# Patient Record
Sex: FEMALE | Race: WHITE | NOT HISPANIC OR LATINO | Employment: OTHER | URBAN - METROPOLITAN AREA
[De-identification: names, ages, dates, MRNs, and addresses within clinical notes are randomized per-mention and may not be internally consistent; named-entity substitution may affect disease eponyms.]

---

## 2017-01-18 ENCOUNTER — HISTORICAL (OUTPATIENT)
Dept: RADIOLOGY | Facility: HOSPITAL | Age: 67
End: 2017-01-18

## 2017-03-01 ENCOUNTER — HISTORICAL (OUTPATIENT)
Dept: LAB | Facility: HOSPITAL | Age: 67
End: 2017-03-01

## 2017-06-19 ENCOUNTER — HISTORICAL (OUTPATIENT)
Dept: SURGERY | Facility: HOSPITAL | Age: 67
End: 2017-06-19

## 2017-06-22 ENCOUNTER — HISTORICAL (OUTPATIENT)
Dept: ANESTHESIOLOGY | Facility: HOSPITAL | Age: 67
End: 2017-06-22

## 2017-12-01 ENCOUNTER — HISTORICAL (OUTPATIENT)
Dept: LAB | Facility: HOSPITAL | Age: 67
End: 2017-12-01

## 2018-01-26 ENCOUNTER — HISTORICAL (OUTPATIENT)
Dept: RADIOLOGY | Facility: HOSPITAL | Age: 68
End: 2018-01-26

## 2018-04-17 ENCOUNTER — HISTORICAL (OUTPATIENT)
Dept: LAB | Facility: HOSPITAL | Age: 68
End: 2018-04-17

## 2018-06-08 ENCOUNTER — HISTORICAL (OUTPATIENT)
Dept: CARDIOLOGY | Facility: HOSPITAL | Age: 68
End: 2018-06-08

## 2019-04-26 ENCOUNTER — HISTORICAL (OUTPATIENT)
Dept: RADIOLOGY | Facility: HOSPITAL | Age: 69
End: 2019-04-26

## 2019-12-10 ENCOUNTER — HISTORICAL (OUTPATIENT)
Dept: RADIOLOGY | Facility: HOSPITAL | Age: 69
End: 2019-12-10

## 2020-02-03 ENCOUNTER — HISTORICAL (OUTPATIENT)
Dept: SURGERY | Facility: HOSPITAL | Age: 70
End: 2020-02-03

## 2020-05-05 ENCOUNTER — HISTORICAL (OUTPATIENT)
Dept: ADMINISTRATIVE | Facility: HOSPITAL | Age: 70
End: 2020-05-05

## 2020-06-07 ENCOUNTER — HISTORICAL (OUTPATIENT)
Dept: ADMINISTRATIVE | Facility: HOSPITAL | Age: 70
End: 2020-06-07

## 2020-06-13 LAB
FINAL CULTURE: NORMAL
FINAL CULTURE: NORMAL

## 2020-10-07 ENCOUNTER — HISTORICAL (OUTPATIENT)
Dept: RADIOLOGY | Facility: HOSPITAL | Age: 70
End: 2020-10-07

## 2021-05-25 ENCOUNTER — HISTORICAL (OUTPATIENT)
Dept: RADIOLOGY | Facility: HOSPITAL | Age: 71
End: 2021-05-25

## 2021-06-08 ENCOUNTER — HISTORICAL (OUTPATIENT)
Dept: RADIOLOGY | Facility: HOSPITAL | Age: 71
End: 2021-06-08

## 2021-06-15 ENCOUNTER — HISTORICAL (OUTPATIENT)
Dept: ANESTHESIOLOGY | Facility: HOSPITAL | Age: 71
End: 2021-06-15

## 2021-10-04 ENCOUNTER — HISTORICAL (OUTPATIENT)
Dept: RADIOLOGY | Facility: HOSPITAL | Age: 71
End: 2021-10-04

## 2021-10-12 ENCOUNTER — HISTORICAL (OUTPATIENT)
Dept: RADIOLOGY | Facility: HOSPITAL | Age: 71
End: 2021-10-12

## 2022-01-04 ENCOUNTER — HISTORICAL (OUTPATIENT)
Dept: ADMINISTRATIVE | Facility: HOSPITAL | Age: 72
End: 2022-01-04

## 2022-01-04 LAB
ABS NEUT (OLG): 5.9 X10(3)/MCL (ref 1.5–6.9)
ALBUMIN SERPL-MCNC: 2.9 GM/DL (ref 3.4–4.8)
ALBUMIN/GLOB SERPL: 0.8 RATIO (ref 1.1–2)
ALP SERPL-CCNC: 136 UNIT/L (ref 40–150)
ALT SERPL-CCNC: 14 UNIT/L (ref 0–55)
AST SERPL-CCNC: 17 UNIT/L (ref 5–34)
BASOPHILS # BLD AUTO: 0 X10(3)/MCL (ref 0–0.1)
BASOPHILS NFR BLD AUTO: 0 % (ref 0–1)
BILIRUB SERPL-MCNC: 1.3 MG/DL
BILIRUBIN DIRECT+TOT PNL SERPL-MCNC: 0.5 MG/DL (ref 0–0.5)
BILIRUBIN DIRECT+TOT PNL SERPL-MCNC: 0.8 MG/DL (ref 0–0.8)
BUN SERPL-MCNC: 18 MG/DL (ref 9.8–20.1)
CALCIUM SERPL-MCNC: 9.5 MG/DL (ref 8.7–10.5)
CHLORIDE SERPL-SCNC: 114 MMOL/L (ref 98–107)
CO2 SERPL-SCNC: 24 MMOL/L (ref 23–31)
CREAT SERPL-MCNC: 2.97 MG/DL (ref 0.55–1.02)
EOSINOPHIL # BLD AUTO: 0.3 X10(3)/MCL (ref 0–0.6)
EOSINOPHIL NFR BLD AUTO: 4 % (ref 0–5)
ERYTHROCYTE [DISTWIDTH] IN BLOOD BY AUTOMATED COUNT: 15.1 % (ref 11.5–17)
GLOBULIN SER-MCNC: 3.8 GM/DL (ref 2.4–3.5)
GLUCOSE SERPL-MCNC: 88 MG/DL (ref 82–115)
HCT VFR BLD AUTO: 35 % (ref 36–48)
HGB BLD-MCNC: 10.9 GM/DL (ref 12–16)
IMM GRANULOCYTES # BLD AUTO: 0.06 10*3/UL (ref 0–0.02)
IMM GRANULOCYTES NFR BLD AUTO: 0.8 % (ref 0–0.43)
LYMPHOCYTES # BLD AUTO: 0.8 X10(3)/MCL (ref 0.5–4.1)
LYMPHOCYTES NFR BLD AUTO: 11 % (ref 15–40)
MCH RBC QN AUTO: 29 PG (ref 27–34)
MCHC RBC AUTO-ENTMCNC: 31 GM/DL (ref 31–36)
MCV RBC AUTO: 93 FL (ref 80–99)
MONOCYTES # BLD AUTO: 0.3 X10(3)/MCL (ref 0–1.1)
MONOCYTES NFR BLD AUTO: 4 % (ref 4–12)
NEUTROPHILS # BLD AUTO: 5.9 X10(3)/MCL (ref 1.5–6.9)
NEUTROPHILS NFR BLD AUTO: 80 % (ref 43–75)
PLATELET # BLD AUTO: 190 X10(3)/MCL (ref 140–400)
PMV BLD AUTO: 10.7 FL (ref 6.8–10)
POTASSIUM SERPL-SCNC: 4.2 MMOL/L (ref 3.5–5.1)
PROT SERPL-MCNC: 6.7 GM/DL (ref 5.8–7.6)
RBC # BLD AUTO: 3.77 X10(6)/MCL (ref 4.2–5.4)
SODIUM SERPL-SCNC: 145 MMOL/L (ref 136–145)
WBC # SPEC AUTO: 7.4 X10(3)/MCL (ref 4.5–11.5)

## 2022-01-07 ENCOUNTER — HISTORICAL (OUTPATIENT)
Dept: ADMINISTRATIVE | Facility: HOSPITAL | Age: 72
End: 2022-01-07

## 2022-04-04 ENCOUNTER — HISTORICAL (OUTPATIENT)
Dept: ADMINISTRATIVE | Facility: HOSPITAL | Age: 72
End: 2022-04-04

## 2022-04-04 ENCOUNTER — HISTORICAL (OUTPATIENT)
Dept: LAB | Facility: HOSPITAL | Age: 72
End: 2022-04-04

## 2022-04-04 LAB
ABS NEUT (OLG): 4.7 (ref 1.5–6.9)
BASOPHILS # BLD AUTO: 0 10*3/UL (ref 0–0.1)
BASOPHILS NFR BLD AUTO: 0 % (ref 0–1)
BUN SERPL-MCNC: 19 MG/DL (ref 9.8–20.1)
CALCIUM SERPL-MCNC: 9.8 MG/DL (ref 8.7–10.5)
CHLORIDE SERPL-SCNC: 113 MMOL/L (ref 98–107)
CO2 SERPL-SCNC: 16 MMOL/L (ref 23–31)
CREAT SERPL-MCNC: 2.48 MG/DL (ref 0.55–1.02)
CREAT/UREA NIT SERPL: 8
EOSINOPHIL # BLD AUTO: 0.3 10*3/UL (ref 0–0.6)
EOSINOPHIL NFR BLD AUTO: 4 % (ref 0–5)
ERYTHROCYTE [DISTWIDTH] IN BLOOD BY AUTOMATED COUNT: 13.8 % (ref 11.5–17)
GLUCOSE SERPL-MCNC: 93 MG/DL (ref 82–115)
HCT VFR BLD AUTO: 34.8 % (ref 36–48)
HEMOLYSIS INTERF INDEX SERPL-ACNC: >10
HGB BLD-MCNC: 11.2 G/DL (ref 12–16)
ICTERIC INTERF INDEX SERPL-ACNC: 0
IMM GRANULOCYTES # BLD AUTO: 0.02 10*3/UL (ref 0–0.02)
IMM GRANULOCYTES NFR BLD AUTO: 0.3 % (ref 0–0.43)
LIPEMIC INTERF INDEX SERPL-ACNC: >10
LYMPHOCYTES # BLD AUTO: 1.6 10*3/UL (ref 0.5–4.1)
LYMPHOCYTES NFR BLD AUTO: 22 % (ref 15–40)
MANUAL DIFF? (OHS): NO
MCH RBC QN AUTO: 31 PG (ref 27–34)
MCHC RBC AUTO-ENTMCNC: 32 G/DL (ref 31–36)
MCV RBC AUTO: 96 FL (ref 80–99)
MONOCYTES # BLD AUTO: 0.4 10*3/UL (ref 0–1.1)
MONOCYTES NFR BLD AUTO: 6 % (ref 4–12)
NEUTROPHILS # BLD AUTO: 4.7 10*3/UL (ref 1.5–6.9)
NEUTROPHILS NFR BLD AUTO: 67 % (ref 43–75)
PLATELET # BLD AUTO: 148 10*3/UL (ref 140–400)
PMV BLD AUTO: 11.8 FL (ref 6.8–10)
POTASSIUM SERPL-SCNC: 4.6 MMOL/L (ref 3.5–5.1)
RBC # BLD AUTO: 3.64 10*6/UL (ref 4.2–5.4)
SODIUM SERPL-SCNC: 142 MMOL/L (ref 136–145)
WBC # SPEC AUTO: 7.1 10*3/UL (ref 4.5–11.5)

## 2022-04-30 NOTE — OP NOTE
PREOPERATIVE DIAGNOSES:    1. History of colon cancer.    2. History of XRT to the pelvis due to cervical cancer remotely.    PROCEDURES PERFORMED:  A rectal stump proctoscopy as well as endoscopic visualization of the terminal ileum.    POSTOPERATIVE DIAGNOSES:    1. Normal terminal ileum.  2. 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    INDICATIONS:    A pleasant 66-year-old white female patient who, in 1996, had colon cancer which required total colectomy.  She had recurrence in 1999.  At that point, the patient had a revision of her colostomy and had an ileostomy as well.  She also in that time period developed cervical cancer and ended up having some radiation to the pelvis as well.     She had some surveillance by Dr. Watson, the surgeon, but has not had these in quite some time.  The patient presented to my office for local followup.  She denies any alarm symptoms.     The patient was consented for the procedure prior to.  The risks and benefits of the procedure were explained to her in detail.  She was willing to undergo those risks.    PROCEDURE IN DETAIL:  The patient was brought down to the endoscopy room suite, laid in a left lateral decubitus position.  I performed a rectal exam at first.  She had essentially a tight anus, too tight for the colonoscope.  At that point, the Olympus gastroscope was then lubricated after the digital exam was performed.  The digital exam was largely unrevealing.  No masses.  Fixed rectal tone.     The Olympus gastroscope was then lubricated and inserted into the rectal vault.  She had about 5-10 cm of rectal vault still present.  It was somewhat erythematous and difficult to tell whether this was trauma or underlying proctitis.  None the less, because that, I biopsied these areas.  I did not see any masses or polyps, but just a closed end of the rectal stump.  These biopsies were sent off in a jar.  The scope was then pulled out gently, and  then we proceeded with evaluation of the terminal ileum.  The Olympus gastroscope was then lubricated once more and then inserted into the ileostomy site.  I was able to get about 15-20 cm into the terminal ileum.  This was very healthy-appearing tissue.  No abnormalities.  No polyps or any masses.  The ostomy itself looked very clean and healthy.     No biopsies taken.     In summary, a 66-year-old white female with a history of colon cancer, status post endoscopic evaluation of the rectum and terminal ileum without any signs of recurrence.     We will follow up on those biopsies of the rectum and see if these have any bearing or significance.  This is likely due to chronic old changes to the rectum due to XRT.  I do not suspect malignancy, but will discuss this with her at a later date.     Repeat endoscopy will be based off my biopsies and further communication and delineation of her previous endoscopies.  It will likely be in the next 5 years.        ANDREW/NHUNG   DD: 06/22/2017 0954   DT: 06/22/2017 1019  Job # [id number]    cc: ANGY Arredondo III, M.D. Stephen R Cannon, M.D.

## 2022-05-02 NOTE — HISTORICAL [facility] CERNER
This is a historical note converted from [employer]. Formatting and pictures may have been removed.  Please reference [employer] for original formatting and attached multimedia. Reason for Consultation  Alex?  History of Present Illness  68 y/o  female ,with h/o radiation cystitis,chronic UT is , vesicular-vaginal and vesicular-enteral fistula,admitted with severe Alex non oliguric renal failure.  Review of Systems  Pt states feels bad has no n/v but has had increased ileostomy output. No heme present. Remaining ros are negative.  Physical Exam  Vitals & Measurements  T:?36.6? ?C (Oral)? TMIN:?36.5? ?C (Oral)? TMAX:?36.6? ?C (Oral)? HR:?67(Peripheral)? RR:?15? BP:?126/75? SpO2:?100%?  Gen Nad skin in tact no lesions ?Lymph ?no lymphadenopathy.HEENT perrla mouth clear EOMI, NECK no jvd supple trachea in midline. Lungs. Cta. No wheezes or rales no tachypneic. Heart s1s2 no rubs or gallops no pmi ? Abdomen ntnd +bs ostomies noted no infection no guarding ? Back ?no cva. Tenderness ? Extremities +2/4 pulses no c/c/e Neuro C N II - XII intact?  Assessment/Plan  Anemia?D64.9  GERD - Gastro-esophageal reflux disease?K21.9  Hypothyroidism?E03.9  Sepsis due to urinary tract infection?A41.9  Urinary tract infection?N39.0  Alex associated with Uti, improving with abx and fluids, avoid nephrotoxins.  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   Problem List/Past Medical History  Ongoing  Anemia  Colon cancer  GERD - Gastro-esophageal reflux disease  Hydronephrosis  Hypothyroidism  Radiation cystitis  Sepsis due to urinary tract infection  Urinary tract infection  vesicular enteric fistula  vesicularvaginal fistula  Historical  Uterine cancer  Procedure/Surgical History  Cystoscopy (None) (06/26/2019)  Fluoroscopy of Kidneys, Ureters and Bladder using Low Osmolar Contrast (06/26/2019)  Suprapubic Catheter Placement (06/26/2019)  Insertion of Infusion Device into Superior Vena Cava, Percutaneous Approach  (06/24/2019)  Change Drainage Device in Bladder, External Approach (05/04/2019)  Change of cystostomy tube; simple (05/04/2019)  Insertion of Infusion Device into Superior Vena Cava, Percutaneous Approach (07/23/2018)  Ultrasonography of Superior Vena Cava, Guidance (07/23/2018)  Change Drainage Device in Kidney, External Approach (06/08/2018)  Convert nephrostomy catheter to nephroureteral catheter, percutaneous, including diagnostic nephrostogram and/or ureterogram when performed, imaging guidance (eg, ultrasound and/or fluoroscopy) and all associated radiological supervision and interpretatio (06/08/2018)  Change Drainage Device in Kidney, External Approach (06/06/2018)  Exchange nephrostomy catheter, percutaneous, including diagnostic nephrostogram and/or ureterogram when performed, imaging guidance (eg, ultrasound and/or fluoroscopy) and all associated radiological supervision and interpretation (06/06/2018)  Insertion of Infusion Device into Superior Vena Cava, Percutaneous Approach (06/04/2018)  Ultrasonography of Superior Vena Cava, Guidance (06/04/2018)  Drainage of Left Kidney with Drainage Device, Percutaneous Approach (05/30/2018)  Drainage of Right Kidney with Drainage Device, Percutaneous Approach (05/30/2018)  Fluoroscopy of Bilateral Kidneys (05/30/2018)  Cystoscopy w/ Ureteral Stent (Bilateral) (05/29/2018)  Inspection of Bladder, Via Natural or Artificial Opening Endoscopic (05/29/2018)  Biopsy Gastrointestinal (06/22/2017)  Colonoscopy (06/22/2017)  Excision of Rectum, Via Natural or Artificial Opening Endoscopic (06/22/2017)  Inspection of Lower Intestinal Tract, Via Natural or Artificial Opening Endoscopic (06/22/2017)  Proctosigmoidoscopy, rigid; with biopsy, single or multiple (06/22/2017)  Small intestinal endoscopy, enteroscopy beyond second portion of duodenum, including ileum; diagnostic, with or without collection of specimen(s) by brushing or washing (separate procedure)  (2017)  Drainage of Bladder with Drainage Device, Via Natural or Artificial Opening (11/15/2016)  fistula packing (2015)  Cataract Extraction Phacoemulsification (Right) (2015)  Extracapsular cataract removal with insertion of intraocular lens prosthesis (1 stage procedure), manual or mechanical technique (eg, irrigation and aspiration or phacoemulsification) (2015)  Insertion of intraocular lens prosthesis at time of cataract extraction, one-stage (2015)  Phacoemulsification and aspiration of cataract (2015)  Cystectomy (.) (2013)  Cystoscopy w/ Ureteral Stent (Bilateral) (2013)  Exploration Laparotomy (2013)  Incisional hernia repair (2013)  Insertion of indwelling urinary catheter (2013)  Other lysis of peritoneal adhesions (2013)  Other suprapubic cystostomy (2013)  Repair of fistula involving bladder and intestine (2013)  Retrograde pyelogram (2013)  Revision of stoma of small intestine (2013)  Small Bowel Resection (2013)  Ureteral catheterization (2013)  Venous catheterization, not elsewhere classified (2013)  Appendectomy   section  Cholecystectomy  colon resection  Hernia repair  Hysterectomy  Ileostomy  mediport  Partial thyroidectomy  Urostomy - stoma   Medications  Inpatient  acetaminophen 325 mg oral tablet, 650 mg= 2 tab(s), Oral, q4hr, PRN  acetaminophen 325 mg oral tablet, 650 mg= 2 tab(s), Oral, q4hr, PRN  acetaminophen-hydrocodone 325 mg-10 mg oral tablet, 1 tab(s), Oral, q6hr, PRN  citalopram 20 mg oral tablet, 20 mg= 1 tab(s), Oral, Daily  citric acid-sodium citrate, 30 mL, Oral, TID  Dulcolax Laxative 10 mg RECTAL suppository, 10 mg= 1 supp, ND (rectal), q24hr, PRN  enoxaparin, 30 mg= 0.3 mL, Subcutaneous, Daily  fluconazole 100 mg oral tablet, 100 mg= 1 tab(s), Oral, Daily  IVF D5 ? Normal Saline Infusion 1,000 mL, 1000 mL, IV  levothyroxine 25 mcg (0.025 mg) oral tablet, 25  mcg= 1 tab(s), Oral, Daily  nystatin 100,000 units/g topical cream, 1 jonelle, TOP, QID  pantoprazole, 40 mg= 1 EA, IV Slow, Daily  piperacillin-tazobactam  Seroquel 25 mg oral tablet, 25 mg= 1 tab(s), Oral, At Bedtime  sodium bicarbonate 650mg tablet, 650 mg= 1 tab(s), Oral, QID  Sodium Chloride 0.9% Normal Saline Flush, 10 mL, IV Push, q12hr  Sodium Chloride 0.9% Normal Saline Flush, 10 mL, IV Push, BID, PRN  Synthroid 137 mcg (0.137 mg) oral tablet, 112 mcg= 1 tab(s), Oral, Daily  Zofran 2 mg/mL injectable solution, 8 mg= 4 mL, IV Push, q8hr, PRN  Home  citalopram 20 mg oral tablet, 20 mg= 1 tab(s), Oral, Daily  fluconazole 100 mg oral tablet, 100 mg= 1 tab(s), Oral, Daily  HYDROCODONE-ACETAMIN  MG, Oral, q4-6hr, PRN  Lovenox, 40 mg= 0.4 mL, Subcutaneous, Daily  nystatin 100,000 units/g topical cream, 1 jonelle, TOP, QID  pantoprazole, 40 mg, IV Slow, Daily  piperacillin-tazobactam, 2.25 gm= 1 EA, IV Piggyback, q8hr  Seroquel 25 mg oral tablet, 25 mg= 1 tab(s), Oral, Daily  Synthroid 137 mcg (0.137 mg) oral tablet, 0.137 mg= 1 tab(s), Oral, Daily  Tylenol 325 mg oral tablet, 650 mg= 2 tab(s), Oral, q6hr, PRN  Zofran 2 mg/mL injectable solution, 8 mg= 4 mL, IV Push, q8hr, PRN  Allergies  Bactrim?(unknown)  Daypro  NSAIDs?(Rash)  doxycycline  Social History  Abuse/Neglect  No, 05/28/2020  No, 05/27/2020  No, 05/01/2020  No, 04/30/2020  No, 02/07/2020  No, 02/03/2020  Alcohol - Denies Alcohol Use, 11/26/2013  Never, 11/11/2016  Employment/School  Retired, Work/School description: beautician., 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 Living situation: Home/Independent., 11/11/2016  Nutrition/Health  Regular, 06/19/2017  Sexual  Sexual orientation: Straight or heterosexual., 02/03/2020  Spiritual/Cultural  Confucianism, 02/03/2020  Substance Use - Denies Substance Abuse, 11/26/2013  Never, 11/11/2016  Tobacco - Denies Tobacco Use, 11/26/2013  Never (less than 100 in lifetime), No, 05/28/2020  Never (less than 100  in lifetime), N/A, 05/27/2020  Never (less than 100 in lifetime), No, 05/01/2020  Never (less than 100 in lifetime), No, 04/30/2020  Never (less than 100 in lifetime), N/A, 02/07/2020  Never (less than 100 in lifetime), No, 02/03/2020  Family History  Cancer: Mother and Father.  Gastric cancer: Mother.  Primary malignant neoplasm of colon: Father.  Tobacco use.: Mother and Father.  Immunizations  Vaccine Date Status   influenza virus vaccine, inactivated 11/17/2016 Given

## 2022-05-02 NOTE — HISTORICAL [facility] CERN[facility]
This is a historical note converted from [employer]. Formatting and pictures may have been removed.  Please reference [employer] for original formatting and attached multimedia. Type of Procedure: Fluoroscopically Guided Lumbar Interlaminar Epidural Steroid Injection  ?  Physician: Carmine Velarde III, MD  ?  Diagnosis: Lumbar spondylosis with radiculopathy  ?  Description of Procedure:  After appropriate informed consent discussing the risks, benefits and possible complications, the patient was taken to the procedure suite. NIBP, pulse rate, and oxygen saturation were monitored throughout the procedure.?  ?  The patient was placed in the prone position on the procedural table. Lumbar area was prepped and draped in sterile fashion. 5cc of Lidocaine 1% was injected subcutaneously over the lumbar spine. A 20-gauge Tuohy needle was introduced atraumatically in the interspinous space and advanced up to the epidural space using fluoroscopic guidance in the AP and lateral views. Loss of resistance to saline was used to identify the epidural space using fluoroscopic guidance in the lateral position. Following negative aspiration for blood and CSF and confirming the absence of paresthesias, injection of approximately 1 cc of Omnipaque 300 demonstrated excellent epidural spread without vascular or intrathecal uptake. At this point, 80 mg of Depo-Medrol mixed with 1 mL of 0.25% bupivacaine was injected without complication.?  ?  The needle was re-styletted and removed. Adhesive dressing was applied over the site. Patient tolerated the procedure well without any apparent complications. The patient was transferred back to the recovery area and then discharged home.

## 2022-06-14 ENCOUNTER — HOSPITAL ENCOUNTER (OUTPATIENT)
Dept: RADIOLOGY | Facility: HOSPITAL | Age: 72
Discharge: HOME | End: 2022-06-14
Attending: FAMILY MEDICINE
Payer: MEDICARE

## 2022-06-14 DIAGNOSIS — R06.00 DYSPNEA: ICD-10-CM

## 2022-06-14 PROCEDURE — 71046 X-RAY EXAM CHEST 2 VIEWS: CPT | Mod: TC

## 2022-07-21 ENCOUNTER — CLINICAL SUPPORT (OUTPATIENT)
Dept: RESPIRATORY THERAPY | Facility: HOSPITAL | Age: 72
End: 2022-07-21
Attending: FAMILY MEDICINE
Payer: MEDICARE

## 2022-07-21 VITALS — OXYGEN SATURATION: 96 % | HEART RATE: 91 BPM | RESPIRATION RATE: 20 BRPM

## 2022-07-21 DIAGNOSIS — R06.02 SHORTNESS OF BREATH: ICD-10-CM

## 2022-07-21 DIAGNOSIS — R06.02 SHORT OF BREATH ON EXERTION: Primary | ICD-10-CM

## 2022-07-21 PROCEDURE — 94010 BREATHING CAPACITY TEST: CPT

## 2022-07-21 PROCEDURE — 94640 AIRWAY INHALATION TREATMENT: CPT | Mod: 59

## 2022-07-21 PROCEDURE — 94760 N-INVAS EAR/PLS OXIMETRY 1: CPT

## 2022-07-21 RX ORDER — ALBUTEROL SULFATE 2.5 MG/.5ML
2.5 SOLUTION RESPIRATORY (INHALATION)
Status: COMPLETED | OUTPATIENT
Start: 2022-07-21 | End: 2022-07-21

## 2022-07-21 RX ORDER — ALBUTEROL SULFATE 2.5 MG/.5ML
2.5 SOLUTION RESPIRATORY (INHALATION)
Status: CANCELLED | OUTPATIENT
Start: 2022-07-21 | End: 2022-07-21

## 2022-07-21 RX ORDER — ALBUTEROL SULFATE 2.5 MG/.5ML
2.5 SOLUTION RESPIRATORY (INHALATION) ONCE
Status: CANCELLED | OUTPATIENT
Start: 2022-07-21

## 2022-07-21 RX ADMIN — ALBUTEROL SULFATE 2.5 MG: 2.5 SOLUTION RESPIRATORY (INHALATION) at 11:07

## 2022-08-22 DIAGNOSIS — M96.1 POSTLAMINECTOMY SYNDROME, CERVICAL REGION: Primary | ICD-10-CM

## 2022-08-24 ENCOUNTER — HOSPITAL ENCOUNTER (OUTPATIENT)
Dept: RADIOLOGY | Facility: HOSPITAL | Age: 72
Discharge: HOME | End: 2022-08-24
Attending: NEUROLOGICAL SURGERY
Payer: MEDICARE

## 2022-08-24 DIAGNOSIS — M96.1 POSTLAMINECTOMY SYNDROME, CERVICAL REGION: ICD-10-CM

## 2022-08-24 PROCEDURE — A9577 INJ MULTIHANCE: HCPCS | Performed by: NEUROLOGICAL SURGERY

## 2022-08-24 PROCEDURE — 25500020 PHARM REV CODE 255: Performed by: NEUROLOGICAL SURGERY

## 2022-08-24 PROCEDURE — 72158 MRI LUMBAR SPINE W/O & W/DYE: CPT | Mod: TC

## 2022-08-24 RX ADMIN — GADOBENATE DIMEGLUMINE 5 ML: 529 INJECTION, SOLUTION INTRAVENOUS at 11:08

## 2022-08-30 ENCOUNTER — HOSPITAL ENCOUNTER (OUTPATIENT)
Dept: PULMONOLOGY | Facility: HOSPITAL | Age: 72
Discharge: HOME | End: 2022-08-30
Attending: FAMILY MEDICINE
Payer: MEDICARE

## 2022-08-30 DIAGNOSIS — R06.00 DYSPNEA, UNSPECIFIED TYPE: ICD-10-CM

## 2022-08-30 DIAGNOSIS — Z11.52 ENCOUNTER FOR SCREENING FOR SEVERE ACUTE RESPIRATORY SYNDROME CORONAVIRUS 2 (SARS-COV-2) INFECTION: ICD-10-CM

## 2022-08-30 LAB
DLCO SINGLE BREATH LLN: 13.54
DLCO SINGLE BREATH PRE REF: 50.5 %
DLCO SINGLE BREATH REF: 19.27
DLCOC SBVA LLN: 2.73
DLCOC SBVA REF: 4.35
DLCOC SINGLE BREATH LLN: 13.54
DLCOC SINGLE BREATH REF: 19.27
DLCOVA LLN: 2.73
DLCOVA PRE REF: 64.6 %
DLCOVA PRE: 2.81 ML/(MIN*MMHG*L) (ref 2.73–5.96)
DLCOVA REF: 4.35
FEF 25 75 CHG: -26.3 %
FEF 25 75 LLN: 0.77
FEF 25 75 POST REF: 82.2 %
FEF 25 75 PRE REF: 111.5 %
FEF 25 75 REF: 1.69
FET100 CHG: -8.6 %
FEV1 CHG: -4.6 %
FEV1 FVC CHG: -6.6 %
FEV1 FVC LLN: 65
FEV1 FVC POST REF: 95.1 %
FEV1 FVC PRE REF: 101.9 %
FEV1 FVC REF: 78
FEV1 LLN: 1.41
FEV1 POST REF: 87.1 %
FEV1 PRE REF: 91.4 %
FEV1 REF: 1.94
FRCPLETH LLN: 1.72
FRCPLETH PREREF: 31.8 %
FRCPLETH REF: 2.54
FVC CHG: 2.1 %
FVC LLN: 1.81
FVC POST REF: 90.9 %
FVC PRE REF: 89.1 %
FVC REF: 2.5
IVC PRE: 2.32 L (ref 1.81–3.23)
IVC SINGLE BREATH LLN: 1.81
IVC SINGLE BREATH PRE REF: 92.7 %
IVC SINGLE BREATH REF: 2.5
MVV LLN: 59
MVV PRE REF: 95.3 %
MVV REF: 70
PEF CHG: -11.7 %
PEF LLN: 3.57
PEF POST REF: 60.6 %
PEF PRE REF: 68.7 %
PEF REF: 5.12
POST FEF 25 75: 1.39 L/S (ref 0.77–3.01)
POST FET 100: 6.29 SEC
POST FEV1 FVC: 74.5 % (ref 64.7–90.02)
POST FEV1: 1.69 L (ref 1.41–2.46)
POST FVC: 2.27 L (ref 1.81–3.23)
POST PEF: 3.1 L/S (ref 3.57–6.67)
PRE DLCO: 9.74 ML/(MIN*MMHG) (ref 13.54–25.01)
PRE FEF 25 75: 1.88 L/S (ref 0.77–3.01)
PRE FET 100: 6.88 SEC
PRE FEV1 FVC: 79.77 % (ref 64.7–90.02)
PRE FEV1: 1.78 L (ref 1.41–2.46)
PRE FRC PL: 0.81 L (ref 1.72–3.36)
PRE FVC: 2.23 L (ref 1.81–3.23)
PRE MVV: 66.51 L/MIN (ref 59.33–80.28)
PRE PEF: 3.52 L/S (ref 3.57–6.67)
RAW LLN: 3.06
RAW PRE REF: 295.3 %
RAW PRE: 9.04 CMH2O*S/L (ref 3.06–3.06)
RAW REF: 3.06
VA PRE: 3.47 L (ref 4.29–4.29)
VA SINGLE BREATH LLN: 4.29
VA SINGLE BREATH PRE REF: 81 %
VA SINGLE BREATH REF: 4.29
VC LLN: 1.81
VC PRE REF: 89.1 %
VC PRE: 2.23 L (ref 1.81–3.23)
VC REF: 2.5

## 2022-08-30 PROCEDURE — 94640 AIRWAY INHALATION TREATMENT: CPT

## 2022-08-30 PROCEDURE — 94729 DIFFUSING CAPACITY: CPT

## 2022-08-30 PROCEDURE — 94060 EVALUATION OF WHEEZING: CPT

## 2022-08-30 RX ORDER — ALBUTEROL SULFATE 0.83 MG/ML
2.5 SOLUTION RESPIRATORY (INHALATION)
Status: COMPLETED | OUTPATIENT
Start: 2022-08-30 | End: 2022-08-30

## 2022-08-30 RX ORDER — IPRATROPIUM BROMIDE 0.5 MG/2.5ML
0.5 SOLUTION RESPIRATORY (INHALATION)
Status: COMPLETED | OUTPATIENT
Start: 2022-08-30 | End: 2022-08-30

## 2022-08-30 RX ADMIN — IPRATROPIUM BROMIDE 0.5 MG: 0.5 SOLUTION RESPIRATORY (INHALATION) at 02:08

## 2022-08-30 RX ADMIN — ALBUTEROL SULFATE 2.5 MG: 0.83 SOLUTION RESPIRATORY (INHALATION) at 02:08

## 2022-10-16 ENCOUNTER — HOSPITAL ENCOUNTER (EMERGENCY)
Facility: HOSPITAL | Age: 72
Discharge: HOME | End: 2022-10-16
Attending: FAMILY MEDICINE
Payer: MEDICARE

## 2022-10-16 VITALS
HEART RATE: 79 BPM | BODY MASS INDEX: 31.72 KG/M2 | DIASTOLIC BLOOD PRESSURE: 89 MMHG | SYSTOLIC BLOOD PRESSURE: 138 MMHG | HEIGHT: 61 IN | OXYGEN SATURATION: 98 % | WEIGHT: 168 LBS | TEMPERATURE: 98 F

## 2022-10-16 DIAGNOSIS — T83.010A SUPRAPUBIC CATHETER DYSFUNCTION, INITIAL ENCOUNTER: Primary | ICD-10-CM

## 2022-10-16 PROCEDURE — 51702 INSERT TEMP BLADDER CATH: CPT

## 2022-10-16 PROCEDURE — 51705 CHANGE OF BLADDER TUBE: CPT

## 2022-10-16 PROCEDURE — 99283 EMERGENCY DEPT VISIT LOW MDM: CPT | Mod: 25

## 2022-10-16 NOTE — ED PROVIDER NOTES
Encounter Date: 10/16/2022       History     Chief Complaint   Patient presents with    Abdominal Pain     Pt states she has a suprapubic catheter and th balloon popped and she needs it replaced.     72-year-old female presents for replacement of suprapubic catheter.  Patient states that the balloon popped last night.  She brought a 16 Fr Chun catheter with her.  She has follow-up with her urologist Dr. Castro tomorrow.  No other complaints.    The history is provided by the patient.   Review of patient's allergies indicates:   Allergen Reactions    Oxaprozin Nausea And Vomiting and Swelling     Facial swelling      Doxycycline     Nsaids (non-steroidal anti-inflammatory drug) Rash    Sulfa (sulfonamide antibiotics) Nausea And Vomiting     No past medical history on file.  No past surgical history on file.  No family history on file.     Review of Systems   Constitutional: Negative.    HENT: Negative.     Eyes: Negative.    Respiratory: Negative.     Cardiovascular: Negative.    Gastrointestinal: Negative.    Endocrine: Negative.    Genitourinary: Negative.    Musculoskeletal: Negative.    Skin: Negative.    Allergic/Immunologic: Negative.    Neurological: Negative.    Hematological: Negative.    Psychiatric/Behavioral: Negative.       Physical Exam     Initial Vitals [10/16/22 0913]   BP Pulse Resp Temp SpO2   138/89 79 -- 97.9 °F (36.6 °C) 98 %      MAP       --         Physical Exam    Nursing note and vitals reviewed.  Constitutional: She appears well-developed.   HENT:   Head: Normocephalic.   Eyes: Pupils are equal, round, and reactive to light.   Neck:   Normal range of motion.  Cardiovascular:  Normal rate.           Pulmonary/Chest: Breath sounds normal.   Abdominal: Abdomen is soft. Bowel sounds are normal.   Suprapubic ostomy noted.  No surrounding erythema or drainage.  No signs of infection.   Musculoskeletal:         General: No tenderness.      Cervical back: Normal range of motion.      Neurological: She is alert.   Skin: Skin is warm. Capillary refill takes less than 2 seconds.   Psychiatric: She has a normal mood and affect.       ED Course   Suprapubic Tube    Date/Time: 10/16/2022 9:48 AM  Performed by: Rd Mccurdy MD  Authorized by: Rd Mccurdy MD   Consent: Verbal consent obtained.  Risks and benefits: risks, benefits and alternatives were discussed  Consent given by: patient  Patient understanding: patient states understanding of the procedure being performed  Patient consent: the patient's understanding of the procedure matches consent given  Patient identity confirmed: verbally with patient  Indications: catheter change  Local anesthesia used: no    Anesthesia:  Local anesthesia used: no    Sedation:  Patient sedated: no    Suprapubic aspiration by: catheter  Catheter type: Chun  Catheter size: 16 Fr  Number of attempts: 1  Urine characteristics: yellow  Patient tolerance: patient tolerated the procedure well with no immediate complications      Labs Reviewed - No data to display       Imaging Results    None          Medications - No data to display  Medical Decision Making:   ED Management:  Keep follow-up with urology as scheduled tomorrow.                        Clinical Impression:   Final diagnoses:  [T83.010A] Suprapubic catheter dysfunction, initial encounter (Primary)        ED Disposition Condition    Discharge Stable          ED Prescriptions    None       Follow-up Information       Follow up With Specialties Details Why Contact Info    Judd Castro MD Urology In 1 day  [address] [facility]  [address]  [facility] [address]  [phone]               Rd Mccurdy MD  10/16/22 0949       Rd Mccurdy MD  10/16/22 0957

## 2023-01-04 ENCOUNTER — HOSPITAL ENCOUNTER (EMERGENCY)
Facility: HOSPITAL | Age: 73
Discharge: HOME | End: 2023-01-04
Attending: INTERNAL MEDICINE
Payer: MEDICARE

## 2023-01-04 VITALS
OXYGEN SATURATION: 99 % | BODY MASS INDEX: 31.24 KG/M2 | WEIGHT: 165.38 LBS | TEMPERATURE: 98 F | RESPIRATION RATE: 20 BRPM | DIASTOLIC BLOOD PRESSURE: 80 MMHG | HEART RATE: 95 BPM | SYSTOLIC BLOOD PRESSURE: 157 MMHG

## 2023-01-04 DIAGNOSIS — T83.010A SUPRAPUBIC CATHETER DYSFUNCTION, INITIAL ENCOUNTER: Primary | ICD-10-CM

## 2023-01-04 PROCEDURE — 99281 EMR DPT VST MAYX REQ PHY/QHP: CPT

## 2023-01-04 PROCEDURE — 51102 DRAIN BL W/CATH INSERTION: CPT

## 2023-01-05 NOTE — ED PROVIDER NOTES
Encounter Date: 1/4/2023       History     Chief Complaint   Patient presents with    medical device problem     Pt states that the balloon on her suprapubic catheter burst and needs it replaced.     73 yo female presents to ED for evaluation of malfunction of suprapubic catheter. States the balloon popped. Patient had 16 Bulgarian catheter placed. Had catheter replaced 2 weeks ago. Urology Dr. Castro.    The history is provided by the patient. No  was used.   Review of patient's allergies indicates:   Allergen Reactions    Oxaprozin Nausea And Vomiting and Swelling     Facial swelling      Doxycycline     Nsaids (non-steroidal anti-inflammatory drug) Rash    Sulfa (sulfonamide antibiotics) Nausea And Vomiting     History reviewed. No pertinent past medical history.  History reviewed. No pertinent surgical history.  History reviewed. No pertinent family history.     Review of Systems   Constitutional:  Negative for chills, fatigue and fever.   Respiratory:  Negative for cough and shortness of breath.    Cardiovascular:  Negative for chest pain.   Gastrointestinal:  Negative for abdominal pain, nausea and vomiting.   Genitourinary:  Negative for dysuria.   Musculoskeletal:  Negative for back pain.   Skin:  Negative for rash.   Neurological:  Negative for weakness.   Hematological:  Does not bruise/bleed easily.   All other systems reviewed and are negative.    Physical Exam     Initial Vitals [01/04/23 2118]   BP Pulse Resp Temp SpO2   (!) 157/80 95 20 97.7 °F (36.5 °C) 99 %      MAP       --         Physical Exam    Vitals reviewed.  Constitutional: She appears well-developed and well-nourished.   HENT:   Head: Normocephalic and atraumatic.   Right Ear: External ear normal.   Left Ear: External ear normal.   Mouth/Throat: Oropharynx is clear and moist.   Eyes: Conjunctivae are normal. Pupils are equal, round, and reactive to light.   Neck: Neck supple.   Normal range of motion.  Cardiovascular:   Normal rate, regular rhythm and normal heart sounds.           Pulmonary/Chest: Breath sounds normal. She has no wheezes. She has no rhonchi. She has no rales.   Abdominal: Abdomen is soft. Bowel sounds are normal. There is no abdominal tenderness.   Suprapubic ostomy noted.  No surrounding erythema or drainage.  No signs of infection.    Musculoskeletal:         General: Normal range of motion.      Cervical back: Normal range of motion and neck supple.     Neurological: She is alert and oriented to person, place, and time.   Skin: Skin is warm and dry.   Psychiatric: She has a normal mood and affect.       ED Course   Suprapubic Tube    Date/Time: 1/4/2023 9:39 PM  Performed by: ISIDRO Veronica  Authorized by: ISIDRO Veronica   Consent given by: patient  Patient understanding: patient states understanding of the procedure being performed  Patient consent: the patient's understanding of the procedure matches consent given  Patient identity confirmed: verbally with patient  Indications: catheter change  Local anesthesia used: no    Anesthesia:  Local anesthesia used: no    Sedation:  Patient sedated: no    Suprapubic aspiration by: catheter  Catheter size: 16 Fr  Number of attempts: 1  Urine characteristics: yellow  Patient tolerance: patient tolerated the procedure well with no immediate complications      Labs Reviewed - No data to display       Imaging Results    None          Medications - No data to display                           Clinical Impression:   Final diagnoses:  [T83.010A] Suprapubic catheter dysfunction, initial encounter (Primary)        ED Disposition Condition    Discharge Stable          ED Prescriptions    None       Follow-up Information       Follow up With Specialties Details Why Contact Info    Gregor Heaton MD Family Medicine   [address]  [address]  [place] [address]  [phone]      Judd Castro MD Urology   [address] [place] [address] [address]  [facility] [address]  [phone]                ISIDRO Veronica  01/04/23 6085

## 2023-01-05 NOTE — FIRST PROVIDER EVALUATION
Medical screening examination initiated.  I have conducted a focused provider triage encounter, findings are as follows:    Brief history of present illness:  73 yo female presents to ED for evaluation of malfunction of suprapubic catheter. States the balloon popped. Patient had 16 Latvian catheter placed. Had catheter replaced 2 weeks ago. Urology Dr. Castro.    Vitals:    01/04/23 2118   BP: (!) 157/80   BP Location: Right arm   Patient Position: Sitting   Pulse: 95   Resp: 20   Temp: 97.7 °F (36.5 °C)   TempSrc: Oral   SpO2: 99%   Weight: 75 kg (165 lb 5.5 oz)       Pertinent physical exam:  Patient is awake and alert and oriented.  Ambulatory to triage.  In no acute distress.      Brief workup plan: Insertion of catheter    Preliminary workup initiated; this workup will be continued and followed by the physician or advanced practice provider that is assigned to the patient when roomed.

## 2023-01-22 ENCOUNTER — HOSPITAL ENCOUNTER (EMERGENCY)
Facility: HOSPITAL | Age: 73
Discharge: HOME | End: 2023-01-22
Attending: EMERGENCY MEDICINE
Payer: MEDICARE

## 2023-01-22 VITALS
DIASTOLIC BLOOD PRESSURE: 78 MMHG | HEART RATE: 75 BPM | WEIGHT: 163.13 LBS | OXYGEN SATURATION: 98 % | BODY MASS INDEX: 30.8 KG/M2 | TEMPERATURE: 99 F | RESPIRATION RATE: 15 BRPM | HEIGHT: 61 IN | SYSTOLIC BLOOD PRESSURE: 131 MMHG

## 2023-01-22 DIAGNOSIS — Z43.5 ENCOUNTER FOR SUPRAPUBIC CATHETER CARE: Primary | ICD-10-CM

## 2023-01-22 PROCEDURE — 99281 EMR DPT VST MAYX REQ PHY/QHP: CPT | Mod: 25

## 2023-01-22 RX ORDER — SODIUM BICARBONATE 650 MG/1
650 TABLET ORAL 4 TIMES DAILY
COMMUNITY

## 2023-01-22 RX ORDER — CALCITRIOL 0.5 UG/1
0.5 CAPSULE ORAL DAILY
COMMUNITY

## 2023-01-22 RX ORDER — LEVOTHYROXINE SODIUM 100 UG/1
137 TABLET ORAL
COMMUNITY

## 2023-01-22 RX ORDER — OMEPRAZOLE 40 MG/1
40 CAPSULE, DELAYED RELEASE ORAL DAILY
COMMUNITY

## 2023-01-22 RX ORDER — QUETIAPINE FUMARATE 25 MG/1
TABLET, FILM COATED ORAL
COMMUNITY

## 2023-01-22 RX ORDER — CITALOPRAM 20 MG/1
20 TABLET, FILM COATED ORAL DAILY
Status: ON HOLD | COMMUNITY
End: 2024-01-30 | Stop reason: HOSPADM

## 2023-01-22 RX ORDER — HYDROCODONE BITARTRATE AND ACETAMINOPHEN 10; 325 MG/1; MG/1
1 TABLET ORAL
COMMUNITY

## 2023-01-22 NOTE — ED PROVIDER NOTES
Encounter Date: 2023       History     Chief Complaint   Patient presents with    Urinary Retention     Patient superpubic tube came out at 1500 and needs to have it replaced.      71 y/o female with h/o indwelling suprapubic catheter secondary to uterine CA and colon CA now c/o catheter balloon breaking and needing replacement.     Review of patient's allergies indicates:   Allergen Reactions    Oxaprozin Nausea And Vomiting and Swelling     Facial swelling      Doxycycline     Nsaids (non-steroidal anti-inflammatory drug) Rash    Sulfa (sulfonamide antibiotics) Nausea And Vomiting     Past Medical History:   Diagnosis Date    Cancer, colon     Chronic pain     GERD (gastroesophageal reflux disease)     Renal disorder     Thyroiditis, unspecified      Past Surgical History:   Procedure Laterality Date    BACK SURGERY       SECTION      CHOLECYSTECTOMY      COLON SURGERY      HYSTERECTOMY      KIDNEY SURGERY       No family history on file.  Social History     Tobacco Use    Smoking status: Never    Smokeless tobacco: Never   Substance Use Topics    Alcohol use: Never    Drug use: Never     Review of Systems   All other systems reviewed and are negative.    Physical Exam     Initial Vitals [23 1555]   BP Pulse Resp Temp SpO2   129/65 79 18 98.6 °F (37 °C) 99 %      MAP       --         Physical Exam    Constitutional: She appears well-developed and well-nourished.   HENT:   Head: Normocephalic and atraumatic.   Eyes: EOM are normal. Pupils are equal, round, and reactive to light.   Neck: Neck supple.   Normal range of motion.  Cardiovascular:  Normal rate, regular rhythm, normal heart sounds and intact distal pulses.           Pulmonary/Chest: Breath sounds normal.   Abdominal: Abdomen is soft. Bowel sounds are normal. There is no abdominal tenderness.   Suprapubic site with no erythema, dc, tenderness, tract appears patent   Musculoskeletal:         General: Normal range of motion.      Cervical  back: Normal range of motion and neck supple.     Neurological: She is alert and oriented to person, place, and time. She has normal strength and normal reflexes.   Skin: Skin is warm and dry. Capillary refill takes less than 2 seconds.   Psychiatric: She has a normal mood and affect. Her behavior is normal. Judgment and thought content normal.       ED Course   Procedures  Labs Reviewed - No data to display       Imaging Results    None          Medications - No data to display  73 y/o female with c/o suprapubic tube coming out after the balloon broke. Inserted new catheter that pt brought with her with no complications. Will dc home to fu PCP as OP 2 day, return PRN worsening symptoms or any other concerns.                           Clinical Impression:   Final diagnoses:  [Z43.5] Encounter for suprapubic catheter care - replacement (Primary)        ED Disposition Condition    Discharge Stable          ED Prescriptions    None       Follow-up Information       Follow up With Specialties Details Why Contact Info    Gregor Heaton MD Family Medicine Call in 1 day  [address]  [address]  [place] [address]  [phone]               Sacha Mcihael MD  01/23/23 0700

## 2023-02-18 ENCOUNTER — LAB REQUISITION (OUTPATIENT)
Dept: LAB | Facility: HOSPITAL | Age: 73
End: 2023-02-18
Payer: MEDICARE

## 2023-02-18 DIAGNOSIS — E03.4 ATROPHY OF THYROID (ACQUIRED): ICD-10-CM

## 2023-02-18 DIAGNOSIS — N18.4 CHRONIC KIDNEY DISEASE, STAGE 4 (SEVERE): ICD-10-CM

## 2023-02-18 LAB
ALBUMIN SERPL-MCNC: 4.1 G/DL (ref 3.4–5)
ALBUMIN/GLOB SERPL: 1.2 RATIO
ALP SERPL-CCNC: 112 UNIT/L (ref 50–144)
ALT SERPL-CCNC: 9 UNIT/L (ref 1–45)
ANION GAP SERPL CALC-SCNC: 10 MEQ/L (ref 2–13)
AST SERPL-CCNC: 21 UNIT/L (ref 14–36)
BASOPHILS # BLD AUTO: 0.01 X10(3)/MCL (ref 0.01–0.08)
BASOPHILS NFR BLD AUTO: 0.1 % (ref 0.1–1.2)
BILIRUBIN DIRECT+TOT PNL SERPL-MCNC: 0.5 MG/DL (ref 0–1)
BUN SERPL-MCNC: 39 MG/DL (ref 7–20)
CALCIUM SERPL-MCNC: 10.9 MG/DL (ref 8.4–10.2)
CHLORIDE SERPL-SCNC: 116 MMOL/L (ref 98–110)
CHOLEST SERPL-MCNC: 128 MG/DL (ref 0–200)
CO2 SERPL-SCNC: 15 MMOL/L (ref 21–32)
CREAT SERPL-MCNC: 5.21 MG/DL (ref 0.66–1.25)
CREAT/UREA NIT SERPL: 7 (ref 12–20)
EOSINOPHIL # BLD AUTO: 0.16 X10(3)/MCL (ref 0.04–0.36)
EOSINOPHIL NFR BLD AUTO: 2.1 % (ref 0.7–7)
ERYTHROCYTE [DISTWIDTH] IN BLOOD BY AUTOMATED COUNT: 12.9 % (ref 11–14.5)
GFR SERPLBLD CREATININE-BSD FMLA CKD-EPI: 8 MLS/MIN/1.73/M2
GLOBULIN SER-MCNC: 3.5 GM/DL (ref 2–3.9)
GLUCOSE SERPL-MCNC: 93 MG/DL (ref 70–115)
HCT VFR BLD AUTO: 31.6 % (ref 36–48)
HDLC SERPL-MCNC: 56 MG/DL (ref 40–60)
HGB BLD-MCNC: 10.3 G/DL (ref 11.8–16)
IMM GRANULOCYTES # BLD AUTO: 0.04 X10(3)/MCL (ref 0–0.03)
IMM GRANULOCYTES NFR BLD AUTO: 0.5 % (ref 0–0.5)
LDLC SERPL DIRECT ASSAY-SCNC: 44.4 MG/DL (ref 30–100)
LYMPHOCYTES # BLD AUTO: 1.47 X10(3)/MCL (ref 1.16–3.74)
LYMPHOCYTES NFR BLD AUTO: 19.1 % (ref 20–55)
MCH RBC QN AUTO: 33.4 PG (ref 27–34)
MCV RBC AUTO: 102.6 FL (ref 79–99)
MEAN CELL HEMOGLOBIN CONCENTRATION (OHS) G/DL: 32.6 G/DL (ref 31–37)
MONOCYTES # BLD AUTO: 0.41 X10(3)/MCL (ref 0.24–0.36)
MONOCYTES NFR BLD AUTO: 5.3 % (ref 4.7–12.5)
NEUTROPHILS # BLD AUTO: 5.62 X10(3)/MCL (ref 1.56–6.13)
NEUTROPHILS NFR BLD AUTO: 72.9 % (ref 37–73)
NRBC BLD AUTO-RTO: 0 % (ref 0–1)
PLATELET # BLD AUTO: 193 X10(3)/MCL (ref 140–371)
PMV BLD AUTO: 11.1 FL (ref 9.4–12.4)
POTASSIUM SERPL-SCNC: 4.9 MMOL/L (ref 3.5–5.1)
PROT SERPL-MCNC: 7.6 GM/DL (ref 6.3–8.2)
RBC # BLD AUTO: 3.08 X10(6)/MCL (ref 4–5.1)
SODIUM SERPL-SCNC: 141 MMOL/L (ref 135–145)
T4 SERPL-MCNC: 10.5 UG/DL (ref 5.53–11)
TRIGL SERPL-MCNC: 120 MG/DL (ref 30–200)
WBC # SPEC AUTO: 7.7 X10(3)/MCL (ref 4–11.5)

## 2023-02-18 PROCEDURE — 85025 COMPLETE CBC W/AUTO DIFF WBC: CPT | Performed by: FAMILY MEDICINE

## 2023-02-18 PROCEDURE — 84436 ASSAY OF TOTAL THYROXINE: CPT | Performed by: FAMILY MEDICINE

## 2023-02-18 PROCEDURE — 80053 COMPREHEN METABOLIC PANEL: CPT | Performed by: FAMILY MEDICINE

## 2023-02-18 PROCEDURE — 80061 LIPID PANEL: CPT | Performed by: FAMILY MEDICINE

## 2023-03-18 ENCOUNTER — HOSPITAL ENCOUNTER (EMERGENCY)
Facility: HOSPITAL | Age: 73
Discharge: HOME | End: 2023-03-18
Attending: STUDENT IN AN ORGANIZED HEALTH CARE EDUCATION/TRAINING PROGRAM
Payer: MEDICARE

## 2023-03-18 VITALS
WEIGHT: 163 LBS | BODY MASS INDEX: 30.78 KG/M2 | HEART RATE: 73 BPM | OXYGEN SATURATION: 100 % | RESPIRATION RATE: 18 BRPM | DIASTOLIC BLOOD PRESSURE: 84 MMHG | HEIGHT: 61 IN | SYSTOLIC BLOOD PRESSURE: 130 MMHG

## 2023-03-18 DIAGNOSIS — T83.010A SUPRAPUBIC CATHETER DYSFUNCTION, INITIAL ENCOUNTER: Primary | ICD-10-CM

## 2023-03-18 PROCEDURE — 51705 CHANGE OF BLADDER TUBE: CPT

## 2023-03-18 PROCEDURE — 99283 EMERGENCY DEPT VISIT LOW MDM: CPT

## 2023-03-18 NOTE — ED PROVIDER NOTES
Encounter Date: 3/18/2023       History     Chief Complaint   Patient presents with    suprapubic tube      Pt states nurse came to change suprapubic catheter and could not get new tube in.      HPI    72-year-old female with a past medical history of ovarian and uterine cancer status post radiation presents emergency department for her suprapubic catheter not in place.  Her nurse changing out today was unable to get the knee went back in.  States she is been having pain in her catheter over a week and they think that the balloon was inflated incorrectly.    Review of patient's allergies indicates:   Allergen Reactions    Oxaprozin Nausea And Vomiting and Swelling     Facial swelling      Doxycycline     Nsaids (non-steroidal anti-inflammatory drug) Rash    Sulfa (sulfonamide antibiotics) Nausea And Vomiting     Past Medical History:   Diagnosis Date    Cancer, colon     Chronic pain     GERD (gastroesophageal reflux disease)     Renal disorder     Thyroiditis, unspecified      Past Surgical History:   Procedure Laterality Date    BACK SURGERY       SECTION      CHOLECYSTECTOMY      COLON SURGERY      HYSTERECTOMY      KIDNEY SURGERY       No family history on file.  Social History     Tobacco Use    Smoking status: Never    Smokeless tobacco: Never   Substance Use Topics    Alcohol use: Never    Drug use: Never     Review of Systems   Constitutional:  Negative for fever.   Respiratory:  Negative for cough and shortness of breath.    Cardiovascular:  Negative for chest pain.   Gastrointestinal:  Negative for abdominal pain.   All other systems reviewed and are negative.    Physical Exam     Initial Vitals [23 1238]   BP Pulse Resp Temp SpO2   130/84 73 18 -- 100 %      MAP       --         Physical Exam    Nursing note and vitals reviewed.  Constitutional: She appears well-developed and well-nourished. No distress.   Cardiovascular:  Normal rate and regular rhythm.           Pulmonary/Chest: Breath  sounds normal.   Abdominal: Abdomen is soft. There is no abdominal tenderness.   Colostomy noted to the lower abdomen.  Ostomy for suprapubic catheter present     Neurological: She is alert.   Skin: Skin is warm. Capillary refill takes less than 2 seconds. No rash noted.       ED Course   Suprapubic Tube    Date/Time: 3/18/2023 1:07 PM  Performed by: Toño Saldivar MD  Authorized by: Toño Saldivar MD   Consent: Verbal consent obtained.  Risks and benefits: risks, benefits and alternatives were discussed  Consent given by: patient  Patient understanding: patient states understanding of the procedure being performed  Indications: catheter change  Local anesthesia used: no    Anesthesia:  Local anesthesia used: no    Sedation:  Patient sedated: no    Preparation: Patient was prepped and draped in the usual sterile fashion.  Suprapubic aspiration by: catheter  Catheter type: Chun  Catheter size: 12 Fr  Number of attempts: 3  Urine volume: 5 ml  Urine characteristics: clear  Patient tolerance: patient tolerated the procedure well with no immediate complications  Comments: I tried to put the 16 Colombian that was originally used although it would not pass.  I then went down to a 14 Colombian and this went past either.  Finally a 12 Colombian was able to be placed.  Patient understands she needs to let her urologist know and she will call on Monday.      Labs Reviewed - No data to display       Imaging Results    None          Medications - No data to display  Medical Decision Making:   Differential Diagnosis:   Suprapubic catheter issue, stoma stenosis   Medical Decision Making  Problems Addressed:  Suprapubic catheter dysfunction, initial encounter: self-limited or minor problem    Amount and/or Complexity of Data Reviewed  External Data Reviewed: notes.                           Clinical Impression:   Final diagnoses:  [T83.010A] Suprapubic catheter dysfunction, initial encounter (Primary)        ED Disposition  Condition    Discharge Stable          ED Prescriptions    None       Follow-up Information       Follow up With Specialties Details Why Contact Info    Gregor Heaton MD Family Medicine Schedule an appointment as soon as possible for a visit   [address]  [facility] [address]  [facility] [address]  [phone]      [facility] - Emergency Dept Emergency Medicine Go to  If symptoms worsen [address]  [facility] [id number] [phone]    Call urologist and follow with them on Monday                 Toño Saldivar MD  03/18/23 1319

## 2023-05-03 ENCOUNTER — LAB REQUISITION (OUTPATIENT)
Dept: LAB | Facility: HOSPITAL | Age: 73
End: 2023-05-03
Payer: MEDICARE

## 2023-05-03 DIAGNOSIS — N93.0 POSTCOITAL AND CONTACT BLEEDING: ICD-10-CM

## 2023-05-03 PROCEDURE — 87077 CULTURE AEROBIC IDENTIFY: CPT | Performed by: FAMILY MEDICINE

## 2023-05-03 PROCEDURE — 87088 URINE BACTERIA CULTURE: CPT | Performed by: FAMILY MEDICINE

## 2023-05-06 LAB
BACTERIA UR CULT: ABNORMAL
BACTERIA UR CULT: ABNORMAL

## 2023-05-18 ENCOUNTER — HOSPITAL ENCOUNTER (EMERGENCY)
Facility: HOSPITAL | Age: 73
Discharge: HOME | End: 2023-05-18
Attending: EMERGENCY MEDICINE
Payer: MEDICARE

## 2023-05-18 VITALS
RESPIRATION RATE: 18 BRPM | HEART RATE: 71 BPM | DIASTOLIC BLOOD PRESSURE: 70 MMHG | BODY MASS INDEX: 30.99 KG/M2 | TEMPERATURE: 97 F | OXYGEN SATURATION: 100 % | WEIGHT: 164 LBS | SYSTOLIC BLOOD PRESSURE: 133 MMHG

## 2023-05-18 DIAGNOSIS — T83.010A SUPRAPUBIC CATHETER DYSFUNCTION, INITIAL ENCOUNTER: Primary | ICD-10-CM

## 2023-05-18 PROCEDURE — 99283 EMERGENCY DEPT VISIT LOW MDM: CPT | Mod: 25

## 2023-05-18 PROCEDURE — 51705 CHANGE OF BLADDER TUBE: CPT

## 2023-05-18 NOTE — ED PROVIDER NOTES
Encounter Date: 2023       History     Chief Complaint   Patient presents with    device problem     Pt states that her suprapubic catheter needs to be changed due to the balloon popping     She felt the balloon popped on suprapubic catheter subtle blood comes out  when it pops is like a rubber band going off in there .  It smarts and causes a bit of beeding she has had suprapubic catheter for 12 years she is well-versed in it she states she needs a 14 Kittitian when she only at 12:16 p.m. in the house.  No other complaints no other problems.      Review of patient's allergies indicates:   Allergen Reactions    Oxaprozin Nausea And Vomiting and Swelling     Facial swelling      Doxycycline     Nsaids (non-steroidal anti-inflammatory drug) Rash    Sulfa (sulfonamide antibiotics) Nausea And Vomiting     Past Medical History:   Diagnosis Date    Cancer, colon     Chronic pain     GERD (gastroesophageal reflux disease)     Renal disorder     Thyroiditis, unspecified      Past Surgical History:   Procedure Laterality Date    BACK SURGERY       SECTION      CHOLECYSTECTOMY      COLON SURGERY      HYSTERECTOMY      KIDNEY SURGERY       No family history on file.  Social History     Tobacco Use    Smoking status: Never    Smokeless tobacco: Never   Substance Use Topics    Alcohol use: Never    Drug use: Never     Review of Systems   Constitutional: Negative.    HENT: Negative.     Eyes: Negative.    Cardiovascular: Negative.    Gastrointestinal: Negative.    Endocrine: Negative.    Genitourinary:  Positive for hematuria and pelvic pain (Bladder pain).   Musculoskeletal: Negative.    Skin: Negative.    Allergic/Immunologic: Negative.    Neurological: Negative.    Hematological: Negative.    Psychiatric/Behavioral: Negative.     All other systems reviewed and are negative.    Physical Exam     Initial Vitals [23 1036]   BP Pulse Resp Temp SpO2   133/70 71 18 97.4 °F (36.3 °C) 100 %      MAP       --          Physical Exam    Nursing note and vitals reviewed.  Constitutional: She appears well-developed and well-nourished.   Pleasant lady wide awake talking colostomy bag right side of abdomen suprapubic tube in place balloon ruptured she said   Abdominal: Abdomen is soft. Bowel sounds are normal.   Colostomy is in place on the right side of her abdomen.  The suprapubic tube in place still draining urine when the tube is removed.  We tried to take down the balloon there was no fluid in it.  Tube was easily removed.  Wound was indeed ruptured like she said there was a scant amount of blood she says that always happens when the balloon ruptures like that.     Neurological: She is alert and oriented to person, place, and time.   Skin: Skin is warm and dry. Capillary refill takes less than 2 seconds.   Psychiatric: She has a normal mood and affect.       ED Course   Suprapubic Tube    Date/Time: 5/18/2023 11:12 AM  Performed by: Damon Velez MD  Authorized by: Damon Velez MD   Consent: Verbal consent obtained. Written consent not obtained.  Risks and benefits: risks, benefits and alternatives were discussed  Consent given by: patient  Patient understanding: patient states understanding of the procedure being performed  Patient identity confirmed: verbally with patient  Indications: catheter change  Local anesthesia used: no    Anesthesia:  Local anesthesia used: no    Sedation:  Patient sedated: no    Preparation: Patient was prepped and draped in the usual sterile fashion.  Suprapubic aspiration by: catheter  Catheter type: Chun  Catheter size: 14 Fr  Number of attempts: 1  Urine volume: 10 ml  Urine characteristics: blood-tinged  Patient tolerance: patient tolerated the procedure well with no immediate complications  Comments: Patient said it felt appropriate.  It is draining urine right after it is being placed.  It anchored securely  Patient did not want to wait so we could observe urine output from the tube  she said she was ready go      Labs Reviewed - No data to display       Imaging Results    None          Medications - No data to display  Medical Decision Making:   Initial Assessment:   Malfunction of the suprapubic tube no other complaints.  Twelve years with suprapubic catheter she is well-versed in its function  Differential Diagnosis:   Malfunction of suprapubic to ruptured balloon needs changing  ED Management:  We did change the SP catheter    she immediately told us it was draining appropriately that she was ready to go home.  She had no complaints of discomfort the catheter change went smoothly  MDM  Problems addressed  Co-morbidities and/or factors adding to the complexity or risk for the patient: chronic sp cath  Problems addressed: balloon rupture  Acute problem/illness or progression/exacerbation of chronic problem with potential threat to life/bodily dysfunction?: none  Differential diagnoses/problems considered: see above     Amount and/or Complexity of Data Reviewed  Independent Historian: none (see above for summary)  External Data Reviewed: notes from previous ED visits (see above for summary)  Risk and benefits of testing: discussed   Labs: Labs: ordered and reviewed  Radiology:Radiology: ordered and independent interpretation performed (see above or ED course)  ECG/medicine tests:Radiology: ordered and independent interpretation performed (see above or ED course)  none    Risk  Shared decision making     Critical Care  none                         Clinical Impression:   Final diagnoses:  [T83.010A] Suprapubic catheter dysfunction, initial encounter - Balloon rupture old catheter new catheter placed without issus  (Primary)        ED Disposition Condition    Discharge Stable          ED Prescriptions    None       Follow-up Information       Follow up With Specialties Details Why Contact Info    Gregor Heaton MD Family Medicine In 2 days If symptoms worsen [address] [facility] [address] [place]  [place] [address]  [phone]      Judd Castro MD Urology In 1 month change of cath [address] [facility]  [address]  [facility] [address]  [phone]               Damon Velez MD  05/18/23 1554       Damon Velez MD  05/18/23 1552

## 2023-05-31 DIAGNOSIS — R10.9 STOMACH ACHE: Primary | ICD-10-CM

## 2023-06-01 ENCOUNTER — HOSPITAL ENCOUNTER (OUTPATIENT)
Dept: RADIOLOGY | Facility: HOSPITAL | Age: 73
Discharge: HOME | End: 2023-06-01
Attending: FAMILY MEDICINE
Payer: MEDICARE

## 2023-06-01 DIAGNOSIS — R10.9 STOMACH ACHE: ICD-10-CM

## 2023-06-01 PROCEDURE — 74176 CT ABD & PELVIS W/O CONTRAST: CPT | Mod: TC

## 2023-06-22 ENCOUNTER — HOSPITAL ENCOUNTER (EMERGENCY)
Facility: HOSPITAL | Age: 73
Discharge: HOME | End: 2023-06-22
Attending: EMERGENCY MEDICINE
Payer: MEDICARE

## 2023-06-22 VITALS
WEIGHT: 165 LBS | HEIGHT: 61 IN | TEMPERATURE: 99 F | BODY MASS INDEX: 31.15 KG/M2 | HEART RATE: 69 BPM | RESPIRATION RATE: 16 BRPM | OXYGEN SATURATION: 100 % | DIASTOLIC BLOOD PRESSURE: 48 MMHG | SYSTOLIC BLOOD PRESSURE: 132 MMHG

## 2023-06-22 DIAGNOSIS — T83.010A SUPRAPUBIC CATHETER DYSFUNCTION, INITIAL ENCOUNTER: Primary | ICD-10-CM

## 2023-06-22 PROCEDURE — 99281 EMR DPT VST MAYX REQ PHY/QHP: CPT

## 2023-06-22 PROCEDURE — 51705 CHANGE OF BLADDER TUBE: CPT

## 2023-06-22 NOTE — ED PROVIDER NOTES
Encounter Date: 2023       History     Chief Complaint   Patient presents with    suprapubic cath replacement     HH nurse unable to exchange SP cath this morning   needs replacement     A new home health nurse tried to change the suprapubic catheter and could not.  Patient comes here so we can change in she used to use a 16 than she was down to a 14 they tried to place a 12 today and could not  Has had a suprapubic catheter for at least 13 years now.  States Dr. Antonio Castro is urologist of record in the near future Dr. Castro scheduled to stretch her suprapubic ostomy.  So she can accepted 16 again and change her renal stents.      Review of patient's allergies indicates:   Allergen Reactions    Oxaprozin Nausea And Vomiting and Swelling     Facial swelling      Doxycycline     Nsaids (non-steroidal anti-inflammatory drug) Rash    Sulfa (sulfonamide antibiotics) Nausea And Vomiting     Past Medical History:   Diagnosis Date    Cancer, colon     Chronic pain     GERD (gastroesophageal reflux disease)     Renal disorder     Thyroiditis, unspecified      Past Surgical History:   Procedure Laterality Date    BACK SURGERY       SECTION      CHOLECYSTECTOMY      COLON SURGERY      HYSTERECTOMY      KIDNEY SURGERY       No family history on file.  Social History     Tobacco Use    Smoking status: Never    Smokeless tobacco: Never   Substance Use Topics    Alcohol use: Never    Drug use: Never     Review of Systems   Constitutional:  Negative for fever.   HENT:  Negative for sore throat.    Eyes: Negative.    Respiratory:  Negative for shortness of breath.    Cardiovascular:  Negative for chest pain.   Gastrointestinal:  Negative for nausea.   Endocrine: Negative.    Genitourinary:  Negative for dysuria.        Just needs her suprapubic back   Musculoskeletal:  Negative for back pain.   Skin:  Negative for rash.   Allergic/Immunologic: Negative.    Neurological:  Negative for weakness.    Hematological:  Does not bruise/bleed easily.   Psychiatric/Behavioral: Negative.       Physical Exam     Initial Vitals [06/22/23 1140]   BP Pulse Resp Temp SpO2   121/73 71 16 98.5 °F (36.9 °C) 100 %      MAP       --         Physical Exam    Nursing note and vitals reviewed.  Constitutional: She appears well-developed and well-nourished.   HENT:   Head: Normocephalic and atraumatic.   Eyes: Pupils are equal, round, and reactive to light.   Cardiovascular:  Normal rate.           Pulmonary/Chest: Breath sounds normal.   Abdominal: Abdomen is soft. Bowel sounds are normal.   Colostomy bag right lower quadrant ostomy in the midline.  We are suprapubic catheter goes.   Musculoskeletal:         General: Normal range of motion.     Neurological: She is alert and oriented to person, place, and time.   Skin: Capillary refill takes less than 2 seconds.   Psychiatric: She has a normal mood and affect.       ED Course   Suprapubic Tube    Date/Time: 6/22/2023 12:35 PM  Performed by: Damon Velez MD  Authorized by: Damon Velez MD   Consent: Verbal consent obtained. Written consent not obtained.  Consent given by: patient  Patient understanding: patient states understanding of the procedure being performed  Patient identity confirmed: verbally with patient  Indications: catheter change  Local anesthesia used: no    Anesthesia:  Local anesthesia used: no    Sedation:  Patient sedated: no    Preparation: Patient was prepped and draped in the usual sterile fashion.  Suprapubic aspiration by: catheter  Catheter type: Chun  Catheter size: 12 Fr  Number of attempts: 1  Urine volume: 15 ml  Urine characteristics: blood-tinged  Patient tolerance: patient tolerated the procedure well with no immediate complications  Comments: The correct angle was found with a temperature probe soft probe.  The 12 Serbian Chun catheter was then inserted.  Originally 10 cc was placed.  But it cause the patient discomfort at that time she  told us he normally just hold 6.  4 cc were taken out.  Patient says this is normal she is ready go home      Labs Reviewed - No data to display       Imaging Results    None          Medications - No data to display  Medical Decision Making:   Initial Assessment:   Needs suprapubic tube replaced home health took  Out the old when could not put the new when no other complaints issues problems  Differential Diagnosis:   Needs suprapubic tube replaced  ED Management:  Replace suprapubic tube 1st attempt  MDM  Problems addressed  Co-morbidities and/or factors adding to the complexity or risk for the patient:  None known  Problems addressed:  Replace suprapubic tube  Acute problem/illness or progression/exacerbation of chronic problem with potential threat to life/bodily dysfunction?:  None known  Differential diagnoses/problems considered: see above     Amount and/or Complexity of Data Reviewed  Independent Historian: none (see above for summary)  External Data Reviewed: notes from previous ED visits and notes from clinic visits (see above for summary)  Risk and benefits of testing: discussed   Labs: Labs: ordered and reviewed  Radiology:Radiology: ordered and independent interpretation performed (see above or ED course)  ECG/medicine tests:Radiology: ordered and independent interpretation performed (see above or ED course)  none    Risk  OTC medications  Shared decision making     Critical Care  none                         Clinical Impression:   Final diagnoses:  [T83.010A] Suprapubic catheter dysfunction, initial encounter (Primary)        ED Disposition Condition    Discharge Stable          ED Prescriptions    None       Follow-up Information       Follow up With Specialties Details Why Contact Info    Judd Castro MD Urology In 1 week  [address] [facility] [address]  [facility] [address]  [phone]      Gregor Heaton MD Family Medicine In 3 days As needed [address] [facility]  [address]  [phone]               Damon Velez MD  06/22/23 2456

## 2023-06-22 NOTE — DISCHARGE INSTRUCTIONS
Continue present medications and care return for any emergency problem    Routine suprapubic care    Keep that appointment with your urologist please

## 2023-06-29 ENCOUNTER — LAB REQUISITION (OUTPATIENT)
Dept: LAB | Facility: HOSPITAL | Age: 73
End: 2023-06-29
Payer: MEDICARE

## 2023-06-29 DIAGNOSIS — Z87.440 PERSONAL HISTORY OF URINARY (TRACT) INFECTIONS: ICD-10-CM

## 2023-06-29 DIAGNOSIS — N31.9 NEUROMUSCULAR DYSFUNCTION OF BLADDER, UNSPECIFIED: ICD-10-CM

## 2023-06-29 LAB
APPEARANCE UR: ABNORMAL
BACTERIA #/AREA URNS AUTO: ABNORMAL /HPF
BILIRUB UR QL STRIP.AUTO: NEGATIVE MG/DL
COLOR UR: YELLOW
GLUCOSE UR QL STRIP.AUTO: NEGATIVE MG/DL
KETONES UR QL STRIP.AUTO: NEGATIVE MG/DL
LEUKOCYTE ESTERASE UR QL STRIP.AUTO: ABNORMAL UNIT/L
NITRITE UR QL STRIP.AUTO: NEGATIVE
PH UR STRIP.AUTO: >=9 [PH]
PROT UR QL STRIP.AUTO: ABNORMAL MG/DL
RBC #/AREA URNS AUTO: <5 /HPF
RBC UR QL AUTO: ABNORMAL UNIT/L
SP GR UR STRIP.AUTO: 1.01 (ref 1–1.03)
SQUAMOUS #/AREA URNS AUTO: <5 /HPF
UROBILINOGEN UR STRIP-ACNC: 0.2 MG/DL
WBC #/AREA URNS AUTO: 93 /HPF

## 2023-06-29 PROCEDURE — 87077 CULTURE AEROBIC IDENTIFY: CPT | Performed by: SPECIALIST

## 2023-06-29 PROCEDURE — 81001 URINALYSIS AUTO W/SCOPE: CPT | Performed by: SPECIALIST

## 2023-07-04 LAB
BACTERIA UR CULT: ABNORMAL

## 2024-01-20 ENCOUNTER — HOSPITAL ENCOUNTER (EMERGENCY)
Facility: HOSPITAL | Age: 74
Discharge: HOME | End: 2024-01-20
Attending: INTERNAL MEDICINE
Payer: MEDICARE

## 2024-01-20 VITALS
BODY MASS INDEX: 33.07 KG/M2 | RESPIRATION RATE: 16 BRPM | OXYGEN SATURATION: 98 % | SYSTOLIC BLOOD PRESSURE: 147 MMHG | HEART RATE: 89 BPM | TEMPERATURE: 100 F | DIASTOLIC BLOOD PRESSURE: 68 MMHG | WEIGHT: 175 LBS

## 2024-01-20 DIAGNOSIS — R53.83 FATIGUE, UNSPECIFIED TYPE: ICD-10-CM

## 2024-01-20 DIAGNOSIS — D63.8 ANEMIA OF CHRONIC DISEASE: ICD-10-CM

## 2024-01-20 DIAGNOSIS — U07.1 COVID: Primary | ICD-10-CM

## 2024-01-20 DIAGNOSIS — B34.9 VIRAL SYNDROME: ICD-10-CM

## 2024-01-20 DIAGNOSIS — N18.9 CHRONIC RENAL IMPAIRMENT, UNSPECIFIED CKD STAGE: ICD-10-CM

## 2024-01-20 LAB
ALBUMIN SERPL-MCNC: 3.1 G/DL (ref 3.4–4.8)
ALBUMIN/GLOB SERPL: 0.7 RATIO (ref 1.1–2)
ALP SERPL-CCNC: 133 UNIT/L (ref 40–150)
ALT SERPL-CCNC: 22 UNIT/L (ref 0–55)
AST SERPL-CCNC: 41 UNIT/L (ref 5–34)
BASOPHILS # BLD AUTO: 0.01 X10(3)/MCL
BASOPHILS NFR BLD AUTO: 0.2 %
BILIRUB SERPL-MCNC: 0.7 MG/DL
BUN SERPL-MCNC: 28 MG/DL (ref 9.8–20.1)
CALCIUM SERPL-MCNC: 9.7 MG/DL (ref 8.4–10.2)
CHLORIDE SERPL-SCNC: 105 MMOL/L (ref 98–107)
CO2 SERPL-SCNC: 20 MMOL/L (ref 23–31)
CREAT SERPL-MCNC: 3.39 MG/DL (ref 0.55–1.02)
EOSINOPHIL # BLD AUTO: 0.03 X10(3)/MCL (ref 0–0.9)
EOSINOPHIL NFR BLD AUTO: 0.5 %
ERYTHROCYTE [DISTWIDTH] IN BLOOD BY AUTOMATED COUNT: 12.5 % (ref 11.5–17)
FLUAV AG UPPER RESP QL IA.RAPID: NOT DETECTED
FLUBV AG UPPER RESP QL IA.RAPID: NOT DETECTED
GFR SERPLBLD CREATININE-BSD FMLA CKD-EPI: 14 MLS/MIN/1.73/M2
GLOBULIN SER-MCNC: 4.4 GM/DL (ref 2.4–3.5)
GLUCOSE SERPL-MCNC: 94 MG/DL (ref 82–115)
HCT VFR BLD AUTO: 32.2 % (ref 37–47)
HGB BLD-MCNC: 10 G/DL (ref 12–16)
IMM GRANULOCYTES # BLD AUTO: 0.02 X10(3)/MCL (ref 0–0.04)
IMM GRANULOCYTES NFR BLD AUTO: 0.3 %
LACTATE SERPL-SCNC: 1.2 MMOL/L (ref 0.5–2.2)
LYMPHOCYTES # BLD AUTO: 0.8 X10(3)/MCL (ref 0.6–4.6)
LYMPHOCYTES NFR BLD AUTO: 12.9 %
MCH RBC QN AUTO: 30.5 PG (ref 27–31)
MCHC RBC AUTO-ENTMCNC: 31.1 G/DL (ref 33–36)
MCV RBC AUTO: 98.2 FL (ref 80–94)
MONOCYTES # BLD AUTO: 0.46 X10(3)/MCL (ref 0.1–1.3)
MONOCYTES NFR BLD AUTO: 7.4 %
NEUTROPHILS # BLD AUTO: 4.89 X10(3)/MCL (ref 2.1–9.2)
NEUTROPHILS NFR BLD AUTO: 78.7 %
PLATELET # BLD AUTO: 197 X10(3)/MCL (ref 130–400)
PMV BLD AUTO: 10.4 FL (ref 7.4–10.4)
POTASSIUM SERPL-SCNC: 4.7 MMOL/L (ref 3.5–5.1)
PROT SERPL-MCNC: 7.5 GM/DL (ref 5.8–7.6)
RBC # BLD AUTO: 3.28 X10(6)/MCL (ref 4.2–5.4)
RSV A 5' UTR RNA NPH QL NAA+PROBE: NOT DETECTED
SARS-COV-2 RNA RESP QL NAA+PROBE: DETECTED
SODIUM SERPL-SCNC: 137 MMOL/L (ref 136–145)
WBC # SPEC AUTO: 6.21 X10(3)/MCL (ref 4.5–11.5)

## 2024-01-20 PROCEDURE — 83605 ASSAY OF LACTIC ACID: CPT | Performed by: INTERNAL MEDICINE

## 2024-01-20 PROCEDURE — 0241U COVID/RSV/FLU A&B PCR: CPT | Performed by: INTERNAL MEDICINE

## 2024-01-20 PROCEDURE — 85025 COMPLETE CBC W/AUTO DIFF WBC: CPT | Performed by: INTERNAL MEDICINE

## 2024-01-20 PROCEDURE — 80053 COMPREHEN METABOLIC PANEL: CPT | Performed by: INTERNAL MEDICINE

## 2024-01-20 PROCEDURE — 99284 EMERGENCY DEPT VISIT MOD MDM: CPT | Mod: 25

## 2024-01-20 RX ORDER — METHYLPREDNISOLONE 4 MG/1
TABLET ORAL
Qty: 21 EACH | Refills: 0 | Status: ON HOLD | OUTPATIENT
Start: 2024-01-20 | End: 2024-01-30 | Stop reason: HOSPADM

## 2024-01-21 NOTE — ED PROVIDER NOTES
Encounter Date: 2024       History     Chief Complaint   Patient presents with    COVID-19 Concerns     Covid symptoms since Monday. Family at home covid positive.       73-year-old white female brought in by ambulance because for the past week she has been having COVID type symptoms and there numerous family members that are COVID positive.  When asked patient states she does not know why she is here with the family made her come get checked out.  Notably patient was seen at our [facility] on  after a fall where she sustained a proximal humeral fracture of the left      Review of patient's allergies indicates:   Allergen Reactions    Oxaprozin Nausea And Vomiting and Swelling     Facial swelling      Doxycycline     Nsaids (non-steroidal anti-inflammatory drug) Rash    Sulfa (sulfonamide antibiotics) Nausea And Vomiting     Past Medical History:   Diagnosis Date    Cancer, colon     Chronic pain     GERD (gastroesophageal reflux disease)     Renal disorder     Thyroiditis, unspecified      Past Surgical History:   Procedure Laterality Date    BACK SURGERY       SECTION      CHOLECYSTECTOMY      COLON SURGERY      HYSTERECTOMY      KIDNEY SURGERY       No family history on file.  Social History     Tobacco Use    Smoking status: Never    Smokeless tobacco: Never   Substance Use Topics    Alcohol use: Never    Drug use: Never     Review of Systems   Constitutional:  Positive for fatigue and fever. Negative for activity change, appetite change, chills, diaphoresis and unexpected weight change.   HENT: Negative.  Negative for congestion, dental problem, drooling, ear discharge, ear pain, facial swelling, hearing loss, mouth sores, nosebleeds, postnasal drip, rhinorrhea, sinus pressure, sinus pain, sneezing, sore throat, tinnitus, trouble swallowing and voice change.    Eyes: Negative.  Negative for photophobia, pain, discharge, redness, itching and visual disturbance.   Respiratory:   Positive for cough. Negative for apnea, choking, chest tightness, shortness of breath, wheezing and stridor.    Cardiovascular: Negative.  Negative for chest pain, palpitations and leg swelling.   Gastrointestinal: Negative.  Negative for abdominal distention, abdominal pain, anal bleeding, blood in stool, constipation, diarrhea, nausea, rectal pain and vomiting.   Endocrine: Negative.  Negative for cold intolerance, heat intolerance, polydipsia, polyphagia and polyuria.   Genitourinary: Negative.  Negative for decreased urine volume, difficulty urinating, dyspareunia, dysuria, enuresis, flank pain, frequency, genital sores, hematuria, menstrual problem, pelvic pain, urgency, vaginal bleeding, vaginal discharge and vaginal pain.   Musculoskeletal: Negative.  Negative for arthralgias, back pain, gait problem, joint swelling, myalgias, neck pain and neck stiffness.        Left shoulder pain   Skin: Negative.  Negative for color change, pallor, rash and wound.   Allergic/Immunologic: Negative.  Negative for environmental allergies, food allergies and immunocompromised state.   Neurological: Negative.  Negative for dizziness, tremors, seizures, syncope, facial asymmetry, speech difficulty, weakness, light-headedness, numbness and headaches.   Hematological: Negative.  Negative for adenopathy. Does not bruise/bleed easily.   Psychiatric/Behavioral: Negative.  Negative for agitation, behavioral problems, confusion, decreased concentration, dysphoric mood, hallucinations, self-injury, sleep disturbance and suicidal ideas. The patient is not nervous/anxious and is not hyperactive.    All other systems reviewed and are negative.      Physical Exam     Initial Vitals   BP Pulse Resp Temp SpO2   01/20/24 1834 01/20/24 1830 01/20/24 1830 01/20/24 1834 01/20/24 1839   (!) 144/78 90 18 99.5 °F (37.5 °C) 98 %      MAP       --                Physical Exam    Nursing note and vitals reviewed.  Constitutional: She appears  well-developed and well-nourished. She is not diaphoretic. No distress.   HENT:   Head: Normocephalic and atraumatic.   Mouth/Throat: Oropharynx is clear and moist. No oropharyngeal exudate.   Eyes: Conjunctivae and EOM are normal. Pupils are equal, round, and reactive to light. No scleral icterus.   Neck: Neck supple. No JVD present.   Normal range of motion.  Cardiovascular:  Normal rate, regular rhythm, normal heart sounds and intact distal pulses.     Exam reveals no gallop and no friction rub.       No murmur heard.  Pulmonary/Chest: Breath sounds normal. No respiratory distress. She has no wheezes. She exhibits no tenderness.   Abdominal: Abdomen is soft. Bowel sounds are normal. She exhibits no distension. There is no abdominal tenderness. There is no rebound.   Musculoskeletal:      Cervical back: Normal range of motion and neck supple.      Comments: Left arm in sling     Neurological: She is alert and oriented to person, place, and time. She has normal strength.   Skin: Skin is warm and dry. Capillary refill takes less than 2 seconds.   Psychiatric: She has a normal mood and affect. Her behavior is normal. Judgment and thought content normal.         ED Course   Procedures  Admission on 01/20/2024   Component Date Value Ref Range Status    Influenza A PCR 01/20/2024 Not Detected  Not Detected Final    Influenza B PCR 01/20/2024 Not Detected  Not Detected Final    Respiratory Syncytial Virus PCR 01/20/2024 Not Detected  Not Detected Final    SARS-CoV-2 PCR 01/20/2024 Detected (A)  Not Detected, Negative Final    Sodium Level 01/20/2024 137  136 - 145 mmol/L Final    Potassium Level 01/20/2024 4.7  3.5 - 5.1 mmol/L Final    Chloride 01/20/2024 105  98 - 107 mmol/L Final    Carbon Dioxide 01/20/2024 20 (L)  23 - 31 mmol/L Final    Glucose Level 01/20/2024 94  82 - 115 mg/dL Final    Blood Urea Nitrogen 01/20/2024 28.0 (H)  9.8 - 20.1 mg/dL Final    Creatinine 01/20/2024 3.39 (H)  0.55 - 1.02 mg/dL Final     Calcium Level Total 01/20/2024 9.7  8.4 - 10.2 mg/dL Final    Protein Total 01/20/2024 7.5  5.8 - 7.6 gm/dL Final    Albumin Level 01/20/2024 3.1 (L)  3.4 - 4.8 g/dL Final    Globulin 01/20/2024 4.4 (H)  2.4 - 3.5 gm/dL Final    Albumin/Globulin Ratio 01/20/2024 0.7 (L)  1.1 - 2.0 ratio Final    Bilirubin Total 01/20/2024 0.7  <=1.5 mg/dL Final    Alkaline Phosphatase 01/20/2024 133  40 - 150 unit/L Final    Alanine Aminotransferase 01/20/2024 22  0 - 55 unit/L Final    Aspartate Aminotransferase 01/20/2024 41 (H)  5 - 34 unit/L Final    eGFR 01/20/2024 14  mls/min/1.73/m2 Final    Lactic Acid Level 01/20/2024 1.2  0.5 - 2.2 mmol/L Final    WBC 01/20/2024 6.21  4.50 - 11.50 x10(3)/mcL Final    RBC 01/20/2024 3.28 (L)  4.20 - 5.40 x10(6)/mcL Final    Hgb 01/20/2024 10.0 (L)  12.0 - 16.0 g/dL Final    Hct 01/20/2024 32.2 (L)  37.0 - 47.0 % Final    MCV 01/20/2024 98.2 (H)  80.0 - 94.0 fL Final    MCH 01/20/2024 30.5  27.0 - 31.0 pg Final    MCHC 01/20/2024 31.1 (L)  33.0 - 36.0 g/dL Final    RDW 01/20/2024 12.5  11.5 - 17.0 % Final    Platelet 01/20/2024 197  130 - 400 x10(3)/mcL Final    MPV 01/20/2024 10.4  7.4 - 10.4 fL Final    Neut % 01/20/2024 78.7  % Final    Lymph % 01/20/2024 12.9  % Final    Mono % 01/20/2024 7.4  % Final    Eos % 01/20/2024 0.5  % Final    Basophil % 01/20/2024 0.2  % Final    Lymph # 01/20/2024 0.80  0.6 - 4.6 x10(3)/mcL Final    Neut # 01/20/2024 4.89  2.1 - 9.2 x10(3)/mcL Final    Mono # 01/20/2024 0.46  0.1 - 1.3 x10(3)/mcL Final    Eos # 01/20/2024 0.03  0 - 0.9 x10(3)/mcL Final    Baso # 01/20/2024 0.01  <=0.2 x10(3)/mcL Final    IG# 01/20/2024 0.02  0 - 0.04 x10(3)/mcL Final    IG% 01/20/2024 0.3  % Final       Labs Reviewed   COVID/RSV/FLU A&B PCR - Abnormal; Notable for the following components:       Result Value    SARS-CoV-2 PCR Detected (*)     All other components within normal limits    Narrative:     The Xpert Xpress SARS-CoV-2/FLU/RSV plus is a rapid, multiplexed real-time  PCR test intended for the simultaneous qualitative detection and differentiation of SARS-CoV-2, Influenza A, Influenza B, and respiratory syncytial virus (RSV) viral RNA in either nasopharyngeal swab or nasal swab specimens.         COMPREHENSIVE METABOLIC PANEL - Abnormal; Notable for the following components:    Carbon Dioxide 20 (*)     Blood Urea Nitrogen 28.0 (*)     Creatinine 3.39 (*)     Albumin Level 3.1 (*)     Globulin 4.4 (*)     Albumin/Globulin Ratio 0.7 (*)     Aspartate Aminotransferase 41 (*)     All other components within normal limits   CBC WITH DIFFERENTIAL - Abnormal; Notable for the following components:    RBC 3.28 (*)     Hgb 10.0 (*)     Hct 32.2 (*)     MCV 98.2 (*)     MCHC 31.1 (*)     All other components within normal limits   LACTIC ACID, PLASMA - Normal   CBC W/ AUTO DIFFERENTIAL    Narrative:     The following orders were created for panel order CBC auto differential.  Procedure                               Abnormality         Status                     ---------                               -----------         ------                     CBC with Differential[[id number]]       Abnormal            Final result                 Please view results for these tests on the individual orders.          Imaging Results              X-Ray Chest 1 View (Preliminary result)  Result time 01/20/24 20:31:39      Wet Read by Nagi Wray MD (01/20/24 20:31:39, [facility] - Emergency Dept, Emergency Medicine)    No acute cardiopulmonary changes noted, mildly displaced proximal humeral fracture on the left                                     Medications - No data to display  Medical Decision Making  73-year-old white female with COVID exposure and viral symptoms for the past 4-5 days.  Differential included viral syndrome, COVID, RSV, flu, pneumonia, electrolyte abnormality, dehydration.  Workup included viral swabs which came back positive for COVID which was not a surprise  that she is got family members at her COVID positive.  Patient denies any current symptoms and her exam does not show anything significant other than left arm being in his sling from recent fall where she sustained a proximally fracture of the humerus on the left.  We discussed treatment so will give a Medrol Dosepak and she can follow up with the primary care    Problems Addressed:  Anemia of chronic disease: chronic illness or injury  Chronic renal impairment, unspecified CKD stage: chronic illness or injury  COVID: acute illness or injury  Fatigue, unspecified type: acute illness or injury  Viral syndrome: acute illness or injury    Amount and/or Complexity of Data Reviewed  Independent Historian: EMS  External Data Reviewed: labs, radiology and notes.  Labs: ordered. Decision-making details documented in ED Course.  Radiology: ordered and independent interpretation performed. Decision-making details documented in ED Course.    Risk  OTC drugs.  Prescription drug management.  Drug therapy requiring intensive monitoring for toxicity.                                      Clinical Impression:  Final diagnoses:  [U07.1] COVID (Primary)  [N18.9] Chronic renal impairment, unspecified CKD stage  [R53.83] Fatigue, unspecified type  [B34.9] Viral syndrome  [D63.8] Anemia of chronic disease          ED Disposition Condition    Discharge Stable          ED Prescriptions       Medication Sig Dispense Start Date End Date Auth. Provider    methylPREDNISolone (MEDROL DOSEPACK) 4 mg tablet use as directed 21 each 1/20/2024 2/10/2024 Nagi Wray MD          Follow-up Information       Follow up With Specialties Details Why Contact Info    Gregor Heaton MD Family Medicine In 1 week  [address]  [address]  [place] [address]  [phone]            Connie Gardner is a certified MA and was present during the entire interaction with this patient      Nagi Wray MD  01/20/24 2032

## 2024-01-26 ENCOUNTER — HOSPITAL ENCOUNTER (INPATIENT)
Facility: HOSPITAL | Age: 74
LOS: 4 days | Discharge: HOME | DRG: 698 | End: 2024-01-30
Attending: FAMILY MEDICINE | Admitting: FAMILY MEDICINE
Payer: MEDICARE

## 2024-01-26 ENCOUNTER — DOCUMENT SCAN (OUTPATIENT)
Dept: HOME HEALTH SERVICES | Facility: HOSPITAL | Age: 74
End: 2024-01-26
Payer: MEDICARE

## 2024-01-26 DIAGNOSIS — T83.511A URINARY TRACT INFECTION ASSOCIATED WITH CATHETERIZATION OF URINARY TRACT, UNSPECIFIED INDWELLING URINARY CATHETER TYPE, INITIAL ENCOUNTER: Primary | ICD-10-CM

## 2024-01-26 DIAGNOSIS — N39.0 URINARY TRACT INFECTION ASSOCIATED WITH CATHETERIZATION OF URINARY TRACT, UNSPECIFIED INDWELLING URINARY CATHETER TYPE, INITIAL ENCOUNTER: Primary | ICD-10-CM

## 2024-01-26 PROBLEM — I10 HYPERTENSION: Status: ACTIVE | Noted: 2024-01-26

## 2024-01-26 PROBLEM — N82.0 VESICOVAGINAL FISTULA: Status: ACTIVE | Noted: 2021-03-01

## 2024-01-26 PROBLEM — Z85.038 PERSONAL HISTORY OF OTHER MALIGNANT NEOPLASM OF LARGE INTESTINE: Status: ACTIVE | Noted: 2023-01-01

## 2024-01-26 PROBLEM — K21.9 GASTRO-ESOPHAGEAL REFLUX DISEASE WITHOUT ESOPHAGITIS: Status: ACTIVE | Noted: 2023-01-01

## 2024-01-26 PROBLEM — U07.1 COVID-19: Status: ACTIVE | Noted: 2024-01-26

## 2024-01-26 PROBLEM — N18.4 CHRONIC KIDNEY DISEASE, STAGE 4 (SEVERE): Status: ACTIVE | Noted: 2023-01-01

## 2024-01-26 PROBLEM — E03.1 CONGENITAL HYPOTHYROIDISM WITHOUT GOITER: Status: ACTIVE | Noted: 2023-01-01

## 2024-01-26 PROBLEM — E03.1 CONGENITAL HYPOTHYROIDISM WITHOUT GOITER: Status: RESOLVED | Noted: 2023-01-01 | Resolved: 2024-01-26

## 2024-01-26 PROBLEM — E03.9 HYPOTHYROID: Status: ACTIVE | Noted: 2021-08-12

## 2024-01-26 PROBLEM — F41.9 ANXIETY DISORDER: Status: ACTIVE | Noted: 2021-08-12

## 2024-01-26 PROBLEM — Z93.2 ILEOSTOMY STATUS: Status: ACTIVE | Noted: 2023-01-01

## 2024-01-26 PROBLEM — F33.9 RECURRENT MAJOR DEPRESSION: Status: ACTIVE | Noted: 2023-01-01

## 2024-01-26 LAB
ALBUMIN SERPL-MCNC: 2.9 G/DL (ref 3.4–4.8)
ALBUMIN/GLOB SERPL: 0.6 RATIO (ref 1.1–2)
ALP SERPL-CCNC: 103 UNIT/L (ref 40–150)
ALT SERPL-CCNC: 17 UNIT/L (ref 0–55)
APPEARANCE UR: ABNORMAL
AST SERPL-CCNC: 47 UNIT/L (ref 5–34)
BACTERIA #/AREA URNS AUTO: ABNORMAL /HPF
BASOPHILS # BLD AUTO: 0.02 X10(3)/MCL
BASOPHILS NFR BLD AUTO: 0.3 %
BILIRUB SERPL-MCNC: 0.5 MG/DL
BILIRUB UR QL STRIP.AUTO: NEGATIVE
BUN SERPL-MCNC: 48 MG/DL (ref 9.8–20.1)
CALCIUM SERPL-MCNC: 9.3 MG/DL (ref 8.4–10.2)
CHLORIDE SERPL-SCNC: 108 MMOL/L (ref 98–107)
CO2 SERPL-SCNC: 16 MMOL/L (ref 23–31)
COLOR UR AUTO: YELLOW
CREAT SERPL-MCNC: 3.37 MG/DL (ref 0.55–1.02)
EOSINOPHIL # BLD AUTO: 0.09 X10(3)/MCL (ref 0–0.9)
EOSINOPHIL NFR BLD AUTO: 1.3 %
ERYTHROCYTE [DISTWIDTH] IN BLOOD BY AUTOMATED COUNT: 12.5 % (ref 11.5–17)
GFR SERPLBLD CREATININE-BSD FMLA CKD-EPI: 14 MLS/MIN/1.73/M2
GLOBULIN SER-MCNC: 4.9 GM/DL (ref 2.4–3.5)
GLUCOSE SERPL-MCNC: 103 MG/DL (ref 82–115)
GLUCOSE UR QL STRIP.AUTO: NEGATIVE
HCT VFR BLD AUTO: 33.6 % (ref 37–47)
HGB BLD-MCNC: 10.6 G/DL (ref 12–16)
IMM GRANULOCYTES # BLD AUTO: 0.08 X10(3)/MCL (ref 0–0.04)
IMM GRANULOCYTES NFR BLD AUTO: 1.1 %
KETONES UR QL STRIP.AUTO: NEGATIVE
LEUKOCYTE ESTERASE UR QL STRIP.AUTO: ABNORMAL
LYMPHOCYTES # BLD AUTO: 0.93 X10(3)/MCL (ref 0.6–4.6)
LYMPHOCYTES NFR BLD AUTO: 13 %
MCH RBC QN AUTO: 30.7 PG (ref 27–31)
MCHC RBC AUTO-ENTMCNC: 31.5 G/DL (ref 33–36)
MCV RBC AUTO: 97.4 FL (ref 80–94)
MONOCYTES # BLD AUTO: 0.43 X10(3)/MCL (ref 0.1–1.3)
MONOCYTES NFR BLD AUTO: 6 %
NEUTROPHILS # BLD AUTO: 5.62 X10(3)/MCL (ref 2.1–9.2)
NEUTROPHILS NFR BLD AUTO: 78.3 %
NITRITE UR QL STRIP.AUTO: NEGATIVE
PH UR STRIP.AUTO: 6 [PH]
PLATELET # BLD AUTO: 246 X10(3)/MCL (ref 130–400)
PMV BLD AUTO: 10.6 FL (ref 7.4–10.4)
POTASSIUM SERPL-SCNC: 4.9 MMOL/L (ref 3.5–5.1)
PROT SERPL-MCNC: 7.8 GM/DL (ref 5.8–7.6)
PROT UR QL STRIP.AUTO: ABNORMAL
RBC # BLD AUTO: 3.45 X10(6)/MCL (ref 4.2–5.4)
RBC #/AREA URNS AUTO: ABNORMAL /HPF
RBC UR QL AUTO: ABNORMAL
SODIUM SERPL-SCNC: 135 MMOL/L (ref 136–145)
SP GR UR STRIP.AUTO: 1.02 (ref 1–1.03)
SQUAMOUS #/AREA URNS AUTO: ABNORMAL /HPF
UROBILINOGEN UR STRIP-ACNC: 0.2
WBC # SPEC AUTO: 7.17 X10(3)/MCL (ref 4.5–11.5)
WBC #/AREA URNS AUTO: ABNORMAL /HPF

## 2024-01-26 PROCEDURE — 80053 COMPREHEN METABOLIC PANEL: CPT | Performed by: NURSE PRACTITIONER

## 2024-01-26 PROCEDURE — 25000003 PHARM REV CODE 250: Performed by: NURSE PRACTITIONER

## 2024-01-26 PROCEDURE — 27000207 HC ISOLATION

## 2024-01-26 PROCEDURE — 87086 URINE CULTURE/COLONY COUNT: CPT | Performed by: NURSE PRACTITIONER

## 2024-01-26 PROCEDURE — 85025 COMPLETE CBC W/AUTO DIFF WBC: CPT | Performed by: NURSE PRACTITIONER

## 2024-01-26 PROCEDURE — 81003 URINALYSIS AUTO W/O SCOPE: CPT | Performed by: NURSE PRACTITIONER

## 2024-01-26 PROCEDURE — 87040 BLOOD CULTURE FOR BACTERIA: CPT | Performed by: NURSE PRACTITIONER

## 2024-01-26 PROCEDURE — 11000001 HC ACUTE MED/SURG PRIVATE ROOM

## 2024-01-26 PROCEDURE — 25000003 PHARM REV CODE 250: Performed by: FAMILY MEDICINE

## 2024-01-26 PROCEDURE — 99285 EMERGENCY DEPT VISIT HI MDM: CPT

## 2024-01-26 PROCEDURE — 63600175 PHARM REV CODE 636 W HCPCS: Performed by: NURSE PRACTITIONER

## 2024-01-26 RX ORDER — CLOPIDOGREL BISULFATE 75 MG/1
75 TABLET ORAL DAILY
Status: DISCONTINUED | OUTPATIENT
Start: 2024-01-27 | End: 2024-01-30 | Stop reason: HOSPADM

## 2024-01-26 RX ORDER — DULOXETIN HYDROCHLORIDE 30 MG/1
30 CAPSULE, DELAYED RELEASE ORAL DAILY
COMMUNITY

## 2024-01-26 RX ORDER — QUETIAPINE FUMARATE 25 MG/1
25 TABLET, FILM COATED ORAL NIGHTLY
Status: DISCONTINUED | OUTPATIENT
Start: 2024-01-26 | End: 2024-01-30 | Stop reason: HOSPADM

## 2024-01-26 RX ORDER — ASCORBIC ACID 250 MG
500 TABLET ORAL DAILY
Status: DISCONTINUED | OUTPATIENT
Start: 2024-01-27 | End: 2024-01-30 | Stop reason: HOSPADM

## 2024-01-26 RX ORDER — CITALOPRAM 20 MG/1
20 TABLET, FILM COATED ORAL DAILY
Status: DISCONTINUED | OUTPATIENT
Start: 2024-01-27 | End: 2024-01-30 | Stop reason: HOSPADM

## 2024-01-26 RX ORDER — TALC
6 POWDER (GRAM) TOPICAL NIGHTLY PRN
Status: DISCONTINUED | OUTPATIENT
Start: 2024-01-26 | End: 2024-01-30 | Stop reason: HOSPADM

## 2024-01-26 RX ORDER — ONDANSETRON HYDROCHLORIDE 2 MG/ML
4 INJECTION, SOLUTION INTRAVENOUS EVERY 8 HOURS PRN
Status: DISCONTINUED | OUTPATIENT
Start: 2024-01-26 | End: 2024-01-30 | Stop reason: HOSPADM

## 2024-01-26 RX ORDER — FERROUS SULFATE 325(65) MG
325 TABLET, DELAYED RELEASE (ENTERIC COATED) ORAL
COMMUNITY

## 2024-01-26 RX ORDER — CLOPIDOGREL BISULFATE 75 MG/1
75 TABLET ORAL DAILY
COMMUNITY

## 2024-01-26 RX ORDER — MORPHINE SULFATE 4 MG/ML
2 INJECTION, SOLUTION INTRAMUSCULAR; INTRAVENOUS EVERY 4 HOURS PRN
Status: DISCONTINUED | OUTPATIENT
Start: 2024-01-26 | End: 2024-01-27

## 2024-01-26 RX ORDER — DULOXETIN HYDROCHLORIDE 30 MG/1
30 CAPSULE, DELAYED RELEASE ORAL DAILY
Status: DISCONTINUED | OUTPATIENT
Start: 2024-01-27 | End: 2024-01-30 | Stop reason: HOSPADM

## 2024-01-26 RX ORDER — FAMOTIDINE 20 MG/1
20 TABLET, FILM COATED ORAL DAILY
Status: DISCONTINUED | OUTPATIENT
Start: 2024-01-27 | End: 2024-01-30 | Stop reason: HOSPADM

## 2024-01-26 RX ORDER — SODIUM CHLORIDE 0.9 % (FLUSH) 0.9 %
10 SYRINGE (ML) INJECTION
Status: DISCONTINUED | OUTPATIENT
Start: 2024-01-26 | End: 2024-01-30 | Stop reason: HOSPADM

## 2024-01-26 RX ORDER — LANOLIN ALCOHOL/MO/W.PET/CERES
1 CREAM (GRAM) TOPICAL DAILY
Status: DISCONTINUED | OUTPATIENT
Start: 2024-01-27 | End: 2024-01-30 | Stop reason: HOSPADM

## 2024-01-26 RX ORDER — SODIUM BICARBONATE 650 MG/1
650 TABLET ORAL 4 TIMES DAILY
Status: DISCONTINUED | OUTPATIENT
Start: 2024-01-26 | End: 2024-01-30 | Stop reason: HOSPADM

## 2024-01-26 RX ORDER — SODIUM CHLORIDE 9 MG/ML
INJECTION, SOLUTION INTRAVENOUS CONTINUOUS
Status: DISCONTINUED | OUTPATIENT
Start: 2024-01-26 | End: 2024-01-28

## 2024-01-26 RX ORDER — CALCITRIOL 0.25 UG/1
0.5 CAPSULE ORAL DAILY
Status: DISCONTINUED | OUTPATIENT
Start: 2024-01-27 | End: 2024-01-30 | Stop reason: HOSPADM

## 2024-01-26 RX ORDER — ACETAMINOPHEN 325 MG/1
650 TABLET ORAL EVERY 8 HOURS PRN
Status: DISCONTINUED | OUTPATIENT
Start: 2024-01-26 | End: 2024-01-30 | Stop reason: HOSPADM

## 2024-01-26 RX ORDER — HYDROCODONE BITARTRATE AND ACETAMINOPHEN 10; 325 MG/1; MG/1
1 TABLET ORAL EVERY 6 HOURS PRN
Status: DISCONTINUED | OUTPATIENT
Start: 2024-01-26 | End: 2024-01-30 | Stop reason: HOSPADM

## 2024-01-26 RX ADMIN — SODIUM BICARBONATE 650 MG: 650 TABLET ORAL at 08:01

## 2024-01-26 RX ADMIN — QUETIAPINE FUMARATE 25 MG: 25 TABLET ORAL at 08:01

## 2024-01-26 RX ADMIN — CEFTRIAXONE SODIUM 1 G: 1 INJECTION, POWDER, FOR SOLUTION INTRAMUSCULAR; INTRAVENOUS at 05:01

## 2024-01-26 RX ADMIN — SODIUM CHLORIDE: 9 INJECTION, SOLUTION INTRAVENOUS at 06:01

## 2024-01-26 NOTE — ED PROVIDER NOTES
Encounter Date: 2024       History     Chief Complaint   Patient presents with    Cystitis     Pt states DR Heaton sent her to Er to do a UA to check for infection     See MDM    The history is provided by the patient. No  was used.     Review of patient's allergies indicates:   Allergen Reactions    Oxaprozin Nausea And Vomiting and Swelling     Facial swelling      Doxycycline     Nsaids (non-steroidal anti-inflammatory drug) Rash    Sulfa (sulfonamide antibiotics) Nausea And Vomiting     Past Medical History:   Diagnosis Date    Cancer, colon     Chronic pain     GERD (gastroesophageal reflux disease)     Renal disorder     Thyroiditis, unspecified      Past Surgical History:   Procedure Laterality Date    BACK SURGERY       SECTION      CHOLECYSTECTOMY      COLON SURGERY      HYSTERECTOMY      KIDNEY SURGERY       History reviewed. No pertinent family history.  Social History     Tobacco Use    Smoking status: Never    Smokeless tobacco: Never   Substance Use Topics    Alcohol use: Never    Drug use: Never     Review of Systems   Constitutional:  Negative for fever.   Respiratory:  Negative for cough and shortness of breath.    Cardiovascular:  Negative for chest pain.   Gastrointestinal:  Negative for abdominal pain.   Genitourinary:  Negative for difficulty urinating and dysuria.   Musculoskeletal:  Negative for gait problem.   Skin:  Negative for color change.   Neurological:  Negative for dizziness, speech difficulty and headaches.   Psychiatric/Behavioral:  Negative for hallucinations and suicidal ideas.    All other systems reviewed and are negative.      Physical Exam     Initial Vitals [24 1635]   BP Pulse Resp Temp SpO2   (!) 114/52 84 20 97.2 °F (36.2 °C) 97 %      MAP       --         Physical Exam    Nursing note and vitals reviewed.  Constitutional: She appears well-developed and well-nourished.   HENT:   Head: Normocephalic.   Eyes: EOM are normal.   Neck:    Normal range of motion.  Cardiovascular:  Normal rate, regular rhythm, normal heart sounds and intact distal pulses.           Pulmonary/Chest: Breath sounds normal. No respiratory distress.   Abdominal: Abdomen is soft. Bowel sounds are normal. There is no abdominal tenderness.   Musculoskeletal:         General: Normal range of motion.      Cervical back: Normal range of motion.      Comments: Left arm is in a sling     Neurological: She is alert and oriented to person, place, and time. She has normal strength.   Skin: Skin is warm and dry.   Blackened area to sole of right heel   Psychiatric: She has a normal mood and affect. Her behavior is normal. Judgment and thought content normal.         ED Course   Procedures  Labs Reviewed   BLOOD CULTURE OLG   BLOOD CULTURE OLG   URINALYSIS, REFLEX TO URINE CULTURE   CBC W/ AUTO DIFFERENTIAL    Narrative:     The following orders were created for panel order CBC auto differential.  Procedure                               Abnormality         Status                     ---------                               -----------         ------                     CBC with Differential[[id number]]                                                        Please view results for these tests on the individual orders.   COMPREHENSIVE METABOLIC PANEL   CBC WITH DIFFERENTIAL          Imaging Results    None          Medications   cefTRIAXone (Rocephin) 1 g in dextrose 5 % in water (D5W) 100 mL IVPB (MB+) (has no administration in time range)     Medical Decision Making  Historian:  Patient.  Patient is a 73-year-old female  that presents with here for UTI that has been present few days. Associated symptoms nothing. Surrounding information is nothing. Exacerbated by nothing. Relieved by nothing. Patient treatment prior to arrival none. Risk factors include age. Other history pertaining to this complaint nothing.   Assessment:  See physical exam.  DD:  UTI  ED Course: History was obtained.   Physical was performed.  Spoke to Dr. Heaton, internal medicine, he accepts admission.  Labs are pending at time of admission.  I did review the records from a different ER from yesterday.  Patient does have a UTI. Medical or surgical consults:  Internal Medicine. Social determinants that affect healthcare:  Age limitations.       Amount and/or Complexity of Data Reviewed  External Data Reviewed: notes.     Details: Reviewed notes from an ER visit from yesterday.  Reviewed ER records from 01/15/2024 from outside facility  Labs: ordered.     Details: Labs pending    Risk  Decision regarding hospitalization.               ED Course as of 01/26/24 1658 Fri Jan 26, 2024 1644 Call placed to Dr Heaton [CL]      ED Course User Index  [CL] Luis Urbina FNP                           Clinical Impression:  Final diagnoses:  [T83.511A, N39.0] Urinary tract infection associated with catheterization of urinary tract, unspecified indwelling urinary catheter type, initial encounter (Primary)          ED Disposition Condition    Admit Stable                Luis Urbina FNP  01/26/24 1654       Luis Urbina FNP  01/26/24 1658

## 2024-01-26 NOTE — ED PROVIDER NOTES
Encounter Date: 2024       History     Chief Complaint   Patient presents with    Cystitis     Pt states DR Heaton sent her to Er to do a UA to check for infection     HPI  Review of patient's allergies indicates:   Allergen Reactions    Oxaprozin Nausea And Vomiting and Swelling     Facial swelling      Doxycycline     Nsaids (non-steroidal anti-inflammatory drug) Rash    Sulfa (sulfonamide antibiotics) Nausea And Vomiting     Past Medical History:   Diagnosis Date    Cancer, colon     Chronic pain     GERD (gastroesophageal reflux disease)     Renal disorder     Thyroiditis, unspecified      Past Surgical History:   Procedure Laterality Date    BACK SURGERY       SECTION      CHOLECYSTECTOMY      COLON SURGERY      HYSTERECTOMY      KIDNEY SURGERY       History reviewed. No pertinent family history.  Social History     Tobacco Use    Smoking status: Never    Smokeless tobacco: Never   Substance Use Topics    Alcohol use: Never    Drug use: Never     Review of Systems    Physical Exam     Initial Vitals [24 1635]   BP Pulse Resp Temp SpO2   (!) 114/52 84 20 97.2 °F (36.2 °C) 97 %      MAP       --         Physical Exam    ED Course   Procedures  Labs Reviewed   BLOOD CULTURE OLG   BLOOD CULTURE OLG   URINALYSIS, REFLEX TO URINE CULTURE   CBC W/ AUTO DIFFERENTIAL    Narrative:     The following orders were created for panel order CBC auto differential.  Procedure                               Abnormality         Status                     ---------                               -----------         ------                     CBC with Differential[[id number]]                                                        Please view results for these tests on the individual orders.   COMPREHENSIVE METABOLIC PANEL   CBC WITH DIFFERENTIAL          Imaging Results    None          Medications   cefTRIAXone (Rocephin) 1 g in dextrose 5 % in water (D5W) 100 mL IVPB (MB+) (has no administration in time range)      Medical Decision Making  Amount and/or Complexity of Data Reviewed  Labs: ordered.    Risk  Decision regarding hospitalization.              Attending Attestation:     Physician Attestation Statement for NP/PA:   I personally made/approved the management plan and take responsibility for the patient management.    Other NP/PA Attestation Additions:    History of Present Illness: I have physically seen and evaluated this patient.  I have made in improved the care plan for this patient I take responsibility for this patient  She was seen yesterday at an outside emergency department diagnosed with UTI appropriately started on Rocephin and given a prescription for Omnicef antibiotics at the house.  However apparently there is some concern that she has had resistant organisms in the past and apparently had rapid deterioration.  Her attending physician wishes her to be admitted.  Review of the cultures from most recent urine culture indicate there were 3 separate organisms chronic indwelling Chun patient.  They were all pretty much pain sensitive.  It should be sensitive to the antibiotics that were selected yesterday.  However given the patient's past history the attending physician felt it was in the patient's best interest to be admitted for intravenous antibiotics   Physical Exam: Elderly female left arm in sling from fractured humerus.  Ileostomy bag in place right lower quadrant of abdomen chronic indwelling Chun in place.  Patient is awake and oriented.  She can not get up and ambulate.  Fully awake oriented heart is regular rate and rhythm lungs are clear abdomen is fairly benign 2+ femoral pulses.  Left arm is contused around the upper part of the humerus.  Radial ulnar pulses are good distally in the left upper extremity neurological is appropriate psychological seems appropriate   Medical Decision Making: MDM  Problems addressed  Co-morbidities and/or factors adding to the complexity or risk for the  patient:  Advanced age indwelling Chun chronic ileostomy bag fractured left humerus  Problems addressed:  Acute UTI  Acute problem/illness or progression/exacerbation of chronic problem with potential threat to life/bodily dysfunction?:  Acute UTI  Differential diagnoses/problems considered: see above     Amount and/or Complexity of Data Reviewed  Independent Historian: EMS, son, and caregiver  (see above for summary)  External Data Reviewed: notes from previous ED visits (see above for summary)  Risk and benefits of testing: discussed   Labs: Labs: ordered and reviewed  Radiology:Radiology: ordered and independent interpretation performed (see above or ED course)  ECG/medicine tests:Radiology: ordered and independent interpretation performed (see above or ED course)  discussed with primary care physician    Risk  Parenteral controlled substances decision to admit patient  Diagnosis or treatment significantly impacted by social determinants of health: none   Shared decision making     Critical Care  none    Procedure Note: Treatment is evaluation we asked the nurses to change the already pre-existing Chun to a new bag send out a new urinalysis culture and sensitivity.  Intravenous Rocephin is ordered maintenance fluids ordered consultation obtained with the attending physician orders are written to admit patient.            ED Course as of 01/26/24 1710 Fri Jan 26, 2024   1644 Call placed to Dr Heaton [CL]      ED Course User Index  [CL] Luis Urbina, POOJAP                           Clinical Impression:  Final diagnoses:  [T83.511A, N39.0] Urinary tract infection associated with catheterization of urinary tract, unspecified indwelling urinary catheter type, initial encounter (Primary)          ED Disposition Condition    Admit Stable                Damon Velez MD  01/26/24 1710

## 2024-01-27 LAB
ALBUMIN SERPL-MCNC: 2.8 G/DL (ref 3.4–4.8)
ALBUMIN/GLOB SERPL: 0.7 RATIO (ref 1.1–2)
ALP SERPL-CCNC: 99 UNIT/L (ref 40–150)
ALT SERPL-CCNC: 15 UNIT/L (ref 0–55)
AST SERPL-CCNC: 44 UNIT/L (ref 5–34)
BASOPHILS # BLD AUTO: 0.02 X10(3)/MCL
BASOPHILS NFR BLD AUTO: 0.4 %
BILIRUB SERPL-MCNC: 0.3 MG/DL
BUN SERPL-MCNC: 44 MG/DL (ref 9.8–20.1)
CALCIUM SERPL-MCNC: 9.1 MG/DL (ref 8.4–10.2)
CHLORIDE SERPL-SCNC: 109 MMOL/L (ref 98–107)
CO2 SERPL-SCNC: 14 MMOL/L (ref 23–31)
CREAT SERPL-MCNC: 3.43 MG/DL (ref 0.55–1.02)
EOSINOPHIL # BLD AUTO: 0.15 X10(3)/MCL (ref 0–0.9)
EOSINOPHIL NFR BLD AUTO: 2.8 %
ERYTHROCYTE [DISTWIDTH] IN BLOOD BY AUTOMATED COUNT: 12.4 % (ref 11.5–17)
GFR SERPLBLD CREATININE-BSD FMLA CKD-EPI: 14 MLS/MIN/1.73/M2
GLOBULIN SER-MCNC: 4.1 GM/DL (ref 2.4–3.5)
GLUCOSE SERPL-MCNC: 95 MG/DL (ref 82–115)
HCT VFR BLD AUTO: 34 % (ref 37–47)
HGB BLD-MCNC: 10.4 G/DL (ref 12–16)
IMM GRANULOCYTES # BLD AUTO: 0.08 X10(3)/MCL (ref 0–0.04)
IMM GRANULOCYTES NFR BLD AUTO: 1.5 %
LYMPHOCYTES # BLD AUTO: 0.94 X10(3)/MCL (ref 0.6–4.6)
LYMPHOCYTES NFR BLD AUTO: 17.2 %
MCH RBC QN AUTO: 31 PG (ref 27–31)
MCHC RBC AUTO-ENTMCNC: 30.6 G/DL (ref 33–36)
MCV RBC AUTO: 101.2 FL (ref 80–94)
MONOCYTES # BLD AUTO: 0.4 X10(3)/MCL (ref 0.1–1.3)
MONOCYTES NFR BLD AUTO: 7.3 %
NEUTROPHILS # BLD AUTO: 3.86 X10(3)/MCL (ref 2.1–9.2)
NEUTROPHILS NFR BLD AUTO: 70.8 %
PLATELET # BLD AUTO: 215 X10(3)/MCL (ref 130–400)
PMV BLD AUTO: 10.2 FL (ref 7.4–10.4)
POTASSIUM SERPL-SCNC: 3.9 MMOL/L (ref 3.5–5.1)
PROT SERPL-MCNC: 6.9 GM/DL (ref 5.8–7.6)
RBC # BLD AUTO: 3.36 X10(6)/MCL (ref 4.2–5.4)
SODIUM SERPL-SCNC: 136 MMOL/L (ref 136–145)
WBC # SPEC AUTO: 5.45 X10(3)/MCL (ref 4.5–11.5)

## 2024-01-27 PROCEDURE — 80053 COMPREHEN METABOLIC PANEL: CPT | Performed by: FAMILY MEDICINE

## 2024-01-27 PROCEDURE — 25000003 PHARM REV CODE 250: Performed by: FAMILY MEDICINE

## 2024-01-27 PROCEDURE — 63600175 PHARM REV CODE 636 W HCPCS: Performed by: FAMILY MEDICINE

## 2024-01-27 PROCEDURE — 94761 N-INVAS EAR/PLS OXIMETRY MLT: CPT

## 2024-01-27 PROCEDURE — 27000207 HC ISOLATION

## 2024-01-27 PROCEDURE — 36410 VNPNXR 3YR/> PHY/QHP DX/THER: CPT

## 2024-01-27 PROCEDURE — 85025 COMPLETE CBC W/AUTO DIFF WBC: CPT | Performed by: FAMILY MEDICINE

## 2024-01-27 PROCEDURE — 11000001 HC ACUTE MED/SURG PRIVATE ROOM

## 2024-01-27 PROCEDURE — C1751 CATH, INF, PER/CENT/MIDLINE: HCPCS

## 2024-01-27 PROCEDURE — A4216 STERILE WATER/SALINE, 10 ML: HCPCS | Performed by: FAMILY MEDICINE

## 2024-01-27 RX ORDER — SODIUM CHLORIDE 9 MG/ML
INJECTION, SOLUTION INTRAVENOUS CONTINUOUS
Status: DISCONTINUED | OUTPATIENT
Start: 2024-01-27 | End: 2024-01-28

## 2024-01-27 RX ORDER — ENOXAPARIN SODIUM 100 MG/ML
30 INJECTION SUBCUTANEOUS EVERY 24 HOURS
Status: DISCONTINUED | OUTPATIENT
Start: 2024-01-27 | End: 2024-01-28

## 2024-01-27 RX ORDER — MUPIROCIN 20 MG/G
OINTMENT TOPICAL 2 TIMES DAILY
Status: DISCONTINUED | OUTPATIENT
Start: 2024-01-27 | End: 2024-01-30 | Stop reason: HOSPADM

## 2024-01-27 RX ORDER — SODIUM CHLORIDE 0.9 % (FLUSH) 0.9 %
10 SYRINGE (ML) INJECTION
Status: DISCONTINUED | OUTPATIENT
Start: 2024-01-27 | End: 2024-01-30 | Stop reason: HOSPADM

## 2024-01-27 RX ORDER — SODIUM CHLORIDE 0.9 % (FLUSH) 0.9 %
10 SYRINGE (ML) INJECTION EVERY 6 HOURS
Status: DISCONTINUED | OUTPATIENT
Start: 2024-01-27 | End: 2024-01-30 | Stop reason: HOSPADM

## 2024-01-27 RX ADMIN — MUPIROCIN 1 G: 20 OINTMENT TOPICAL at 08:01

## 2024-01-27 RX ADMIN — Medication 500 MG: at 08:01

## 2024-01-27 RX ADMIN — FERROUS SULFATE TAB 325 MG (65 MG ELEMENTAL FE) 1 EACH: 325 (65 FE) TAB at 08:01

## 2024-01-27 RX ADMIN — SODIUM CHLORIDE: 9 INJECTION, SOLUTION INTRAVENOUS at 08:01

## 2024-01-27 RX ADMIN — SODIUM BICARBONATE 650 MG: 650 TABLET ORAL at 08:01

## 2024-01-27 RX ADMIN — CLOPIDOGREL BISULFATE 75 MG: 75 TABLET ORAL at 08:01

## 2024-01-27 RX ADMIN — CEFTRIAXONE SODIUM 1 G: 1 INJECTION, POWDER, FOR SOLUTION INTRAMUSCULAR; INTRAVENOUS at 05:01

## 2024-01-27 RX ADMIN — SODIUM CHLORIDE, PRESERVATIVE FREE 10 ML: 5 INJECTION INTRAVENOUS at 05:01

## 2024-01-27 RX ADMIN — CALCITRIOL CAPSULES 0.25 MCG 0.5 MCG: 0.25 CAPSULE ORAL at 08:01

## 2024-01-27 RX ADMIN — SODIUM CHLORIDE: 9 INJECTION, SOLUTION INTRAVENOUS at 01:01

## 2024-01-27 RX ADMIN — ENOXAPARIN SODIUM 30 MG: 30 INJECTION SUBCUTANEOUS at 04:01

## 2024-01-27 RX ADMIN — SODIUM CHLORIDE, PRESERVATIVE FREE 10 ML: 5 INJECTION INTRAVENOUS at 12:01

## 2024-01-27 RX ADMIN — SODIUM BICARBONATE 650 MG: 650 TABLET ORAL at 04:01

## 2024-01-27 RX ADMIN — QUETIAPINE FUMARATE 25 MG: 25 TABLET ORAL at 08:01

## 2024-01-27 RX ADMIN — FAMOTIDINE 20 MG: 20 TABLET ORAL at 08:01

## 2024-01-27 RX ADMIN — SODIUM BICARBONATE 650 MG: 650 TABLET ORAL at 12:01

## 2024-01-27 RX ADMIN — CITALOPRAM HYDROBROMIDE 20 MG: 20 TABLET ORAL at 08:01

## 2024-01-27 RX ADMIN — LEVOTHYROXINE SODIUM 137 MCG: 25 TABLET ORAL at 06:01

## 2024-01-27 NOTE — SUBJECTIVE & OBJECTIVE
Past Medical History:   Diagnosis Date    Cancer, colon     Chronic pain     GERD (gastroesophageal reflux disease)     Renal disorder     Thyroiditis, unspecified        Past Surgical History:   Procedure Laterality Date    BACK SURGERY       SECTION      CHOLECYSTECTOMY      COLON SURGERY      HYSTERECTOMY      KIDNEY SURGERY         Review of patient's allergies indicates:   Allergen Reactions    Oxaprozin Nausea And Vomiting and Swelling     Facial swelling      Doxycycline     Nsaids (non-steroidal anti-inflammatory drug) Rash    Sulfa (sulfonamide antibiotics) Nausea And Vomiting       No current facility-administered medications on file prior to encounter.     Current Outpatient Medications on File Prior to Encounter   Medication Sig    ascorbic acid, vitamin C, (VITAMIN C) 100 MG tablet Take 100 mg by mouth once daily.    calcitRIOL (ROCALTROL) 0.5 MCG Cap Take 0.5 mcg by mouth once daily.    citalopram (CELEXA) 20 MG tablet Take 20 mg by mouth once daily.    clopidogreL (PLAVIX) 75 mg tablet Take 75 mg by mouth once daily.    DULoxetine (CYMBALTA) 30 MG capsule Take 30 mg by mouth once daily.    ferrous sulfate 325 (65 FE) MG EC tablet Take 325 mg by mouth 3 (three) times daily with meals.    HYDROcodone-acetaminophen (NORCO)  mg per tablet Take 1 tablet by mouth.    levothyroxine (SYNTHROID) 100 MCG tablet Take 137 mcg by mouth before breakfast.    methylPREDNISolone (MEDROL DOSEPACK) 4 mg tablet use as directed    omeprazole (PRILOSEC) 40 MG capsule Take 40 mg by mouth once daily.    QUEtiapine (SEROQUEL) 25 MG Tab Take by mouth.    sodium bicarbonate 650 MG tablet Take 650 mg by mouth 4 (four) times daily.     Family History       Problem Relation (Age of Onset)    Hypertension Mother          Tobacco Use    Smoking status: Never    Smokeless tobacco: Never   Substance and Sexual Activity    Alcohol use: Never    Drug use: Never    Sexual activity: Not Currently     Review of Systems    Constitutional: Negative.    HENT: Negative.     Eyes: Negative.    Respiratory: Negative.     Cardiovascular: Negative.    Gastrointestinal: Negative.    Endocrine: Negative.    Genitourinary:  Negative for decreased urine volume, frequency, hematuria and pelvic pain.   Musculoskeletal: Negative.    Skin: Negative.    Allergic/Immunologic: Negative.    Neurological: Negative.    Hematological: Negative.    Psychiatric/Behavioral: Negative.       Objective:     Vital Signs (Most Recent):  Temp: 97.9 °F (36.6 °C) (01/26/24 1841)  Pulse: 84 (01/26/24 1841)  Resp: 20 (01/26/24 1841)  BP: (!) 148/68 (01/26/24 1841)  SpO2: 96 % (01/26/24 1841) Vital Signs (24h Range):  Temp:  [97.2 °F (36.2 °C)-97.9 °F (36.6 °C)] 97.9 °F (36.6 °C)  Pulse:  [84-85] 84  Resp:  [20] 20  SpO2:  [96 %-99 %] 96 %  BP: (114-148)/(52-90) 148/68     Weight: 79.8 kg (176 lb)  Body mass index is 33.25 kg/m².     Physical Exam  Constitutional:       General: She is not in acute distress.     Appearance: Normal appearance.   HENT:      Head: Normocephalic and atraumatic.      Right Ear: Tympanic membrane normal.      Left Ear: Tympanic membrane normal.      Nose: Nose normal.      Mouth/Throat:      Mouth: Mucous membranes are moist.      Pharynx: Oropharynx is clear. No oropharyngeal exudate.   Eyes:      Extraocular Movements: Extraocular movements intact.      Pupils: Pupils are equal, round, and reactive to light.   Cardiovascular:      Rate and Rhythm: Normal rate and regular rhythm.      Pulses: Normal pulses.      Heart sounds: Normal heart sounds. No murmur heard.     No gallop.   Pulmonary:      Effort: Pulmonary effort is normal.      Breath sounds: Normal breath sounds.   Abdominal:      General: Abdomen is flat. Bowel sounds are normal.      Palpations: Abdomen is soft.      Comments: Colostomy and uriostomy sites are clear   Genitourinary:     General: Normal vulva.   Musculoskeletal:         General: Normal range of motion.       Cervical back: Normal range of motion and neck supple.   Skin:     General: Skin is warm and dry.      Capillary Refill: Capillary refill takes less than 2 seconds.   Neurological:      General: No focal deficit present.      Mental Status: She is alert and oriented to person, place, and time. Mental status is at baseline.   Psychiatric:         Mood and Affect: Mood normal.         Behavior: Behavior normal.         Thought Content: Thought content normal.         Judgment: Judgment normal.              CRANIAL NERVES     CN III, IV, VI   Pupils are equal, round, and reactive to light.       Significant Labs: All pertinent labs within the past 24 hours have been reviewed.  CBC:   Recent Labs   Lab 01/26/24  1737   WBC 7.17   HGB 10.6*   HCT 33.6*        CMP:   Recent Labs   Lab 01/26/24  1737   *   K 4.9   CO2 16*   BUN 48.0*   CREATININE 3.37*   CALCIUM 9.3   ALBUMIN 2.9*   BILITOT 0.5   ALKPHOS 103   AST 47*   ALT 17       Significant Imaging: I have reviewed all pertinent imaging results/findings within the past 24 hours.

## 2024-01-27 NOTE — PLAN OF CARE
Problem: Adult Inpatient Plan of Care  Goal: Plan of Care Review  Outcome: Ongoing, Progressing  Goal: Patient-Specific Goal (Individualized)  Outcome: Ongoing, Progressing  Goal: Absence of Hospital-Acquired Illness or Injury  Outcome: Ongoing, Progressing  Goal: Optimal Comfort and Wellbeing  Outcome: Ongoing, Progressing  Goal: Readiness for Transition of Care  Outcome: Ongoing, Progressing     Problem: Mobility Impairment  Goal: Optimal Mobility  Outcome: Ongoing, Progressing     Problem: Pain Acute  Goal: Acceptable Pain Control and Functional Ability  Outcome: Ongoing, Progressing     Problem: UTI (Urinary Tract Infection)  Goal: Improved Infection Symptoms  Outcome: Ongoing, Progressing     Problem: Infection  Goal: Absence of Infection Signs and Symptoms  Outcome: Ongoing, Progressing

## 2024-01-27 NOTE — PROGRESS NOTES
Pharmacist Renal Dose Adjustment Note    Kam Reyes is a 73 y.o. female being treated with the medication famotidine    Patient Data:    Vital Signs (Most Recent):  Temp: 97.9 °F (36.6 °C) (01/26/24 1841)  Pulse: 84 (01/26/24 1841)  Resp: 20 (01/26/24 1841)  BP: (!) 148/68 (01/26/24 1841)  SpO2: 96 % (01/26/24 1841) Vital Signs (72h Range):  Temp:  [97.2 °F (36.2 °C)-97.9 °F (36.6 °C)]   Pulse:  [84-85]   Resp:  [20]   BP: (114-148)/(52-90)   SpO2:  [96 %-99 %]      Recent Labs   Lab 01/20/24  1913 01/26/24  1737   CREATININE 3.39* 3.37*     Serum creatinine: 3.37 mg/dL (H) 01/26/24 1737  Estimated creatinine clearance: 14.2 mL/min (A)    Medication:famotadine dose: 20mg frequency bid will be changed to medication:famotadine dose:20mg frequency:daily    Pharmacist's Name: Gail Bass  Pharmacist's Extension: [phone]

## 2024-01-27 NOTE — PROCEDURES
"Kam Reyes is a 73 y.o. female patient.    Temp: 98.3 °F (36.8 °C) (01/27/24 0801)  Pulse: 80 (01/27/24 0801)  Resp: 20 (01/26/24 2255)  BP: 118/71 (01/27/24 0801)  SpO2: 96 % (01/27/24 0801)  Weight: 79.8 kg (176 lb) (01/26/24 1841)  Height: 5' 1" (154.9 cm) (01/26/24 1841)    PICC  Date/Time: 1/27/2024 10:04 AM  Performed by: Uriel Adam RN  Consent Done: Yes  Time out: Immediately prior to procedure a time out was called to verify the correct patient, procedure, equipment, support staff and site/side marked as required  Indications: med administration and vascular access  Anesthesia: local infiltration  Local anesthetic: lidocaine 1% without epinephrine    Preparation: skin prepped with ChloraPrep  Skin prep agent dried: skin prep agent completely dried prior to procedure  Sterile barriers: all five maximum sterile barriers used - cap, mask, sterile gown, sterile gloves, and large sterile sheet  Hand hygiene: hand hygiene performed prior to central venous catheter insertion  Location details: right basilic  Catheter type: single lumen  Catheter size: 4 Fr  Catheter Length: 17cm    Ultrasound guidance: yes  Vessel Caliber: patent, compressibility normal  Needle advanced into vessel with real time Ultrasound guidance.  Guidewire confirmed in vessel.  Sterile sheath used.  Number of attempts: 1  Post-procedure: blood return through all ports, chlorhexidine patch and sterile dressing applied            Name Uriel Adam RN   1/27/2024    "

## 2024-01-27 NOTE — ASSESSMENT & PLAN NOTE
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

## 2024-01-27 NOTE — PLAN OF CARE
Problem: Adult Inpatient Plan of Care  Goal: Plan of Care Review  Outcome: Ongoing, Progressing  Goal: Patient-Specific Goal (Individualized)  Outcome: Ongoing, Progressing  Goal: Absence of Hospital-Acquired Illness or Injury  Outcome: Ongoing, Progressing  Goal: Optimal Comfort and Wellbeing  Outcome: Ongoing, Progressing  Goal: Readiness for Transition of Care  Outcome: Ongoing, Progressing     Problem: Mobility Impairment  Goal: Optimal Mobility  Outcome: Ongoing, Progressing     Problem: Pain Acute  Goal: Acceptable Pain Control and Functional Ability  Outcome: Ongoing, Progressing     Problem: UTI (Urinary Tract Infection)  Goal: Improved Infection Symptoms  Outcome: Ongoing, Progressing

## 2024-01-27 NOTE — H&P
[facility] - Medical Surgical Unit  [facility] Medicine  History & Physical    Patient Name: Kam Reyes  MRN: [id number]  Patient Class: IP- Inpatient  Admission Date: 2024  Attending Physician: Gregor Heaton MD   Primary Care Provider: Gregor Heaton MD         Patient information was obtained from patient and ER records.     Subjective:     Principal Problem:<principal problem not specified>    Chief Complaint:   Chief Complaint   Patient presents with    Cystitis     Pt states DR Heaton sent her to Er to do a UA to check for infection        HPI: The patient was seen in the [facility] ER yesterday for possible urinary tract infection with increased sedimentation to her urine from her ostomy bag.  She was hit home yesterday.  She called us today to tell us that she was going to the emergency room to be admitted for her urinary tract infection.  She recounts that her ostomy bag had sediment in it and bubbles.  Year in his currently clear.  She denies any nausea vomiting any fever.  She denies any abdominal pain.  States she has not had any decrease in energy are in her usual activities of living.  Has been getting good p.o. intake passing good stool through her ostomy.  She states on the  she was diagnosed with COVID and had some respiratory issues states he feels fine now with no coughing no fever no congestion    Past Medical History:   Diagnosis Date    Cancer, colon     Chronic pain     GERD (gastroesophageal reflux disease)     Renal disorder     Thyroiditis, unspecified        Past Surgical History:   Procedure Laterality Date    BACK SURGERY       SECTION      CHOLECYSTECTOMY      COLON SURGERY      HYSTERECTOMY      KIDNEY SURGERY         Review of patient's allergies indicates:   Allergen Reactions    Oxaprozin Nausea And Vomiting and Swelling     Facial swelling      Doxycycline     Nsaids (non-steroidal anti-inflammatory drug) Rash    Sulfa (sulfonamide  antibiotics) Nausea And Vomiting       No current facility-administered medications on file prior to encounter.     Current Outpatient Medications on File Prior to Encounter   Medication Sig    ascorbic acid, vitamin C, (VITAMIN C) 100 MG tablet Take 100 mg by mouth once daily.    calcitRIOL (ROCALTROL) 0.5 MCG Cap Take 0.5 mcg by mouth once daily.    citalopram (CELEXA) 20 MG tablet Take 20 mg by mouth once daily.    clopidogreL (PLAVIX) 75 mg tablet Take 75 mg by mouth once daily.    DULoxetine (CYMBALTA) 30 MG capsule Take 30 mg by mouth once daily.    ferrous sulfate 325 (65 FE) MG EC tablet Take 325 mg by mouth 3 (three) times daily with meals.    HYDROcodone-acetaminophen (NORCO)  mg per tablet Take 1 tablet by mouth.    levothyroxine (SYNTHROID) 100 MCG tablet Take 137 mcg by mouth before breakfast.    methylPREDNISolone (MEDROL DOSEPACK) 4 mg tablet use as directed    omeprazole (PRILOSEC) 40 MG capsule Take 40 mg by mouth once daily.    QUEtiapine (SEROQUEL) 25 MG Tab Take by mouth.    sodium bicarbonate 650 MG tablet Take 650 mg by mouth 4 (four) times daily.     Family History       Problem Relation (Age of Onset)    Hypertension Mother          Tobacco Use    Smoking status: Never    Smokeless tobacco: Never   Substance and Sexual Activity    Alcohol use: Never    Drug use: Never    Sexual activity: Not Currently     Review of Systems   Constitutional: Negative.    HENT: Negative.     Eyes: Negative.    Respiratory: Negative.     Cardiovascular: Negative.    Gastrointestinal: Negative.    Endocrine: Negative.    Genitourinary:  Negative for decreased urine volume, frequency, hematuria and pelvic pain.   Musculoskeletal: Negative.    Skin: Negative.    Allergic/Immunologic: Negative.    Neurological: Negative.    Hematological: Negative.    Psychiatric/Behavioral: Negative.       Objective:     Vital Signs (Most Recent):  Temp: 97.9 °F (36.6 °C) (01/26/24 1841)  Pulse: 84 (01/26/24 1841)  Resp: 20  (01/26/24 1841)  BP: (!) 148/68 (01/26/24 1841)  SpO2: 96 % (01/26/24 1841) Vital Signs (24h Range):  Temp:  [97.2 °F (36.2 °C)-97.9 °F (36.6 °C)] 97.9 °F (36.6 °C)  Pulse:  [84-85] 84  Resp:  [20] 20  SpO2:  [96 %-99 %] 96 %  BP: (114-148)/(52-90) 148/68     Weight: 79.8 kg (176 lb)  Body mass index is 33.25 kg/m².     Physical Exam  Constitutional:       General: She is not in acute distress.     Appearance: Normal appearance.   HENT:      Head: Normocephalic and atraumatic.      Right Ear: Tympanic membrane normal.      Left Ear: Tympanic membrane normal.      Nose: Nose normal.      Mouth/Throat:      Mouth: Mucous membranes are moist.      Pharynx: Oropharynx is clear. No oropharyngeal exudate.   Eyes:      Extraocular Movements: Extraocular movements intact.      Pupils: Pupils are equal, round, and reactive to light.   Cardiovascular:      Rate and Rhythm: Normal rate and regular rhythm.      Pulses: Normal pulses.      Heart sounds: Normal heart sounds. No murmur heard.     No gallop.   Pulmonary:      Effort: Pulmonary effort is normal.      Breath sounds: Normal breath sounds.   Abdominal:      General: Abdomen is flat. Bowel sounds are normal.      Palpations: Abdomen is soft.      Comments: Colostomy and uriostomy sites are clear   Genitourinary:     General: Normal vulva.   Musculoskeletal:         General: Normal range of motion.      Cervical back: Normal range of motion and neck supple.   Skin:     General: Skin is warm and dry.      Capillary Refill: Capillary refill takes less than 2 seconds.   Neurological:      General: No focal deficit present.      Mental Status: She is alert and oriented to person, place, and time. Mental status is at baseline.   Psychiatric:         Mood and Affect: Mood normal.         Behavior: Behavior normal.         Thought Content: Thought content normal.         Judgment: Judgment normal.              CRANIAL NERVES     CN III, IV, VI   Pupils are equal, round, and  reactive to light.       Significant Labs: All pertinent labs within the past 24 hours have been reviewed.  CBC:   Recent Labs   Lab 01/26/24  1737   WBC 7.17   HGB 10.6*   HCT 33.6*        CMP:   Recent Labs   Lab 01/26/24  1737   *   K 4.9   CO2 16*   BUN 48.0*   CREATININE 3.37*   CALCIUM 9.3   ALBUMIN 2.9*   BILITOT 0.5   ALKPHOS 103   AST 47*   ALT 17       Significant Imaging: I have reviewed all pertinent imaging results/findings within the past 24 hours.  Assessment/Plan:     Catheter-associated urinary tract infection  This is a distant history she was diagnosed with 20th it is currently the 26 she should almost be out of quarantine.      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 her repeated recurrent resistant urinary tract infections      Recurrent major depression  Continue current medications as written      Ileostomy status  Continue ostomy care      Gastro-esophageal reflux disease without esophagitis  pepcid twice a day      Chronic kidney disease, stage 4 (severe)  She is on her baseline creatinine right now she is not a dialysis patient refuses this      VTE Risk Mitigation (From admission, onward)           Ordered     IP VTE HIGH RISK PATIENT  Once         01/26/24 1805     Place sequential compression device  Until discontinued         01/26/24 1805                                    Gregor Heaton MD  Department of Hospital Medicine  [facility] - Medical Surgical Unit

## 2024-01-27 NOTE — HPI
The patient was seen in the [facility] ER yesterday for possible urinary tract infection with increased sedimentation to her urine from her ostomy bag.  She was hit home yesterday.  She called us today to tell us that she was going to the emergency room to be admitted for her urinary tract infection.  She recounts that her ostomy bag had sediment in it and bubbles.  Year in his currently clear.  She denies any nausea vomiting any fever.  She denies any abdominal pain.  States she has not had any decrease in energy are in her usual activities of living.  Has been getting good p.o. intake passing good stool through her ostomy.  She states on the 20th she was diagnosed with COVID and had some respiratory issues states he feels fine now with no coughing no fever no congestion

## 2024-01-27 NOTE — PROGRESS NOTES
Progress Note    Admit Date: 1/26/2024   LOS: 1 day     SUBJECTIVE:     Follow-up For:   pt   here  for uti   Dx  w  covid   1/20    Scheduled Meds:   ascorbic acid (vitamin C)  500 mg Oral Daily    calcitRIOL  0.5 mcg Oral Daily    cefTRIAXone (Rocephin) IV (PEDS and ADULTS)  1 g Intravenous Q24H    citalopram  20 mg Oral Daily    clopidogreL  75 mg Oral Daily    DULoxetine  30 mg Oral Daily    famotidine  20 mg Oral Daily    ferrous sulfate  1 tablet Oral Daily    levothyroxine  137 mcg Oral Before breakfast    mupirocin   Nasal BID    QUEtiapine  25 mg Oral QHS    sodium bicarbonate  650 mg Oral QID    sodium chloride 0.9%  10 mL Intravenous Q6H     Continuous Infusions:   sodium chloride 0.9% 50 mL/hr at 01/26/24 1828     PRN Meds:acetaminophen, HYDROcodone-acetaminophen, melatonin, ondansetron, sodium chloride 0.9%, Flushing PICC/Midline Protocol **AND** sodium chloride 0.9% **AND** sodium chloride 0.9%    Review of patient's allergies indicates:   Allergen Reactions    Oxaprozin Nausea And Vomiting and Swelling     Facial swelling      Doxycycline     Nsaids (non-steroidal anti-inflammatory drug) Rash    Sulfa (sulfonamide antibiotics) Nausea And Vomiting       Review of Systems  ROS    OBJECTIVE:     Vital Signs (Most Recent)  Temp: 97.9 °F (36.6 °C) (01/27/24 1139)  Pulse: 74 (01/27/24 1139)  Resp: 20 (01/26/24 2255)  BP: (!) 128/58 (01/27/24 1139)  SpO2: 97 % (01/27/24 1139)    Vital Signs Range (Last 24H):  Temp:  [97.2 °F (36.2 °C)-98.3 °F (36.8 °C)]   Pulse:  []   Resp:  [20]   BP: (114-158)/(52-90)   SpO2:  [95 %-99 %]     I & O (Last 24H):  Intake/Output Summary (Last 24 hours) at 1/27/2024 1214  Last data filed at 1/27/2024 0530  Gross per 24 hour   Intake --   Output 350 ml   Net -350 ml     Physical Exam:  Physical Exam   Vitals:    01/27/24 1139   BP: (!) 128/58   Pulse: 74   Resp:    Temp: 97.9 °F (36.6 °C)       Physical Exam   Constitutional: alert & oriented, no acute distressvery   quiet  today  HENT:   Head: Normocephalic and atraumatic.   Eyes: Conjunctivae normal and EOM are normal. Pupils are equal, round, and reactive to light.   Neck: Normal range of motion. Neck supple.   Cardiovascular: Normal rate, regular rhythm, normal heart sounds   Pulmonary/Chest: CTAB, nonlabored respiration  Abdominal: Soft, nontender, bowel sounds normal  Neurological: nonfocal  Skin: warm, dry intact  Psychiatric: normal mood and affect, cooperative  Left shoulder  w  fx  Her  neck w  bruising             Laboratory:  Recent Results (from the past 24 hour(s))   Urinalysis, Reflex to Urine Culture    Collection Time: 01/26/24  5:24 PM    Specimen: Urine   Result Value Ref Range    Color, UA Yellow Yellow, Light-Yellow, Dark Yellow, Tala, Straw    Appearance, UA Cloudy (A) Clear    Specific Gravity, UA 1.020 1.005 - 1.030    pH, UA 6.0 5.0 - 8.5    Protein, UA 3+ (A) Negative    Glucose, UA Negative Negative, Normal    Ketones, UA Negative Negative    Blood, UA 3+ (A) Negative    Bilirubin, UA Negative Negative    Urobilinogen, UA 0.2 0.2, 1.0, Normal    Nitrites, UA Negative Negative    Leukocyte Esterase, UA 3+ (A) Negative   Urinalysis, Microscopic    Collection Time: 01/26/24  5:24 PM   Result Value Ref Range    Bacteria, UA Few (A) None Seen, Rare, Occasional /HPF    RBC, UA 6-10 (A) None Seen, 0-2, 3-5, 0-5 /HPF    WBC, UA 21-50 (A) None Seen, 0-2, 3-5, 0-5 /HPF    Squamous Epithelial Cells, UA Few (A) None Seen, Rare, Occasional, Occ /HPF   Comprehensive metabolic panel    Collection Time: 01/26/24  5:37 PM   Result Value Ref Range    Sodium Level 135 (L) 136 - 145 mmol/L    Potassium Level 4.9 3.5 - 5.1 mmol/L    Chloride 108 (H) 98 - 107 mmol/L    Carbon Dioxide 16 (L) 23 - 31 mmol/L    Glucose Level 103 82 - 115 mg/dL    Blood Urea Nitrogen 48.0 (H) 9.8 - 20.1 mg/dL    Creatinine 3.37 (H) 0.55 - 1.02 mg/dL    Calcium Level Total 9.3 8.4 - 10.2 mg/dL    Protein Total 7.8 (H) 5.8 - 7.6 gm/dL    Albumin Level  2.9 (L) 3.4 - 4.8 g/dL    Globulin 4.9 (H) 2.4 - 3.5 gm/dL    Albumin/Globulin Ratio 0.6 (L) 1.1 - 2.0 ratio    Bilirubin Total 0.5 <=1.5 mg/dL    Alkaline Phosphatase 103 40 - 150 unit/L    Alanine Aminotransferase 17 0 - 55 unit/L    Aspartate Aminotransferase 47 (H) 5 - 34 unit/L    eGFR 14 mls/min/1.73/m2   CBC with Differential    Collection Time: 01/26/24  5:37 PM   Result Value Ref Range    WBC 7.17 4.50 - 11.50 x10(3)/mcL    RBC 3.45 (L) 4.20 - 5.40 x10(6)/mcL    Hgb 10.6 (L) 12.0 - 16.0 g/dL    Hct 33.6 (L) 37.0 - 47.0 %    MCV 97.4 (H) 80.0 - 94.0 fL    MCH 30.7 27.0 - 31.0 pg    MCHC 31.5 (L) 33.0 - 36.0 g/dL    RDW 12.5 11.5 - 17.0 %    Platelet 246 130 - 400 x10(3)/mcL    MPV 10.6 (H) 7.4 - 10.4 fL    Neut % 78.3 %    Lymph % 13.0 %    Mono % 6.0 %    Eos % 1.3 %    Basophil % 0.3 %    Lymph # 0.93 0.6 - 4.6 x10(3)/mcL    Neut # 5.62 2.1 - 9.2 x10(3)/mcL    Mono # 0.43 0.1 - 1.3 x10(3)/mcL    Eos # 0.09 0 - 0.9 x10(3)/mcL    Baso # 0.02 <=0.2 x10(3)/mcL    IG# 0.08 (H) 0 - 0.04 x10(3)/mcL    IG% 1.1 %   Comprehensive metabolic panel    Collection Time: 01/27/24  3:17 AM   Result Value Ref Range    Sodium Level 136 136 - 145 mmol/L    Potassium Level 3.9 3.5 - 5.1 mmol/L    Chloride 109 (H) 98 - 107 mmol/L    Carbon Dioxide 14 (L) 23 - 31 mmol/L    Glucose Level 95 82 - 115 mg/dL    Blood Urea Nitrogen 44.0 (H) 9.8 - 20.1 mg/dL    Creatinine 3.43 (H) 0.55 - 1.02 mg/dL    Calcium Level Total 9.1 8.4 - 10.2 mg/dL    Protein Total 6.9 5.8 - 7.6 gm/dL    Albumin Level 2.8 (L) 3.4 - 4.8 g/dL    Globulin 4.1 (H) 2.4 - 3.5 gm/dL    Albumin/Globulin Ratio 0.7 (L) 1.1 - 2.0 ratio    Bilirubin Total 0.3 <=1.5 mg/dL    Alkaline Phosphatase 99 40 - 150 unit/L    Alanine Aminotransferase 15 0 - 55 unit/L    Aspartate Aminotransferase 44 (H) 5 - 34 unit/L    eGFR 14 mls/min/1.73/m2   CBC with Differential    Collection Time: 01/27/24  3:17 AM   Result Value Ref Range    WBC 5.45 4.50 - 11.50 x10(3)/mcL    RBC 3.36 (L)  4.20 - 5.40 x10(6)/mcL    Hgb 10.4 (L) 12.0 - 16.0 g/dL    Hct 34.0 (L) 37.0 - 47.0 %    .2 (H) 80.0 - 94.0 fL    MCH 31.0 27.0 - 31.0 pg    MCHC 30.6 (L) 33.0 - 36.0 g/dL    RDW 12.4 11.5 - 17.0 %    Platelet 215 130 - 400 x10(3)/mcL    MPV 10.2 7.4 - 10.4 fL    Neut % 70.8 %    Lymph % 17.2 %    Mono % 7.3 %    Eos % 2.8 %    Basophil % 0.4 %    Lymph # 0.94 0.6 - 4.6 x10(3)/mcL    Neut # 3.86 2.1 - 9.2 x10(3)/mcL    Mono # 0.40 0.1 - 1.3 x10(3)/mcL    Eos # 0.15 0 - 0.9 x10(3)/mcL    Baso # 0.02 <=0.2 x10(3)/mcL    IG# 0.08 (H) 0 - 0.04 x10(3)/mcL    IG% 1.5 %        Diagnostic Results:  No results found.     ASSESSMENT/PLAN:       Plan:   Patient Active Problem List    Diagnosis Date Noted    Hypertension 01/26/2024    COVID-19 01/26/2024    Catheter-associated urinary tract infection 01/26/2024    Chronic kidney disease, stage 4 (severe) 01/01/2023    Gastro-esophageal reflux disease without esophagitis 01/01/2023    Ileostomy status 01/01/2023    Recurrent major depression 01/01/2023    Personal history of other malignant neoplasm of large intestine 01/01/2023    Dyspnea 08/30/2022    Hypothyroid 08/12/2021    Anxiety disorder 08/12/2021    Vesicovaginal fistula 03/01/2021    73 year old   wf  w a  hx of  recurrent  ut  hx of  colovessicular fistula .   She  had  covid on 1/20   She ihas  very little in way or respiratory sx  Admitted for uti  Her  renal  fxn is  always   poor  but her  c02  is decreasing   Will start iv f   Monitor for  fluid overload

## 2024-01-27 NOTE — ASSESSMENT & PLAN NOTE
This is a distant history she was diagnosed with 20th it is currently the 26 she should almost be out of quarantine.

## 2024-01-28 LAB
ALBUMIN SERPL-MCNC: 2.5 G/DL (ref 3.4–4.8)
ALBUMIN/GLOB SERPL: 0.7 RATIO (ref 1.1–2)
ALP SERPL-CCNC: 88 UNIT/L (ref 40–150)
ALT SERPL-CCNC: 13 UNIT/L (ref 0–55)
AST SERPL-CCNC: 36 UNIT/L (ref 5–34)
BASOPHILS # BLD AUTO: 0.01 X10(3)/MCL
BASOPHILS NFR BLD AUTO: 0.2 %
BILIRUB SERPL-MCNC: 0.3 MG/DL
BUN SERPL-MCNC: 36 MG/DL (ref 9.8–20.1)
CALCIUM SERPL-MCNC: 8.4 MG/DL (ref 8.4–10.2)
CHLORIDE SERPL-SCNC: 115 MMOL/L (ref 98–107)
CO2 SERPL-SCNC: 17 MMOL/L (ref 23–31)
CREAT SERPL-MCNC: 3.08 MG/DL (ref 0.55–1.02)
EOSINOPHIL # BLD AUTO: 0.21 X10(3)/MCL (ref 0–0.9)
EOSINOPHIL NFR BLD AUTO: 4.8 %
ERYTHROCYTE [DISTWIDTH] IN BLOOD BY AUTOMATED COUNT: 12.6 % (ref 11.5–17)
GFR SERPLBLD CREATININE-BSD FMLA CKD-EPI: 15 MLS/MIN/1.73/M2
GLOBULIN SER-MCNC: 3.7 GM/DL (ref 2.4–3.5)
GLUCOSE SERPL-MCNC: 94 MG/DL (ref 82–115)
HCT VFR BLD AUTO: 29.5 % (ref 37–47)
HGB BLD-MCNC: 9.2 G/DL (ref 12–16)
IMM GRANULOCYTES # BLD AUTO: 0.07 X10(3)/MCL (ref 0–0.04)
IMM GRANULOCYTES NFR BLD AUTO: 1.6 %
LYMPHOCYTES # BLD AUTO: 1.02 X10(3)/MCL (ref 0.6–4.6)
LYMPHOCYTES NFR BLD AUTO: 23.1 %
MAGNESIUM SERPL-MCNC: 1.9 MG/DL (ref 1.6–2.6)
MCH RBC QN AUTO: 30.6 PG (ref 27–31)
MCHC RBC AUTO-ENTMCNC: 31.2 G/DL (ref 33–36)
MCV RBC AUTO: 98 FL (ref 80–94)
MONOCYTES # BLD AUTO: 0.34 X10(3)/MCL (ref 0.1–1.3)
MONOCYTES NFR BLD AUTO: 7.7 %
NEUTROPHILS # BLD AUTO: 2.77 X10(3)/MCL (ref 2.1–9.2)
NEUTROPHILS NFR BLD AUTO: 62.6 %
PHOSPHATE SERPL-MCNC: 3.4 MG/DL (ref 2.3–4.7)
PLATELET # BLD AUTO: 212 X10(3)/MCL (ref 130–400)
PMV BLD AUTO: 10.7 FL (ref 7.4–10.4)
POTASSIUM SERPL-SCNC: 3.6 MMOL/L (ref 3.5–5.1)
PROT SERPL-MCNC: 6.2 GM/DL (ref 5.8–7.6)
RBC # BLD AUTO: 3.01 X10(6)/MCL (ref 4.2–5.4)
SODIUM SERPL-SCNC: 142 MMOL/L (ref 136–145)
WBC # SPEC AUTO: 4.42 X10(3)/MCL (ref 4.5–11.5)

## 2024-01-28 PROCEDURE — 11000001 HC ACUTE MED/SURG PRIVATE ROOM

## 2024-01-28 PROCEDURE — 25000003 PHARM REV CODE 250: Performed by: FAMILY MEDICINE

## 2024-01-28 PROCEDURE — 85025 COMPLETE CBC W/AUTO DIFF WBC: CPT | Performed by: FAMILY MEDICINE

## 2024-01-28 PROCEDURE — A4216 STERILE WATER/SALINE, 10 ML: HCPCS | Performed by: FAMILY MEDICINE

## 2024-01-28 PROCEDURE — 80053 COMPREHEN METABOLIC PANEL: CPT | Performed by: FAMILY MEDICINE

## 2024-01-28 PROCEDURE — 94761 N-INVAS EAR/PLS OXIMETRY MLT: CPT

## 2024-01-28 PROCEDURE — 83735 ASSAY OF MAGNESIUM: CPT | Performed by: FAMILY MEDICINE

## 2024-01-28 PROCEDURE — 63600175 PHARM REV CODE 636 W HCPCS: Performed by: FAMILY MEDICINE

## 2024-01-28 PROCEDURE — 84100 ASSAY OF PHOSPHORUS: CPT | Performed by: FAMILY MEDICINE

## 2024-01-28 PROCEDURE — 27000207 HC ISOLATION

## 2024-01-28 RX ORDER — SODIUM CHLORIDE 450 MG/100ML
INJECTION, SOLUTION INTRAVENOUS CONTINUOUS
Status: DISCONTINUED | OUTPATIENT
Start: 2024-01-28 | End: 2024-01-30 | Stop reason: HOSPADM

## 2024-01-28 RX ADMIN — FERROUS SULFATE TAB 325 MG (65 MG ELEMENTAL FE) 1 EACH: 325 (65 FE) TAB at 08:01

## 2024-01-28 RX ADMIN — ACETAMINOPHEN 650 MG: 325 TABLET ORAL at 09:01

## 2024-01-28 RX ADMIN — LEVOTHYROXINE SODIUM 137 MCG: 25 TABLET ORAL at 05:01

## 2024-01-28 RX ADMIN — MUPIROCIN 1 G: 20 OINTMENT TOPICAL at 09:01

## 2024-01-28 RX ADMIN — SODIUM CHLORIDE, PRESERVATIVE FREE 10 ML: 5 INJECTION INTRAVENOUS at 05:01

## 2024-01-28 RX ADMIN — CALCITRIOL CAPSULES 0.25 MCG 0.5 MCG: 0.25 CAPSULE ORAL at 08:01

## 2024-01-28 RX ADMIN — SODIUM CHLORIDE: 9 INJECTION, SOLUTION INTRAVENOUS at 05:01

## 2024-01-28 RX ADMIN — QUETIAPINE FUMARATE 25 MG: 25 TABLET ORAL at 09:01

## 2024-01-28 RX ADMIN — SODIUM BICARBONATE 650 MG: 650 TABLET ORAL at 08:01

## 2024-01-28 RX ADMIN — CEFTRIAXONE SODIUM 1 G: 1 INJECTION, POWDER, FOR SOLUTION INTRAMUSCULAR; INTRAVENOUS at 05:01

## 2024-01-28 RX ADMIN — FAMOTIDINE 20 MG: 20 TABLET ORAL at 08:01

## 2024-01-28 RX ADMIN — DULOXETINE HYDROCHLORIDE 30 MG: 30 CAPSULE, DELAYED RELEASE ORAL at 08:01

## 2024-01-28 RX ADMIN — SODIUM BICARBONATE 650 MG: 650 TABLET ORAL at 05:01

## 2024-01-28 RX ADMIN — MUPIROCIN 1 G: 20 OINTMENT TOPICAL at 08:01

## 2024-01-28 RX ADMIN — Medication 500 MG: at 08:01

## 2024-01-28 RX ADMIN — SODIUM BICARBONATE 650 MG: 650 TABLET ORAL at 01:01

## 2024-01-28 RX ADMIN — CLOPIDOGREL BISULFATE 75 MG: 75 TABLET ORAL at 08:01

## 2024-01-28 RX ADMIN — SODIUM BICARBONATE 650 MG: 650 TABLET ORAL at 09:01

## 2024-01-28 RX ADMIN — CITALOPRAM HYDROBROMIDE 20 MG: 20 TABLET ORAL at 08:01

## 2024-01-28 RX ADMIN — SODIUM CHLORIDE: 4.5 INJECTION, SOLUTION INTRAVENOUS at 08:01

## 2024-01-28 RX ADMIN — SODIUM CHLORIDE: 4.5 INJECTION, SOLUTION INTRAVENOUS at 10:01

## 2024-01-28 NOTE — PLAN OF CARE
Problem: Adult Inpatient Plan of Care  Goal: Plan of Care Review  Outcome: Ongoing, Progressing  Goal: Patient-Specific Goal (Individualized)  Outcome: Ongoing, Progressing  Goal: Absence of Hospital-Acquired Illness or Injury  Outcome: Ongoing, Progressing  Goal: Optimal Comfort and Wellbeing  Outcome: Ongoing, Progressing  Goal: Readiness for Transition of Care  Outcome: Ongoing, Progressing     Problem: Pain Acute  Goal: Acceptable Pain Control and Functional Ability  Outcome: Ongoing, Progressing     Problem: UTI (Urinary Tract Infection)  Goal: Improved Infection Symptoms  Outcome: Ongoing, Progressing     Problem: Infection  Goal: Absence of Infection Signs and Symptoms  Outcome: Ongoing, Progressing

## 2024-01-28 NOTE — PROGRESS NOTES
Progress Note    Admit Date: 1/26/2024   LOS: 2 days     SUBJECTIVE:     Follow-up For:   pt   here  for uti   Dx  w  covid   1/20    Scheduled Meds:   ascorbic acid (vitamin C)  500 mg Oral Daily    calcitRIOL  0.5 mcg Oral Daily    cefTRIAXone (Rocephin) IV (PEDS and ADULTS)  1 g Intravenous Q24H    citalopram  20 mg Oral Daily    clopidogreL  75 mg Oral Daily    DULoxetine  30 mg Oral Daily    famotidine  20 mg Oral Daily    ferrous sulfate  1 tablet Oral Daily    levothyroxine  137 mcg Oral Before breakfast    mupirocin   Nasal BID    QUEtiapine  25 mg Oral QHS    sodium bicarbonate  650 mg Oral QID    sodium chloride 0.9%  10 mL Intravenous Q6H     Continuous Infusions:   sodium chloride 0.45%       PRN Meds:acetaminophen, HYDROcodone-acetaminophen, melatonin, ondansetron, sodium chloride 0.9%, Flushing PICC/Midline Protocol **AND** sodium chloride 0.9% **AND** sodium chloride 0.9%    Review of patient's allergies indicates:   Allergen Reactions    Oxaprozin Nausea And Vomiting and Swelling     Facial swelling      Doxycycline     Nsaids (non-steroidal anti-inflammatory drug) Rash    Sulfa (sulfonamide antibiotics) Nausea And Vomiting       Review of Systems  ROS    OBJECTIVE:     Vital Signs (Most Recent)  Temp: 97.9 °F (36.6 °C) (01/28/24 0433)  Pulse: 75 (01/28/24 0433)  Resp: 20 (01/26/24 2255)  BP: 117/72 (01/28/24 0433)  SpO2: 96 % (01/28/24 0433)    Vital Signs Range (Last 24H):  Temp:  [97.7 °F (36.5 °C)-98.3 °F (36.8 °C)]   Pulse:  [60-80]   BP: (117-154)/(58-73)   SpO2:  [96 %-98 %]     I & O (Last 24H):  Intake/Output Summary (Last 24 hours) at 1/28/2024 0742  Last data filed at 1/28/2024 0449  Gross per 24 hour   Intake 2640.18 ml   Output 650 ml   Net 1990.18 ml       Physical Exam:  Physical Exam   Vitals:    01/28/24 0433   BP: 117/72   Pulse: 75   Resp:    Temp: 97.9 °F (36.6 °C)       Physical Exam   Constitutional: alert & oriented, no acute distressvery   quiet today  HENT:   Head:  Normocephalic and atraumatic.   Eyes: Conjunctivae normal and EOM are normal. Pupils are equal, round, and reactive to light.   Neck: Normal range of motion. Neck supple.   Cardiovascular: Normal rate, regular rhythm, normal heart sounds   Pulmonary/Chest: CTAB, nonlabored respiration  Abdominal: Soft, nontender, bowel sounds normal  Neurological: nonfocal  Skin: warm, dry intact  Psychiatric: normal mood and affect, cooperative  Left shoulder  w  fx  Her  neck w  bruising             Laboratory:  Recent Results (from the past 24 hour(s))   Comprehensive Metabolic Panel    Collection Time: 01/28/24  3:01 AM   Result Value Ref Range    Sodium Level 142 136 - 145 mmol/L    Potassium Level 3.6 3.5 - 5.1 mmol/L    Chloride 115 (H) 98 - 107 mmol/L    Carbon Dioxide 17 (L) 23 - 31 mmol/L    Glucose Level 94 82 - 115 mg/dL    Blood Urea Nitrogen 36.0 (H) 9.8 - 20.1 mg/dL    Creatinine 3.08 (H) 0.55 - 1.02 mg/dL    Calcium Level Total 8.4 8.4 - 10.2 mg/dL    Protein Total 6.2 5.8 - 7.6 gm/dL    Albumin Level 2.5 (L) 3.4 - 4.8 g/dL    Globulin 3.7 (H) 2.4 - 3.5 gm/dL    Albumin/Globulin Ratio 0.7 (L) 1.1 - 2.0 ratio    Bilirubin Total 0.3 <=1.5 mg/dL    Alkaline Phosphatase 88 40 - 150 unit/L    Alanine Aminotransferase 13 0 - 55 unit/L    Aspartate Aminotransferase 36 (H) 5 - 34 unit/L    eGFR 15 mls/min/1.73/m2   Magnesium    Collection Time: 01/28/24  3:01 AM   Result Value Ref Range    Magnesium Level 1.90 1.60 - 2.60 mg/dL   Phosphorus    Collection Time: 01/28/24  3:01 AM   Result Value Ref Range    Phosphorus Level 3.4 2.3 - 4.7 mg/dL   CBC with Differential    Collection Time: 01/28/24  3:01 AM   Result Value Ref Range    WBC 4.42 (L) 4.50 - 11.50 x10(3)/mcL    RBC 3.01 (L) 4.20 - 5.40 x10(6)/mcL    Hgb 9.2 (L) 12.0 - 16.0 g/dL    Hct 29.5 (L) 37.0 - 47.0 %    MCV 98.0 (H) 80.0 - 94.0 fL    MCH 30.6 27.0 - 31.0 pg    MCHC 31.2 (L) 33.0 - 36.0 g/dL    RDW 12.6 11.5 - 17.0 %    Platelet 212 130 - 400 x10(3)/mcL    MPV  10.7 (H) 7.4 - 10.4 fL    Neut % 62.6 %    Lymph % 23.1 %    Mono % 7.7 %    Eos % 4.8 %    Basophil % 0.2 %    Lymph # 1.02 0.6 - 4.6 x10(3)/mcL    Neut # 2.77 2.1 - 9.2 x10(3)/mcL    Mono # 0.34 0.1 - 1.3 x10(3)/mcL    Eos # 0.21 0 - 0.9 x10(3)/mcL    Baso # 0.01 <=0.2 x10(3)/mcL    IG# 0.07 (H) 0 - 0.04 x10(3)/mcL    IG% 1.6 %        Diagnostic Results:  No results found.     ASSESSMENT/PLAN:       Plan:   Patient Active Problem List    Diagnosis Date Noted    Hypertension 01/26/2024    COVID-19 01/26/2024    Catheter-associated urinary tract infection 01/26/2024    Chronic kidney disease, stage 4 (severe) 01/01/2023    Gastro-esophageal reflux disease without esophagitis 01/01/2023    Ileostomy status 01/01/2023    Recurrent major depression 01/01/2023    Personal history of other malignant neoplasm of large intestine 01/01/2023    Dyspnea 08/30/2022    Hypothyroid 08/12/2021    Anxiety disorder 08/12/2021    Vesicovaginal fistula 03/01/2021    73 year old   wf  w a  hx of  recurrent  ut  hx of  colovesicular fistula .   She  had  covid on 1/20   She ihas  very little in way or respiratory sx  Admitted for uti  Her  renal  fxn is  always   poor  but her  c02  is decreasing   Will start iv f   Monitor for  fluid overload     1/28  D/C lovenox   Change  IVF  to  1/2 NS   due to hyperchloremia    Rocephin cont

## 2024-01-29 PROCEDURE — 11000001 HC ACUTE MED/SURG PRIVATE ROOM

## 2024-01-29 PROCEDURE — 25000003 PHARM REV CODE 250: Performed by: FAMILY MEDICINE

## 2024-01-29 PROCEDURE — 63600175 PHARM REV CODE 636 W HCPCS: Performed by: FAMILY MEDICINE

## 2024-01-29 PROCEDURE — 27000207 HC ISOLATION

## 2024-01-29 PROCEDURE — A4216 STERILE WATER/SALINE, 10 ML: HCPCS | Performed by: FAMILY MEDICINE

## 2024-01-29 PROCEDURE — 94761 N-INVAS EAR/PLS OXIMETRY MLT: CPT

## 2024-01-29 RX ADMIN — SODIUM CHLORIDE, PRESERVATIVE FREE 10 ML: 5 INJECTION INTRAVENOUS at 12:01

## 2024-01-29 RX ADMIN — MUPIROCIN 1 G: 20 OINTMENT TOPICAL at 08:01

## 2024-01-29 RX ADMIN — LEVOTHYROXINE SODIUM 137 MCG: 25 TABLET ORAL at 05:01

## 2024-01-29 RX ADMIN — CALCITRIOL CAPSULES 0.25 MCG 0.5 MCG: 0.25 CAPSULE ORAL at 08:01

## 2024-01-29 RX ADMIN — CEFTRIAXONE SODIUM 1 G: 1 INJECTION, POWDER, FOR SOLUTION INTRAMUSCULAR; INTRAVENOUS at 05:01

## 2024-01-29 RX ADMIN — FERROUS SULFATE TAB 325 MG (65 MG ELEMENTAL FE) 1 EACH: 325 (65 FE) TAB at 08:01

## 2024-01-29 RX ADMIN — CITALOPRAM HYDROBROMIDE 20 MG: 20 TABLET ORAL at 08:01

## 2024-01-29 RX ADMIN — FAMOTIDINE 20 MG: 20 TABLET ORAL at 08:01

## 2024-01-29 RX ADMIN — SODIUM CHLORIDE, PRESERVATIVE FREE 10 ML: 5 INJECTION INTRAVENOUS at 05:01

## 2024-01-29 RX ADMIN — SODIUM BICARBONATE 650 MG: 650 TABLET ORAL at 08:01

## 2024-01-29 RX ADMIN — SODIUM CHLORIDE, PRESERVATIVE FREE 10 ML: 5 INJECTION INTRAVENOUS at 06:01

## 2024-01-29 RX ADMIN — DULOXETINE HYDROCHLORIDE 30 MG: 30 CAPSULE, DELAYED RELEASE ORAL at 08:01

## 2024-01-29 RX ADMIN — CLOPIDOGREL BISULFATE 75 MG: 75 TABLET ORAL at 08:01

## 2024-01-29 RX ADMIN — SODIUM BICARBONATE 650 MG: 650 TABLET ORAL at 12:01

## 2024-01-29 RX ADMIN — SODIUM BICARBONATE 650 MG: 650 TABLET ORAL at 04:01

## 2024-01-29 RX ADMIN — Medication 500 MG: at 08:01

## 2024-01-29 RX ADMIN — QUETIAPINE FUMARATE 25 MG: 25 TABLET ORAL at 08:01

## 2024-01-29 NOTE — HOSPITAL COURSE
The patient is a 73-year-old  female known to from clinic was admitted with changes to her urination.  Noticed cloudy in discolored urine to bag noted to have a urinary tract infection culture grew out Pseudomonas aeruginosa originally was on Rocephin which did seem to be causing improvement however culture showed that it was resistant to this she was changed to Cipro 500 mg twice a day she has had clearing of her urine since day 2 of her hospitalization no fever no nausea vomiting no chest pain no shortness a breath she does have some mild weakness but this is baseline.  States no other difficulties except for pain to her left arm.  She apparently had a fall on the 15th resulted in radial fracture.  She has not yet sought care from an orthopedist and did not come to our office for this injury.  She has only been kept in the sling up to this date.  States she still has pain area.

## 2024-01-29 NOTE — PLAN OF CARE
01/29/24 1451   Discharge Assessment   Assessment Type Discharge Planning Assessment   Source of Information patient   People in Home alone   Do you expect to return to your current living situation? Yes   Do you have help at home or someone to help you manage your care at home? Yes   Who are your caregiver(s) and their phone number(s)? fly   Prior to hospitilization cognitive status: Alert/Oriented;No Deficits   Current cognitive status: Alert/Oriented;No Deficits   Equipment Currently Used at Home none   Do you currently have service(s) that help you manage your care at home? No   Do you take prescription medications? Yes   Do you have prescription coverage? Yes   Do you have any problems affording any of your prescribed medications? No   Is the patient taking medications as prescribed? yes   How do you get to doctors appointments? family or friend will provide   Are you on dialysis? No   Do you take coumadin? No   Discharge Plan A Home with family   Discharge Plan B Home with family   DME Needed Upon Discharge  none   Discharge Plan discussed with: Patient   Transition of Care Barriers None   Physical Activity   On average, how many days per week do you engage in moderate to strenuous exercise (like a brisk walk)? Pt Declined   On average, how many minutes do you engage in exercise at this level? Pt Declined   Financial Resource Strain   How hard is it for you to pay for the very basics like food, housing, medical care, and heating? Pt Declined   Housing Stability   In the last 12 months, was there a time when you were not able to pay the mortgage or rent on time? Pt Declined   In the last 12 months, was there a time when you did not have a steady place to sleep or slept in a shelter (including now)? Pt Declined   Transportation Needs   In the past 12 months, has lack of transportation kept you from medical appointments or from getting medications? Pt Declined   In the past 12 months, has lack of  transportation kept you from meetings, work, or from getting things needed for daily living? Pt Declined   Food Insecurity   Within the past 12 months, you worried that your food would run out before you got the money to buy more. Pt Declined   Within the past 12 months, the food you bought just didn't last and you didn't have money to get more. Pt Declined   Stress   Do you feel stress - tense, restless, nervous, or anxious, or unable to sleep at night because your mind is troubled all the time - these days? Pt Declined   Social Connections   In a typical week, how many times do you talk on the phone with family, friends, or neighbors? Pt Declined   How often do you get together with friends or relatives? Pt Declined   How often do you attend Religion or Muslim services? Pt Declined   Do you belong to any clubs or organizations such as Religion groups, unions, fraternal or athletic groups, or school groups? Pt Declined   How often do you attend meetings of the clubs or organizations you belong to? Pt Declined   Are you , , , , never , or living with a partner? Pt Declined   Alcohol Use   Q1: How often do you have a drink containing alcohol? Pt Declined   Q2: How many drinks containing alcohol do you have on a typical day when you are drinking? Pt Declined   Q3: How often do you have six or more drinks on one occasion? Pt Declined

## 2024-01-29 NOTE — PROGRESS NOTES
[facility]  [facility] Medicine  Progress Note    Patient Name: Kam Reyes  MRN: [id number]  Patient Class: IP- Inpatient   Admission Date: 2024  Length of Stay: 3 days  Attending Physician: Gregor Heaton MD  Primary Care Provider: Gregor Heaton MD        Subjective:     Principal Problem:<principal problem not specified>        HPI:  The patient was seen in the [facility] ER yesterday for possible urinary tract infection with increased sedimentation to her urine from her ostomy bag.  She was hit home yesterday.  She called us today to tell us that she was going to the emergency room to be admitted for her urinary tract infection.  She recounts that her ostomy bag had sediment in it and bubbles.  Year in his currently clear.  She denies any nausea vomiting any fever.  She denies any abdominal pain.  States she has not had any decrease in energy are in her usual activities of living.  Has been getting good p.o. intake passing good stool through her ostomy.  She states on the  she was diagnosed with COVID and had some respiratory issues states he feels fine now with no coughing no fever no congestion    Overview/Hospital Course:  Denies abd pain, no n/v  urine is clearing,  Strength improved    Past Medical History:   Diagnosis Date    Cancer, colon     Chronic pain     GERD (gastroesophageal reflux disease)     Renal disorder     Thyroiditis, unspecified        Past Surgical History:   Procedure Laterality Date    BACK SURGERY       SECTION      CHOLECYSTECTOMY      COLON SURGERY      HYSTERECTOMY      KIDNEY SURGERY         Review of patient's allergies indicates:   Allergen Reactions    Oxaprozin Nausea And Vomiting and Swelling     Facial swelling      Doxycycline     Nsaids (non-steroidal anti-inflammatory drug) Rash    Sulfa (sulfonamide antibiotics) Nausea And Vomiting       No current facility-administered medications on file prior to encounter.      Current Outpatient Medications on File Prior to Encounter   Medication Sig    ascorbic acid, vitamin C, (VITAMIN C) 100 MG tablet Take 100 mg by mouth once daily.    calcitRIOL (ROCALTROL) 0.5 MCG Cap Take 0.5 mcg by mouth once daily.    citalopram (CELEXA) 20 MG tablet Take 20 mg by mouth once daily.    clopidogreL (PLAVIX) 75 mg tablet Take 75 mg by mouth once daily.    DULoxetine (CYMBALTA) 30 MG capsule Take 30 mg by mouth once daily.    ferrous sulfate 325 (65 FE) MG EC tablet Take 325 mg by mouth 3 (three) times daily with meals.    HYDROcodone-acetaminophen (NORCO)  mg per tablet Take 1 tablet by mouth.    levothyroxine (SYNTHROID) 100 MCG tablet Take 137 mcg by mouth before breakfast.    methylPREDNISolone (MEDROL DOSEPACK) 4 mg tablet use as directed    omeprazole (PRILOSEC) 40 MG capsule Take 40 mg by mouth once daily.    QUEtiapine (SEROQUEL) 25 MG Tab Take by mouth.    sodium bicarbonate 650 MG tablet Take 650 mg by mouth 4 (four) times daily.     Family History       Problem Relation (Age of Onset)    Hypertension Mother          Tobacco Use    Smoking status: Never    Smokeless tobacco: Never   Substance and Sexual Activity    Alcohol use: Never    Drug use: Never    Sexual activity: Not Currently     Review of Systems   Constitutional: Negative.    HENT: Negative.     Eyes: Negative.    Respiratory: Negative.     Cardiovascular: Negative.    Gastrointestinal: Negative.    Endocrine: Negative.    Genitourinary:  Negative for decreased urine volume, frequency, hematuria and pelvic pain.   Musculoskeletal: Negative.    Skin: Negative.    Allergic/Immunologic: Negative.    Neurological: Negative.    Hematological: Negative.    Psychiatric/Behavioral: Negative.       Objective:     Vital Signs (Most Recent):  Temp: 97.6 °F (36.4 °C) (01/29/24 0440)  Pulse: 65 (01/29/24 0440)  Resp: 20 (01/26/24 2255)  BP: (!) 111/54 (01/29/24 0440)  SpO2: 100 % (01/29/24 0728) Vital Signs (24h Range):  Temp:   [97.6 °F (36.4 °C)-98 °F (36.7 °C)] 97.6 °F (36.4 °C)  Pulse:  [63-78] 65  SpO2:  [96 %-100 %] 100 %  BP: (111-156)/(54-82) 111/54     Weight: 79.8 kg (176 lb)  Body mass index is 33.25 kg/m².     Physical Exam  Constitutional:       General: She is not in acute distress.     Appearance: Normal appearance.   HENT:      Head: Normocephalic and atraumatic.      Right Ear: Tympanic membrane normal.      Left Ear: Tympanic membrane normal.      Nose: Nose normal.      Mouth/Throat:      Mouth: Mucous membranes are moist.      Pharynx: Oropharynx is clear. No oropharyngeal exudate.   Eyes:      Extraocular Movements: Extraocular movements intact.      Pupils: Pupils are equal, round, and reactive to light.   Cardiovascular:      Rate and Rhythm: Normal rate and regular rhythm.      Pulses: Normal pulses.      Heart sounds: Normal heart sounds. No murmur heard.     No gallop.   Pulmonary:      Effort: Pulmonary effort is normal.      Breath sounds: Normal breath sounds.   Abdominal:      General: Abdomen is flat. Bowel sounds are normal.      Palpations: Abdomen is soft.      Comments: Colostomy and uriostomy sites are clear   Genitourinary:     General: Normal vulva.   Musculoskeletal:         General: Normal range of motion.      Cervical back: Normal range of motion and neck supple.   Skin:     General: Skin is warm and dry.      Capillary Refill: Capillary refill takes less than 2 seconds.   Neurological:      General: No focal deficit present.      Mental Status: She is alert and oriented to person, place, and time. Mental status is at baseline.   Psychiatric:         Mood and Affect: Mood normal.         Behavior: Behavior normal.         Thought Content: Thought content normal.         Judgment: Judgment normal.              CRANIAL NERVES     CN III, IV, VI   Pupils are equal, round, and reactive to light.       Significant Labs: All pertinent labs within the past 24 hours have been reviewed.  CBC:   Recent Labs    Lab 01/28/24  0301   WBC 4.42*   HGB 9.2*   HCT 29.5*          CMP:   Recent Labs   Lab 01/28/24  0301      K 3.6   CO2 17*   BUN 36.0*   CREATININE 3.08*   CALCIUM 8.4   ALBUMIN 2.5*   BILITOT 0.3   ALKPHOS 88   AST 36*   ALT 13         Significant Imaging: I have reviewed all pertinent imaging results/findings within the past 24 hours.    Assessment/Plan:      Catheter-associated urinary tract infection  Contiue current abx and follow cx.  Cx with gram neg rods. 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 which gives her repeated recurrent resistant urinary tract infections      Recurrent major depression  Continue current medications as written      Ileostomy status  Continue ostomy care      Gastro-esophageal reflux disease without esophagitis  pepcid twice a day      Chronic kidney disease, stage 4 (severe)  She is on her baseline creatinine right now she is not a dialysis patient refuses this    Dyspnea  resolved        VTE Risk Mitigation (From admission, onward)           Ordered     IP VTE HIGH RISK PATIENT  Once         01/26/24 1805     Place sequential compression device  Until discontinued         01/26/24 1805                    Discharge Planning   JOSHUA:      Code Status: Full Code   Is the patient medically ready for discharge?:     Reason for patient still in hospital (select all that apply): Patient trending condition                     Gregor Heaton MD  Department of Hospital Medicine   [facility]

## 2024-01-29 NOTE — SUBJECTIVE & OBJECTIVE
Past Medical History:   Diagnosis Date    Cancer, colon     Chronic pain     GERD (gastroesophageal reflux disease)     Renal disorder     Thyroiditis, unspecified        Past Surgical History:   Procedure Laterality Date    BACK SURGERY       SECTION      CHOLECYSTECTOMY      COLON SURGERY      HYSTERECTOMY      KIDNEY SURGERY         Review of patient's allergies indicates:   Allergen Reactions    Oxaprozin Nausea And Vomiting and Swelling     Facial swelling      Doxycycline     Nsaids (non-steroidal anti-inflammatory drug) Rash    Sulfa (sulfonamide antibiotics) Nausea And Vomiting       No current facility-administered medications on file prior to encounter.     Current Outpatient Medications on File Prior to Encounter   Medication Sig    ascorbic acid, vitamin C, (VITAMIN C) 100 MG tablet Take 100 mg by mouth once daily.    calcitRIOL (ROCALTROL) 0.5 MCG Cap Take 0.5 mcg by mouth once daily.    citalopram (CELEXA) 20 MG tablet Take 20 mg by mouth once daily.    clopidogreL (PLAVIX) 75 mg tablet Take 75 mg by mouth once daily.    DULoxetine (CYMBALTA) 30 MG capsule Take 30 mg by mouth once daily.    ferrous sulfate 325 (65 FE) MG EC tablet Take 325 mg by mouth 3 (three) times daily with meals.    HYDROcodone-acetaminophen (NORCO)  mg per tablet Take 1 tablet by mouth.    levothyroxine (SYNTHROID) 100 MCG tablet Take 137 mcg by mouth before breakfast.    methylPREDNISolone (MEDROL DOSEPACK) 4 mg tablet use as directed    omeprazole (PRILOSEC) 40 MG capsule Take 40 mg by mouth once daily.    QUEtiapine (SEROQUEL) 25 MG Tab Take by mouth.    sodium bicarbonate 650 MG tablet Take 650 mg by mouth 4 (four) times daily.     Family History       Problem Relation (Age of Onset)    Hypertension Mother          Tobacco Use    Smoking status: Never    Smokeless tobacco: Never   Substance and Sexual Activity    Alcohol use: Never    Drug use: Never    Sexual activity: Not Currently     Review of Systems    Constitutional: Negative.    HENT: Negative.     Eyes: Negative.    Respiratory: Negative.     Cardiovascular: Negative.    Gastrointestinal: Negative.    Endocrine: Negative.    Genitourinary:  Negative for decreased urine volume, frequency, hematuria and pelvic pain.   Musculoskeletal: Negative.    Skin: Negative.    Allergic/Immunologic: Negative.    Neurological: Negative.    Hematological: Negative.    Psychiatric/Behavioral: Negative.       Objective:     Vital Signs (Most Recent):  Temp: 97.6 °F (36.4 °C) (01/29/24 0440)  Pulse: 65 (01/29/24 0440)  Resp: 20 (01/26/24 2255)  BP: (!) 111/54 (01/29/24 0440)  SpO2: 100 % (01/29/24 0728) Vital Signs (24h Range):  Temp:  [97.6 °F (36.4 °C)-98 °F (36.7 °C)] 97.6 °F (36.4 °C)  Pulse:  [63-78] 65  SpO2:  [96 %-100 %] 100 %  BP: (111-156)/(54-82) 111/54     Weight: 79.8 kg (176 lb)  Body mass index is 33.25 kg/m².     Physical Exam  Constitutional:       General: She is not in acute distress.     Appearance: Normal appearance.   HENT:      Head: Normocephalic and atraumatic.      Right Ear: Tympanic membrane normal.      Left Ear: Tympanic membrane normal.      Nose: Nose normal.      Mouth/Throat:      Mouth: Mucous membranes are moist.      Pharynx: Oropharynx is clear. No oropharyngeal exudate.   Eyes:      Extraocular Movements: Extraocular movements intact.      Pupils: Pupils are equal, round, and reactive to light.   Cardiovascular:      Rate and Rhythm: Normal rate and regular rhythm.      Pulses: Normal pulses.      Heart sounds: Normal heart sounds. No murmur heard.     No gallop.   Pulmonary:      Effort: Pulmonary effort is normal.      Breath sounds: Normal breath sounds.   Abdominal:      General: Abdomen is flat. Bowel sounds are normal.      Palpations: Abdomen is soft.      Comments: Colostomy and uriostomy sites are clear   Genitourinary:     General: Normal vulva.   Musculoskeletal:         General: Normal range of motion.      Cervical back:  Normal range of motion and neck supple.   Skin:     General: Skin is warm and dry.      Capillary Refill: Capillary refill takes less than 2 seconds.   Neurological:      General: No focal deficit present.      Mental Status: She is alert and oriented to person, place, and time. Mental status is at baseline.   Psychiatric:         Mood and Affect: Mood normal.         Behavior: Behavior normal.         Thought Content: Thought content normal.         Judgment: Judgment normal.              CRANIAL NERVES     CN III, IV, VI   Pupils are equal, round, and reactive to light.       Significant Labs: All pertinent labs within the past 24 hours have been reviewed.  CBC:   Recent Labs   Lab 01/28/24  0301   WBC 4.42*   HGB 9.2*   HCT 29.5*          CMP:   Recent Labs   Lab 01/28/24  0301      K 3.6   CO2 17*   BUN 36.0*   CREATININE 3.08*   CALCIUM 8.4   ALBUMIN 2.5*   BILITOT 0.3   ALKPHOS 88   AST 36*   ALT 13         Significant Imaging: I have reviewed all pertinent imaging results/findings within the past 24 hours.

## 2024-01-30 VITALS
TEMPERATURE: 98 F | HEIGHT: 61 IN | HEART RATE: 86 BPM | BODY MASS INDEX: 33.23 KG/M2 | RESPIRATION RATE: 20 BRPM | OXYGEN SATURATION: 97 % | WEIGHT: 176 LBS | DIASTOLIC BLOOD PRESSURE: 75 MMHG | SYSTOLIC BLOOD PRESSURE: 146 MMHG

## 2024-01-30 PROBLEM — S42.213A FRACTURE OF SURGICAL NECK OF HUMERUS: Status: ACTIVE | Noted: 2024-01-30

## 2024-01-30 PROCEDURE — 25000003 PHARM REV CODE 250: Performed by: FAMILY MEDICINE

## 2024-01-30 PROCEDURE — A4216 STERILE WATER/SALINE, 10 ML: HCPCS | Performed by: FAMILY MEDICINE

## 2024-01-30 PROCEDURE — 94761 N-INVAS EAR/PLS OXIMETRY MLT: CPT

## 2024-01-30 RX ORDER — CIPROFLOXACIN 500 MG/1
500 TABLET ORAL
Status: DISCONTINUED | OUTPATIENT
Start: 2024-01-30 | End: 2024-01-30 | Stop reason: HOSPADM

## 2024-01-30 RX ORDER — CIPROFLOXACIN 500 MG/1
500 TABLET ORAL EVERY 12 HOURS
Qty: 14 TABLET | Refills: 0 | Status: ON HOLD | OUTPATIENT
Start: 2024-01-30 | End: 2024-02-14 | Stop reason: HOSPADM

## 2024-01-30 RX ADMIN — CLOPIDOGREL BISULFATE 75 MG: 75 TABLET ORAL at 09:01

## 2024-01-30 RX ADMIN — FERROUS SULFATE TAB 325 MG (65 MG ELEMENTAL FE) 1 EACH: 325 (65 FE) TAB at 09:01

## 2024-01-30 RX ADMIN — Medication 500 MG: at 09:01

## 2024-01-30 RX ADMIN — LEVOTHYROXINE SODIUM 137 MCG: 25 TABLET ORAL at 05:01

## 2024-01-30 RX ADMIN — MUPIROCIN 1 G: 20 OINTMENT TOPICAL at 09:01

## 2024-01-30 RX ADMIN — SODIUM BICARBONATE 650 MG: 650 TABLET ORAL at 09:01

## 2024-01-30 RX ADMIN — SODIUM CHLORIDE, PRESERVATIVE FREE 10 ML: 5 INJECTION INTRAVENOUS at 05:01

## 2024-01-30 RX ADMIN — CIPROFLOXACIN 500 MG: 500 TABLET, FILM COATED ORAL at 09:01

## 2024-01-30 RX ADMIN — CALCITRIOL CAPSULES 0.25 MCG 0.5 MCG: 0.25 CAPSULE ORAL at 09:01

## 2024-01-30 RX ADMIN — FAMOTIDINE 20 MG: 20 TABLET ORAL at 09:01

## 2024-01-30 RX ADMIN — CITALOPRAM HYDROBROMIDE 20 MG: 20 TABLET ORAL at 09:01

## 2024-01-30 RX ADMIN — DULOXETINE HYDROCHLORIDE 30 MG: 30 CAPSULE, DELAYED RELEASE ORAL at 09:01

## 2024-01-30 NOTE — DISCHARGE SUMMARY
[facility]  Hospital Medicine  Discharge Summary      Patient Name: Kam Reyes  MRN: [id number]  JOI: [id number]  Patient Class: IP- Inpatient  Admission Date: 1/26/2024  Hospital Length of Stay: 4 days  Discharge Date and Time:  01/30/2024 7:41 AM  Attending Physician: Gregor Heaton MD   Discharging Provider: Gregor Heaton MD  Primary Care Provider: Gregor Heaton MD    Primary Care Team: Networked reference to record PCT     HPI:   The patient was seen in the [facility] ER yesterday for possible urinary tract infection with increased sedimentation to her urine from her ostomy bag.  She was hit home yesterday.  She called us today to tell us that she was going to the emergency room to be admitted for her urinary tract infection.  She recounts that her ostomy bag had sediment in it and bubbles.  Year in his currently clear.  She denies any nausea vomiting any fever.  She denies any abdominal pain.  States she has not had any decrease in energy are in her usual activities of living.  Has been getting good p.o. intake passing good stool through her ostomy.  She states on the 20th she was diagnosed with COVID and had some respiratory issues states he feels fine now with no coughing no fever no congestion    * No surgery found *      Hospital Course:   The patient is a 73-year-old  female known to from clinic was admitted with changes to her urination.  Noticed cloudy in discolored urine to bag noted to have a urinary tract infection culture grew out Pseudomonas aeruginosa originally was on Rocephin which did seem to be causing improvement however culture showed that it was resistant to this she was changed to Cipro 500 mg twice a day she has had clearing of her urine since day 2 of her hospitalization no fever no nausea vomiting no chest pain no shortness a breath she does have some mild weakness but this is baseline.  States no other difficulties except  for pain to her left arm.  She apparently had a fall on the 15th resulted in radial fracture.  She has not yet sought care from an orthopedist and did not come to our office for this injury.  She has only been kept in the sling up to this date.  States she still has pain area.     Goals of Care Treatment Preferences:  Code Status: Full Code      Consults:   Consults (From admission, onward)          Status Ordering Provider     Inpatient consult to Midline team  Once        Provider:  (Not yet assigned)    Acknowledged ELINA SONG            Psychiatric  Recurrent major depression  Continue current medications as written      Pulmonary  Dyspnea  resolved      Cardiac/Vascular  Hypertension  Controlled, continue home medications    Renal/  Chronic kidney disease, stage 4 (severe)  She is on her baseline creatinine right now she refuses dialysis    ID  COVID-19  Resolved    Endocrine  Hypothyroid  Continue Synthroid      GI  Ileostomy status  Continue ostomy care      Gastro-esophageal reflux disease without esophagitis  Resume home PPI      Orthopedic  Fracture of surgical neck of humerus    Refer to ortho, follow in my office for cast      Final Active Diagnoses:    Diagnosis Date Noted POA    PRINCIPAL PROBLEM:  Catheter-associated urinary tract infection [T83.511A, N39.0] 01/26/2024 Yes    Fracture of surgical neck of humerus [S42.213A] 01/30/2024 Yes    COVID-19 [U07.1] 01/26/2024 Yes    Hypertension [I10] 01/26/2024 Yes    Chronic kidney disease, stage 4 (severe) [N18.4] 01/01/2023 Yes    Gastro-esophageal reflux disease without esophagitis [K21.9] 01/01/2023 Yes    Ileostomy status [Z93.2] 01/01/2023 Not Applicable    Recurrent major depression [F33.9] 01/01/2023 Yes    Personal history of other malignant neoplasm of large intestine [Z85.038] 01/01/2023 Yes    Dyspnea [R06.00] 08/30/2022 Yes    Hypothyroid [E03.9] 08/12/2021 Yes    Anxiety disorder [F41.9] 08/12/2021 Yes    Vesicovaginal fistula [N82.0]  03/01/2021 Yes      Problems Resolved During this Admission:    Diagnosis Date Noted Date Resolved POA    Congenital hypothyroidism without goiter [E03.1] 01/01/2023 01/26/2024 Yes       Discharged Condition: good    Disposition: Home or Self Care    Follow Up:   Follow-up Information       Gregor Heaton MD. Schedule an appointment as soon as possible for a visit in 2 day(s).    Specialty: Family Medicine  Contact information:  [address]  [address]  [place] [address]  [phone]               Narciso Machado MD Follow up in 3 day(s).    Specialty: Orthopedic Surgery  Contact information:  [address] [place] [address] Drive  [place] [address]  [phone]                           Patient Instructions:      Diet renal     Activity as tolerated       Significant Diagnostic Studies: N/A    Pending Diagnostic Studies:       None           Medications:  Reconciled Home Medications:      Medication List        START taking these medications      ciprofloxacin HCl 500 MG tablet  Commonly known as: CIPRO  Take 1 tablet (500 mg total) by mouth every 12 (twelve) hours.            CONTINUE taking these medications      calcitRIOL 0.5 MCG Cap  Commonly known as: ROCALTROL  Take 0.5 mcg by mouth once daily.     clopidogreL 75 mg tablet  Commonly known as: PLAVIX  Take 75 mg by mouth once daily.     DULoxetine 30 MG capsule  Commonly known as: CYMBALTA  Take 30 mg by mouth once daily.     ferrous sulfate 325 (65 FE) MG EC tablet  Take 325 mg by mouth 3 (three) times daily with meals.     HYDROcodone-acetaminophen  mg per tablet  Commonly known as: NORCO  Take 1 tablet by mouth.     levothyroxine 100 MCG tablet  Commonly known as: SYNTHROID  Take 137 mcg by mouth before breakfast.     omeprazole 40 MG capsule  Commonly known as: PRILOSEC  Take 40 mg by mouth once daily.     QUEtiapine 25 MG Tab  Commonly known as: SEROQUEL  Take by mouth.     sodium bicarbonate 650 MG tablet  Take 650 mg by mouth 4 (four) times daily.      VITAMIN C 100 MG tablet  Generic drug: ascorbic acid (vitamin C)  Take 100 mg by mouth once daily.            STOP taking these medications      citalopram 20 MG tablet  Commonly known as: CeleXA     methylPREDNISolone 4 mg tablet  Commonly known as: MEDROL DOSEPACK              Indwelling Lines/Drains at time of discharge:   Lines/Drains/Airways       Drain  Duration                  Ileostomy RUQ -- days         Suprapubic Catheter 06/22/23 1200 100% silicone 12 Fr. 221 days                    Time spent on the discharge of patient: 35 minutes         Gregor Heaton MD  Department of Hospital Medicine  [facility]

## 2024-01-30 NOTE — PLAN OF CARE
Problem: Mobility Impairment  Goal: Optimal Mobility  Outcome: Ongoing, Progressing     Problem: Pain Acute  Goal: Acceptable Pain Control and Functional Ability  Outcome: Ongoing, Progressing     Problem: UTI (Urinary Tract Infection)  Goal: Improved Infection Symptoms  Outcome: Ongoing, Progressing

## 2024-01-31 ENCOUNTER — PATIENT OUTREACH (OUTPATIENT)
Dept: ADMINISTRATIVE | Facility: CLINIC | Age: 74
End: 2024-01-31
Payer: MEDICARE

## 2024-01-31 NOTE — PROGRESS NOTES
C3 nurse attempted to contact Kam Reyes  for a TCC post hospital discharge follow up call. The patient is unable to conduct the call @ this time. The patient son requested a callback.    The patient has a scheduled [facility] appointment with Gregor Heaton MD  on 1/31/24 @ 3:30pm.

## 2024-02-01 LAB
BACTERIA BLD CULT: NORMAL
BACTERIA BLD CULT: NORMAL
BACTERIA UR CULT: ABNORMAL

## 2024-02-01 NOTE — PROGRESS NOTES
C3 nurse spoke with Kam Reyes  for a TCC post hospital discharge follow up call. The patient has a scheduled [facility]OS[facility] appointment with Gregor Heaton MD  on 2/1/24 @ 11am.

## 2024-02-05 ENCOUNTER — HOSPITAL ENCOUNTER (INPATIENT)
Facility: HOSPITAL | Age: 74
LOS: 21 days | Discharge: NURSING, REHAB OR LONG-TERM CARE | DRG: 690 | End: 2024-02-26
Attending: INTERNAL MEDICINE | Admitting: FAMILY MEDICINE
Payer: MEDICARE

## 2024-02-05 DIAGNOSIS — A49.8 INFECTION DUE TO EXTENDED SPECTRUM BETA LACTAMASE (ESBL) PRODUCING PROTEUS MIRABILIS: ICD-10-CM

## 2024-02-05 DIAGNOSIS — N18.4 CHRONIC KIDNEY DISEASE, STAGE 4 (SEVERE): Primary | ICD-10-CM

## 2024-02-05 DIAGNOSIS — K92.2 GASTROINTESTINAL HEMORRHAGE, UNSPECIFIED GASTROINTESTINAL HEMORRHAGE TYPE: ICD-10-CM

## 2024-02-05 DIAGNOSIS — A49.8 PSEUDOMONAS AERUGINOSA INFECTION: ICD-10-CM

## 2024-02-05 DIAGNOSIS — Z16.12 INFECTION DUE TO EXTENDED SPECTRUM BETA LACTAMASE (ESBL) PRODUCING PROTEUS MIRABILIS: ICD-10-CM

## 2024-02-05 DIAGNOSIS — R60.9 EDEMA: ICD-10-CM

## 2024-02-05 DIAGNOSIS — A49.8 ENTEROCOCCUS FAECALIS INFECTION: ICD-10-CM

## 2024-02-05 PROBLEM — I12.9 HYPERTENSIVE CHRONIC KIDNEY DISEASE W STG 1-4/UNSP CHR KDNY: Status: ACTIVE | Noted: 2023-01-01

## 2024-02-05 PROBLEM — K21.9 GERD (GASTROESOPHAGEAL REFLUX DISEASE): Status: ACTIVE | Noted: 2021-08-12

## 2024-02-05 LAB
ALBUMIN SERPL-MCNC: 2.9 G/DL (ref 3.4–4.8)
ALBUMIN/GLOB SERPL: 0.7 RATIO (ref 1.1–2)
ALP SERPL-CCNC: 106 UNIT/L (ref 40–150)
ALT SERPL-CCNC: 8 UNIT/L (ref 0–55)
AMORPH URATE CRY URNS QL MICRO: ABNORMAL /HPF
APPEARANCE UR: ABNORMAL
AST SERPL-CCNC: 13 UNIT/L (ref 5–34)
BACTERIA #/AREA URNS AUTO: ABNORMAL /HPF
BASOPHILS # BLD AUTO: 0.04 X10(3)/MCL
BASOPHILS NFR BLD AUTO: 0.2 %
BILIRUB SERPL-MCNC: 0.7 MG/DL
BILIRUB UR QL STRIP.AUTO: NEGATIVE
BUN SERPL-MCNC: 48 MG/DL (ref 9.8–20.1)
CALCIUM SERPL-MCNC: 10.2 MG/DL (ref 8.4–10.2)
CHLORIDE SERPL-SCNC: 109 MMOL/L (ref 98–107)
CO2 SERPL-SCNC: 17 MMOL/L (ref 23–31)
COLOR UR AUTO: ABNORMAL
CREAT SERPL-MCNC: 3.75 MG/DL (ref 0.55–1.02)
EOSINOPHIL # BLD AUTO: 0.13 X10(3)/MCL (ref 0–0.9)
EOSINOPHIL NFR BLD AUTO: 0.7 %
ERYTHROCYTE [DISTWIDTH] IN BLOOD BY AUTOMATED COUNT: 13.4 % (ref 11.5–17)
GFR SERPLBLD CREATININE-BSD FMLA CKD-EPI: 12 MLS/MIN/1.73/M2
GLOBULIN SER-MCNC: 4.1 GM/DL (ref 2.4–3.5)
GLUCOSE SERPL-MCNC: 168 MG/DL (ref 82–115)
GLUCOSE UR QL STRIP.AUTO: NEGATIVE
HCT VFR BLD AUTO: 31.8 % (ref 37–47)
HGB BLD-MCNC: 10.1 G/DL (ref 12–16)
IMM GRANULOCYTES # BLD AUTO: 0.14 X10(3)/MCL (ref 0–0.04)
IMM GRANULOCYTES NFR BLD AUTO: 0.7 %
KETONES UR QL STRIP.AUTO: ABNORMAL
LACTATE SERPL-SCNC: 2 MMOL/L (ref 0.5–2.2)
LEUKOCYTE ESTERASE UR QL STRIP.AUTO: ABNORMAL
LYMPHOCYTES # BLD AUTO: 1.19 X10(3)/MCL (ref 0.6–4.6)
LYMPHOCYTES NFR BLD AUTO: 6.1 %
MCH RBC QN AUTO: 30.9 PG (ref 27–31)
MCHC RBC AUTO-ENTMCNC: 31.8 G/DL (ref 33–36)
MCV RBC AUTO: 97.2 FL (ref 80–94)
MONOCYTES # BLD AUTO: 0.95 X10(3)/MCL (ref 0.1–1.3)
MONOCYTES NFR BLD AUTO: 4.9 %
MUCOUS THREADS URNS QL MICRO: ABNORMAL /LPF
NEUTROPHILS # BLD AUTO: 17.1 X10(3)/MCL (ref 2.1–9.2)
NEUTROPHILS NFR BLD AUTO: 87.4 %
NITRITE UR QL STRIP.AUTO: NEGATIVE
PH UR STRIP.AUTO: 5.5 [PH]
PLATELET # BLD AUTO: 308 X10(3)/MCL (ref 130–400)
PMV BLD AUTO: 10.9 FL (ref 7.4–10.4)
POTASSIUM SERPL-SCNC: 4 MMOL/L (ref 3.5–5.1)
PROT SERPL-MCNC: 7 GM/DL (ref 5.8–7.6)
PROT UR QL STRIP.AUTO: ABNORMAL
RBC # BLD AUTO: 3.27 X10(6)/MCL (ref 4.2–5.4)
RBC #/AREA URNS AUTO: >100 /HPF
RBC UR QL AUTO: ABNORMAL
SODIUM SERPL-SCNC: 138 MMOL/L (ref 136–145)
SP GR UR STRIP.AUTO: 1.02 (ref 1–1.03)
SQUAMOUS #/AREA URNS AUTO: ABNORMAL /HPF
UROBILINOGEN UR STRIP-ACNC: 1
WBC # SPEC AUTO: 19.55 X10(3)/MCL (ref 4.5–11.5)
WBC #/AREA URNS AUTO: ABNORMAL /HPF
YEAST URNS QL MICRO: ABNORMAL /HPF

## 2024-02-05 PROCEDURE — 87040 BLOOD CULTURE FOR BACTERIA: CPT | Performed by: INTERNAL MEDICINE

## 2024-02-05 PROCEDURE — 83605 ASSAY OF LACTIC ACID: CPT | Performed by: INTERNAL MEDICINE

## 2024-02-05 PROCEDURE — 25000003 PHARM REV CODE 250: Performed by: INTERNAL MEDICINE

## 2024-02-05 PROCEDURE — 85025 COMPLETE CBC W/AUTO DIFF WBC: CPT | Performed by: INTERNAL MEDICINE

## 2024-02-05 PROCEDURE — A4216 STERILE WATER/SALINE, 10 ML: HCPCS | Performed by: INTERNAL MEDICINE

## 2024-02-05 PROCEDURE — 25000003 PHARM REV CODE 250: Performed by: FAMILY MEDICINE

## 2024-02-05 PROCEDURE — 80053 COMPREHEN METABOLIC PANEL: CPT | Performed by: INTERNAL MEDICINE

## 2024-02-05 PROCEDURE — 81003 URINALYSIS AUTO W/O SCOPE: CPT | Performed by: INTERNAL MEDICINE

## 2024-02-05 PROCEDURE — 63600175 PHARM REV CODE 636 W HCPCS: Performed by: INTERNAL MEDICINE

## 2024-02-05 PROCEDURE — 96365 THER/PROPH/DIAG IV INF INIT: CPT

## 2024-02-05 PROCEDURE — 96375 TX/PRO/DX INJ NEW DRUG ADDON: CPT

## 2024-02-05 PROCEDURE — 99285 EMERGENCY DEPT VISIT HI MDM: CPT | Mod: 25

## 2024-02-05 PROCEDURE — 87077 CULTURE AEROBIC IDENTIFY: CPT | Performed by: INTERNAL MEDICINE

## 2024-02-05 PROCEDURE — 11000001 HC ACUTE MED/SURG PRIVATE ROOM

## 2024-02-05 RX ORDER — CLOPIDOGREL BISULFATE 75 MG/1
75 TABLET ORAL DAILY
Status: DISCONTINUED | OUTPATIENT
Start: 2024-02-06 | End: 2024-02-20

## 2024-02-05 RX ORDER — LANOLIN ALCOHOL/MO/W.PET/CERES
1 CREAM (GRAM) TOPICAL DAILY
Status: DISCONTINUED | OUTPATIENT
Start: 2024-02-06 | End: 2024-02-26 | Stop reason: HOSPADM

## 2024-02-05 RX ORDER — MUPIROCIN 20 MG/G
OINTMENT TOPICAL 2 TIMES DAILY
Status: COMPLETED | OUTPATIENT
Start: 2024-02-05 | End: 2024-02-10

## 2024-02-05 RX ORDER — SODIUM CHLORIDE 0.9 % (FLUSH) 0.9 %
10 SYRINGE (ML) INJECTION EVERY 8 HOURS
Status: DISCONTINUED | OUTPATIENT
Start: 2024-02-05 | End: 2024-02-26 | Stop reason: HOSPADM

## 2024-02-05 RX ORDER — ONDANSETRON HYDROCHLORIDE 2 MG/ML
4 INJECTION, SOLUTION INTRAVENOUS ONCE
Status: COMPLETED | OUTPATIENT
Start: 2024-02-05 | End: 2024-02-05

## 2024-02-05 RX ORDER — SODIUM BICARBONATE 650 MG/1
650 TABLET ORAL 4 TIMES DAILY
Status: DISCONTINUED | OUTPATIENT
Start: 2024-02-05 | End: 2024-02-10

## 2024-02-05 RX ORDER — PANTOPRAZOLE SODIUM 40 MG/1
40 TABLET, DELAYED RELEASE ORAL DAILY
Status: DISCONTINUED | OUTPATIENT
Start: 2024-02-06 | End: 2024-02-19

## 2024-02-05 RX ORDER — CALCITRIOL 0.25 UG/1
0.5 CAPSULE ORAL DAILY
Status: DISCONTINUED | OUTPATIENT
Start: 2024-02-06 | End: 2024-02-26 | Stop reason: HOSPADM

## 2024-02-05 RX ORDER — ACETAMINOPHEN 325 MG/1
650 TABLET ORAL EVERY 8 HOURS PRN
Status: DISCONTINUED | OUTPATIENT
Start: 2024-02-05 | End: 2024-02-26 | Stop reason: HOSPADM

## 2024-02-05 RX ORDER — QUETIAPINE FUMARATE 25 MG/1
25 TABLET, FILM COATED ORAL NIGHTLY
Status: DISCONTINUED | OUTPATIENT
Start: 2024-02-05 | End: 2024-02-26 | Stop reason: HOSPADM

## 2024-02-05 RX ORDER — CIPROFLOXACIN 500 MG/1
500 TABLET ORAL EVERY 12 HOURS
Status: DISCONTINUED | OUTPATIENT
Start: 2024-02-05 | End: 2024-02-05

## 2024-02-05 RX ORDER — DULOXETIN HYDROCHLORIDE 30 MG/1
30 CAPSULE, DELAYED RELEASE ORAL DAILY
Status: DISCONTINUED | OUTPATIENT
Start: 2024-02-06 | End: 2024-02-26 | Stop reason: HOSPADM

## 2024-02-05 RX ORDER — HYDROCODONE BITARTRATE AND ACETAMINOPHEN 10; 325 MG/1; MG/1
1 TABLET ORAL EVERY 6 HOURS PRN
Status: DISCONTINUED | OUTPATIENT
Start: 2024-02-05 | End: 2024-02-26 | Stop reason: HOSPADM

## 2024-02-05 RX ORDER — SODIUM CHLORIDE 9 MG/ML
1000 INJECTION, SOLUTION INTRAVENOUS
Status: COMPLETED | OUTPATIENT
Start: 2024-02-05 | End: 2024-02-05

## 2024-02-05 RX ORDER — ASCORBIC ACID 250 MG
250 TABLET ORAL DAILY
Status: DISCONTINUED | OUTPATIENT
Start: 2024-02-06 | End: 2024-02-26 | Stop reason: HOSPADM

## 2024-02-05 RX ADMIN — CEFEPIME 1 G: 1 INJECTION, POWDER, FOR SOLUTION INTRAMUSCULAR; INTRAVENOUS at 01:02

## 2024-02-05 RX ADMIN — VANCOMYCIN HYDROCHLORIDE 1000 MG: 1 INJECTION, POWDER, LYOPHILIZED, FOR SOLUTION INTRAVENOUS at 02:02

## 2024-02-05 RX ADMIN — ONDANSETRON 4 MG: 2 INJECTION INTRAMUSCULAR; INTRAVENOUS at 01:02

## 2024-02-05 RX ADMIN — HYDROCODONE BITARTRATE AND ACETAMINOPHEN 1 TABLET: 10; 325 TABLET ORAL at 07:02

## 2024-02-05 RX ADMIN — MUPIROCIN 1 G: 20 OINTMENT TOPICAL at 11:02

## 2024-02-05 RX ADMIN — SODIUM BICARBONATE 650 MG: 650 TABLET ORAL at 10:02

## 2024-02-05 RX ADMIN — SODIUM CHLORIDE, PRESERVATIVE FREE 10 ML: 5 INJECTION INTRAVENOUS at 11:02

## 2024-02-05 RX ADMIN — QUETIAPINE FUMARATE 25 MG: 25 TABLET ORAL at 10:02

## 2024-02-05 RX ADMIN — CIPROFLOXACIN 500 MG: 500 TABLET, FILM COATED ORAL at 02:02

## 2024-02-05 RX ADMIN — SODIUM CHLORIDE 1000 ML: 9 INJECTION, SOLUTION INTRAVENOUS at 12:02

## 2024-02-05 NOTE — PROGRESS NOTES
Pharmacokinetic Initial Assessment: IV Vancomycin    Assessment/Plan:    Initiate intravenous vancomycin with loading dose of 1000 mg once with subsequent doses when random concentrations are less than 20 mcg/mL  Desired empiric serum trough concentration is 10 to 20 mcg/mL  Draw vancomycin random level on 2/7 at 1200.  Pharmacy will continue to follow and monitor vancomycin.      Please contact pharmacy at extension [phone] with any questions regarding this assessment.     Thank you for the consult,   Janae Jazcaryn       Patient brief summary:  Kam Reyes is a 73 y.o. female initiated on antimicrobial therapy with IV Vancomycin for treatment of suspected urinary tract infection    Drug Allergies:   Review of patient's allergies indicates:   Allergen Reactions    Oxaprozin Nausea And Vomiting and Swelling     Facial swelling      Sulfamethoxazole-trimethoprim Nausea And Vomiting     Severe nausea and vomiting    Doxycycline     Duloxetine Other (See Comments)    Nsaids (non-steroidal anti-inflammatory drug) Rash    Sulfa (sulfonamide antibiotics) Nausea And Vomiting       Actual Body Weight:   79.8 kg    Renal Function:   Estimated Creatinine Clearance: 12.8 mL/min (A) (based on SCr of 3.75 mg/dL (H)).,     Dialysis Method (if applicable):  N/A    CBC (last 72 hours):  Recent Labs   Lab Result Units 02/05/24  1137   WBC x10(3)/mcL 19.55*   Hgb g/dL 10.1*   Hct % 31.8*   Platelet x10(3)/mcL 308   Mono % % 4.9   Eos % % 0.7   Basophil % % 0.2       Metabolic Panel (last 72 hours):  Recent Labs   Lab Result Units 02/05/24  1137   Sodium Level mmol/L 138   Potassium Level mmol/L 4.0   Chloride mmol/L 109*   Carbon Dioxide mmol/L 17*   Glucose Level mg/dL 168*   Blood Urea Nitrogen mg/dL 48.0*   Creatinine mg/dL 3.75*   Albumin Level g/dL 2.9*   Bilirubin Total mg/dL 0.7   Alkaline Phosphatase unit/L 106   Aspartate Aminotransferase unit/L 13   Alanine Aminotransferase unit/L 8       Drug levels (last 3  "results):  No results for input(s): "VANCOMYCINRA", "VANCORANDOM", "VANCOMYCINPE", "VANCOPEAK", "VANCOMYCINTR", "VANCOTROUGH" in the last 72 hours.    Microbiologic Results:  Microbiology Results (last 7 days)       Procedure Component Value Units Date/Time    Blood culture #1 **CANNOT BE ORDERED STAT** [[id number]] Collected: 02/05/24 1312    Order Status: Sent Specimen: Blood from Arm, Right Updated: 02/05/24 1314    Blood culture #2 **CANNOT BE ORDERED STAT** [[id number]] Collected: 02/05/24 1312    Order Status: Sent Specimen: Blood from Arm, Right Updated: 02/05/24 1314            "

## 2024-02-05 NOTE — ED PROVIDER NOTES
Encounter Date: 2024  History from patient and medics     History     Chief Complaint   Patient presents with    Fatigue     C/o weakness, n/v, and reports she was dx with UTI last week. Currently taking Cipro since .     HPI    Kam Reyes is 73 y.o. female who  has a past medical history of Cancer, colon, Chronic pain, GERD (gastroesophageal reflux disease), Renal disorder, and Thyroiditis, unspecified. arrives in ER with c/o Fatigue (C/o weakness, n/v, and reports she was dx with UTI last week. Currently taking Cipro since .)    Review of patient's allergies indicates:   Allergen Reactions    Oxaprozin Nausea And Vomiting and Swelling     Facial swelling      Sulfamethoxazole-trimethoprim Nausea And Vomiting     Severe nausea and vomiting    Doxycycline     Duloxetine Other (See Comments)    Nsaids (non-steroidal anti-inflammatory drug) Rash    Sulfa (sulfonamide antibiotics) Nausea And Vomiting     Past Medical History:   Diagnosis Date    Cancer, colon     Chronic pain     GERD (gastroesophageal reflux disease)     Renal disorder     Thyroiditis, unspecified      Past Surgical History:   Procedure Laterality Date    BACK SURGERY       SECTION      CHOLECYSTECTOMY      COLON SURGERY      HYSTERECTOMY      KIDNEY SURGERY       Family History   Problem Relation Age of Onset    Hypertension Mother      Social History     Tobacco Use    Smoking status: Never    Smokeless tobacco: Never   Substance Use Topics    Alcohol use: Never    Drug use: Never     Review of Systems   Constitutional:  Positive for fatigue. Negative for fever.   HENT:  Negative for trouble swallowing and voice change.    Eyes:  Negative for visual disturbance.   Respiratory:  Negative for cough and shortness of breath.    Cardiovascular:  Negative for chest pain.   Gastrointestinal:  Negative for abdominal pain, diarrhea and vomiting.   Genitourinary:  Negative for dysuria and hematuria.        Dislodged Chun    Musculoskeletal:  Negative for back pain and gait problem.   Skin:  Negative for color change and rash.   Neurological:  Positive for weakness. Negative for headaches.   Psychiatric/Behavioral:  Negative for behavioral problems and sleep disturbance.    All other systems reviewed and are negative.    Physical Exam     Initial Vitals [02/05/24 1118]   BP Pulse Resp Temp SpO2   (!) 164/133 96 18 97.5 °F (36.4 °C) 99 %      MAP       --         Physical Exam    Constitutional: She appears well-developed and well-nourished.   HENT:   Head: Atraumatic.   Eyes: EOM are normal. Pupils are equal, round, and reactive to light.   Neck: Neck supple.   Cardiovascular:  Normal rate and regular rhythm.           Pulmonary/Chest: Breath sounds normal. No respiratory distress. She has no wheezes. She has no rales.   Abdominal: Abdomen is soft. Bowel sounds are normal. She exhibits no distension. There is no abdominal tenderness.   Musculoskeletal:         General: No edema.      Cervical back: Neck supple.      Comments: Bruising noted to bilateral shoulders, her left arm is in the sling, decreased range of motion on the left     Neurological: She is alert. GCS score is 15. GCS eye subscore is 4. GCS verbal subscore is 5. GCS motor subscore is 6.   Skin: Skin is warm and dry.       ED Course   Suprapubic Tube    Date/Time: 2/5/2024 1:28 PM    Performed by: Kathy Kwan MD  Authorized by: Kathy Kwan MD  Consent: Verbal consent obtained.  Risks and benefits: risks, benefits and alternatives were discussed  Patient identity confirmed: provided demographic data and hospital-assigned identification number  Indications: catheter change and neurogenic bladder  Local anesthesia used: no    Anesthesia:  Local anesthesia used: no    Sedation:  Patient sedated: no    Urine volume: 10 ml  Urine characteristics: blood-tinged  Comments: Initially when patient came in she told that she had a Chun catheter in which fell out from  the bottom, she kept on insisting that the catheter was in the bottom, she did have a colostomy in place, and she also had what appears like to be a suprapubic cystostomy.  But patient said that they had the catheter in her urethra.  Nurses tried multiple times, and multiple nurses tried and I tried myself to cut the catheter in the urethra but we were not able to get it in, then I saw again that patient does have a suprapubic cystostomy whole, and I advised her that I am going to try to put the catheter in there, and she said it was not there it was in the urethra, I said let me just try if I can get a catheter in the suprapubic cystostomy, and I was able to insert a catheter without any difficulty and high add about 5-7 cc of urine which came out, I was able to inflate the balloon.  Initially patient was saying that they have a 30 cc balloon because the catheter falls out, but when I started putting the sterile water in the balloon she started complaining that it is hurting, and told me that they only put 5 cc in there, after 5 cc patient had the catheter snug in place, so I decided to leave it there and wait for some time and possibly inferior the balloon further if needed later.        Orders Placed This Encounter   Procedures    SUPRAPUBIC TUBE PLACEMENT    Blood culture #1 **CANNOT BE ORDERED STAT**    Blood culture #2 **CANNOT BE ORDERED STAT**    CBC auto differential    Comprehensive metabolic panel    Urinalysis, Reflex to Urine Culture    CBC with Differential    Lactic acid, plasma    Vancomycin, Random    Bladder scan    Pharmacy to dose Vancomycin consult    Airborne and Contact and Droplet Isolation Status    Insert Saline lock IV    Admit to Inpatient     Medications   ceFEPIme (MAXIPIME) 1 g in dextrose 5 % in water (D5W) 100 mL IVPB (MB+) (1 g Intravenous New Bag 2/5/24 1314)   vancomycin (VANCOCIN) 1,000 mg in dextrose 5 % (D5W) 250 mL IVPB (Vial-Mate) (has no administration in time range)    vancomycin (VANCOCIN) 1,000 mg in dextrose 5 % (D5W) 250 mL IVPB (Vial-Mate) (1,000 mg Intravenous Random Level Due 2/7/24 1200)   ciprofloxacin HCl tablet 500 mg (has no administration in time range)   0.9%  NaCl infusion (1,000 mLs Intravenous New Bag 2/5/24 1208)   ondansetron injection 4 mg (4 mg Intravenous Given 2/5/24 1314)     Admission on 02/05/2024   Component Date Value Ref Range Status    Sodium Level 02/05/2024 138  136 - 145 mmol/L Final    Potassium Level 02/05/2024 4.0  3.5 - 5.1 mmol/L Final    Chloride 02/05/2024 109 (H)  98 - 107 mmol/L Final    Carbon Dioxide 02/05/2024 17 (L)  23 - 31 mmol/L Final    Glucose Level 02/05/2024 168 (H)  82 - 115 mg/dL Final    Blood Urea Nitrogen 02/05/2024 48.0 (H)  9.8 - 20.1 mg/dL Final    Creatinine 02/05/2024 3.75 (H)  0.55 - 1.02 mg/dL Final    Calcium Level Total 02/05/2024 10.2  8.4 - 10.2 mg/dL Final    Protein Total 02/05/2024 7.0  5.8 - 7.6 gm/dL Final    Albumin Level 02/05/2024 2.9 (L)  3.4 - 4.8 g/dL Final    Globulin 02/05/2024 4.1 (H)  2.4 - 3.5 gm/dL Final    Albumin/Globulin Ratio 02/05/2024 0.7 (L)  1.1 - 2.0 ratio Final    Bilirubin Total 02/05/2024 0.7  <=1.5 mg/dL Final    Alkaline Phosphatase 02/05/2024 106  40 - 150 unit/L Final    Alanine Aminotransferase 02/05/2024 8  0 - 55 unit/L Final    Aspartate Aminotransferase 02/05/2024 13  5 - 34 unit/L Final    eGFR 02/05/2024 12  mls/min/1.73/m2 Final    WBC 02/05/2024 19.55 (H)  4.50 - 11.50 x10(3)/mcL Final    RBC 02/05/2024 3.27 (L)  4.20 - 5.40 x10(6)/mcL Final    Hgb 02/05/2024 10.1 (L)  12.0 - 16.0 g/dL Final    Hct 02/05/2024 31.8 (L)  37.0 - 47.0 % Final    MCV 02/05/2024 97.2 (H)  80.0 - 94.0 fL Final    MCH 02/05/2024 30.9  27.0 - 31.0 pg Final    MCHC 02/05/2024 31.8 (L)  33.0 - 36.0 g/dL Final    RDW 02/05/2024 13.4  11.5 - 17.0 % Final    Platelet 02/05/2024 308  130 - 400 x10(3)/mcL Final    MPV 02/05/2024 10.9 (H)  7.4 - 10.4 fL Final    Neut % 02/05/2024 87.4  % Final    Lymph  % 02/05/2024 6.1  % Final    Mono % 02/05/2024 4.9  % Final    Eos % 02/05/2024 0.7  % Final    Basophil % 02/05/2024 0.2  % Final    Lymph # 02/05/2024 1.19  0.6 - 4.6 x10(3)/mcL Final    Neut # 02/05/2024 17.10 (H)  2.1 - 9.2 x10(3)/mcL Final    Mono # 02/05/2024 0.95  0.1 - 1.3 x10(3)/mcL Final    Eos # 02/05/2024 0.13  0 - 0.9 x10(3)/mcL Final    Baso # 02/05/2024 0.04  <=0.2 x10(3)/mcL Final    IG# 02/05/2024 0.14 (H)  0 - 0.04 x10(3)/mcL Final    IG% 02/05/2024 0.7  % Final    Lactic Acid Level 02/05/2024 2.0  0.5 - 2.2 mmol/L Final     Susceptibility data from last 90 days.  Collected Specimen Info Organism Amox/ K Clav Ampicillin Cefazolin Cefepime Ceftriaxone Cefuroxime Ciprofloxacin Genatmicin Synergy Screen Gentamicin Levofloxacin Meropenem Nitrofurantoin Penicillin Pip/Tazo Strep. Synergy   01/26/24 Urine Pseudomonas aeruginosa     S    S   S  I  S         Raoultella planticola  S  R  R  R  R  R  S   S  S   S   S      Proteus mirabilis ESBL   R  R  S  R  R  R   I  R  S  R   S      Enterococcus faecium   R      R  S   R   I  R   S     Collected Specimen Info Organism Tetracycline Tobramycin TRIMETHOPRIM/SULFAMETHOXAZOLE SUSCEPTIBILITY Vancomycin   01/26/24 Urine Pseudomonas aeruginosa   S       Raoultella planticola  S  S  R      Proteus mirabilis ESBL  R  I  R      Enterococcus faecium     S      Labs Reviewed   COMPREHENSIVE METABOLIC PANEL - Abnormal; Notable for the following components:       Result Value    Chloride 109 (*)     Carbon Dioxide 17 (*)     Glucose Level 168 (*)     Blood Urea Nitrogen 48.0 (*)     Creatinine 3.75 (*)     Albumin Level 2.9 (*)     Globulin 4.1 (*)     Albumin/Globulin Ratio 0.7 (*)     All other components within normal limits   CBC WITH DIFFERENTIAL - Abnormal; Notable for the following components:    WBC 19.55 (*)     RBC 3.27 (*)     Hgb 10.1 (*)     Hct 31.8 (*)     MCV 97.2 (*)     MCHC 31.8 (*)     MPV 10.9 (*)     Neut # 17.10 (*)     IG# 0.14 (*)     All other  components within normal limits   LACTIC ACID, PLASMA - Normal   BLOOD CULTURE [facility]   BLOOD CULTURE [facility]   CBC W/ AUTO DIFFERENTIAL    Narrative:     The following orders were created for panel order CBC auto differential.  Procedure                               Abnormality         Status                     ---------                               -----------         ------                     CBC with Differential[[id number]]       Abnormal            Final result                 Please view results for these tests on the individual orders.   URINALYSIS, REFLEX TO URINE CULTURE        Imaging Results    None          Medications   ceFEPIme (MAXIPIME) 1 g in dextrose 5 % in water (D5W) 100 mL IVPB (MB+) (1 g Intravenous New Bag 2/5/24 1314)   vancomycin (VANCOCIN) 1,000 mg in dextrose 5 % (D5W) 250 mL IVPB (Vial-Mate) (has no administration in time range)   vancomycin (VANCOCIN) 1,000 mg in dextrose 5 % (D5W) 250 mL IVPB (Vial-Mate) (1,000 mg Intravenous Random Level Due 2/7/24 1200)   ciprofloxacin HCl tablet 500 mg (has no administration in time range)   0.9%  NaCl infusion (1,000 mLs Intravenous New Bag 2/5/24 1208)   ondansetron injection 4 mg (4 mg Intravenous Given 2/5/24 1314)     Medical Decision Making    Kam Reyes is 73 y.o. female who  has a past medical history of Cancer, colon, Chronic pain, GERD (gastroesophageal reflux disease), Renal disorder, and Thyroiditis, unspecified. arrives in ER with c/o Fatigue (C/o weakness, n/v, and reports she was dx with UTI last week. Currently taking Cipro since 1/30.)      Essentially patient had a fall on 15th and she broke her left humerus, then she was diagnosed with COVID-19 on 24th, then she was in the ER again and was diagnosed with the urinary tract infection, and she was essentially admitted in the hospital and day before yesterday she was discharged home on ciprofloxacin by mouth, and today patient says she is having just generalized weakness since  she got discharged, and today her Chun came out while she was trying to get out of the bed, so she called the ambulance to bring her to the emergency room.    Amount and/or Complexity of Data Reviewed  Labs: ordered.    Risk  Prescription drug management.  Decision regarding hospitalization.               ED Course as of 02/05/24 1341   Mon Feb 05, 2024   1211 Nurses tried inserting a Chun's in and they could not, and I went in and I tried myself inserting the Chun in but I could not get the Chun in either, patient is incontinent of urine, [GQ]   1213 Patient's white count has gone up to 20,000, she still has the renal insufficiency, unfortunately patient has a culture which is positive with the multiple organisms, essentially when Dr. Heaton discharge her they had reported to organisms which were sensitive to Cipro, but now I looked at the culture again and it appears like that patient has the Enterococcus faecalis which is resistant to everything except for vancomycin, unfortunately patient has renal insufficiency, I talked to Dr. Heaton.  Dr. Heaton says that I can admit her and we can consult pharmacology to give her vancomycin according to her kidney function, also I called Dr. Dorsey and talk to him, he will be able to see the patient in the evening he is doing surgery in [facility] at this time, I am going to admit her in the hospital, I will continue Cipro on her because 2 of the organisms are sensitive to it. [GQ]   1223 Patient's white blood cell count is going up, her kidney function is again going down, but she does have chronic renal insufficiency,  I am going to admit her, give her IV fluids, her blood pressure is maintained, her blood cultures were reported to be negative,    I will admit her, consult pharmacy for vancomycin dosing, consult Dr. Dorsey for catheterization.  Patient usually goes to urologist's office to get the catheter changed. [GQ]   1224 Dr. Heaton is okay with admitting her  in the hospital for further management. [GQ]   1333 Since patient has multiple multidrug resistant organisms in the urine we are going to admit her in the hospital and I have talked to the pharmacist they are going to dose the vancomycin because she has Enterococcus faecalis which is resistant to everything, and then I will put her on cefepime because ESBL Proteus is resistant to everything else.  And she will continue ciprofloxacin because she has 2 more organisms. [GQ]   1334 Patient's lactic acid is only 2.0, [place] 710[address], I will admit her upstairs. [GQ]   1340 I feel that patient had a suprapubic cystostomy which fell off, and she has just not a good historian. [GQ]      ED Course User Index  [GQ] Kathy Kwan MD                     Clinical Impression:  Final diagnoses:  [A49.8, Z16.12] Infection due to extended spectrum beta lactamase (ESBL) producing Proteus mirabilis (Primary)  [A49.8] Enterococcus faecalis infection  [A49.8] Pseudomonas aeruginosa infection          ED Disposition Condition    Admit Stable                Kathy Kwan MD  02/05/24 3843

## 2024-02-05 NOTE — PROGRESS NOTES
Pharmacist Renal Dose Adjustment Note    Kam Reyes is a 73 y.o. female being treated with the medication ciprofloxacin    Patient Data:    Vital Signs (Most Recent):  Temp: 97.6 °F (36.4 °C) (02/05/24 1554)  Pulse: 92 (02/05/24 1554)  Resp: 20 (02/05/24 1554)  BP: 113/66 (02/05/24 1554)  SpO2: 99 % (02/05/24 1554) Vital Signs (72h Range):  Temp:  [97.5 °F (36.4 °C)-97.6 °F (36.4 °C)]   Pulse:  [84-96]   Resp:  [18-21]   BP: (113-164)/()   SpO2:  [98 %-100 %]      Recent Labs   Lab 02/05/24  1137   CREATININE 3.75*     Serum creatinine: 3.75 mg/dL (H) 02/05/24 1137  Estimated creatinine clearance: 12.8 mL/min (A)    Medication:ciprofloxacin dose: 500mg frequency twice daily will be changed to medication:ciprofloxacin dose:750mg frequency:daily    Pharmacist's Name: Janae Mcintosh  Pharmacist's Extension: [phone]

## 2024-02-05 NOTE — PLAN OF CARE
Problem: Adult Inpatient Plan of Care  Goal: Plan of Care Review  Outcome: Ongoing, Progressing  Goal: Patient-Specific Goal (Individualized)  Outcome: Ongoing, Progressing  Goal: Absence of Hospital-Acquired Illness or Injury  Outcome: Ongoing, Progressing  Goal: Optimal Comfort and Wellbeing  Outcome: Ongoing, Progressing  Goal: Readiness for Transition of Care  Outcome: Ongoing, Progressing     Problem: Fall Injury Risk  Goal: Absence of Fall and Fall-Related Injury  Outcome: Ongoing, Progressing     Problem: Infection  Goal: Absence of Infection Signs and Symptoms  Outcome: Ongoing, Progressing     Problem: Pain Chronic (Persistent) (Comorbidity Management)  Goal: Acceptable Pain Control and Functional Ability  Outcome: Ongoing, Progressing

## 2024-02-06 LAB
ALBUMIN SERPL-MCNC: 2.6 G/DL (ref 3.4–4.8)
ALBUMIN/GLOB SERPL: 0.7 RATIO (ref 1.1–2)
ALP SERPL-CCNC: 86 UNIT/L (ref 40–150)
ALT SERPL-CCNC: 6 UNIT/L (ref 0–55)
AST SERPL-CCNC: 13 UNIT/L (ref 5–34)
BASOPHILS # BLD AUTO: 0.04 X10(3)/MCL
BASOPHILS NFR BLD AUTO: 0.4 %
BILIRUB SERPL-MCNC: 0.6 MG/DL
BUN SERPL-MCNC: 56 MG/DL (ref 9.8–20.1)
CALCIUM SERPL-MCNC: 9.1 MG/DL (ref 8.4–10.2)
CHLORIDE SERPL-SCNC: 109 MMOL/L (ref 98–107)
CO2 SERPL-SCNC: 17 MMOL/L (ref 23–31)
CREAT SERPL-MCNC: 4.64 MG/DL (ref 0.55–1.02)
EOSINOPHIL # BLD AUTO: 0.19 X10(3)/MCL (ref 0–0.9)
EOSINOPHIL NFR BLD AUTO: 1.7 %
ERYTHROCYTE [DISTWIDTH] IN BLOOD BY AUTOMATED COUNT: 13.2 % (ref 11.5–17)
GFR SERPLBLD CREATININE-BSD FMLA CKD-EPI: 9 MLS/MIN/1.73/M2
GLOBULIN SER-MCNC: 3.5 GM/DL (ref 2.4–3.5)
GLUCOSE SERPL-MCNC: 91 MG/DL (ref 82–115)
HCT VFR BLD AUTO: 25.1 % (ref 37–47)
HCT VFR BLD AUTO: 28 % (ref 37–47)
HGB BLD-MCNC: 7.9 G/DL (ref 12–16)
HGB BLD-MCNC: 8.3 G/DL (ref 12–16)
IMM GRANULOCYTES # BLD AUTO: 0.07 X10(3)/MCL (ref 0–0.04)
IMM GRANULOCYTES NFR BLD AUTO: 0.6 %
LYMPHOCYTES # BLD AUTO: 1.33 X10(3)/MCL (ref 0.6–4.6)
LYMPHOCYTES NFR BLD AUTO: 11.9 %
MCH RBC QN AUTO: 30.7 PG (ref 27–31)
MCHC RBC AUTO-ENTMCNC: 31.5 G/DL (ref 33–36)
MCV RBC AUTO: 97.7 FL (ref 80–94)
MONOCYTES # BLD AUTO: 0.71 X10(3)/MCL (ref 0.1–1.3)
MONOCYTES NFR BLD AUTO: 6.3 %
NEUTROPHILS # BLD AUTO: 8.86 X10(3)/MCL (ref 2.1–9.2)
NEUTROPHILS NFR BLD AUTO: 79.1 %
PLATELET # BLD AUTO: 198 X10(3)/MCL (ref 130–400)
PMV BLD AUTO: 11.6 FL (ref 7.4–10.4)
POTASSIUM SERPL-SCNC: 3.7 MMOL/L (ref 3.5–5.1)
PROT SERPL-MCNC: 6.1 GM/DL (ref 5.8–7.6)
RBC # BLD AUTO: 2.57 X10(6)/MCL (ref 4.2–5.4)
SODIUM SERPL-SCNC: 136 MMOL/L (ref 136–145)
WBC # SPEC AUTO: 11.2 X10(3)/MCL (ref 4.5–11.5)

## 2024-02-06 PROCEDURE — 27000207 HC ISOLATION

## 2024-02-06 PROCEDURE — 63600175 PHARM REV CODE 636 W HCPCS: Mod: EC,JG | Performed by: FAMILY MEDICINE

## 2024-02-06 PROCEDURE — 85018 HEMOGLOBIN: CPT | Performed by: FAMILY MEDICINE

## 2024-02-06 PROCEDURE — 25000003 PHARM REV CODE 250: Performed by: FAMILY MEDICINE

## 2024-02-06 PROCEDURE — A4216 STERILE WATER/SALINE, 10 ML: HCPCS | Performed by: INTERNAL MEDICINE

## 2024-02-06 PROCEDURE — 94761 N-INVAS EAR/PLS OXIMETRY MLT: CPT

## 2024-02-06 PROCEDURE — 80053 COMPREHEN METABOLIC PANEL: CPT | Performed by: INTERNAL MEDICINE

## 2024-02-06 PROCEDURE — 11000001 HC ACUTE MED/SURG PRIVATE ROOM

## 2024-02-06 PROCEDURE — 25000003 PHARM REV CODE 250: Performed by: INTERNAL MEDICINE

## 2024-02-06 PROCEDURE — 63600175 PHARM REV CODE 636 W HCPCS: Performed by: INTERNAL MEDICINE

## 2024-02-06 PROCEDURE — 85025 COMPLETE CBC W/AUTO DIFF WBC: CPT | Performed by: INTERNAL MEDICINE

## 2024-02-06 RX ORDER — SODIUM CHLORIDE 9 MG/ML
INJECTION, SOLUTION INTRAVENOUS CONTINUOUS
Status: DISCONTINUED | OUTPATIENT
Start: 2024-02-06 | End: 2024-02-10

## 2024-02-06 RX ORDER — LINEZOLID 600 MG/1
600 TABLET, FILM COATED ORAL EVERY 12 HOURS
Status: DISCONTINUED | OUTPATIENT
Start: 2024-02-06 | End: 2024-02-19

## 2024-02-06 RX ADMIN — ERYTHROPOIETIN 40000 UNITS: 20000 INJECTION, SOLUTION INTRAVENOUS; SUBCUTANEOUS at 02:02

## 2024-02-06 RX ADMIN — SODIUM CHLORIDE, PRESERVATIVE FREE 10 ML: 5 INJECTION INTRAVENOUS at 10:02

## 2024-02-06 RX ADMIN — SODIUM BICARBONATE 650 MG: 650 TABLET ORAL at 02:02

## 2024-02-06 RX ADMIN — QUETIAPINE FUMARATE 25 MG: 25 TABLET ORAL at 08:02

## 2024-02-06 RX ADMIN — FERROUS SULFATE TAB 325 MG (65 MG ELEMENTAL FE) 1 EACH: 325 (65 FE) TAB at 09:02

## 2024-02-06 RX ADMIN — MUPIROCIN 1 G: 20 OINTMENT TOPICAL at 08:02

## 2024-02-06 RX ADMIN — SODIUM BICARBONATE 650 MG: 650 TABLET ORAL at 09:02

## 2024-02-06 RX ADMIN — LINEZOLID 600 MG: 600 TABLET, FILM COATED ORAL at 08:02

## 2024-02-06 RX ADMIN — LINEZOLID 600 MG: 600 TABLET, FILM COATED ORAL at 09:02

## 2024-02-06 RX ADMIN — SODIUM CHLORIDE, PRESERVATIVE FREE 10 ML: 5 INJECTION INTRAVENOUS at 06:02

## 2024-02-06 RX ADMIN — PANTOPRAZOLE SODIUM 40 MG: 40 TABLET, DELAYED RELEASE ORAL at 09:02

## 2024-02-06 RX ADMIN — CLOPIDOGREL BISULFATE 75 MG: 75 TABLET ORAL at 09:02

## 2024-02-06 RX ADMIN — SODIUM BICARBONATE 650 MG: 650 TABLET ORAL at 05:02

## 2024-02-06 RX ADMIN — LEVOTHYROXINE SODIUM 137 MCG: 25 TABLET ORAL at 06:02

## 2024-02-06 RX ADMIN — SODIUM CHLORIDE, PRESERVATIVE FREE 10 ML: 5 INJECTION INTRAVENOUS at 03:02

## 2024-02-06 RX ADMIN — SODIUM BICARBONATE 650 MG: 650 TABLET ORAL at 08:02

## 2024-02-06 RX ADMIN — MUPIROCIN 1 G: 20 OINTMENT TOPICAL at 09:02

## 2024-02-06 RX ADMIN — CEFEPIME 1 G: 1 INJECTION, POWDER, FOR SOLUTION INTRAMUSCULAR; INTRAVENOUS at 12:02

## 2024-02-06 RX ADMIN — CIPROFLOXACIN HYDROCHLORIDE 750 MG: 250 TABLET, FILM COATED ORAL at 09:02

## 2024-02-06 RX ADMIN — DULOXETINE HYDROCHLORIDE 30 MG: 30 CAPSULE, DELAYED RELEASE ORAL at 09:02

## 2024-02-06 RX ADMIN — Medication 250 MG: at 09:02

## 2024-02-06 RX ADMIN — CEFEPIME 1 G: 1 INJECTION, POWDER, FOR SOLUTION INTRAMUSCULAR; INTRAVENOUS at 02:02

## 2024-02-06 RX ADMIN — SODIUM CHLORIDE: 9 INJECTION, SOLUTION INTRAVENOUS at 08:02

## 2024-02-06 RX ADMIN — SODIUM CHLORIDE: 9 INJECTION, SOLUTION INTRAVENOUS at 09:02

## 2024-02-06 RX ADMIN — CALCITRIOL CAPSULES 0.25 MCG 0.5 MCG: 0.25 CAPSULE ORAL at 09:02

## 2024-02-06 NOTE — SUBJECTIVE & OBJECTIVE
Past Medical History:   Diagnosis Date    Cancer, colon     Chronic pain     GERD (gastroesophageal reflux disease)     Renal disorder     Thyroiditis, unspecified        Past Surgical History:   Procedure Laterality Date    BACK SURGERY       SECTION      CHOLECYSTECTOMY      COLON SURGERY      HYSTERECTOMY      KIDNEY SURGERY         Review of patient's allergies indicates:   Allergen Reactions    Oxaprozin Nausea And Vomiting and Swelling     Facial swelling      Sulfamethoxazole-trimethoprim Nausea And Vomiting     Severe nausea and vomiting    Doxycycline     Nsaids (non-steroidal anti-inflammatory drug) Rash    Sulfa (sulfonamide antibiotics) Nausea And Vomiting       No current facility-administered medications on file prior to encounter.     Current Outpatient Medications on File Prior to Encounter   Medication Sig    ascorbic acid, vitamin C, (VITAMIN C) 100 MG tablet Take 100 mg by mouth once daily.    calcitRIOL (ROCALTROL) 0.5 MCG Cap Take 0.5 mcg by mouth once daily.    ciprofloxacin HCl (CIPRO) 500 MG tablet Take 1 tablet (500 mg total) by mouth every 12 (twelve) hours.    clopidogreL (PLAVIX) 75 mg tablet Take 75 mg by mouth once daily.    DULoxetine (CYMBALTA) 30 MG capsule Take 30 mg by mouth once daily.    ferrous sulfate 325 (65 FE) MG EC tablet Take 325 mg by mouth 3 (three) times daily with meals.    HYDROcodone-acetaminophen (NORCO)  mg per tablet Take 1 tablet by mouth.    levothyroxine (SYNTHROID) 100 MCG tablet Take 137 mcg by mouth before breakfast.    omeprazole (PRILOSEC) 40 MG capsule Take 40 mg by mouth once daily.    QUEtiapine (SEROQUEL) 25 MG Tab Take by mouth.    sodium bicarbonate 650 MG tablet Take 650 mg by mouth 4 (four) times daily.     Family History       Problem Relation (Age of Onset)    Hypertension Mother          Tobacco Use    Smoking status: Never    Smokeless tobacco: Never   Substance and Sexual Activity    Alcohol use: Never    Drug use: Never     Sexual activity: Not Currently     Review of Systems   Constitutional:  Positive for activity change and fever.   HENT: Negative.     Eyes: Negative.    Respiratory: Negative.     Cardiovascular: Negative.    Gastrointestinal:  Positive for abdominal pain and nausea.   Endocrine: Negative.    Genitourinary: Negative.    Musculoskeletal:  Positive for arthralgias and joint swelling.   Skin: Negative.    Allergic/Immunologic: Negative.    Neurological:  Positive for weakness.   Hematological: Negative.    Psychiatric/Behavioral: Negative.       Objective:     Vital Signs (Most Recent):  Temp: 97.8 °F (36.6 °C) (02/06/24 0318)  Pulse: 100 (02/06/24 0318)  Resp: 20 (02/05/24 1935)  BP: 106/61 (02/06/24 0318)  SpO2: 95 % (02/06/24 0700) Vital Signs (24h Range):  Temp:  [97.5 °F (36.4 °C)-98.1 °F (36.7 °C)] 97.8 °F (36.6 °C)  Pulse:  [] 100  Resp:  [18-21] 20  SpO2:  [93 %-100 %] 95 %  BP: (106-164)/() 106/61     Weight: 79.8 kg (176 lb)  Body mass index is 33.25 kg/m².     Physical Exam           Significant Labs: All pertinent labs within the past 24 hours have been reviewed.  CBC:   Recent Labs   Lab 02/05/24 1137 02/06/24  0530   WBC 19.55* 11.20   HGB 10.1* 7.9*   HCT 31.8* 25.1*    198       CMP:   Recent Labs   Lab 02/05/24  1137 02/06/24  0530    136   K 4.0 3.7   CO2 17* 17*   BUN 48.0* 56.0*   CREATININE 3.75* 4.64*   CALCIUM 10.2 9.1   ALBUMIN 2.9* 2.6*   BILITOT 0.7 0.6   ALKPHOS 106 86   AST 13 13   ALT 8 6         Significant Imaging: I have reviewed all pertinent imaging results/findings within the past 24 hours.

## 2024-02-06 NOTE — H&P
[facility]  [facility] Medicine  History & Physical    Patient Name: Kam Reyes  MRN: [id number]  Patient Class: IP- Inpatient  Admission Date: 2/5/2024  Attending Physician: Gregor Heaton MD   Primary Care Provider: Gregor Heaton MD         Patient information was obtained from patient and ER records.     Subjective:     Principal Problem:<principal problem not specified>    Chief Complaint:   Chief Complaint   Patient presents with    Fatigue     C/o weakness, n/v, and reports she was dx with UTI last week. Currently taking Cipro since 1/30.        HPI: Patient is a 73-year-old known to me from clinic.  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  Patient had frequent urinary tract infections.  After many procedures was continuing to have them.  She was hospitalized about a week ago for urinary tract infection.  Remained asymptomatic during her stay with no leukocytosis grew only Proteus which was sensitive to Cipro.  She was discharged home and apparently over the weekend was becoming more nauseated and had feeling more ill.  Since the culture has grown out 3 more organisms including Enterobacter faecium Proteus mirabilis Pseudomonas aeruginosa enrol sella plan to coli.  Only the Pseudomonas and Proteus were sensitive to the Cipro my floxacillin she was sent on home on.  Moreover her urinary catheter came out today she would called initially for us to replace it in the office but she was feeling too unwell to come the office and went straight to the emergency room.  There she was complaining of nausea vomiting and some abdominal pain.  Was noted to have leukocytosis of 19,000. And urinary tract infection continuing.    Past Medical History:   Diagnosis Date    Cancer, colon     Chronic pain     GERD (gastroesophageal reflux disease)     Renal disorder     Thyroiditis, unspecified        Past Surgical History:    Procedure Laterality Date    BACK SURGERY       SECTION      CHOLECYSTECTOMY      COLON SURGERY      HYSTERECTOMY      KIDNEY SURGERY         Review of patient's allergies indicates:   Allergen Reactions    Oxaprozin Nausea And Vomiting and Swelling     Facial swelling      Sulfamethoxazole-trimethoprim Nausea And Vomiting     Severe nausea and vomiting    Doxycycline     Nsaids (non-steroidal anti-inflammatory drug) Rash    Sulfa (sulfonamide antibiotics) Nausea And Vomiting       No current facility-administered medications on file prior to encounter.     Current Outpatient Medications on File Prior to Encounter   Medication Sig    ascorbic acid, vitamin C, (VITAMIN C) 100 MG tablet Take 100 mg by mouth once daily.    calcitRIOL (ROCALTROL) 0.5 MCG Cap Take 0.5 mcg by mouth once daily.    ciprofloxacin HCl (CIPRO) 500 MG tablet Take 1 tablet (500 mg total) by mouth every 12 (twelve) hours.    clopidogreL (PLAVIX) 75 mg tablet Take 75 mg by mouth once daily.    DULoxetine (CYMBALTA) 30 MG capsule Take 30 mg by mouth once daily.    ferrous sulfate 325 (65 FE) MG EC tablet Take 325 mg by mouth 3 (three) times daily with meals.    HYDROcodone-acetaminophen (NORCO)  mg per tablet Take 1 tablet by mouth.    levothyroxine (SYNTHROID) 100 MCG tablet Take 137 mcg by mouth before breakfast.    omeprazole (PRILOSEC) 40 MG capsule Take 40 mg by mouth once daily.    QUEtiapine (SEROQUEL) 25 MG Tab Take by mouth.    sodium bicarbonate 650 MG tablet Take 650 mg by mouth 4 (four) times daily.     Family History       Problem Relation (Age of Onset)    Hypertension Mother          Tobacco Use    Smoking status: Never    Smokeless tobacco: Never   Substance and Sexual Activity    Alcohol use: Never    Drug use: Never    Sexual activity: Not Currently     Review of Systems   Constitutional:  Positive for activity change and fever.   HENT: Negative.     Eyes: Negative.    Respiratory: Negative.     Cardiovascular:  Negative.    Gastrointestinal:  Positive for abdominal pain and nausea.   Endocrine: Negative.    Genitourinary: Negative.    Musculoskeletal:  Positive for arthralgias and joint swelling.   Skin: Negative.    Allergic/Immunologic: Negative.    Neurological:  Positive for weakness.   Hematological: Negative.    Psychiatric/Behavioral: Negative.       Objective:     Vital Signs (Most Recent):  Temp: 97.6 °F (36.4 °C) (02/05/24 1554)  Pulse: 92 (02/05/24 1554)  Resp: 20 (02/05/24 1554)  BP: 113/66 (02/05/24 1554)  SpO2: 99 % (02/05/24 1554) Vital Signs (24h Range):  Temp:  [97.5 °F (36.4 °C)-97.6 °F (36.4 °C)] 97.6 °F (36.4 °C)  Pulse:  [84-96] 92  Resp:  [18-21] 20  SpO2:  [98 %-100 %] 99 %  BP: (113-164)/() 113/66     Weight: 79.8 kg (176 lb)  Body mass index is 33.25 kg/m².     Physical Exam           Significant Labs: All pertinent labs within the past 24 hours have been reviewed.  CBC:   Recent Labs   Lab 02/05/24  1137   WBC 19.55*   HGB 10.1*   HCT 31.8*        CMP:   Recent Labs   Lab 02/05/24  1137      K 4.0   CO2 17*   BUN 48.0*   CREATININE 3.75*   CALCIUM 10.2   ALBUMIN 2.9*   BILITOT 0.7   ALKPHOS 106   AST 13   ALT 8       Significant Imaging: I have reviewed all pertinent imaging results/findings within the past 24 hours.  Assessment/Plan:     Fracture of surgical neck of humerus  I will consult ortho in-house to assess      Urinary tract infection  Have requested extended spectrum sensitivities from main [facility] for antibiotic coverage in the meantime we will continue cefepime ciprofloxacin and vancomycin.  I am not happy about using vancomycin with her renal stay but until he can get another organ antibiotic that covers Enterococcus faecium she will have to continue renal dosing it.      Hypertension  Continue home medications as written    Hypothyroid  Continue Synthroid      Recurrent major depression  Continue her Cymbalta as her allergy duloxetine was put in an  error    Ileostomy status  Continue care and maintenance      GERD (gastroesophageal reflux disease)  Continue p.o. Protonix      Chronic kidney disease, stage 4 (severe)  Creatine stable for now. BMP reviewed- noted Estimated Creatinine Clearance: 12.8 mL/min (A) (based on SCr of 3.75 mg/dL (H)). according to latest data. Based on current GFR, CKD stage is stage 4 - GFR 15-29.  Monitor UOP and serial BMP and adjust therapy as needed. Renally dose meds. Avoid nephrotoxic medications and procedures.  She will have to be on vancomycin is currently the only viable sensitivity of the for Enterococcus faecium have put in a consult to [facility] to see if there any other medications that we will cover.    Dyspnea  Now resolved        VTE Risk Mitigation (From admission, onward)           Ordered     IP VTE HIGH RISK PATIENT  Once         02/05/24 1537     Place sequential compression device  Until discontinued         02/05/24 1537                               Pharmacist Renal Dose Adjustment Note    Kam Reyes is a 73 y.o. female being treated with the medication ciprofloxacin    Patient Data:    Vital Signs (Most Recent):  Temp: 97.6 °F (36.4 °C) (02/05/24 1554)  Pulse: 92 (02/05/24 1554)  Resp: 20 (02/05/24 1554)  BP: 113/66 (02/05/24 1554)  SpO2: 99 % (02/05/24 1554) Vital Signs (72h Range):  Temp:  [97.5 °F (36.4 °C)-97.6 °F (36.4 °C)]   Pulse:  [84-96]   Resp:  [18-21]   BP: (113-164)/()   SpO2:  [98 %-100 %]      Recent Labs   Lab 02/05/24 1137   CREATININE 3.75*     Serum creatinine: 3.75 mg/dL (H) 02/05/24 1137  Estimated creatinine clearance: 12.8 mL/min (A)    Medication:ciprofloxacin dose: 500mg frequency twice daily will be changed to medication:ciprofloxacin dose:750mg frequency:daily    Pharmacist's Name: Janae Mcintosh  Pharmacist's Extension: [phone]         Gregor Heaton MD  Department of Hospital Medicine  [facility]

## 2024-02-06 NOTE — PLAN OF CARE
02/06/24 1526   Discharge Assessment   Assessment Type Discharge Planning Assessment   Source of Information patient   People in Home alone   Do you expect to return to your current living situation? Yes   Do you have help at home or someone to help you manage your care at home? Yes   Who are your caregiver(s) and their phone number(s)? fly   Prior to hospitilization cognitive status: Alert/Oriented;No Deficits   Current cognitive status: Alert/Oriented;No Deficits   Dressing/Bathing bathing difficulty, assistance 1 person   Equipment Currently Used at Home walker, standard;cane, straight   Do you currently have service(s) that help you manage your care at home? No   Do you take prescription medications? Yes   Do you have prescription coverage? Yes   Do you have any problems affording any of your prescribed medications? No   Is the patient taking medications as prescribed? yes   Who is going to help you get home at discharge? fly   How do you get to doctors appointments? family or friend will provide   Are you on dialysis? No   Do you take coumadin? No   Discharge Plan A Home with family   Discharge Plan B Home with family   DME Needed Upon Discharge  none   Discharge Plan discussed with: Patient   Transition of Care Barriers None   Physical Activity   On average, how many days per week do you engage in moderate to strenuous exercise (like a brisk walk)? Pt Declined   On average, how many minutes do you engage in exercise at this level? Pt Declined   Financial Resource Strain   How hard is it for you to pay for the very basics like food, housing, medical care, and heating? Pt Declined   Housing Stability   In the last 12 months, was there a time when you were not able to pay the mortgage or rent on time? Pt Declined   In the last 12 months, was there a time when you did not have a steady place to sleep or slept in a shelter (including now)? Pt Declined   Transportation Needs   In the past 12 months, has lack of  transportation kept you from medical appointments or from getting medications? Pt Declined   In the past 12 months, has lack of transportation kept you from meetings, work, or from getting things needed for daily living? Pt Declined   Food Insecurity   Within the past 12 months, you worried that your food would run out before you got the money to buy more. Pt Declined   Within the past 12 months, the food you bought just didn't last and you didn't have money to get more. Pt Declined   Stress   Do you feel stress - tense, restless, nervous, or anxious, or unable to sleep at night because your mind is troubled all the time - these days? Pt Declined   Social Connections   In a typical week, how many times do you talk on the phone with family, friends, or neighbors? Pt Declined   How often do you get together with friends or relatives? Pt Declined   How often do you attend [employer]ist or [employer] services? Pt Declined   Do you belong to any clubs or organizations such as Adventist groups, unions, fraternal or athletic groups, or school groups? Pt Declined   How often do you attend meetings of the clubs or organizations you belong to? Pt Declined   Are you , , , , never , or living with a partner? Pt Declined   Alcohol Use   Q1: How often do you have a drink containing alcohol? Pt Declined   Q2: How many drinks containing alcohol do you have on a typical day when you are drinking? Pt Declined   Q3: How often do you have six or more drinks on one occasion? Pt Declined

## 2024-02-06 NOTE — CONSULTS
[facility]  Urology  Consult Note    Patient Name: Kam Reyes  MRN: [id number]  Admission Date: 2024  Hospital Length of Stay: 0   Code Status: Full Code   Attending Provider: Gregor Heaton MD   Consulting Provider: Carlos Eduardo Dorsey MD  Primary Care Physician: Gregor Heaton MD  Principal Problem:<principal problem not specified>    Inpatient consult to Urology  Consult performed by: Carlos Eduardo Dorsey MD    Subjective:     HPI:  This 73-year-old white female who presented to the emergency room today because her suprapubic tube would come out and they were unable to reinserted however during the interim they were able to get a 16 [place] suprapubic tube in his been draining the rest of the day has been in since about 10:00 a.m. this morning in his draining grossly cloudy urine she denies any fever chills she had been recently discharged after being treated for multifactorial UTI she is currently now on Cipro cefepime and vancomycin with renal dosing she appears in no acute distress family history of moreira syndrome  No notes on file    Review of Systems   Constitutional: Negative.    HENT: Negative.     Eyes: Negative.    Respiratory: Negative.     Cardiovascular: Negative.    Gastrointestinal:  Negative for abdominal distention, abdominal pain, nausea and vomiting.        Colostomy, SP tube    Genitourinary:         SP tube draining cloudy urine   Musculoskeletal: Negative.    Skin: Negative.    Neurological: Negative.    Psychiatric/Behavioral: Negative.         Past Medical History:   Diagnosis Date    Cancer, colon     Chronic pain     GERD (gastroesophageal reflux disease)     Renal disorder     Thyroiditis, unspecified    Ureteral stricture  c chronic ureteral stent    Past Surgical History:   Procedure Laterality Date    BACK SURGERY       SECTION      CHOLECYSTECTOMY      COLON SURGERY      HYSTERECTOMY      KIDNEY SURGERY      Cystoscopy c ureteral  stent exchange    Current Facility-Administered Medications   Medication Dose Route Frequency Provider Last Rate Last Admin    acetaminophen tablet 650 mg  650 mg Oral Q8H PRN Kathy Kwan MD        ceFEPIme (MAXIPIME) 1 g in dextrose 5 % in water (D5W) 100 mL IVPB (MB+)  1 g Intravenous Q12H Kathy Kwan MD   Stopped at 02/05/24 1344    [START ON 2/6/2024] ciprofloxacin HCl tablet 750 mg  750 mg Oral Daily Gregor Heaton MD        mupirocin 2 % ointment   Nasal BID Gregor Heaton MD        sodium chloride 0.9% flush 10 mL  10 mL Intravenous Q8H Kathy Kwan MD        [START ON 2/7/2024] vancomycin (VANCOCIN) 1,000 mg in dextrose 5 % (D5W) 250 mL IVPB (Vial-Mate)  1,000 mg Intravenous Q48H Gregor Heaton MD           No new subjective & objective note has been filed under this hospital service since the last note was generated.      Family History       Problem Relation (Age of Onset)    Hypertension Mother            Tobacco Use    Smoking status: Never    Smokeless tobacco: Never   Substance and Sexual Activity    Alcohol use: Never    Drug use: Never    Sexual activity: Not Currently       Physical Exam  Vitals reviewed.   Constitutional:       Appearance: Normal appearance.   HENT:      Head: Normocephalic and atraumatic.      Nose: Nose normal.      Mouth/Throat:      Mouth: Mucous membranes are moist.      Pharynx: Oropharynx is clear.   Eyes:      General: No scleral icterus.  Cardiovascular:      Rate and Rhythm: Normal rate and regular rhythm.   Pulmonary:      Effort: Pulmonary effort is normal.      Breath sounds: Normal breath sounds.   Abdominal:      General: Abdomen is flat.      Palpations: Abdomen is soft.      Comments: Colostomy and SP tube   Genitourinary:     Comments: Sp tube  Musculoskeletal:         General: No swelling. Normal range of motion.      Cervical back: Normal range of motion and neck supple.   Skin:     General: Skin is warm and dry.    Neurological:      General: No focal deficit present.      Mental Status: She is alert and oriented to person, place, and time.   Psychiatric:         Mood and Affect: Mood normal.         Significant Labs:  BMP:  Recent Labs   Lab 02/05/24  1137      K 4.0   CO2 17*   BUN 48.0*   CREATININE 3.75*   GLUCOSE 168*   CALCIUM 10.2       CBC:  Recent Labs   Lab 02/05/24  1137   WBC 19.55*   HGB 10.1*   HCT 31.8*          Recent Lab Results         02/05/24  1459   02/05/24  1312   02/05/24  1137        Albumin/Globulin Ratio     0.7       Albumin     2.9       ALP     106       ALT     8       Amporphous Urate Crystals, UA Few           Appearance, UA Turbid           AST     13       Bacteria, UA Few           Baso #     0.04       Basophil %     0.2       BILIRUBIN TOTAL     0.7       Bilirubin, UA Negative           BUN     48.0       Calcium     10.2       Chloride     109       CO2     17       Color, UA Red           Creatinine     3.75       eGFR     12       Eos #     0.13       Eos %     0.7       Globulin, Total     4.1       Glucose     168       Glucose, UA Negative           Hematocrit     31.8       Hemoglobin     10.1       Immature Grans (Abs)     0.14       Immature Granulocytes     0.7       Ketones, UA Trace           Lactic Acid Level   2.0         Leukocyte Esterase, UA 3+           Lymph #     1.19       LYMPH %     6.1       MCH     30.9       MCHC     31.8       MCV     97.2       Mono #     0.95       Mono %     4.9       MPV     10.9       Mucous, UA Small           Neut #     17.10       Neut %     87.4       NITRITE UA Negative           Blood, UA 3+           pH, UA 5.5           Platelet Count     308       Potassium     4.0       PROTEIN TOTAL     7.0       Protein, UA 3+           RBC     3.27       RBC, UA >100           RDW     13.4       Sodium     138       Specific Gravity,UA 1.020           Squam Epithel, UA Few           Urobilinogen, UA 1.0           WBC, UA 51-99            WBC     19.55       Yeast, UA Few                    [unfilled]       Assessment and Plan:   Patient with multi organism, UTI suprapubic tube that had become displaced ,colostomy  History of colon cancer and uterine cancer status post chemotherapy and radiation she has a rectovaginal fistula she had her suprapubic tube replaced in the ER and she is now to receive medication for urinary tract infections she had repeat cultures performed continue current care    No notes have been filed under this hospital service.  Service: Urology      VTE Risk Mitigation (From admission, onward)           Ordered     IP VTE HIGH RISK PATIENT  Once         02/05/24 1537     Place sequential compression device  Until discontinued         02/05/24 1537                    Thank you for your consult. I will follow-up with patient. Please contact us if you have any additional questions.    Carlos Eduardo Dorsey MD  Urology  [facility]   PSYCHOTIC SYMPTOMS PSYCHOTIC SYMPTOMS PSYCHOTIC SYMPTOMS PSYCHOTIC SYMPTOMS PSYCHOTIC SYMPTOMS PSYCHOTIC SYMPTOMS PSYCHOTIC SYMPTOMS PSYCHOTIC SYMPTOMS

## 2024-02-06 NOTE — PROGRESS NOTES
[facility] - Medical Surgical Unit  Hospital Medicine  Progress Note    Patient Name: Kam Reyes  MRN: [id number]  Patient Class: IP- Inpatient   Admission Date: 2/5/2024  Length of Stay: 1 days  Attending Physician: Gregor Heaton MD  Primary Care Provider: Gregor Heaton MD        Subjective:     Principal Problem:<principal problem not specified>        HPI:  Patient is a 73-year-old known to me from clinic.  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  Patient had frequent urinary tract infections.  After many procedures was continuing to have them.  She was hospitalized about a week ago for urinary tract infection.  Remained asymptomatic during her stay with no leukocytosis grew only Proteus which was sensitive to Cipro.  She was discharged home and apparently over the weekend was becoming more nauseated and had feeling more ill.  Since the culture has grown out 3 more organisms including Enterobacter faecium Proteus mirabilis Pseudomonas aeruginosa enrol sella plan to coli.  Only the Pseudomonas and Proteus were sensitive to the Cipro my floxacillin she was sent on home on.  Moreover her urinary catheter came out today she would called initially for us to replace it in the office but she was feeling too unwell to come the office and went straight to the emergency room.  There she was complaining of nausea vomiting and some abdominal pain.  Was noted to have leukocytosis of 19,000. And urinary tract infection continuing.    Overview/Hospital Course:  A little better today no shortness a breath less nausea she still has some abdominal pain.  Her leukocytosis has improved.  However her kidney function has gone down to a creatinine over 4 with a single dose of vancomycin.  I have put in for linezolid but we will seek an ID consult further suggestions if appropriate continue all other antibiotics seemed to be well tolerated.  She does  have an H and H had decreased 7.1 today I will check a Hemoccult give her a dose of prune and we will monitor again checking at 11:00 a.m. today she denies any blood in her ostomy or her urostomy bag.    Past Medical History:   Diagnosis Date    Cancer, colon     Chronic pain     GERD (gastroesophageal reflux disease)     Renal disorder     Thyroiditis, unspecified        Past Surgical History:   Procedure Laterality Date    BACK SURGERY       SECTION      CHOLECYSTECTOMY      COLON SURGERY      HYSTERECTOMY      KIDNEY SURGERY         Review of patient's allergies indicates:   Allergen Reactions    Oxaprozin Nausea And Vomiting and Swelling     Facial swelling      Sulfamethoxazole-trimethoprim Nausea And Vomiting     Severe nausea and vomiting    Doxycycline     Nsaids (non-steroidal anti-inflammatory drug) Rash    Sulfa (sulfonamide antibiotics) Nausea And Vomiting       No current facility-administered medications on file prior to encounter.     Current Outpatient Medications on File Prior to Encounter   Medication Sig    ascorbic acid, vitamin C, (VITAMIN C) 100 MG tablet Take 100 mg by mouth once daily.    calcitRIOL (ROCALTROL) 0.5 MCG Cap Take 0.5 mcg by mouth once daily.    ciprofloxacin HCl (CIPRO) 500 MG tablet Take 1 tablet (500 mg total) by mouth every 12 (twelve) hours.    clopidogreL (PLAVIX) 75 mg tablet Take 75 mg by mouth once daily.    DULoxetine (CYMBALTA) 30 MG capsule Take 30 mg by mouth once daily.    ferrous sulfate 325 (65 FE) MG EC tablet Take 325 mg by mouth 3 (three) times daily with meals.    HYDROcodone-acetaminophen (NORCO)  mg per tablet Take 1 tablet by mouth.    levothyroxine (SYNTHROID) 100 MCG tablet Take 137 mcg by mouth before breakfast.    omeprazole (PRILOSEC) 40 MG capsule Take 40 mg by mouth once daily.    QUEtiapine (SEROQUEL) 25 MG Tab Take by mouth.    sodium bicarbonate 650 MG tablet Take 650 mg by mouth 4 (four) times daily.     Family History        Problem Relation (Age of Onset)    Hypertension Mother          Tobacco Use    Smoking status: Never    Smokeless tobacco: Never   Substance and Sexual Activity    Alcohol use: Never    Drug use: Never    Sexual activity: Not Currently     Review of Systems   Constitutional:  Positive for activity change and fever.   HENT: Negative.     Eyes: Negative.    Respiratory: Negative.     Cardiovascular: Negative.    Gastrointestinal:  Positive for abdominal pain and nausea.   Endocrine: Negative.    Genitourinary: Negative.    Musculoskeletal:  Positive for arthralgias and joint swelling.   Skin: Negative.    Allergic/Immunologic: Negative.    Neurological:  Positive for weakness.   Hematological: Negative.    Psychiatric/Behavioral: Negative.       Objective:     Vital Signs (Most Recent):  Temp: 97.8 °F (36.6 °C) (02/06/24 0318)  Pulse: 100 (02/06/24 0318)  Resp: 20 (02/05/24 1935)  BP: 106/61 (02/06/24 0318)  SpO2: 95 % (02/06/24 0700) Vital Signs (24h Range):  Temp:  [97.5 °F (36.4 °C)-98.1 °F (36.7 °C)] 97.8 °F (36.6 °C)  Pulse:  [] 100  Resp:  [18-21] 20  SpO2:  [93 %-100 %] 95 %  BP: (106-164)/() 106/61     Weight: 79.8 kg (176 lb)  Body mass index is 33.25 kg/m².     Physical Exam           Significant Labs: All pertinent labs within the past 24 hours have been reviewed.  CBC:   Recent Labs   Lab 02/05/24  1137 02/06/24  0530   WBC 19.55* 11.20   HGB 10.1* 7.9*   HCT 31.8* 25.1*    198       CMP:   Recent Labs   Lab 02/05/24  1137 02/06/24  0530    136   K 4.0 3.7   CO2 17* 17*   BUN 48.0* 56.0*   CREATININE 3.75* 4.64*   CALCIUM 10.2 9.1   ALBUMIN 2.9* 2.6*   BILITOT 0.7 0.6   ALKPHOS 106 86   AST 13 13   ALT 8 6         Significant Imaging: I have reviewed all pertinent imaging results/findings within the past 24 hours.    Assessment/Plan:      Fracture of surgical neck of humerus  I will consult ortho in-house to assess      Urinary tract infection  Have requested extended spectrum  sensitivities from [facility] for antibiotic coverage in the meantime we will continue cefepime ciprofloxacin and vancomycin.  I am not happy about using vancomycin with her renal stay but until he can get another organ antibiotic that covers Enterococcus faecium she will have to continue renal dosing it.      Hypertension  Continue home medications as written    Anxiety disorder        Hypothyroid  Continue Synthroid      Personal history of other malignant neoplasm of large intestine        Recurrent major depression  Continue her Cymbalta as her allergy duloxetine was put in an error    Ileostomy status  Continue care and maintenance      GERD (gastroesophageal reflux disease)  Continue p.o. Protonix      Chronic kidney disease, stage 4 (severe)  Creatine stable for now. BMP reviewed- noted Estimated Creatinine Clearance: 10.3 mL/min (A) (based on SCr of 4.64 mg/dL (H)). according to latest data. Based on current GFR, CKD stage is stage 4 - GFR 15-29.  Monitor UOP and serial BMP and adjust therapy as needed. Renally dose meds. Avoid nephrotoxic medications and procedures.  She will have to be on vancomycin is currently the only viable sensitivity of the for Enterococcus faecium   I have put in a consult to [facility] to see if there any other medications that we will cover.  After a single dose her creatinine did increase we will start the vancomycin and place a ID consult for possible linezolid or any other suggestions her leukocytosis did improve    Dyspnea  Now resolved        VTE Risk Mitigation (From admission, onward)           Ordered     IP VTE HIGH RISK PATIENT  Once         02/05/24 1537     Place sequential compression device  Until discontinued         02/05/24 1537                    Discharge Planning   JOSHUA:      Code Status: Full Code   Is the patient medically ready for discharge?:     Reason for patient still in hospital (select all that apply): Patient trending condition                      Gregor Heaton MD  Department of Hospital Medicine   [facility] - [facility] Surgical Unit

## 2024-02-06 NOTE — ASSESSMENT & PLAN NOTE
Creatine stable for now. BMP reviewed- noted Estimated Creatinine Clearance: 12.8 mL/min (A) (based on SCr of 3.75 mg/dL (H)). according to latest data. Based on current GFR, CKD stage is stage 4 - GFR 15-29.  Monitor UOP and serial BMP and adjust therapy as needed. Renally dose meds. Avoid nephrotoxic medications and procedures.  She will have to be on vancomycin is currently the only viable sensitivity of the for Enterococcus faecium have put in a consult to main campus to see if there any other medications that we will cover.

## 2024-02-06 NOTE — HPI
Patient is a 73-year-old known to me from clinic.  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  Patient had frequent urinary tract infections.  After many procedures was continuing to have them.  She was hospitalized about a week ago for urinary tract infection.  Remained asymptomatic during her stay with no leukocytosis grew only Proteus which was sensitive to Cipro.  She was discharged home and apparently over the weekend was becoming more nauseated and had feeling more ill.  Since the culture has grown out 3 more organisms including Enterobacter faecium Proteus mirabilis Pseudomonas aeruginosa enrol sella plan to coli.  Only the Pseudomonas and Proteus were sensitive to the Cipro my floxacillin she was sent on home on.  Moreover her urinary catheter came out today she would called initially for us to replace it in the office but she was feeling too unwell to come the office and went straight to the emergency room.  There she was complaining of nausea vomiting and some abdominal pain.  Was noted to have leukocytosis of 19,000. And urinary tract infection continuing.

## 2024-02-06 NOTE — HOSPITAL COURSE
73-year-old  female well known to me through clinic.  Presented with complaints of her catheter coming out however urostomy.  Upon evaluation was noted to have leukocytosis nausea abdominal pain.  Diagnosed with urinary tract infection.  The admitted to the hospital and covered initially for her older cultures which had multiple organisms.  With broad-spectrum antibiotic therapy.  Cultures came back with only Enterococcus faecalis this time.  And patient was placed on linezolid only.  She has had clearing of her urinary tract infection with clear urine no nausea no leukocytosis initially she did have some significant loose leukocytosis.  No shortness a breath throughout her stay.  She did have notable anemia requiring transfusion.  As well as a shot of Procrit.  She has not have heme-positive stools in his was attributable to her renal insufficiency.  Initially her she was placed on vancomycin with an increase in her creatinine thus necessitating the switch to linezolid.      Patient had a fractured humerus prior to admission orthopedics was consulted and recommended expectant management with a sling patient did develop swelling to the arm ultrasound was performed did not show a DVT the patient is wearing compression sleeve at this time to aid with edema to the arm.  Physical therapy was consulted during the stay and she will continue physical therapy after discharge.  She is being discharged today to skilled nursing for continued physical therapy because of her weakened state and continued p.o. antibiotic therapy.  Discharge delayed due to acceptance by nursing home  Decreased h/h received 2 unit blood  egd with gastritis and erosive esophagitis, no active bleed, has colonoscopy planned today  More alert today, cxr with atelectasis, ct no acute changes. Blood cx no growth, urine with gram neg rods, current ly on rocephin pending sensitivities  She is  alert today, seems at her baseline.  No  complaints  Poor appetite  Continued delay in placement

## 2024-02-06 NOTE — SUBJECTIVE & OBJECTIVE
Past Medical History:   Diagnosis Date    Cancer, colon     Chronic pain     GERD (gastroesophageal reflux disease)     Renal disorder     Thyroiditis, unspecified        Past Surgical History:   Procedure Laterality Date    BACK SURGERY       SECTION      CHOLECYSTECTOMY      COLON SURGERY      HYSTERECTOMY      KIDNEY SURGERY         Review of patient's allergies indicates:   Allergen Reactions    Oxaprozin Nausea And Vomiting and Swelling     Facial swelling      Sulfamethoxazole-trimethoprim Nausea And Vomiting     Severe nausea and vomiting    Doxycycline     Nsaids (non-steroidal anti-inflammatory drug) Rash    Sulfa (sulfonamide antibiotics) Nausea And Vomiting       No current facility-administered medications on file prior to encounter.     Current Outpatient Medications on File Prior to Encounter   Medication Sig    ascorbic acid, vitamin C, (VITAMIN C) 100 MG tablet Take 100 mg by mouth once daily.    calcitRIOL (ROCALTROL) 0.5 MCG Cap Take 0.5 mcg by mouth once daily.    ciprofloxacin HCl (CIPRO) 500 MG tablet Take 1 tablet (500 mg total) by mouth every 12 (twelve) hours.    clopidogreL (PLAVIX) 75 mg tablet Take 75 mg by mouth once daily.    DULoxetine (CYMBALTA) 30 MG capsule Take 30 mg by mouth once daily.    ferrous sulfate 325 (65 FE) MG EC tablet Take 325 mg by mouth 3 (three) times daily with meals.    HYDROcodone-acetaminophen (NORCO)  mg per tablet Take 1 tablet by mouth.    levothyroxine (SYNTHROID) 100 MCG tablet Take 137 mcg by mouth before breakfast.    omeprazole (PRILOSEC) 40 MG capsule Take 40 mg by mouth once daily.    QUEtiapine (SEROQUEL) 25 MG Tab Take by mouth.    sodium bicarbonate 650 MG tablet Take 650 mg by mouth 4 (four) times daily.     Family History       Problem Relation (Age of Onset)    Hypertension Mother          Tobacco Use    Smoking status: Never    Smokeless tobacco: Never   Substance and Sexual Activity    Alcohol use: Never    Drug use: Never     Sexual activity: Not Currently     Review of Systems   Constitutional:  Positive for activity change and fever.   HENT: Negative.     Eyes: Negative.    Respiratory: Negative.     Cardiovascular: Negative.    Gastrointestinal:  Positive for abdominal pain and nausea.   Endocrine: Negative.    Genitourinary: Negative.    Musculoskeletal:  Positive for arthralgias and joint swelling.   Skin: Negative.    Allergic/Immunologic: Negative.    Neurological:  Positive for weakness.   Hematological: Negative.    Psychiatric/Behavioral: Negative.       Objective:     Vital Signs (Most Recent):  Temp: 97.6 °F (36.4 °C) (02/05/24 1554)  Pulse: 92 (02/05/24 1554)  Resp: 20 (02/05/24 1554)  BP: 113/66 (02/05/24 1554)  SpO2: 99 % (02/05/24 1554) Vital Signs (24h Range):  Temp:  [97.5 °F (36.4 °C)-97.6 °F (36.4 °C)] 97.6 °F (36.4 °C)  Pulse:  [84-96] 92  Resp:  [18-21] 20  SpO2:  [98 %-100 %] 99 %  BP: (113-164)/() 113/66     Weight: 79.8 kg (176 lb)  Body mass index is 33.25 kg/m².     Physical Exam           Significant Labs: All pertinent labs within the past 24 hours have been reviewed.  CBC:   Recent Labs   Lab 02/05/24  1137   WBC 19.55*   HGB 10.1*   HCT 31.8*        CMP:   Recent Labs   Lab 02/05/24  1137      K 4.0   CO2 17*   BUN 48.0*   CREATININE 3.75*   CALCIUM 10.2   ALBUMIN 2.9*   BILITOT 0.7   ALKPHOS 106   AST 13   ALT 8       Significant Imaging: I have reviewed all pertinent imaging results/findings within the past 24 hours.

## 2024-02-06 NOTE — ASSESSMENT & PLAN NOTE
Have requested extended spectrum sensitivities from [facility] for antibiotic coverage in the meantime we will continue cefepime ciprofloxacin and vancomycin.  I am not happy about using vancomycin with her renal stay but until he can get another organ antibiotic that covers Enterococcus faecium she will have to continue renal dosing it.

## 2024-02-06 NOTE — ASSESSMENT & PLAN NOTE
Creatine stable for now. BMP reviewed- noted Estimated Creatinine Clearance: 10.3 mL/min (A) (based on SCr of 4.64 mg/dL (H)). according to latest data. Based on current GFR, CKD stage is stage 4 - GFR 15-29.  Monitor UOP and serial BMP and adjust therapy as needed. Renally dose meds. Avoid nephrotoxic medications and procedures.  She will have to be on vancomycin is currently the only viable sensitivity of the for Enterococcus faecium   I have put in a consult to [facility] to see if there any other medications that we will cover.  After a single dose her creatinine did increase we will start the vancomycin and place a ID consult for possible linezolid or any other suggestions her leukocytosis did improve

## 2024-02-07 PROBLEM — D63.1 ANEMIA OF CHRONIC KIDNEY FAILURE, STAGE 4 (SEVERE): Status: ACTIVE | Noted: 2023-01-01

## 2024-02-07 LAB
ALBUMIN SERPL-MCNC: 2.1 G/DL (ref 3.4–4.8)
ALBUMIN/GLOB SERPL: 0.7 RATIO (ref 1.1–2)
ALP SERPL-CCNC: 75 UNIT/L (ref 40–150)
ALT SERPL-CCNC: 5 UNIT/L (ref 0–55)
ANTIBODY IDENTIFICATION: NORMAL
AST SERPL-CCNC: 11 UNIT/L (ref 5–34)
BASOPHILS # BLD AUTO: 0.02 X10(3)/MCL
BASOPHILS NFR BLD AUTO: 0.3 %
BILIRUB SERPL-MCNC: 0.4 MG/DL
BUN SERPL-MCNC: 59 MG/DL (ref 9.8–20.1)
CALCIUM SERPL-MCNC: 8.5 MG/DL (ref 8.4–10.2)
CHLORIDE SERPL-SCNC: 113 MMOL/L (ref 98–107)
CO2 SERPL-SCNC: 15 MMOL/L (ref 23–31)
CREAT SERPL-MCNC: 4.54 MG/DL (ref 0.55–1.02)
EOSINOPHIL # BLD AUTO: 0.14 X10(3)/MCL (ref 0–0.9)
EOSINOPHIL NFR BLD AUTO: 1.8 %
ERYTHROCYTE [DISTWIDTH] IN BLOOD BY AUTOMATED COUNT: 13.3 % (ref 11.5–17)
GFR SERPLBLD CREATININE-BSD FMLA CKD-EPI: 10 MLS/MIN/1.73/M2
GLOBULIN SER-MCNC: 3.1 GM/DL (ref 2.4–3.5)
GLUCOSE SERPL-MCNC: 93 MG/DL (ref 82–115)
GROUP & RH: ABNORMAL
HCT VFR BLD AUTO: 20.1 % (ref 37–47)
HGB BLD-MCNC: 6.3 G/DL (ref 12–16)
IMM GRANULOCYTES # BLD AUTO: 0.05 X10(3)/MCL (ref 0–0.04)
IMM GRANULOCYTES NFR BLD AUTO: 0.7 %
INDIRECT COOMBS: ABNORMAL
LYMPHOCYTES # BLD AUTO: 0.79 X10(3)/MCL (ref 0.6–4.6)
LYMPHOCYTES NFR BLD AUTO: 10.4 %
MCH RBC QN AUTO: 30.7 PG (ref 27–31)
MCHC RBC AUTO-ENTMCNC: 31.3 G/DL (ref 33–36)
MCV RBC AUTO: 98 FL (ref 80–94)
MONOCYTES # BLD AUTO: 0.45 X10(3)/MCL (ref 0.1–1.3)
MONOCYTES NFR BLD AUTO: 5.9 %
NEUTROPHILS # BLD AUTO: 6.15 X10(3)/MCL (ref 2.1–9.2)
NEUTROPHILS NFR BLD AUTO: 80.9 %
PLATELET # BLD AUTO: 142 X10(3)/MCL (ref 130–400)
PMV BLD AUTO: 10.6 FL (ref 7.4–10.4)
POTASSIUM SERPL-SCNC: 3.6 MMOL/L (ref 3.5–5.1)
PROT SERPL-MCNC: 5.2 GM/DL (ref 5.8–7.6)
RBC # BLD AUTO: 2.05 X10(6)/MCL (ref 4.2–5.4)
SODIUM SERPL-SCNC: 137 MMOL/L (ref 136–145)
SPECIMEN OUTDATE: ABNORMAL
WBC # SPEC AUTO: 7.6 X10(3)/MCL (ref 4.5–11.5)

## 2024-02-07 PROCEDURE — 27000207 HC ISOLATION

## 2024-02-07 PROCEDURE — 94761 N-INVAS EAR/PLS OXIMETRY MLT: CPT

## 2024-02-07 PROCEDURE — 25000003 PHARM REV CODE 250: Performed by: INTERNAL MEDICINE

## 2024-02-07 PROCEDURE — 80053 COMPREHEN METABOLIC PANEL: CPT | Performed by: FAMILY MEDICINE

## 2024-02-07 PROCEDURE — 86870 RBC ANTIBODY IDENTIFICATION: CPT | Performed by: FAMILY MEDICINE

## 2024-02-07 PROCEDURE — 30233N1 TRANSFUSION OF NONAUTOLOGOUS RED BLOOD CELLS INTO PERIPHERAL VEIN, PERCUTANEOUS APPROACH: ICD-10-PCS | Performed by: FAMILY MEDICINE

## 2024-02-07 PROCEDURE — 86850 RBC ANTIBODY SCREEN: CPT | Performed by: FAMILY MEDICINE

## 2024-02-07 PROCEDURE — A4216 STERILE WATER/SALINE, 10 ML: HCPCS | Performed by: INTERNAL MEDICINE

## 2024-02-07 PROCEDURE — 86922 COMPATIBILITY TEST ANTIGLOB: CPT | Performed by: FAMILY MEDICINE

## 2024-02-07 PROCEDURE — 63600175 PHARM REV CODE 636 W HCPCS: Performed by: INTERNAL MEDICINE

## 2024-02-07 PROCEDURE — 11000001 HC ACUTE MED/SURG PRIVATE ROOM

## 2024-02-07 PROCEDURE — P9016 RBC LEUKOCYTES REDUCED: HCPCS | Performed by: FAMILY MEDICINE

## 2024-02-07 PROCEDURE — 85025 COMPLETE CBC W/AUTO DIFF WBC: CPT | Performed by: FAMILY MEDICINE

## 2024-02-07 PROCEDURE — 36430 TRANSFUSION BLD/BLD COMPNT: CPT

## 2024-02-07 PROCEDURE — 25000003 PHARM REV CODE 250: Performed by: FAMILY MEDICINE

## 2024-02-07 RX ORDER — HYDROCODONE BITARTRATE AND ACETAMINOPHEN 500; 5 MG/1; MG/1
TABLET ORAL
Status: DISCONTINUED | OUTPATIENT
Start: 2024-02-07 | End: 2024-02-26 | Stop reason: HOSPADM

## 2024-02-07 RX ADMIN — SODIUM BICARBONATE 650 MG: 650 TABLET ORAL at 12:02

## 2024-02-07 RX ADMIN — CLOPIDOGREL BISULFATE 75 MG: 75 TABLET ORAL at 08:02

## 2024-02-07 RX ADMIN — MUPIROCIN 1 G: 20 OINTMENT TOPICAL at 09:02

## 2024-02-07 RX ADMIN — LINEZOLID 600 MG: 600 TABLET, FILM COATED ORAL at 08:02

## 2024-02-07 RX ADMIN — CEFEPIME 1 G: 1 INJECTION, POWDER, FOR SOLUTION INTRAMUSCULAR; INTRAVENOUS at 12:02

## 2024-02-07 RX ADMIN — DULOXETINE HYDROCHLORIDE 30 MG: 30 CAPSULE, DELAYED RELEASE ORAL at 08:02

## 2024-02-07 RX ADMIN — CIPROFLOXACIN HYDROCHLORIDE 750 MG: 250 TABLET, FILM COATED ORAL at 08:02

## 2024-02-07 RX ADMIN — SODIUM CHLORIDE, PRESERVATIVE FREE 10 ML: 5 INJECTION INTRAVENOUS at 05:02

## 2024-02-07 RX ADMIN — SODIUM CHLORIDE: 9 INJECTION, SOLUTION INTRAVENOUS at 09:02

## 2024-02-07 RX ADMIN — Medication 250 MG: at 08:02

## 2024-02-07 RX ADMIN — MUPIROCIN 1 G: 20 OINTMENT TOPICAL at 08:02

## 2024-02-07 RX ADMIN — LEVOTHYROXINE SODIUM 137 MCG: 25 TABLET ORAL at 06:02

## 2024-02-07 RX ADMIN — QUETIAPINE FUMARATE 25 MG: 25 TABLET ORAL at 09:02

## 2024-02-07 RX ADMIN — SODIUM BICARBONATE 650 MG: 650 TABLET ORAL at 09:02

## 2024-02-07 RX ADMIN — SODIUM CHLORIDE, PRESERVATIVE FREE 10 ML: 5 INJECTION INTRAVENOUS at 06:02

## 2024-02-07 RX ADMIN — SODIUM BICARBONATE 650 MG: 650 TABLET ORAL at 05:02

## 2024-02-07 RX ADMIN — SODIUM BICARBONATE 650 MG: 650 TABLET ORAL at 08:02

## 2024-02-07 RX ADMIN — FERROUS SULFATE TAB 325 MG (65 MG ELEMENTAL FE) 1 EACH: 325 (65 FE) TAB at 08:02

## 2024-02-07 RX ADMIN — CALCITRIOL CAPSULES 0.25 MCG 0.5 MCG: 0.25 CAPSULE ORAL at 08:02

## 2024-02-07 RX ADMIN — LINEZOLID 600 MG: 600 TABLET, FILM COATED ORAL at 09:02

## 2024-02-07 RX ADMIN — PANTOPRAZOLE SODIUM 40 MG: 40 TABLET, DELAYED RELEASE ORAL at 08:02

## 2024-02-07 NOTE — HPI
This is a 73-year-old female for which Dr. Machado was consulted for orthopedic evaluation.  She has been hospitalized due to UTI with multiple organisms.  Apparently she fell a week or so ago and was seen at a hospital in [place].  She was found to have a left proximal humerus fracture.  She had not yet seen an orthopedic.  Dr. Heaton consulted Orthopedics for evaluation.  The patient states that she fell 2 weeks ago.  Denies any other injuries during that fall.  She does have frequent UTIs she has a history of a vesicle colonic fistula.  She has past medical history of colon cancer, chronic pain, GERD, renal disorders.  X-rays of the left humerus revealed a 3 part surgical neck fracture with satisfactory alignment.    This is a 73-year-old female who I saw weeks ago for a healing left proximal humerus fracture.  She was referred to another orthopedic surgeon but got stuck here in the hospital because of placement difficulties.  According to Dr. Heaton she has had stable depression for 20 years but that triggers this rather ridiculous scenario that takes forever to get her into a skilled nursing unit.  Now she was here and has had some other medical issues arise she was had endoscopy is being currently prep for colonoscopy.  I examined her she was got a sling and swath that has a little bit disheveled.  I removed it took her shoulder through a gentle range of motion which did not really produce any pain and she appears to be healing her fracture well.  We ordered an x-ray and I am going to ask therapy to see her

## 2024-02-07 NOTE — PROGRESS NOTES
[facility]  [facility] Medicine  Progress Note    Patient Name: Kam Reyes  MRN: [id number]  Patient Class: IP- Inpatient   Admission Date: 2/5/2024  Length of Stay: 2 days  Attending Physician: Gregor Heaton MD  Primary Care Provider: Gregor Heaton MD        Subjective:     Principal Problem:<principal problem not specified>        HPI:  Patient is a 73-year-old known to me from clinic.  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  Patient had frequent urinary tract infections.  After many procedures was continuing to have them.  She was hospitalized about a week ago for urinary tract infection.  Remained asymptomatic during her stay with no leukocytosis grew only Proteus which was sensitive to Cipro.  She was discharged home and apparently over the weekend was becoming more nauseated and had feeling more ill.  Since the culture has grown out 3 more organisms including Enterobacter faecium Proteus mirabilis Pseudomonas aeruginosa enrol sella plan to coli.  Only the Pseudomonas and Proteus were sensitive to the Cipro my floxacillin she was sent on home on.  Moreover her urinary catheter came out today she would called initially for us to replace it in the office but she was feeling too unwell to come the office and went straight to the emergency room.  There she was complaining of nausea vomiting and some abdominal pain.  Was noted to have leukocytosis of 19,000. And urinary tract infection continuing.    Overview/Hospital Course:  Much better today no shortness a breath less nausea no abdominal pain.  Her leukocytosis has improved.  However her kidney function has gone down to a creatinine over 4 after a single dose of vancomycin.  I have put in for linezolid . ID consult further suggestions rec ct abdomen and agree with current regimen.    She does have an H and H had decreased 7.1 again john     Past Medical  History:   Diagnosis Date    Cancer, colon     Chronic pain     GERD (gastroesophageal reflux disease)     Renal disorder     Thyroiditis, unspecified        Past Surgical History:   Procedure Laterality Date    BACK SURGERY       SECTION      CHOLECYSTECTOMY      COLON SURGERY      HYSTERECTOMY      KIDNEY SURGERY         Review of patient's allergies indicates:   Allergen Reactions    Oxaprozin Nausea And Vomiting and Swelling     Facial swelling      Sulfamethoxazole-trimethoprim Nausea And Vomiting     Severe nausea and vomiting    Doxycycline     Nsaids (non-steroidal anti-inflammatory drug) Rash    Sulfa (sulfonamide antibiotics) Nausea And Vomiting       No current facility-administered medications on file prior to encounter.     Current Outpatient Medications on File Prior to Encounter   Medication Sig    ascorbic acid, vitamin C, (VITAMIN C) 100 MG tablet Take 100 mg by mouth once daily.    calcitRIOL (ROCALTROL) 0.5 MCG Cap Take 0.5 mcg by mouth once daily.    ciprofloxacin HCl (CIPRO) 500 MG tablet Take 1 tablet (500 mg total) by mouth every 12 (twelve) hours.    clopidogreL (PLAVIX) 75 mg tablet Take 75 mg by mouth once daily.    DULoxetine (CYMBALTA) 30 MG capsule Take 30 mg by mouth once daily.    ferrous sulfate 325 (65 FE) MG EC tablet Take 325 mg by mouth 3 (three) times daily with meals.    HYDROcodone-acetaminophen (NORCO)  mg per tablet Take 1 tablet by mouth.    levothyroxine (SYNTHROID) 100 MCG tablet Take 137 mcg by mouth before breakfast.    omeprazole (PRILOSEC) 40 MG capsule Take 40 mg by mouth once daily.    QUEtiapine (SEROQUEL) 25 MG Tab Take by mouth.    sodium bicarbonate 650 MG tablet Take 650 mg by mouth 4 (four) times daily.     Family History       Problem Relation (Age of Onset)    Hypertension Mother          Tobacco Use    Smoking status: Never    Smokeless tobacco: Never   Substance and Sexual Activity    Alcohol use: Never    Drug use: Never    Sexual activity:  Not Currently     Review of Systems   Constitutional:  Positive for activity change and fever.   HENT: Negative.     Eyes: Negative.    Respiratory: Negative.     Cardiovascular: Negative.    Gastrointestinal:  Positive for abdominal pain and nausea.   Endocrine: Negative.    Genitourinary: Negative.    Musculoskeletal:  Positive for arthralgias and joint swelling.   Skin: Negative.    Allergic/Immunologic: Negative.    Neurological:  Positive for weakness.   Hematological: Negative.    Psychiatric/Behavioral: Negative.       Objective:     Vital Signs (Most Recent):  Temp: 98 °F (36.7 °C) (02/07/24 0849)  Pulse: 84 (02/07/24 0849)  Resp: 20 (02/07/24 0849)  BP: (!) 94/55 (reported b/p to nurse) (02/07/24 0849)  SpO2: 98 % (02/07/24 0849) Vital Signs (24h Range):  Temp:  [97.5 °F (36.4 °C)-98 °F (36.7 °C)] 98 °F (36.7 °C)  Pulse:  [81-91] 84  Resp:  [20] 20  SpO2:  [96 %-100 %] 98 %  BP: ()/(52-70) 94/55     Weight: 79.8 kg (176 lb)  Body mass index is 33.25 kg/m².     Physical Exam           Significant Labs: All pertinent labs within the past 24 hours have been reviewed.  CBC:   Recent Labs   Lab 02/05/24 1137 02/06/24  0530 02/06/24  1052 02/07/24  0550   WBC 19.55* 11.20  --  7.60   HGB 10.1* 7.9* 8.3* 6.3*   HCT 31.8* 25.1* 28.0* 20.1*    198  --  142       CMP:   Recent Labs   Lab 02/05/24  1137 02/06/24  0530 02/07/24  0550    136 137   K 4.0 3.7 3.6   CO2 17* 17* 15*   BUN 48.0* 56.0* 59.0*   CREATININE 3.75* 4.64* 4.54*   CALCIUM 10.2 9.1 8.5   ALBUMIN 2.9* 2.6* 2.1*   BILITOT 0.7 0.6 0.4   ALKPHOS 106 86 75   AST 13 13 11   ALT 8 6 5         Significant Imaging: I have reviewed all pertinent imaging results/findings within the past 24 hours.    Assessment/Plan:      Fracture of surgical neck of humerus  I will consult ortho in-house to assess  Dr. Machado is following      Urinary tract infection  Continue current regimen seems to be improving with decreased  leukocytosis    Hypertension  Continue home medications as written    Anxiety disorder        Hypothyroid  Continue Synthroid      Personal history of other malignant neoplasm of large intestine        Recurrent major depression  Continue her Cymbalta as her allergy duloxetine was put in an error    Ileostomy status  Continue care and maintenance      GERD (gastroesophageal reflux disease)  Continue p.o. Protonix      Anemia of chronic kidney failure, stage 4 (severe)  Creatine stable for now. BMP reviewed- noted Estimated Creatinine Clearance: 10.6 mL/min (A) (based on SCr of 4.54 mg/dL (H)). according to latest data. Based on current GFR, CKD stage is stage 4 - GFR 15-29.  Monitor UOP and serial BMP and adjust therapy as needed. Renally dose meds. Avoid nephrotoxic medications and procedures.  After a single dose of vancomycin her creatinine did increase we will continue the linesolid per id rec.  She was given a dose of Procrit and we will get 2 units of packed red blood cells today    Dyspnea  Now resolved        VTE Risk Mitigation (From admission, onward)           Ordered     IP VTE HIGH RISK PATIENT  Once         02/05/24 1537     Place sequential compression device  Until discontinued         02/05/24 1537                    Discharge Planning   JOSHUA:      Code Status: Full Code   Is the patient medically ready for discharge?:     Reason for patient still in hospital (select all that apply): Patient trending condition  Discharge Plan A: Home with family                  Gregor Heaton MD  Department of Hospital Medicine   [facility]

## 2024-02-07 NOTE — SUBJECTIVE & OBJECTIVE
Past Medical History:   Diagnosis Date    Cancer, colon     Chronic pain     GERD (gastroesophageal reflux disease)     Renal disorder     Thyroiditis, unspecified        Past Surgical History:   Procedure Laterality Date    BACK SURGERY       SECTION      CHOLECYSTECTOMY      COLON SURGERY      HYSTERECTOMY      KIDNEY SURGERY         Review of patient's allergies indicates:   Allergen Reactions    Oxaprozin Nausea And Vomiting and Swelling     Facial swelling      Sulfamethoxazole-trimethoprim Nausea And Vomiting     Severe nausea and vomiting    Doxycycline     Nsaids (non-steroidal anti-inflammatory drug) Rash    Sulfa (sulfonamide antibiotics) Nausea And Vomiting       No current facility-administered medications on file prior to encounter.     Current Outpatient Medications on File Prior to Encounter   Medication Sig    ascorbic acid, vitamin C, (VITAMIN C) 100 MG tablet Take 100 mg by mouth once daily.    calcitRIOL (ROCALTROL) 0.5 MCG Cap Take 0.5 mcg by mouth once daily.    ciprofloxacin HCl (CIPRO) 500 MG tablet Take 1 tablet (500 mg total) by mouth every 12 (twelve) hours.    clopidogreL (PLAVIX) 75 mg tablet Take 75 mg by mouth once daily.    DULoxetine (CYMBALTA) 30 MG capsule Take 30 mg by mouth once daily.    ferrous sulfate 325 (65 FE) MG EC tablet Take 325 mg by mouth 3 (three) times daily with meals.    HYDROcodone-acetaminophen (NORCO)  mg per tablet Take 1 tablet by mouth.    levothyroxine (SYNTHROID) 100 MCG tablet Take 137 mcg by mouth before breakfast.    omeprazole (PRILOSEC) 40 MG capsule Take 40 mg by mouth once daily.    QUEtiapine (SEROQUEL) 25 MG Tab Take by mouth.    sodium bicarbonate 650 MG tablet Take 650 mg by mouth 4 (four) times daily.     Family History       Problem Relation (Age of Onset)    Hypertension Mother          Tobacco Use    Smoking status: Never    Smokeless tobacco: Never   Substance and Sexual Activity    Alcohol use: Never    Drug use: Never     Sexual activity: Not Currently     Review of Systems   Constitutional:  Positive for activity change and fever.   HENT: Negative.     Eyes: Negative.    Respiratory: Negative.     Cardiovascular: Negative.    Gastrointestinal:  Positive for abdominal pain and nausea.   Endocrine: Negative.    Genitourinary: Negative.    Musculoskeletal:  Positive for arthralgias and joint swelling.   Skin: Negative.    Allergic/Immunologic: Negative.    Neurological:  Positive for weakness.   Hematological: Negative.    Psychiatric/Behavioral: Negative.       Objective:     Vital Signs (Most Recent):  Temp: 98 °F (36.7 °C) (02/07/24 0849)  Pulse: 84 (02/07/24 0849)  Resp: 20 (02/07/24 0849)  BP: (!) 94/55 (reported b/p to nurse) (02/07/24 0849)  SpO2: 98 % (02/07/24 0849) Vital Signs (24h Range):  Temp:  [97.5 °F (36.4 °C)-98 °F (36.7 °C)] 98 °F (36.7 °C)  Pulse:  [81-91] 84  Resp:  [20] 20  SpO2:  [96 %-100 %] 98 %  BP: ()/(52-70) 94/55     Weight: 79.8 kg (176 lb)  Body mass index is 33.25 kg/m².     Physical Exam           Significant Labs: All pertinent labs within the past 24 hours have been reviewed.  CBC:   Recent Labs   Lab 02/05/24  1137 02/06/24  0530 02/06/24  1052 02/07/24  0550   WBC 19.55* 11.20  --  7.60   HGB 10.1* 7.9* 8.3* 6.3*   HCT 31.8* 25.1* 28.0* 20.1*    198  --  142       CMP:   Recent Labs   Lab 02/05/24  1137 02/06/24  0530 02/07/24  0550    136 137   K 4.0 3.7 3.6   CO2 17* 17* 15*   BUN 48.0* 56.0* 59.0*   CREATININE 3.75* 4.64* 4.54*   CALCIUM 10.2 9.1 8.5   ALBUMIN 2.9* 2.6* 2.1*   BILITOT 0.7 0.6 0.4   ALKPHOS 106 86 75   AST 13 13 11   ALT 8 6 5         Significant Imaging: I have reviewed all pertinent imaging results/findings within the past 24 hours.

## 2024-02-07 NOTE — PLAN OF CARE
Problem: Adult Inpatient Plan of Care  Goal: Plan of Care Review  Outcome: Ongoing, Progressing  Flowsheets (Taken 2/7/2024 1450)  Plan of Care Reviewed With: patient  Goal: Patient-Specific Goal (Individualized)  Outcome: Ongoing, Progressing  Goal: Absence of Hospital-Acquired Illness or Injury  Outcome: Ongoing, Progressing  Intervention: Identify and Manage Fall Risk  Flowsheets (Taken 2/7/2024 1450)  Safety Promotion/Fall Prevention:   assistive device/personal item within reach   side rails raised x 2   nonskid shoes/socks when out of bed   muscle strengthening facilitated   medications reviewed   lighting adjusted   in recliner, wheels locked   high risk medications identified  Intervention: Prevent Skin Injury  Flowsheets (Taken 2/7/2024 1450)  Body Position:   position changed independently   position maintained  Skin Protection:   adhesive use limited   incontinence pads utilized  Intervention: Prevent and Manage VTE (Venous Thromboembolism) Risk  Flowsheets (Taken 2/7/2024 1450)  Activity Management: Leg kicks - L2  VTE Prevention/Management:   remove, assess skin, and reapply sequential compression device   ambulation promoted   fluids promoted   intravenous hydration   ROM (active) performed  Range of Motion: ROM (range of motion) performed  Intervention: Prevent Infection  Flowsheets (Taken 2/7/2024 1450)  Infection Prevention:   cohorting utilized   environmental surveillance performed   equipment surfaces disinfected   hand hygiene promoted   personal protective equipment utilized   rest/sleep promoted   single patient room provided  Goal: Optimal Comfort and Wellbeing  Outcome: Ongoing, Progressing  Intervention: Monitor Pain and Promote Comfort  Flowsheets (Taken 2/7/2024 1450)  Pain Management Interventions:   care clustered   relaxation techniques promoted  Intervention: Provide Person-Centered Care  Flowsheets (Taken 2/7/2024 1450)  Trust Relationship/Rapport:   care explained   questions  answered  Goal: Readiness for Transition of Care  Outcome: Ongoing, Progressing     Problem: Fall Injury Risk  Goal: Absence of Fall and Fall-Related Injury  Outcome: Ongoing, Progressing  Intervention: Identify and Manage Contributors  Flowsheets (Taken 2/7/2024 1450)  Self-Care Promotion: independence encouraged  Medication Review/Management: medications reviewed  Intervention: Promote Injury-Free Environment  Flowsheets (Taken 2/7/2024 1450)  Safety Promotion/Fall Prevention:   assistive device/personal item within reach   side rails raised x 2   nonskid shoes/socks when out of bed   muscle strengthening facilitated   medications reviewed   lighting adjusted   in recliner, wheels locked   high risk medications identified     Problem: Infection  Goal: Absence of Infection Signs and Symptoms  Outcome: Ongoing, Progressing  Intervention: Prevent or Manage Infection  Flowsheets (Taken 2/7/2024 1450)  Fever Reduction/Comfort Measures: lightweight bedding  Infection Management: aseptic technique maintained  Isolation Precautions: precautions maintained     Problem: Pain Chronic (Persistent) (Comorbidity Management)  Goal: Acceptable Pain Control and Functional Ability  Outcome: Ongoing, Progressing  Intervention: Develop Pain Management Plan  Flowsheets (Taken 2/7/2024 1450)  Pain Management Interventions:   care clustered   relaxation techniques promoted  Intervention: Manage Persistent Pain  Flowsheets (Taken 2/7/2024 1450)  Sleep/Rest Enhancement: relaxation techniques promoted  Bowel Elimination Promotion:   ambulation promoted   adequate fluid intake promoted  Medication Review/Management: medications reviewed  Intervention: Optimize Psychosocial Wellbeing  Flowsheets (Taken 2/7/2024 1450)  Supportive Measures:   relaxation techniques promoted   self-reflection promoted  Diversional Activities: television  Family/Support System Care: self-care encouraged

## 2024-02-07 NOTE — ASSESSMENT & PLAN NOTE
Creatine stable for now. BMP reviewed- noted Estimated Creatinine Clearance: 10.6 mL/min (A) (based on SCr of 4.54 mg/dL (H)). according to latest data. Based on current GFR, CKD stage is stage 4 - GFR 15-29.  Monitor UOP and serial BMP and adjust therapy as needed. Renally dose meds. Avoid nephrotoxic medications and procedures.  After a single dose of vancomycin her creatinine did increase we will continue the linesolid per id rec.  She was given a dose of Procrit and we will get 2 units of packed red blood cells today

## 2024-02-07 NOTE — PLAN OF CARE
Problem: Adult Inpatient Plan of Care  Goal: Plan of Care Review  Outcome: Ongoing, Progressing  Flowsheets (Taken 2/6/2024 1804)  Plan of Care Reviewed With: patient  Goal: Patient-Specific Goal (Individualized)  Outcome: Ongoing, Progressing  Goal: Absence of Hospital-Acquired Illness or Injury  Outcome: Ongoing, Progressing  Intervention: Identify and Manage Fall Risk  Flowsheets (Taken 2/6/2024 1804)  Safety Promotion/Fall Prevention: assistive device/personal item within reach  Intervention: Prevent Skin Injury  Flowsheets (Taken 2/6/2024 1806)  Body Position:   position changed independently   position maintained  Skin Protection:   adhesive use limited   incontinence pads utilized  Intervention: Prevent and Manage VTE (Venous Thromboembolism) Risk  Flowsheets (Taken 2/6/2024 1806)  Activity Management: Ambulated -L4  VTE Prevention/Management:   remove, assess skin, and reapply sequential compression device   ambulation promoted   fluids promoted   intravenous hydration   ROM (active) performed  Range of Motion: ROM (range of motion) performed  Intervention: Prevent Infection  Flowsheets (Taken 2/6/2024 1806)  Infection Prevention:   cohorting utilized   environmental surveillance performed   equipment surfaces disinfected   hand hygiene promoted   personal protective equipment utilized   rest/sleep promoted   single patient room provided  Goal: Optimal Comfort and Wellbeing  Outcome: Ongoing, Progressing  Intervention: Monitor Pain and Promote Comfort  Flowsheets (Taken 2/6/2024 1806)  Pain Management Interventions:   care clustered   quiet environment facilitated   relaxation techniques promoted  Intervention: Provide Person-Centered Care  Flowsheets (Taken 2/6/2024 1806)  Trust Relationship/Rapport:   care explained   choices provided   emotional support provided   empathic listening provided   questions answered   questions encouraged   reassurance provided   thoughts/feelings acknowledged  Goal:  Readiness for Transition of Care  Outcome: Ongoing, Progressing     Problem: Fall Injury Risk  Goal: Absence of Fall and Fall-Related Injury  Outcome: Ongoing, Progressing  Intervention: Identify and Manage Contributors  Flowsheets (Taken 2/6/2024 1806)  Self-Care Promotion: independence encouraged  Medication Review/Management: medications reviewed  Intervention: Promote Injury-Free Environment  Flowsheets (Taken 2/6/2024 1806)  Safety Promotion/Fall Prevention:   assistive device/personal item within reach   side rails raised x 2   nonskid shoes/socks when out of bed   muscle strengthening facilitated   medications reviewed   lighting adjusted   in recliner, wheels locked   high risk medications identified     Problem: Infection  Goal: Absence of Infection Signs and Symptoms  Outcome: Ongoing, Progressing  Intervention: Prevent or Manage Infection  Flowsheets (Taken 2/6/2024 1806)  Fever Reduction/Comfort Measures: lightweight bedding  Infection Management: aseptic technique maintained  Isolation Precautions: precautions maintained     Problem: Pain Chronic (Persistent) (Comorbidity Management)  Goal: Acceptable Pain Control and Functional Ability  Outcome: Ongoing, Progressing  Intervention: Develop Pain Management Plan  Flowsheets (Taken 2/6/2024 1806)  Pain Management Interventions:   care clustered   quiet environment facilitated   relaxation techniques promoted  Intervention: Manage Persistent Pain  Flowsheets (Taken 2/6/2024 1806)  Sleep/Rest Enhancement: relaxation techniques promoted  Bowel Elimination Promotion: ambulation promoted  Medication Review/Management: medications reviewed  Intervention: Optimize Psychosocial Wellbeing  Flowsheets (Taken 2/6/2024 1806)  Supportive Measures:   self-responsibility promoted   self-care encouraged   relaxation techniques promoted   self-reflection promoted  Diversional Activities: television  Family/Support System Care:   involvement promoted   self-care  encouraged

## 2024-02-07 NOTE — CONSULTS
[facility] - Medical Surgical Unit  Orthopedics  Consult Note    Patient Name: Kam Reyes  MRN: [id number]  Admission Date: 2024  Hospital Length of Stay: 2 days  Attending Provider: Gregor Heaton MD  Primary Care Provider: Gregor Heaton MD    Patient information was obtained from patient and ER records.     Consults  Subjective:     Principal Problem:<principal problem not specified>    Chief Complaint:   Chief Complaint   Patient presents with    Fatigue     C/o weakness, n/v, and reports she was dx with UTI last week. Currently taking Cipro since .        HPI: This is a 73-year-old female for which Dr. Machado was consulted for orthopedic evaluation.  She has been hospitalized due to UTI with multiple organisms.  Apparently she fell a week or so ago and was seen at a hospital in [place].  She was found to have a left proximal humerus fracture.  She had not yet seen an orthopedic.  Dr. Heaton consulted Orthopedics for evaluation.  The patient states that she fell 2 weeks ago.  Denies any other injuries during that fall.  She does have frequent UTIs she has a history of a vesicle colonic fistula.  She has past medical history of colon cancer, chronic pain, GERD, renal disorders.  X-rays of the left humerus revealed a 3 part surgical neck fracture with satisfactory alignment.    Past Medical History:   Diagnosis Date    Cancer, colon     Chronic pain     GERD (gastroesophageal reflux disease)     Renal disorder     Thyroiditis, unspecified        Past Surgical History:   Procedure Laterality Date    BACK SURGERY       SECTION      CHOLECYSTECTOMY      COLON SURGERY      HYSTERECTOMY      KIDNEY SURGERY         Review of patient's allergies indicates:   Allergen Reactions    Oxaprozin Nausea And Vomiting and Swelling     Facial swelling      Sulfamethoxazole-trimethoprim Nausea And Vomiting     Severe nausea and vomiting    Doxycycline     Nsaids (non-steroidal  anti-inflammatory drug) Rash    Sulfa (sulfonamide antibiotics) Nausea And Vomiting       Current Facility-Administered Medications   Medication    0.9%  NaCl infusion (for blood administration)    0.9%  NaCl infusion    acetaminophen tablet 650 mg    ascorbic acid (vitamin C) tablet 250 mg    calcitRIOL capsule 0.5 mcg    ceFEPIme (MAXIPIME) 1 g in dextrose 5 % in water (D5W) 100 mL IVPB (MB+)    ciprofloxacin HCl tablet 750 mg    clopidogreL tablet 75 mg    DULoxetine DR capsule 30 mg    ferrous sulfate tablet 1 each    HYDROcodone-acetaminophen  mg per tablet 1 tablet    levothyroxine tablet 137 mcg    linezolid tablet 600 mg    mupirocin 2 % ointment    pantoprazole EC tablet 40 mg    QUEtiapine tablet 25 mg    sodium bicarbonate tablet 650 mg    sodium chloride 0.9% flush 10 mL     Family History       Problem Relation (Age of Onset)    Hypertension Mother          Tobacco Use    Smoking status: Never    Smokeless tobacco: Never   Substance and Sexual Activity    Alcohol use: Never    Drug use: Never    Sexual activity: Not Currently     Review of Systems   Constitutional: Negative for chills and fever.   HENT:  Negative for congestion, ear pain and sore throat.    Eyes:  Negative for pain, redness and visual disturbance.   Cardiovascular:  Negative for chest pain, dyspnea on exertion and syncope.   Respiratory:  Negative for cough, shortness of breath and wheezing.    Endocrine: Negative for cold intolerance, heat intolerance and polyuria.   Hematologic/Lymphatic: Does not bruise/bleed easily.   Skin:  Negative for rash and suspicious lesions.   Musculoskeletal:  Positive for joint pain.   Gastrointestinal:  Negative for abdominal pain, nausea and vomiting.   Genitourinary:  Positive for dysuria and frequency. Negative for urgency.   Neurological:  Negative for dizziness, focal weakness and seizures.   Psychiatric/Behavioral:  Negative for altered mental status and hallucinations. The patient is not  "nervous/anxious.      Objective:     Vital Signs (Most Recent):  Temp: 98.1 °F (36.7 °C) (02/07/24 1554)  Pulse: 86 (02/07/24 1554)  Resp: 18 (02/07/24 1138)  BP: (!) 95/53 (02/07/24 1554)  SpO2: (!) 93 % (02/07/24 1554) Vital Signs (24h Range):  Temp:  [97.5 °F (36.4 °C)-98.1 °F (36.7 °C)] 98.1 °F (36.7 °C)  Pulse:  [78-87] 86  Resp:  [18-20] 18  SpO2:  [93 %-100 %] 93 %  BP: ()/(52-70) 95/53     Weight: 79.8 kg (176 lb)  Height: 5' 1" (154.9 cm)  Body mass index is 33.25 kg/m².      Intake/Output Summary (Last 24 hours) at 2/7/2024 1704  Last data filed at 2/7/2024 1220  Gross per 24 hour   Intake 2430.32 ml   Output 1450 ml   Net 980.32 ml        General    Vitals reviewed.  Constitutional: She is oriented to person, place, and time. She appears well-developed and well-nourished.   HENT:   Head: Normocephalic and atraumatic.   Eyes: EOM are normal.   Cardiovascular:  Normal rate.            Pulmonary/Chest: Effort normal. No respiratory distress.   Neurological: She is alert and oriented to person, place, and time.   Psychiatric: She has a normal mood and affect. Her behavior is normal.         Right Shoulder Exam   Right shoulder exam is normal.    Left Shoulder Exam     Inspection/Observation   Bruising: present     Comments:  Patient had ecchymosis diffusely about the proximal humerus.  Her skin was intact.  She is neurovascularly intact to the distal extremity.  Sling is in place.         Significant Labs:   Recent Lab Results         02/07/24  0550   02/07/24  0500        Albumin/Globulin Ratio 0.7         Albumin 2.1         ALP 75         ALT 5         Antibody ID   POS       AST 11         Baso # 0.02         Basophil % 0.3         BILIRUBIN TOTAL 0.4         BUN 59.0         Calcium 8.5         Chloride 113         CO2 15         Creatinine 4.54         eGFR 10         Eos # 0.14         Eos % 1.8         Globulin, Total 3.1         Glucose 93         Group & Rh   O POS       Hematocrit 20.1      "    Hemoglobin 6.3         Immature Grans (Abs) 0.05         Immature Granulocytes 0.7         Indirect [place] GEL   POS       Lymph # 0.79         LYMPH % 10.4         MCH 30.7         MCHC 31.3         MCV 98.0         Mono # 0.45         Mono % 5.9         MPV 10.6         Neut # 6.15         Neut % 80.9         Platelet Count 142         Potassium 3.6         PROTEIN TOTAL 5.2         RBC 2.05         RDW 13.3         Sodium 137         Specimen Outdate   02/10/2024 23:59       WBC 7.60               All pertinent labs within the past 24 hours have been reviewed.    Significant Imaging: I have reviewed all pertinent imaging results/findings.  Assessment/Plan:     Fracture of surgical neck of humerus  Closed left 3 part surgical neck fracture, satisfactory alignment at this point.    She may continue with sling, removing multiple times a day for gentle range of motion of the elbow wrist and hand.    She will follow up with Dr. Machado in 1 week.    At this time x-rays will be taken to ensure that the alignment is remaining acceptable.  Thank you for the consultation.        Thank you for your consult. I will follow-up with patient. Please contact us if you have any additional questions.    ISIDRO Gross  Orthopedics  [facility]

## 2024-02-07 NOTE — ASSESSMENT & PLAN NOTE
Closed left 3 part surgical neck fracture, satisfactory alignment at this point.    She may continue with sling, removing multiple times a day for gentle range of motion of the elbow wrist and hand.    She will follow up with Dr. Machado in 1 week.    At this time x-rays will be taken to ensure that the alignment is remaining acceptable.  Thank you for the consultation.

## 2024-02-07 NOTE — NURSING
Checks morning lab results and pt's h/h dropped from 8.3/28.0 to 6.3/20.1. Went in and let Dr. Heaton know to see if he wanted to transfuse her with prbc's. Awaiting his decision.

## 2024-02-07 NOTE — SUBJECTIVE & OBJECTIVE
Past Medical History:   Diagnosis Date    Cancer, colon     Chronic pain     GERD (gastroesophageal reflux disease)     Renal disorder     Thyroiditis, unspecified        Past Surgical History:   Procedure Laterality Date    BACK SURGERY       SECTION      CHOLECYSTECTOMY      COLON SURGERY      HYSTERECTOMY      KIDNEY SURGERY         Review of patient's allergies indicates:   Allergen Reactions    Oxaprozin Nausea And Vomiting and Swelling     Facial swelling      Sulfamethoxazole-trimethoprim Nausea And Vomiting     Severe nausea and vomiting    Doxycycline     Nsaids (non-steroidal anti-inflammatory drug) Rash    Sulfa (sulfonamide antibiotics) Nausea And Vomiting       Current Facility-Administered Medications   Medication    0.9%  NaCl infusion (for blood administration)    0.9%  NaCl infusion    acetaminophen tablet 650 mg    ascorbic acid (vitamin C) tablet 250 mg    calcitRIOL capsule 0.5 mcg    ceFEPIme (MAXIPIME) 1 g in dextrose 5 % in water (D5W) 100 mL IVPB (MB+)    ciprofloxacin HCl tablet 750 mg    clopidogreL tablet 75 mg    DULoxetine DR capsule 30 mg    ferrous sulfate tablet 1 each    HYDROcodone-acetaminophen  mg per tablet 1 tablet    levothyroxine tablet 137 mcg    linezolid tablet 600 mg    mupirocin 2 % ointment    pantoprazole EC tablet 40 mg    QUEtiapine tablet 25 mg    sodium bicarbonate tablet 650 mg    sodium chloride 0.9% flush 10 mL     Family History       Problem Relation (Age of Onset)    Hypertension Mother          Tobacco Use    Smoking status: Never    Smokeless tobacco: Never   Substance and Sexual Activity    Alcohol use: Never    Drug use: Never    Sexual activity: Not Currently     Review of Systems   Constitutional: Negative for chills and fever.   HENT:  Negative for congestion, ear pain and sore throat.    Eyes:  Negative for pain, redness and visual disturbance.   Cardiovascular:  Negative for chest pain, dyspnea on exertion and syncope.   Respiratory:   "Negative for cough, shortness of breath and wheezing.    Endocrine: Negative for cold intolerance, heat intolerance and polyuria.   Hematologic/Lymphatic: Does not bruise/bleed easily.   Skin:  Negative for rash and suspicious lesions.   Musculoskeletal:  Positive for joint pain.   Gastrointestinal:  Negative for abdominal pain, nausea and vomiting.   Genitourinary:  Positive for dysuria and frequency. Negative for urgency.   Neurological:  Negative for dizziness, focal weakness and seizures.   Psychiatric/Behavioral:  Negative for altered mental status and hallucinations. The patient is not nervous/anxious.      Objective:     Vital Signs (Most Recent):  Temp: 98.1 °F (36.7 °C) (02/07/24 1554)  Pulse: 86 (02/07/24 1554)  Resp: 18 (02/07/24 1138)  BP: (!) 95/53 (02/07/24 1554)  SpO2: (!) 93 % (02/07/24 1554) Vital Signs (24h Range):  Temp:  [97.5 °F (36.4 °C)-98.1 °F (36.7 °C)] 98.1 °F (36.7 °C)  Pulse:  [78-87] 86  Resp:  [18-20] 18  SpO2:  [93 %-100 %] 93 %  BP: ()/(52-70) 95/53     Weight: 79.8 kg (176 lb)  Height: 5' 1" (154.9 cm)  Body mass index is 33.25 kg/m².      Intake/Output Summary (Last 24 hours) at 2/7/2024 1704  Last data filed at 2/7/2024 1220  Gross per 24 hour   Intake 2430.32 ml   Output 1450 ml   Net 980.32 ml        General    Vitals reviewed.  Constitutional: She is oriented to person, place, and time. She appears well-developed and well-nourished.   HENT:   Head: Normocephalic and atraumatic.   Eyes: EOM are normal.   Cardiovascular:  Normal rate.            Pulmonary/Chest: Effort normal. No respiratory distress.   Neurological: She is alert and oriented to person, place, and time.   Psychiatric: She has a normal mood and affect. Her behavior is normal.         Right Shoulder Exam   Right shoulder exam is normal.    Left Shoulder Exam     Inspection/Observation   Bruising: present     Comments:  Patient had ecchymosis diffusely about the proximal humerus.  Her skin was intact.  She is " neurovascularly intact to the distal extremity.  Sling is in place.         Significant Labs:   Recent Lab Results         02/07/24  0550   02/07/24  0500        Albumin/Globulin Ratio 0.7         Albumin 2.1         ALP 75         ALT 5         Antibody ID   POS       AST 11         Baso # 0.02         Basophil % 0.3         BILIRUBIN TOTAL 0.4         BUN 59.0         Calcium 8.5         Chloride 113         CO2 15         Creatinine 4.54         eGFR 10         Eos # 0.14         Eos % 1.8         Globulin, Total 3.1         Glucose 93         Group & Rh   O POS       Hematocrit 20.1         Hemoglobin 6.3         Immature Grans (Abs) 0.05         Immature Granulocytes 0.7         Indirect [place] GEL   POS       Lymph # 0.79         LYMPH % 10.4         MCH 30.7         MCHC 31.3         MCV 98.0         Mono # 0.45         Mono % 5.9         MPV 10.6         Neut # 6.15         Neut % 80.9         Platelet Count 142         Potassium 3.6         PROTEIN TOTAL 5.2         RBC 2.05         RDW 13.3         Sodium 137         Specimen Outdate   02/10/2024 23:59       WBC 7.60               All pertinent labs within the past 24 hours have been reviewed.    Significant Imaging: I have reviewed all pertinent imaging results/findings.

## 2024-02-08 LAB
ABO + RH BLD: NORMAL
ABO + RH BLD: NORMAL
ALBUMIN SERPL-MCNC: 2.2 G/DL (ref 3.4–4.8)
ALBUMIN/GLOB SERPL: 0.6 RATIO (ref 1.1–2)
ALP SERPL-CCNC: 80 UNIT/L (ref 40–150)
ALT SERPL-CCNC: 6 UNIT/L (ref 0–55)
AST SERPL-CCNC: 11 UNIT/L (ref 5–34)
BASOPHILS # BLD AUTO: 0.02 X10(3)/MCL
BASOPHILS NFR BLD AUTO: 0.3 %
BILIRUB SERPL-MCNC: 0.9 MG/DL
BLD PROD TYP BPU: NORMAL
BLD PROD TYP BPU: NORMAL
BLOOD UNIT EXPIRATION DATE: NORMAL
BLOOD UNIT EXPIRATION DATE: NORMAL
BLOOD UNIT TYPE CODE: 5100
BLOOD UNIT TYPE CODE: 5100
BUN SERPL-MCNC: 48 MG/DL (ref 9.8–20.1)
CALCIUM SERPL-MCNC: 8.8 MG/DL (ref 8.4–10.2)
CHLORIDE SERPL-SCNC: 115 MMOL/L (ref 98–107)
CO2 SERPL-SCNC: 15 MMOL/L (ref 23–31)
CREAT SERPL-MCNC: 4.28 MG/DL (ref 0.55–1.02)
CROSSMATCH INTERPRETATION: NORMAL
CROSSMATCH INTERPRETATION: NORMAL
DISPENSE STATUS: NORMAL
DISPENSE STATUS: NORMAL
EOSINOPHIL # BLD AUTO: 0.14 X10(3)/MCL (ref 0–0.9)
EOSINOPHIL NFR BLD AUTO: 2.1 %
ERYTHROCYTE [DISTWIDTH] IN BLOOD BY AUTOMATED COUNT: 14.4 % (ref 11.5–17)
GFR SERPLBLD CREATININE-BSD FMLA CKD-EPI: 10 MLS/MIN/1.73/M2
GLOBULIN SER-MCNC: 3.5 GM/DL (ref 2.4–3.5)
GLUCOSE SERPL-MCNC: 88 MG/DL (ref 82–115)
HCT VFR BLD AUTO: 28.7 % (ref 37–47)
HGB BLD-MCNC: 9 G/DL (ref 12–16)
IMM GRANULOCYTES # BLD AUTO: 0.07 X10(3)/MCL (ref 0–0.04)
IMM GRANULOCYTES NFR BLD AUTO: 1 %
LYMPHOCYTES # BLD AUTO: 0.65 X10(3)/MCL (ref 0.6–4.6)
LYMPHOCYTES NFR BLD AUTO: 9.7 %
MCH RBC QN AUTO: 30 PG (ref 27–31)
MCHC RBC AUTO-ENTMCNC: 31.4 G/DL (ref 33–36)
MCV RBC AUTO: 95.7 FL (ref 80–94)
MONOCYTES # BLD AUTO: 0.38 X10(3)/MCL (ref 0.1–1.3)
MONOCYTES NFR BLD AUTO: 5.7 %
NEUTROPHILS # BLD AUTO: 5.46 X10(3)/MCL (ref 2.1–9.2)
NEUTROPHILS NFR BLD AUTO: 81.2 %
PLATELET # BLD AUTO: 129 X10(3)/MCL (ref 130–400)
PMV BLD AUTO: 11.2 FL (ref 7.4–10.4)
POTASSIUM SERPL-SCNC: 3.4 MMOL/L (ref 3.5–5.1)
PROT SERPL-MCNC: 5.7 GM/DL (ref 5.8–7.6)
RBC # BLD AUTO: 3 X10(6)/MCL (ref 4.2–5.4)
SODIUM SERPL-SCNC: 138 MMOL/L (ref 136–145)
UNIT NUMBER: NORMAL
UNIT NUMBER: NORMAL
WBC # SPEC AUTO: 6.72 X10(3)/MCL (ref 4.5–11.5)

## 2024-02-08 PROCEDURE — A4216 STERILE WATER/SALINE, 10 ML: HCPCS | Performed by: INTERNAL MEDICINE

## 2024-02-08 PROCEDURE — 36430 TRANSFUSION BLD/BLD COMPNT: CPT

## 2024-02-08 PROCEDURE — 25000003 PHARM REV CODE 250: Performed by: FAMILY MEDICINE

## 2024-02-08 PROCEDURE — 85025 COMPLETE CBC W/AUTO DIFF WBC: CPT | Performed by: FAMILY MEDICINE

## 2024-02-08 PROCEDURE — 25000003 PHARM REV CODE 250: Performed by: INTERNAL MEDICINE

## 2024-02-08 PROCEDURE — 11000001 HC ACUTE MED/SURG PRIVATE ROOM

## 2024-02-08 PROCEDURE — 63600175 PHARM REV CODE 636 W HCPCS: Performed by: INTERNAL MEDICINE

## 2024-02-08 PROCEDURE — 27000207 HC ISOLATION

## 2024-02-08 PROCEDURE — P9016 RBC LEUKOCYTES REDUCED: HCPCS | Performed by: FAMILY MEDICINE

## 2024-02-08 PROCEDURE — 94761 N-INVAS EAR/PLS OXIMETRY MLT: CPT

## 2024-02-08 PROCEDURE — 80053 COMPREHEN METABOLIC PANEL: CPT | Performed by: FAMILY MEDICINE

## 2024-02-08 RX ADMIN — CLOPIDOGREL BISULFATE 75 MG: 75 TABLET ORAL at 09:02

## 2024-02-08 RX ADMIN — FERROUS SULFATE TAB 325 MG (65 MG ELEMENTAL FE) 1 EACH: 325 (65 FE) TAB at 09:02

## 2024-02-08 RX ADMIN — SODIUM CHLORIDE, PRESERVATIVE FREE 10 ML: 5 INJECTION INTRAVENOUS at 06:02

## 2024-02-08 RX ADMIN — QUETIAPINE FUMARATE 25 MG: 25 TABLET ORAL at 10:02

## 2024-02-08 RX ADMIN — DULOXETINE HYDROCHLORIDE 30 MG: 30 CAPSULE, DELAYED RELEASE ORAL at 09:02

## 2024-02-08 RX ADMIN — SODIUM CHLORIDE: 9 INJECTION, SOLUTION INTRAVENOUS at 11:02

## 2024-02-08 RX ADMIN — PANTOPRAZOLE SODIUM 40 MG: 40 TABLET, DELAYED RELEASE ORAL at 09:02

## 2024-02-08 RX ADMIN — LINEZOLID 600 MG: 600 TABLET, FILM COATED ORAL at 09:02

## 2024-02-08 RX ADMIN — SODIUM CHLORIDE: 9 INJECTION, SOLUTION INTRAVENOUS at 04:02

## 2024-02-08 RX ADMIN — SODIUM CHLORIDE, PRESERVATIVE FREE 10 ML: 5 INJECTION INTRAVENOUS at 01:02

## 2024-02-08 RX ADMIN — CALCITRIOL CAPSULES 0.25 MCG 0.5 MCG: 0.25 CAPSULE ORAL at 09:02

## 2024-02-08 RX ADMIN — LINEZOLID 600 MG: 600 TABLET, FILM COATED ORAL at 10:02

## 2024-02-08 RX ADMIN — CEFEPIME 1 G: 1 INJECTION, POWDER, FOR SOLUTION INTRAMUSCULAR; INTRAVENOUS at 03:02

## 2024-02-08 RX ADMIN — SODIUM BICARBONATE 650 MG: 650 TABLET ORAL at 12:02

## 2024-02-08 RX ADMIN — CIPROFLOXACIN HYDROCHLORIDE 750 MG: 250 TABLET, FILM COATED ORAL at 09:02

## 2024-02-08 RX ADMIN — MUPIROCIN 1 G: 20 OINTMENT TOPICAL at 09:02

## 2024-02-08 RX ADMIN — LEVOTHYROXINE SODIUM 137 MCG: 25 TABLET ORAL at 06:02

## 2024-02-08 RX ADMIN — SODIUM CHLORIDE, PRESERVATIVE FREE 10 ML: 5 INJECTION INTRAVENOUS at 11:02

## 2024-02-08 RX ADMIN — SODIUM BICARBONATE 650 MG: 650 TABLET ORAL at 04:02

## 2024-02-08 RX ADMIN — CEFEPIME 1 G: 1 INJECTION, POWDER, FOR SOLUTION INTRAMUSCULAR; INTRAVENOUS at 11:02

## 2024-02-08 RX ADMIN — Medication 250 MG: at 09:02

## 2024-02-08 RX ADMIN — SODIUM BICARBONATE 650 MG: 650 TABLET ORAL at 10:02

## 2024-02-08 RX ADMIN — MUPIROCIN 1 G: 20 OINTMENT TOPICAL at 10:02

## 2024-02-08 RX ADMIN — SODIUM BICARBONATE 650 MG: 650 TABLET ORAL at 09:02

## 2024-02-08 NOTE — SUBJECTIVE & OBJECTIVE
Past Medical History:   Diagnosis Date    Cancer, colon     Chronic pain     GERD (gastroesophageal reflux disease)     Renal disorder     Thyroiditis, unspecified        Past Surgical History:   Procedure Laterality Date    BACK SURGERY       SECTION      CHOLECYSTECTOMY      COLON SURGERY      HYSTERECTOMY      KIDNEY SURGERY         Review of patient's allergies indicates:   Allergen Reactions    Oxaprozin Nausea And Vomiting and Swelling     Facial swelling      Sulfamethoxazole-trimethoprim Nausea And Vomiting     Severe nausea and vomiting    Doxycycline     Nsaids (non-steroidal anti-inflammatory drug) Rash    Sulfa (sulfonamide antibiotics) Nausea And Vomiting       No current facility-administered medications on file prior to encounter.     Current Outpatient Medications on File Prior to Encounter   Medication Sig    ascorbic acid, vitamin C, (VITAMIN C) 100 MG tablet Take 100 mg by mouth once daily.    calcitRIOL (ROCALTROL) 0.5 MCG Cap Take 0.5 mcg by mouth once daily.    ciprofloxacin HCl (CIPRO) 500 MG tablet Take 1 tablet (500 mg total) by mouth every 12 (twelve) hours.    clopidogreL (PLAVIX) 75 mg tablet Take 75 mg by mouth once daily.    DULoxetine (CYMBALTA) 30 MG capsule Take 30 mg by mouth once daily.    ferrous sulfate 325 (65 FE) MG EC tablet Take 325 mg by mouth 3 (three) times daily with meals.    HYDROcodone-acetaminophen (NORCO)  mg per tablet Take 1 tablet by mouth.    levothyroxine (SYNTHROID) 100 MCG tablet Take 137 mcg by mouth before breakfast.    omeprazole (PRILOSEC) 40 MG capsule Take 40 mg by mouth once daily.    QUEtiapine (SEROQUEL) 25 MG Tab Take by mouth.    sodium bicarbonate 650 MG tablet Take 650 mg by mouth 4 (four) times daily.     Family History       Problem Relation (Age of Onset)    Hypertension Mother          Tobacco Use    Smoking status: Never    Smokeless tobacco: Never   Substance and Sexual Activity    Alcohol use: Never    Drug use: Never     Sexual activity: Not Currently     Review of Systems   Constitutional:  Positive for activity change and fever.   HENT: Negative.     Eyes: Negative.    Respiratory: Negative.     Cardiovascular: Negative.    Gastrointestinal:  Positive for abdominal pain and nausea.   Endocrine: Negative.    Genitourinary: Negative.    Musculoskeletal:  Positive for arthralgias and joint swelling.   Skin: Negative.    Allergic/Immunologic: Negative.    Neurological:  Positive for weakness.   Hematological: Negative.    Psychiatric/Behavioral: Negative.       Objective:     Vital Signs (Most Recent):  Temp: 98.2 °F (36.8 °C) (02/08/24 0736)  Pulse: 70 (02/08/24 0736)  Resp: 20 (02/08/24 0736)  BP: (!) 107/50 (02/08/24 0736)  SpO2: 98 % (02/08/24 0736) Vital Signs (24h Range):  Temp:  [97.6 °F (36.4 °C)-98.2 °F (36.8 °C)] 98.2 °F (36.8 °C)  Pulse:  [66-86] 70  Resp:  [20] 20  SpO2:  [93 %-100 %] 98 %  BP: ()/(47-65) 107/50     Weight: 79.8 kg (176 lb)  Body mass index is 33.25 kg/m².     Physical Exam           Significant Labs: All pertinent labs within the past 24 hours have been reviewed.  CBC:   Recent Labs   Lab 02/07/24  0550 02/08/24  0853   WBC 7.60 6.72   HGB 6.3* 9.0*   HCT 20.1* 28.7*    129*       CMP:   Recent Labs   Lab 02/07/24  0550 02/08/24  0853    138   K 3.6 3.4*   CO2 15* 15*   BUN 59.0* 48.0*   CREATININE 4.54* 4.28*   CALCIUM 8.5 8.8   ALBUMIN 2.1* 2.2*   BILITOT 0.4 0.9   ALKPHOS 75 80   AST 11 11   ALT 5 6         Significant Imaging: I have reviewed all pertinent imaging results/findings within the past 24 hours.

## 2024-02-08 NOTE — PLAN OF CARE
Problem: Adult Inpatient Plan of Care  Goal: Plan of Care Review  Outcome: Ongoing, Progressing  Goal: Patient-Specific Goal (Individualized)  Outcome: Ongoing, Progressing  Goal: Absence of Hospital-Acquired Illness or Injury  Outcome: Ongoing, Progressing  Goal: Optimal Comfort and Wellbeing  Outcome: Ongoing, Progressing  Goal: Readiness for Transition of Care  Outcome: Ongoing, Progressing     Problem: Fall Injury Risk  Goal: Absence of Fall and Fall-Related Injury  Outcome: Ongoing, Progressing     Problem: Infection  Goal: Absence of Infection Signs and Symptoms  Outcome: Ongoing, Progressing     Problem: Pain Chronic (Persistent) (Comorbidity Management)  Goal: Acceptable Pain Control and Functional Ability  Outcome: Ongoing, Progressing     Problem: Skin Injury Risk Increased  Goal: Skin Health and Integrity  Outcome: Ongoing, Progressing

## 2024-02-08 NOTE — ASSESSMENT & PLAN NOTE
Continue current regimen seems to be improving with decreased leukocytosis  No fever during her stay urine is clearing.    Culture done in the hospital shows no growth after 24 hours.  May consider if she continues to have improvement tomorrow possibly going home with p.o. linezolid if possible.  Are continuing her dosage through the weekend with discharge on Monday.

## 2024-02-08 NOTE — ASSESSMENT & PLAN NOTE
Creatine stable for now. BMP reviewed- noted Estimated Creatinine Clearance: 11.2 mL/min (A) (based on SCr of 4.28 mg/dL (H)). according to latest data. Based on current GFR, CKD stage is stage 4 - GFR 15-29.  Monitor UOP and serial BMP and adjust therapy as needed. Renally dose meds. Avoid nephrotoxic medications and procedures.  After a single dose of vancomycin her creatinine did increase we will continue the linesolid per id rec.  She was given a dose of Procrit and is status post 2 units packed red blood cell creatinine has improved slightly currently her hemoglobin is over 9

## 2024-02-08 NOTE — PROGRESS NOTES
[facility] - Medical Surgical Unit  [facility] Medicine  Progress Note    Patient Name: Kam Reyes  MRN: [id number]  Patient Class: IP- Inpatient   Admission Date: 2/5/2024  Length of Stay: 3 days  Attending Physician: Gregor Heaton MD  Primary Care Provider: Gregor Heaton MD        Subjective:     Principal Problem:<principal problem not specified>        HPI:  Patient is a 73-year-old known to me from clinic.  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  Patient had frequent urinary tract infections.  After many procedures was continuing to have them.  She was hospitalized about a week ago for urinary tract infection.  Remained asymptomatic during her stay with no leukocytosis grew only Proteus which was sensitive to Cipro.  She was discharged home and apparently over the weekend was becoming more nauseated and had feeling more ill.  Since the culture has grown out 3 more organisms including Enterobacter faecium Proteus mirabilis Pseudomonas aeruginosa enrol sella plan to coli.  Only the Pseudomonas and Proteus were sensitive to the Cipro my floxacillin she was sent on home on.  Moreover her urinary catheter came out today she would called initially for us to replace it in the office but she was feeling too unwell to come the office and went straight to the emergency room.  There she was complaining of nausea vomiting and some abdominal pain.  Was noted to have leukocytosis of 19,000. And urinary tract infection continuing.    Overview/Hospital Course:  Much better today no shortness a breath less nausea no abdominal pain.  Her leukocytosis has improved.  However her kidney function has gone down to a creatinine over 4 after a single dose of vancomycin.  I have put in for linezolid . ID consult further suggestions rec ct abdomen and agree with current regimen.    She does have an H and H moved over 9 after 2 units.      Here in his  currently looking clear her urine culture done in the emergency room shows no growth after 24 hours.  Her previous culture had multiple organisms as previously dictated.    Past Medical History:   Diagnosis Date    Cancer, colon     Chronic pain     GERD (gastroesophageal reflux disease)     Renal disorder     Thyroiditis, unspecified        Past Surgical History:   Procedure Laterality Date    BACK SURGERY       SECTION      CHOLECYSTECTOMY      COLON SURGERY      HYSTERECTOMY      KIDNEY SURGERY         Review of patient's allergies indicates:   Allergen Reactions    Oxaprozin Nausea And Vomiting and Swelling     Facial swelling      Sulfamethoxazole-trimethoprim Nausea And Vomiting     Severe nausea and vomiting    Doxycycline     Nsaids (non-steroidal anti-inflammatory drug) Rash    Sulfa (sulfonamide antibiotics) Nausea And Vomiting       No current facility-administered medications on file prior to encounter.     Current Outpatient Medications on File Prior to Encounter   Medication Sig    ascorbic acid, vitamin C, (VITAMIN C) 100 MG tablet Take 100 mg by mouth once daily.    calcitRIOL (ROCALTROL) 0.5 MCG Cap Take 0.5 mcg by mouth once daily.    ciprofloxacin HCl (CIPRO) 500 MG tablet Take 1 tablet (500 mg total) by mouth every 12 (twelve) hours.    clopidogreL (PLAVIX) 75 mg tablet Take 75 mg by mouth once daily.    DULoxetine (CYMBALTA) 30 MG capsule Take 30 mg by mouth once daily.    ferrous sulfate 325 (65 FE) MG EC tablet Take 325 mg by mouth 3 (three) times daily with meals.    HYDROcodone-acetaminophen (NORCO)  mg per tablet Take 1 tablet by mouth.    levothyroxine (SYNTHROID) 100 MCG tablet Take 137 mcg by mouth before breakfast.    omeprazole (PRILOSEC) 40 MG capsule Take 40 mg by mouth once daily.    QUEtiapine (SEROQUEL) 25 MG Tab Take by mouth.    sodium bicarbonate 650 MG tablet Take 650 mg by mouth 4 (four) times daily.     Family History       Problem Relation (Age of Onset)     Hypertension Mother          Tobacco Use    Smoking status: Never    Smokeless tobacco: Never   Substance and Sexual Activity    Alcohol use: Never    Drug use: Never    Sexual activity: Not Currently     Review of Systems   Constitutional:  Positive for activity change and fever.   HENT: Negative.     Eyes: Negative.    Respiratory: Negative.     Cardiovascular: Negative.    Gastrointestinal:  Positive for abdominal pain and nausea.   Endocrine: Negative.    Genitourinary: Negative.    Musculoskeletal:  Positive for arthralgias and joint swelling.   Skin: Negative.    Allergic/Immunologic: Negative.    Neurological:  Positive for weakness.   Hematological: Negative.    Psychiatric/Behavioral: Negative.       Objective:     Vital Signs (Most Recent):  Temp: 98.2 °F (36.8 °C) (02/08/24 0736)  Pulse: 70 (02/08/24 0736)  Resp: 20 (02/08/24 0736)  BP: (!) 107/50 (02/08/24 0736)  SpO2: 98 % (02/08/24 0736) Vital Signs (24h Range):  Temp:  [97.6 °F (36.4 °C)-98.2 °F (36.8 °C)] 98.2 °F (36.8 °C)  Pulse:  [66-86] 70  Resp:  [20] 20  SpO2:  [93 %-100 %] 98 %  BP: ()/(47-65) 107/50     Weight: 79.8 kg (176 lb)  Body mass index is 33.25 kg/m².     Physical Exam           Significant Labs: All pertinent labs within the past 24 hours have been reviewed.  CBC:   Recent Labs   Lab 02/07/24  0550 02/08/24  0853   WBC 7.60 6.72   HGB 6.3* 9.0*   HCT 20.1* 28.7*    129*       CMP:   Recent Labs   Lab 02/07/24  0550 02/08/24  0853    138   K 3.6 3.4*   CO2 15* 15*   BUN 59.0* 48.0*   CREATININE 4.54* 4.28*   CALCIUM 8.5 8.8   ALBUMIN 2.1* 2.2*   BILITOT 0.4 0.9   ALKPHOS 75 80   AST 11 11   ALT 5 6         Significant Imaging: I have reviewed all pertinent imaging results/findings within the past 24 hours.    Assessment/Plan:      Fracture of surgical neck of humerus  I will consult ortho in-house to assess  Dr. Machado is following      Urinary tract infection  Continue current regimen seems to be improving with  decreased leukocytosis  No fever during her stay urine is clearing.    Culture done in the hospital shows no growth after 24 hours.  May consider if she continues to have improvement tomorrow possibly going home with p.o. linezolid if possible.  Are continuing her dosage through the weekend with discharge on Monday.    Hypertension  Continue home medications as written    Anxiety disorder        Hypothyroid  Continue Synthroid      Personal history of other malignant neoplasm of large intestine        Recurrent major depression  Continue her Cymbalta as her allergy duloxetine was put in an error    Ileostomy status  Continue care and maintenance      GERD (gastroesophageal reflux disease)  Continue p.o. Protonix      Anemia of chronic kidney failure, stage 4 (severe)  Creatine stable for now. BMP reviewed- noted Estimated Creatinine Clearance: 11.2 mL/min (A) (based on SCr of 4.28 mg/dL (H)). according to latest data. Based on current GFR, CKD stage is stage 4 - GFR 15-29.  Monitor UOP and serial BMP and adjust therapy as needed. Renally dose meds. Avoid nephrotoxic medications and procedures.  After a single dose of vancomycin her creatinine did increase we will continue the linesolid per id rec.  She was given a dose of Procrit and is status post 2 units packed red blood cell creatinine has improved slightly currently her hemoglobin is over 9    Dyspnea  Now resolved        VTE Risk Mitigation (From admission, onward)           Ordered     IP VTE HIGH RISK PATIENT  Once         02/05/24 1537     Place sequential compression device  Until discontinued         02/05/24 1537                    Discharge Planning   JOSHUA:      Code Status: Full Code   Is the patient medically ready for discharge?:     Reason for patient still in hospital (select all that apply): Patient trending condition  Discharge Plan A: Home with family                  Gregor Heaton MD  Department of Hospital Medicine   [facility] -  Medical Surgical Unit

## 2024-02-09 PROCEDURE — 25000003 PHARM REV CODE 250: Performed by: INTERNAL MEDICINE

## 2024-02-09 PROCEDURE — 63600175 PHARM REV CODE 636 W HCPCS: Performed by: INTERNAL MEDICINE

## 2024-02-09 PROCEDURE — 63700000 PHARM REV CODE 250 ALT 637 W/O HCPCS: Performed by: FAMILY MEDICINE

## 2024-02-09 PROCEDURE — 94761 N-INVAS EAR/PLS OXIMETRY MLT: CPT

## 2024-02-09 PROCEDURE — 11000001 HC ACUTE MED/SURG PRIVATE ROOM

## 2024-02-09 PROCEDURE — A4216 STERILE WATER/SALINE, 10 ML: HCPCS | Performed by: INTERNAL MEDICINE

## 2024-02-09 PROCEDURE — 25000003 PHARM REV CODE 250: Performed by: FAMILY MEDICINE

## 2024-02-09 PROCEDURE — 27000207 HC ISOLATION

## 2024-02-09 RX ORDER — FLUCONAZOLE 100 MG/1
100 TABLET ORAL DAILY
Status: DISCONTINUED | OUTPATIENT
Start: 2024-02-09 | End: 2024-02-26 | Stop reason: HOSPADM

## 2024-02-09 RX ORDER — SODIUM CITRATE AND CITRIC ACID MONOHYDRATE 334; 500 MG/5ML; MG/5ML
30 SOLUTION ORAL 2 TIMES DAILY
Status: DISCONTINUED | OUTPATIENT
Start: 2024-02-09 | End: 2024-02-10

## 2024-02-09 RX ORDER — POTASSIUM CHLORIDE 20 MEQ/1
20 TABLET, EXTENDED RELEASE ORAL DAILY
Status: DISCONTINUED | OUTPATIENT
Start: 2024-02-09 | End: 2024-02-22

## 2024-02-09 RX ADMIN — MUPIROCIN 1 G: 20 OINTMENT TOPICAL at 09:02

## 2024-02-09 RX ADMIN — SODIUM CHLORIDE, PRESERVATIVE FREE 10 ML: 5 INJECTION INTRAVENOUS at 10:02

## 2024-02-09 RX ADMIN — SODIUM BICARBONATE 650 MG: 650 TABLET ORAL at 03:02

## 2024-02-09 RX ADMIN — LEVOTHYROXINE SODIUM 137 MCG: 25 TABLET ORAL at 06:02

## 2024-02-09 RX ADMIN — SODIUM BICARBONATE 650 MG: 650 TABLET ORAL at 09:02

## 2024-02-09 RX ADMIN — SODIUM BICARBONATE 650 MG: 650 TABLET ORAL at 08:02

## 2024-02-09 RX ADMIN — DULOXETINE HYDROCHLORIDE 30 MG: 30 CAPSULE, DELAYED RELEASE ORAL at 09:02

## 2024-02-09 RX ADMIN — LINEZOLID 600 MG: 600 TABLET, FILM COATED ORAL at 09:02

## 2024-02-09 RX ADMIN — MUPIROCIN 1 G: 20 OINTMENT TOPICAL at 08:02

## 2024-02-09 RX ADMIN — CALCITRIOL CAPSULES 0.25 MCG 0.5 MCG: 0.25 CAPSULE ORAL at 09:02

## 2024-02-09 RX ADMIN — CIPROFLOXACIN HYDROCHLORIDE 750 MG: 250 TABLET, FILM COATED ORAL at 09:02

## 2024-02-09 RX ADMIN — SODIUM CHLORIDE, PRESERVATIVE FREE 10 ML: 5 INJECTION INTRAVENOUS at 04:02

## 2024-02-09 RX ADMIN — SODIUM CHLORIDE, PRESERVATIVE FREE 10 ML: 5 INJECTION INTRAVENOUS at 06:02

## 2024-02-09 RX ADMIN — FLUCONAZOLE 100 MG: 100 TABLET ORAL at 12:02

## 2024-02-09 RX ADMIN — CEFEPIME 1 G: 1 INJECTION, POWDER, FOR SOLUTION INTRAMUSCULAR; INTRAVENOUS at 12:02

## 2024-02-09 RX ADMIN — QUETIAPINE FUMARATE 25 MG: 25 TABLET ORAL at 08:02

## 2024-02-09 RX ADMIN — SODIUM CHLORIDE: 9 INJECTION, SOLUTION INTRAVENOUS at 09:02

## 2024-02-09 RX ADMIN — CLOPIDOGREL BISULFATE 75 MG: 75 TABLET ORAL at 09:02

## 2024-02-09 RX ADMIN — POTASSIUM CHLORIDE 20 MEQ: 1500 TABLET, EXTENDED RELEASE ORAL at 12:02

## 2024-02-09 RX ADMIN — PANTOPRAZOLE SODIUM 40 MG: 40 TABLET, DELAYED RELEASE ORAL at 09:02

## 2024-02-09 RX ADMIN — Medication 250 MG: at 09:02

## 2024-02-09 RX ADMIN — SODIUM BICARBONATE 650 MG: 650 TABLET ORAL at 12:02

## 2024-02-09 RX ADMIN — SODIUM CHLORIDE: 9 INJECTION, SOLUTION INTRAVENOUS at 06:02

## 2024-02-09 RX ADMIN — FERROUS SULFATE TAB 325 MG (65 MG ELEMENTAL FE) 1 EACH: 325 (65 FE) TAB at 09:02

## 2024-02-09 RX ADMIN — LINEZOLID 600 MG: 600 TABLET, FILM COATED ORAL at 08:02

## 2024-02-09 RX ADMIN — SODIUM CITRATE AND CITRIC ACID MONOHYDRATE 30 ML: 500; 334 SOLUTION ORAL at 03:02

## 2024-02-09 NOTE — PROGRESS NOTES
"Inpatient Nutrition Evaluation    Admit Date: 2/5/2024   Total duration of encounter: 4 days    Nutrition Recommendation/Prescription     Continue current Renal, Non-Dialysis Diet as tolerated.   Add Suplena if intake becomes </= 50%.   Monitor intake, weight, and labs.     RD available as needed. Thank you.     Nutrition Assessment     Chart Review    Reason Seen: length of stay    Malnutrition Screening Tool Results   Have you recently lost weight without trying?: No  Have you been eating poorly because of a decreased appetite?: No   MST Score: 0     Diagnosis:    Fracture of surgical neck of humerus     Relevant Medical History:   Cancer, colon      Chronic pain      GERD (gastroesophageal reflux disease)      Renal disorder      Thyroiditis, unspecified          Nutrition-Related Medications:   Ascorbic Acid; Ferrous Sulfate; Levothyroxine; Kcl; Calcitriol.     Nutrition-Related Labs:  2/8: H/H 9.0/28.7(L); K+ 3.4(L); Cl 115(H); BUN/Crea 48//4.28(H); GFR 10(L).    Diet Order: Diet Renal Non-Dialysis  Oral Supplement Order: none  Appetite/Oral Intake: fair/50-75% of meals  Factors Affecting Nutritional Intake: none identified  Food/Muslim/Cultural Preferences: none reported  Food Allergies: none reported    Skin Integrity: bruised (ecchymotic)  Wound(s):       Comments    2/9: Renal indices abnormal; however, improving since admit. No recent weight loss noted/reported on nursing admit screen. Will continue to monitor during stay.     Anthropometrics    Height: 5' 1" (154.9 cm) Height Method: Stated  Last Weight: 79.8 kg (176 lb) (02/05/24 1118) Weight Method: Bed Scale  BMI (Calculated): 33.3  BMI Classification: obese grade I (BMI 30-34.9)     Ideal Body Weight (IBW), Female: 105 lb     % Ideal Body Weight, Female (lb): 167.62 %                             Usual Weight Provided By: EMR weight history    Wt Readings from Last 5 Encounters:   02/05/24 79.8 kg (176 lb)   01/26/24 79.8 kg (176 lb)   01/20/24 79.4 " kg (175 lb)   06/22/23 74.8 kg (165 lb)   05/18/23 74.4 kg (164 lb)     Weight Change(s) Since Admission:  Admit Weight: 79.8 kg (176 lb) (02/05/24 1118)      Patient Education    Not applicable.    Monitoring & Evaluation     Dietitian will monitor food and beverage intake, energy intake, weight, weight change, electrolyte/renal panel, glucose/endocrine profile, and gastrointestinal profile.  Nutrition Risk/Follow-Up: low (follow-up in 5-7 days)  Patients assigned 'low nutrition risk' status do not qualify for a full nutritional assessment but will be monitored and re-evaluated in a 5-7 day time period. Please consult if re-evaluation needed sooner.

## 2024-02-09 NOTE — SUBJECTIVE & OBJECTIVE
Past Medical History:   Diagnosis Date    Cancer, colon     Chronic pain     GERD (gastroesophageal reflux disease)     Renal disorder     Thyroiditis, unspecified        Past Surgical History:   Procedure Laterality Date    BACK SURGERY       SECTION      CHOLECYSTECTOMY      COLON SURGERY      HYSTERECTOMY      KIDNEY SURGERY         Review of patient's allergies indicates:   Allergen Reactions    Oxaprozin Nausea And Vomiting and Swelling     Facial swelling      Sulfamethoxazole-trimethoprim Nausea And Vomiting     Severe nausea and vomiting    Doxycycline     Nsaids (non-steroidal anti-inflammatory drug) Rash    Sulfa (sulfonamide antibiotics) Nausea And Vomiting       No current facility-administered medications on file prior to encounter.     Current Outpatient Medications on File Prior to Encounter   Medication Sig    ascorbic acid, vitamin C, (VITAMIN C) 100 MG tablet Take 100 mg by mouth once daily.    calcitRIOL (ROCALTROL) 0.5 MCG Cap Take 0.5 mcg by mouth once daily.    ciprofloxacin HCl (CIPRO) 500 MG tablet Take 1 tablet (500 mg total) by mouth every 12 (twelve) hours.    clopidogreL (PLAVIX) 75 mg tablet Take 75 mg by mouth once daily.    DULoxetine (CYMBALTA) 30 MG capsule Take 30 mg by mouth once daily.    ferrous sulfate 325 (65 FE) MG EC tablet Take 325 mg by mouth 3 (three) times daily with meals.    HYDROcodone-acetaminophen (NORCO)  mg per tablet Take 1 tablet by mouth.    levothyroxine (SYNTHROID) 100 MCG tablet Take 137 mcg by mouth before breakfast.    omeprazole (PRILOSEC) 40 MG capsule Take 40 mg by mouth once daily.    QUEtiapine (SEROQUEL) 25 MG Tab Take by mouth.    sodium bicarbonate 650 MG tablet Take 650 mg by mouth 4 (four) times daily.     Family History       Problem Relation (Age of Onset)    Hypertension Mother          Tobacco Use    Smoking status: Never    Smokeless tobacco: Never   Substance and Sexual Activity    Alcohol use: Never    Drug use: Never     Sexual activity: Not Currently     Review of Systems   Constitutional:  Positive for activity change and fever.   HENT: Negative.     Eyes: Negative.    Respiratory: Negative.     Cardiovascular: Negative.    Gastrointestinal:  Positive for abdominal pain and nausea.   Endocrine: Negative.    Genitourinary: Negative.    Musculoskeletal:  Positive for arthralgias and joint swelling.   Skin: Negative.    Allergic/Immunologic: Negative.    Neurological:  Positive for weakness.   Hematological: Negative.    Psychiatric/Behavioral: Negative.       Objective:     Vital Signs (Most Recent):  Temp: 97.7 °F (36.5 °C) (02/09/24 1100)  Pulse: 76 (02/09/24 1100)  Resp: 18 (02/09/24 1100)  BP: 111/72 (02/09/24 1100)  SpO2: 98 % (02/09/24 1100) Vital Signs (24h Range):  Temp:  [97.6 °F (36.4 °C)-98.2 °F (36.8 °C)] 97.7 °F (36.5 °C)  Pulse:  [75-92] 76  Resp:  [18] 18  SpO2:  [96 %-100 %] 98 %  BP: ()/(56-80) 111/72     Weight: 79.8 kg (176 lb)  Body mass index is 33.25 kg/m².     Physical Exam           Significant Labs: All pertinent labs within the past 24 hours have been reviewed.  CBC:   Recent Labs   Lab 02/08/24  0853   WBC 6.72   HGB 9.0*   HCT 28.7*   *       CMP:   Recent Labs   Lab 02/08/24  0853      K 3.4*   CO2 15*   BUN 48.0*   CREATININE 4.28*   CALCIUM 8.8   ALBUMIN 2.2*   BILITOT 0.9   ALKPHOS 80   AST 11   ALT 6         Significant Imaging: I have reviewed all pertinent imaging results/findings within the past 24 hours.

## 2024-02-09 NOTE — ASSESSMENT & PLAN NOTE
Continue current regimen seems to be improving with decreased leukocytosis  No fever during her stay urine is clearing.    Culture done in the hospital shows enterrococcus faeicium   Are continuing her dosage through the weekend with discharge on Monday.

## 2024-02-09 NOTE — PROGRESS NOTES
[facility] - [facility] Surgical [facility]  [facility] Medicine  Progress Note    Patient Name: Kam Reyes  MRN: [id number]  Patient Class: IP- Inpatient   Admission Date: 2/5/2024  Length of Stay: 4 days  Attending Physician: Gregor Heaton MD  Primary Care Provider: Gregor Heaton MD        Subjective:     Principal Problem:<principal problem not specified>        HPI:  Patient is a 73-year-old known to me from clinic.  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  Patient had frequent urinary tract infections.  After many procedures was continuing to have them.  She was hospitalized about a week ago for urinary tract infection.  Remained asymptomatic during her stay with no leukocytosis grew only Proteus which was sensitive to Cipro.  She was discharged home and apparently over the weekend was becoming more nauseated and had feeling more ill.  Since the culture has grown out 3 more organisms including Enterobacter faecium Proteus mirabilis Pseudomonas aeruginosa enrol sella plan to coli.  Only the Pseudomonas and Proteus were sensitive to the Cipro my floxacillin she was sent on home on.  Moreover her urinary catheter came out today she would called initially for us to replace it in the office but she was feeling too unwell to come the office and went straight to the emergency room.  There she was complaining of nausea vomiting and some abdominal pain.  Was noted to have leukocytosis of 19,000. And urinary tract infection continuing.    Overview/Hospital Course:  Much better today no shortness a breath less nausea no abdominal pain.  Her leukocytosis has improved.  However her kidney function has gone down to a creatinine over 4 after a single dose of vancomycin.  I have put in for linezolid . ID consult further suggestions rec ct abdomen and agree with current regimen.    She does have an H and H moved over 9 after 2 units.  Now stable    Here in  his currently looking clear her urine culture done in the emergency room shows enterrococcus faecium.    Her previous culture had multiple organisms as previously dictated.    Past Medical History:   Diagnosis Date    Cancer, colon     Chronic pain     GERD (gastroesophageal reflux disease)     Renal disorder     Thyroiditis, unspecified        Past Surgical History:   Procedure Laterality Date    BACK SURGERY       SECTION      CHOLECYSTECTOMY      COLON SURGERY      HYSTERECTOMY      KIDNEY SURGERY         Review of patient's allergies indicates:   Allergen Reactions    Oxaprozin Nausea And Vomiting and Swelling     Facial swelling      Sulfamethoxazole-trimethoprim Nausea And Vomiting     Severe nausea and vomiting    Doxycycline     Nsaids (non-steroidal anti-inflammatory drug) Rash    Sulfa (sulfonamide antibiotics) Nausea And Vomiting       No current facility-administered medications on file prior to encounter.     Current Outpatient Medications on File Prior to Encounter   Medication Sig    ascorbic acid, vitamin C, (VITAMIN C) 100 MG tablet Take 100 mg by mouth once daily.    calcitRIOL (ROCALTROL) 0.5 MCG Cap Take 0.5 mcg by mouth once daily.    ciprofloxacin HCl (CIPRO) 500 MG tablet Take 1 tablet (500 mg total) by mouth every 12 (twelve) hours.    clopidogreL (PLAVIX) 75 mg tablet Take 75 mg by mouth once daily.    DULoxetine (CYMBALTA) 30 MG capsule Take 30 mg by mouth once daily.    ferrous sulfate 325 (65 FE) MG EC tablet Take 325 mg by mouth 3 (three) times daily with meals.    HYDROcodone-acetaminophen (NORCO)  mg per tablet Take 1 tablet by mouth.    levothyroxine (SYNTHROID) 100 MCG tablet Take 137 mcg by mouth before breakfast.    omeprazole (PRILOSEC) 40 MG capsule Take 40 mg by mouth once daily.    QUEtiapine (SEROQUEL) 25 MG Tab Take by mouth.    sodium bicarbonate 650 MG tablet Take 650 mg by mouth 4 (four) times daily.     Family History       Problem Relation (Age of Onset)     Hypertension Mother          Tobacco Use    Smoking status: Never    Smokeless tobacco: Never   Substance and Sexual Activity    Alcohol use: Never    Drug use: Never    Sexual activity: Not Currently     Review of Systems   Constitutional:  Positive for activity change and fever.   HENT: Negative.     Eyes: Negative.    Respiratory: Negative.     Cardiovascular: Negative.    Gastrointestinal:  Positive for abdominal pain and nausea.   Endocrine: Negative.    Genitourinary: Negative.    Musculoskeletal:  Positive for arthralgias and joint swelling.   Skin: Negative.    Allergic/Immunologic: Negative.    Neurological:  Positive for weakness.   Hematological: Negative.    Psychiatric/Behavioral: Negative.       Objective:     Vital Signs (Most Recent):  Temp: 97.7 °F (36.5 °C) (02/09/24 1100)  Pulse: 76 (02/09/24 1100)  Resp: 18 (02/09/24 1100)  BP: 111/72 (02/09/24 1100)  SpO2: 98 % (02/09/24 1100) Vital Signs (24h Range):  Temp:  [97.6 °F (36.4 °C)-98.2 °F (36.8 °C)] 97.7 °F (36.5 °C)  Pulse:  [75-92] 76  Resp:  [18] 18  SpO2:  [96 %-100 %] 98 %  BP: ()/(56-80) 111/72     Weight: 79.8 kg (176 lb)  Body mass index is 33.25 kg/m².     Physical Exam           Significant Labs: All pertinent labs within the past 24 hours have been reviewed.  CBC:   Recent Labs   Lab 02/08/24  0853   WBC 6.72   HGB 9.0*   HCT 28.7*   *       CMP:   Recent Labs   Lab 02/08/24  0853      K 3.4*   CO2 15*   BUN 48.0*   CREATININE 4.28*   CALCIUM 8.8   ALBUMIN 2.2*   BILITOT 0.9   ALKPHOS 80   AST 11   ALT 6         Significant Imaging: I have reviewed all pertinent imaging results/findings within the past 24 hours.    Assessment/Plan:      Fracture of surgical neck of humerus  I will consult ortho in-house to assess  Dr. Machado is following      Urinary tract infection  Continue current regimen seems to be improving with decreased leukocytosis  No fever during her stay urine is clearing.    Culture done in the hospital  shows enterrococcus faeicium   Are continuing her dosage through the weekend with discharge on Monday.    Hypertension  Continue home medications as written    Anxiety disorder        Hypothyroid  Continue Synthroid      Personal history of other malignant neoplasm of large intestine        Recurrent major depression  Continue her Cymbalta as her allergy duloxetine was put in an error    Ileostomy status  Continue care and maintenance      GERD (gastroesophageal reflux disease)  Continue p.o. Protonix      Anemia of chronic kidney failure, stage 4 (severe)  Creatine stable for now. BMP reviewed- noted Estimated Creatinine Clearance: 11.2 mL/min (A) (based on SCr of 4.28 mg/dL (H)). according to latest data. Based on current GFR, CKD stage is stage 4 - GFR 15-29.  Monitor UOP and serial BMP and adjust therapy as needed. Renally dose meds. Avoid nephrotoxic medications and procedures.  After a single dose of vancomycin her creatinine did increase we will continue the linesolid per id rec.  She was given a dose of Procrit and is status post 2 units packed red blood cell creatinine has improved slightly currently her hemoglobin is over 9    Dyspnea  Now resolved        VTE Risk Mitigation (From admission, onward)           Ordered     IP VTE HIGH RISK PATIENT  Once         02/05/24 1537     Place sequential compression device  Until discontinued         02/05/24 1537                    Discharge Planning   JOSHUA:      Code Status: Full Code   Is the patient medically ready for discharge?:     Reason for patient still in hospital (select all that apply): Patient trending condition  Discharge Plan A: Home with family                  Gregor Heaton MD  Department of Hospital Medicine   [facility] - [facility]

## 2024-02-10 LAB
ALBUMIN SERPL-MCNC: 2.1 G/DL (ref 3.4–4.8)
ALBUMIN/GLOB SERPL: 0.6 RATIO (ref 1.1–2)
ALP SERPL-CCNC: 83 UNIT/L (ref 40–150)
ALT SERPL-CCNC: 6 UNIT/L (ref 0–55)
AST SERPL-CCNC: 11 UNIT/L (ref 5–34)
BACTERIA BLD CULT: NORMAL
BACTERIA BLD CULT: NORMAL
BACTERIA UR CULT: ABNORMAL
BACTERIA UR CULT: ABNORMAL
BASOPHILS # BLD AUTO: 0.02 X10(3)/MCL
BASOPHILS NFR BLD AUTO: 0.3 %
BILIRUB SERPL-MCNC: 0.5 MG/DL
BUN SERPL-MCNC: 35 MG/DL (ref 9.8–20.1)
CALCIUM SERPL-MCNC: 8.9 MG/DL (ref 8.4–10.2)
CHLORIDE SERPL-SCNC: 120 MMOL/L (ref 98–107)
CO2 SERPL-SCNC: 12 MMOL/L (ref 23–31)
CREAT SERPL-MCNC: 3.62 MG/DL (ref 0.55–1.02)
EOSINOPHIL # BLD AUTO: 0.14 X10(3)/MCL (ref 0–0.9)
EOSINOPHIL NFR BLD AUTO: 2.4 %
ERYTHROCYTE [DISTWIDTH] IN BLOOD BY AUTOMATED COUNT: 15 % (ref 11.5–17)
GFR SERPLBLD CREATININE-BSD FMLA CKD-EPI: 13 MLS/MIN/1.73/M2
GLOBULIN SER-MCNC: 3.5 GM/DL (ref 2.4–3.5)
GLUCOSE SERPL-MCNC: 91 MG/DL (ref 82–115)
HCT VFR BLD AUTO: 30.4 % (ref 37–47)
HGB BLD-MCNC: 9.2 G/DL (ref 12–16)
IMM GRANULOCYTES # BLD AUTO: 0.04 X10(3)/MCL (ref 0–0.04)
IMM GRANULOCYTES NFR BLD AUTO: 0.7 %
LYMPHOCYTES # BLD AUTO: 0.7 X10(3)/MCL (ref 0.6–4.6)
LYMPHOCYTES NFR BLD AUTO: 12.1 %
MCH RBC QN AUTO: 30.1 PG (ref 27–31)
MCHC RBC AUTO-ENTMCNC: 30.3 G/DL (ref 33–36)
MCV RBC AUTO: 99.3 FL (ref 80–94)
MONOCYTES # BLD AUTO: 0.37 X10(3)/MCL (ref 0.1–1.3)
MONOCYTES NFR BLD AUTO: 6.4 %
NEUTROPHILS # BLD AUTO: 4.5 X10(3)/MCL (ref 2.1–9.2)
NEUTROPHILS NFR BLD AUTO: 78.1 %
PLATELET # BLD AUTO: 135 X10(3)/MCL (ref 130–400)
PMV BLD AUTO: 11.1 FL (ref 7.4–10.4)
POTASSIUM SERPL-SCNC: 3.8 MMOL/L (ref 3.5–5.1)
PROT SERPL-MCNC: 5.6 GM/DL (ref 5.8–7.6)
RBC # BLD AUTO: 3.06 X10(6)/MCL (ref 4.2–5.4)
SODIUM SERPL-SCNC: 140 MMOL/L (ref 136–145)
WBC # SPEC AUTO: 5.77 X10(3)/MCL (ref 4.5–11.5)

## 2024-02-10 PROCEDURE — 25000003 PHARM REV CODE 250: Performed by: FAMILY MEDICINE

## 2024-02-10 PROCEDURE — A4216 STERILE WATER/SALINE, 10 ML: HCPCS | Performed by: INTERNAL MEDICINE

## 2024-02-10 PROCEDURE — 80053 COMPREHEN METABOLIC PANEL: CPT | Performed by: FAMILY MEDICINE

## 2024-02-10 PROCEDURE — 25000003 PHARM REV CODE 250: Performed by: INTERNAL MEDICINE

## 2024-02-10 PROCEDURE — 85025 COMPLETE CBC W/AUTO DIFF WBC: CPT | Performed by: FAMILY MEDICINE

## 2024-02-10 PROCEDURE — 63700000 PHARM REV CODE 250 ALT 637 W/O HCPCS: Performed by: FAMILY MEDICINE

## 2024-02-10 PROCEDURE — 11000001 HC ACUTE MED/SURG PRIVATE ROOM

## 2024-02-10 PROCEDURE — 63600175 PHARM REV CODE 636 W HCPCS: Performed by: INTERNAL MEDICINE

## 2024-02-10 PROCEDURE — 27000207 HC ISOLATION

## 2024-02-10 PROCEDURE — 94761 N-INVAS EAR/PLS OXIMETRY MLT: CPT

## 2024-02-10 RX ADMIN — CEFEPIME 1 G: 1 INJECTION, POWDER, FOR SOLUTION INTRAMUSCULAR; INTRAVENOUS at 12:02

## 2024-02-10 RX ADMIN — CIPROFLOXACIN HYDROCHLORIDE 750 MG: 250 TABLET, FILM COATED ORAL at 08:02

## 2024-02-10 RX ADMIN — SODIUM CHLORIDE, PRESERVATIVE FREE 10 ML: 5 INJECTION INTRAVENOUS at 02:02

## 2024-02-10 RX ADMIN — PANTOPRAZOLE SODIUM 40 MG: 40 TABLET, DELAYED RELEASE ORAL at 08:02

## 2024-02-10 RX ADMIN — LEVOTHYROXINE SODIUM 137 MCG: 25 TABLET ORAL at 05:02

## 2024-02-10 RX ADMIN — SODIUM CITRATE AND CITRIC ACID MONOHYDRATE 30 ML: 500; 334 SOLUTION ORAL at 08:02

## 2024-02-10 RX ADMIN — SODIUM CHLORIDE, PRESERVATIVE FREE 10 ML: 5 INJECTION INTRAVENOUS at 10:02

## 2024-02-10 RX ADMIN — DULOXETINE HYDROCHLORIDE 30 MG: 30 CAPSULE, DELAYED RELEASE ORAL at 08:02

## 2024-02-10 RX ADMIN — MUPIROCIN 1 G: 20 OINTMENT TOPICAL at 08:02

## 2024-02-10 RX ADMIN — QUETIAPINE FUMARATE 25 MG: 25 TABLET ORAL at 10:02

## 2024-02-10 RX ADMIN — SODIUM BICARBONATE 650 MG: 650 TABLET ORAL at 12:02

## 2024-02-10 RX ADMIN — LINEZOLID 600 MG: 600 TABLET, FILM COATED ORAL at 10:02

## 2024-02-10 RX ADMIN — HYDROCODONE BITARTRATE AND ACETAMINOPHEN 1 TABLET: 10; 325 TABLET ORAL at 08:02

## 2024-02-10 RX ADMIN — POTASSIUM CHLORIDE 20 MEQ: 1500 TABLET, EXTENDED RELEASE ORAL at 08:02

## 2024-02-10 RX ADMIN — Medication 250 MG: at 08:02

## 2024-02-10 RX ADMIN — FERROUS SULFATE TAB 325 MG (65 MG ELEMENTAL FE) 1 EACH: 325 (65 FE) TAB at 08:02

## 2024-02-10 RX ADMIN — SODIUM BICARBONATE: 84 INJECTION, SOLUTION INTRAVENOUS at 04:02

## 2024-02-10 RX ADMIN — SODIUM CHLORIDE: 9 INJECTION, SOLUTION INTRAVENOUS at 12:02

## 2024-02-10 RX ADMIN — FLUCONAZOLE 100 MG: 100 TABLET ORAL at 08:02

## 2024-02-10 RX ADMIN — CALCITRIOL CAPSULES 0.25 MCG 0.5 MCG: 0.25 CAPSULE ORAL at 08:02

## 2024-02-10 RX ADMIN — CLOPIDOGREL BISULFATE 75 MG: 75 TABLET ORAL at 08:02

## 2024-02-10 RX ADMIN — LINEZOLID 600 MG: 600 TABLET, FILM COATED ORAL at 08:02

## 2024-02-10 RX ADMIN — SODIUM BICARBONATE 650 MG: 650 TABLET ORAL at 08:02

## 2024-02-10 RX ADMIN — SODIUM CHLORIDE: 9 INJECTION, SOLUTION INTRAVENOUS at 04:02

## 2024-02-11 LAB
ALBUMIN SERPL-MCNC: 2 G/DL (ref 3.4–4.8)
ALBUMIN/GLOB SERPL: 0.6 RATIO (ref 1.1–2)
ALP SERPL-CCNC: 80 UNIT/L (ref 40–150)
ALT SERPL-CCNC: 6 UNIT/L (ref 0–55)
AST SERPL-CCNC: 11 UNIT/L (ref 5–34)
BASOPHILS # BLD AUTO: 0.02 X10(3)/MCL
BASOPHILS NFR BLD AUTO: 0.3 %
BILIRUB SERPL-MCNC: 0.6 MG/DL
BUN SERPL-MCNC: 31 MG/DL (ref 9.8–20.1)
CALCIUM SERPL-MCNC: 8.6 MG/DL (ref 8.4–10.2)
CHLORIDE SERPL-SCNC: 117 MMOL/L (ref 98–107)
CO2 SERPL-SCNC: 12 MMOL/L (ref 23–31)
CREAT SERPL-MCNC: 3.31 MG/DL (ref 0.55–1.02)
EOSINOPHIL # BLD AUTO: 0.19 X10(3)/MCL (ref 0–0.9)
EOSINOPHIL NFR BLD AUTO: 2.6 %
ERYTHROCYTE [DISTWIDTH] IN BLOOD BY AUTOMATED COUNT: 15 % (ref 11.5–17)
GFR SERPLBLD CREATININE-BSD FMLA CKD-EPI: 14 MLS/MIN/1.73/M2
GLOBULIN SER-MCNC: 3.3 GM/DL (ref 2.4–3.5)
GLUCOSE SERPL-MCNC: 95 MG/DL (ref 82–115)
HCT VFR BLD AUTO: 27.9 % (ref 37–47)
HGB BLD-MCNC: 9.2 G/DL (ref 12–16)
IMM GRANULOCYTES # BLD AUTO: 0.03 X10(3)/MCL (ref 0–0.04)
IMM GRANULOCYTES NFR BLD AUTO: 0.4 %
LYMPHOCYTES # BLD AUTO: 0.7 X10(3)/MCL (ref 0.6–4.6)
LYMPHOCYTES NFR BLD AUTO: 9.5 %
MAGNESIUM SERPL-MCNC: 1.2 MG/DL (ref 1.6–2.6)
MCH RBC QN AUTO: 30.8 PG (ref 27–31)
MCHC RBC AUTO-ENTMCNC: 33 G/DL (ref 33–36)
MCV RBC AUTO: 93.3 FL (ref 80–94)
MONOCYTES # BLD AUTO: 0.4 X10(3)/MCL (ref 0.1–1.3)
MONOCYTES NFR BLD AUTO: 5.4 %
NEUTROPHILS # BLD AUTO: 6.05 X10(3)/MCL (ref 2.1–9.2)
NEUTROPHILS NFR BLD AUTO: 81.8 %
NRBC BLD AUTO-RTO: 0 %
PHOSPHATE SERPL-MCNC: 3 MG/DL (ref 2.3–4.7)
PLATELET # BLD AUTO: 87 X10(3)/MCL (ref 130–400)
PLATELET # BLD EST: ABNORMAL 10*3/UL
PMV BLD AUTO: 11.2 FL (ref 7.4–10.4)
POTASSIUM SERPL-SCNC: 3.6 MMOL/L (ref 3.5–5.1)
PROT SERPL-MCNC: 5.3 GM/DL (ref 5.8–7.6)
RBC # BLD AUTO: 2.99 X10(6)/MCL (ref 4.2–5.4)
RBC MORPH BLD: NORMAL
SODIUM SERPL-SCNC: 138 MMOL/L (ref 136–145)
WBC # SPEC AUTO: 7.39 X10(3)/MCL (ref 4.5–11.5)

## 2024-02-11 PROCEDURE — A4216 STERILE WATER/SALINE, 10 ML: HCPCS | Performed by: INTERNAL MEDICINE

## 2024-02-11 PROCEDURE — 63700000 PHARM REV CODE 250 ALT 637 W/O HCPCS: Performed by: FAMILY MEDICINE

## 2024-02-11 PROCEDURE — 83735 ASSAY OF MAGNESIUM: CPT | Performed by: INTERNAL MEDICINE

## 2024-02-11 PROCEDURE — 25000003 PHARM REV CODE 250: Performed by: INTERNAL MEDICINE

## 2024-02-11 PROCEDURE — 94761 N-INVAS EAR/PLS OXIMETRY MLT: CPT

## 2024-02-11 PROCEDURE — 63600175 PHARM REV CODE 636 W HCPCS: Performed by: INTERNAL MEDICINE

## 2024-02-11 PROCEDURE — 27000207 HC ISOLATION

## 2024-02-11 PROCEDURE — 11000001 HC ACUTE MED/SURG PRIVATE ROOM

## 2024-02-11 PROCEDURE — 85025 COMPLETE CBC W/AUTO DIFF WBC: CPT | Performed by: INTERNAL MEDICINE

## 2024-02-11 PROCEDURE — 80053 COMPREHEN METABOLIC PANEL: CPT | Performed by: INTERNAL MEDICINE

## 2024-02-11 PROCEDURE — 25000003 PHARM REV CODE 250: Performed by: FAMILY MEDICINE

## 2024-02-11 PROCEDURE — 84100 ASSAY OF PHOSPHORUS: CPT | Performed by: INTERNAL MEDICINE

## 2024-02-11 RX ORDER — ONDANSETRON HYDROCHLORIDE 2 MG/ML
4 INJECTION, SOLUTION INTRAVENOUS EVERY 6 HOURS PRN
Status: DISCONTINUED | OUTPATIENT
Start: 2024-02-11 | End: 2024-02-26 | Stop reason: HOSPADM

## 2024-02-11 RX ORDER — ENOXAPARIN SODIUM 100 MG/ML
30 INJECTION SUBCUTANEOUS EVERY 24 HOURS
Status: DISCONTINUED | OUTPATIENT
Start: 2024-02-11 | End: 2024-02-20

## 2024-02-11 RX ORDER — LIDOCAINE 50 MG/G
1 PATCH TOPICAL DAILY
Status: DISCONTINUED | OUTPATIENT
Start: 2024-02-11 | End: 2024-02-26 | Stop reason: HOSPADM

## 2024-02-11 RX ADMIN — CEFEPIME 1 G: 1 INJECTION, POWDER, FOR SOLUTION INTRAMUSCULAR; INTRAVENOUS at 11:02

## 2024-02-11 RX ADMIN — LINEZOLID 600 MG: 600 TABLET, FILM COATED ORAL at 09:02

## 2024-02-11 RX ADMIN — DULOXETINE HYDROCHLORIDE 30 MG: 30 CAPSULE, DELAYED RELEASE ORAL at 09:02

## 2024-02-11 RX ADMIN — SODIUM BICARBONATE: 84 INJECTION, SOLUTION INTRAVENOUS at 10:02

## 2024-02-11 RX ADMIN — LEVOTHYROXINE SODIUM 137 MCG: 25 TABLET ORAL at 09:02

## 2024-02-11 RX ADMIN — FERROUS SULFATE TAB 325 MG (65 MG ELEMENTAL FE) 1 EACH: 325 (65 FE) TAB at 09:02

## 2024-02-11 RX ADMIN — HYDROCODONE BITARTRATE AND ACETAMINOPHEN 1 TABLET: 10; 325 TABLET ORAL at 06:02

## 2024-02-11 RX ADMIN — CEFEPIME 1 G: 1 INJECTION, POWDER, FOR SOLUTION INTRAMUSCULAR; INTRAVENOUS at 12:02

## 2024-02-11 RX ADMIN — CALCITRIOL CAPSULES 0.25 MCG 0.5 MCG: 0.25 CAPSULE ORAL at 09:02

## 2024-02-11 RX ADMIN — CIPROFLOXACIN HYDROCHLORIDE 750 MG: 250 TABLET, FILM COATED ORAL at 09:02

## 2024-02-11 RX ADMIN — SODIUM CHLORIDE, PRESERVATIVE FREE 10 ML: 5 INJECTION INTRAVENOUS at 09:02

## 2024-02-11 RX ADMIN — ENOXAPARIN SODIUM 30 MG: 30 INJECTION SUBCUTANEOUS at 11:02

## 2024-02-11 RX ADMIN — PANTOPRAZOLE SODIUM 40 MG: 40 TABLET, DELAYED RELEASE ORAL at 09:02

## 2024-02-11 RX ADMIN — SODIUM CHLORIDE, PRESERVATIVE FREE 10 ML: 5 INJECTION INTRAVENOUS at 02:02

## 2024-02-11 RX ADMIN — POTASSIUM CHLORIDE 20 MEQ: 1500 TABLET, EXTENDED RELEASE ORAL at 09:02

## 2024-02-11 RX ADMIN — CLOPIDOGREL BISULFATE 75 MG: 75 TABLET ORAL at 09:02

## 2024-02-11 RX ADMIN — SODIUM CHLORIDE, PRESERVATIVE FREE 10 ML: 5 INJECTION INTRAVENOUS at 11:02

## 2024-02-11 RX ADMIN — ONDANSETRON 4 MG: 2 INJECTION INTRAMUSCULAR; INTRAVENOUS at 01:02

## 2024-02-11 RX ADMIN — QUETIAPINE FUMARATE 25 MG: 25 TABLET ORAL at 09:02

## 2024-02-11 RX ADMIN — FLUCONAZOLE 100 MG: 100 TABLET ORAL at 09:02

## 2024-02-11 RX ADMIN — LIDOCAINE 5% 1 PATCH: 700 PATCH TOPICAL at 12:02

## 2024-02-11 RX ADMIN — Medication 250 MG: at 09:02

## 2024-02-12 PROBLEM — R60.0 EDEMA OF LEFT UPPER EXTREMITY: Status: ACTIVE | Noted: 2024-02-12

## 2024-02-12 LAB
ALBUMIN SERPL-MCNC: 1.9 G/DL (ref 3.4–4.8)
ALBUMIN/GLOB SERPL: 0.6 RATIO (ref 1.1–2)
ALP SERPL-CCNC: 81 UNIT/L (ref 40–150)
ALT SERPL-CCNC: 5 UNIT/L (ref 0–55)
AST SERPL-CCNC: 11 UNIT/L (ref 5–34)
BASOPHILS # BLD AUTO: 0.01 X10(3)/MCL
BASOPHILS NFR BLD AUTO: 0.2 %
BILIRUB SERPL-MCNC: 0.7 MG/DL
BUN SERPL-MCNC: 30 MG/DL (ref 9.8–20.1)
CALCIUM SERPL-MCNC: 8.8 MG/DL (ref 8.4–10.2)
CHLORIDE SERPL-SCNC: 112 MMOL/L (ref 98–107)
CO2 SERPL-SCNC: 19 MMOL/L (ref 23–31)
CREAT SERPL-MCNC: 3.19 MG/DL (ref 0.55–1.02)
EOSINOPHIL # BLD AUTO: 0.2 X10(3)/MCL (ref 0–0.9)
EOSINOPHIL NFR BLD AUTO: 3.8 %
ERYTHROCYTE [DISTWIDTH] IN BLOOD BY AUTOMATED COUNT: 14.9 % (ref 11.5–17)
GFR SERPLBLD CREATININE-BSD FMLA CKD-EPI: 15 MLS/MIN/1.73/M2
GLOBULIN SER-MCNC: 3.3 GM/DL (ref 2.4–3.5)
GLUCOSE SERPL-MCNC: 93 MG/DL (ref 82–115)
HCT VFR BLD AUTO: 26.2 % (ref 37–47)
HGB BLD-MCNC: 8.4 G/DL (ref 12–16)
IMM GRANULOCYTES # BLD AUTO: 0.02 X10(3)/MCL (ref 0–0.04)
IMM GRANULOCYTES NFR BLD AUTO: 0.4 %
LACTATE SERPL-SCNC: 0.9 MMOL/L (ref 0.5–2.2)
LYMPHOCYTES # BLD AUTO: 0.85 X10(3)/MCL (ref 0.6–4.6)
LYMPHOCYTES NFR BLD AUTO: 15.9 %
MCH RBC QN AUTO: 30.2 PG (ref 27–31)
MCHC RBC AUTO-ENTMCNC: 32.1 G/DL (ref 33–36)
MCV RBC AUTO: 94.2 FL (ref 80–94)
MONOCYTES # BLD AUTO: 0.34 X10(3)/MCL (ref 0.1–1.3)
MONOCYTES NFR BLD AUTO: 6.4 %
NEUTROPHILS # BLD AUTO: 3.91 X10(3)/MCL (ref 2.1–9.2)
NEUTROPHILS NFR BLD AUTO: 73.3 %
PLATELET # BLD AUTO: 126 X10(3)/MCL (ref 130–400)
PMV BLD AUTO: 10.8 FL (ref 7.4–10.4)
POTASSIUM SERPL-SCNC: 3.5 MMOL/L (ref 3.5–5.1)
PROT SERPL-MCNC: 5.2 GM/DL (ref 5.8–7.6)
RBC # BLD AUTO: 2.78 X10(6)/MCL (ref 4.2–5.4)
SODIUM SERPL-SCNC: 139 MMOL/L (ref 136–145)
WBC # SPEC AUTO: 5.33 X10(3)/MCL (ref 4.5–11.5)

## 2024-02-12 PROCEDURE — A4216 STERILE WATER/SALINE, 10 ML: HCPCS | Performed by: INTERNAL MEDICINE

## 2024-02-12 PROCEDURE — 25000003 PHARM REV CODE 250: Performed by: INTERNAL MEDICINE

## 2024-02-12 PROCEDURE — 25000003 PHARM REV CODE 250: Performed by: FAMILY MEDICINE

## 2024-02-12 PROCEDURE — 85025 COMPLETE CBC W/AUTO DIFF WBC: CPT | Performed by: INTERNAL MEDICINE

## 2024-02-12 PROCEDURE — 63600175 PHARM REV CODE 636 W HCPCS: Performed by: INTERNAL MEDICINE

## 2024-02-12 PROCEDURE — 80053 COMPREHEN METABOLIC PANEL: CPT | Performed by: INTERNAL MEDICINE

## 2024-02-12 PROCEDURE — 27000207 HC ISOLATION

## 2024-02-12 PROCEDURE — 63700000 PHARM REV CODE 250 ALT 637 W/O HCPCS: Performed by: FAMILY MEDICINE

## 2024-02-12 PROCEDURE — 83605 ASSAY OF LACTIC ACID: CPT | Performed by: INTERNAL MEDICINE

## 2024-02-12 PROCEDURE — 11000001 HC ACUTE MED/SURG PRIVATE ROOM

## 2024-02-12 PROCEDURE — 94761 N-INVAS EAR/PLS OXIMETRY MLT: CPT

## 2024-02-12 RX ADMIN — SODIUM CHLORIDE, PRESERVATIVE FREE 10 ML: 5 INJECTION INTRAVENOUS at 08:02

## 2024-02-12 RX ADMIN — LINEZOLID 600 MG: 600 TABLET, FILM COATED ORAL at 09:02

## 2024-02-12 RX ADMIN — QUETIAPINE FUMARATE 25 MG: 25 TABLET ORAL at 08:02

## 2024-02-12 RX ADMIN — CALCITRIOL CAPSULES 0.25 MCG 0.5 MCG: 0.25 CAPSULE ORAL at 09:02

## 2024-02-12 RX ADMIN — LEVOTHYROXINE SODIUM 137 MCG: 25 TABLET ORAL at 07:02

## 2024-02-12 RX ADMIN — Medication 250 MG: at 09:02

## 2024-02-12 RX ADMIN — PANTOPRAZOLE SODIUM 40 MG: 40 TABLET, DELAYED RELEASE ORAL at 09:02

## 2024-02-12 RX ADMIN — SODIUM BICARBONATE: 84 INJECTION, SOLUTION INTRAVENOUS at 05:02

## 2024-02-12 RX ADMIN — LIDOCAINE 5% 1 PATCH: 700 PATCH TOPICAL at 09:02

## 2024-02-12 RX ADMIN — DULOXETINE HYDROCHLORIDE 30 MG: 30 CAPSULE, DELAYED RELEASE ORAL at 09:02

## 2024-02-12 RX ADMIN — FLUCONAZOLE 100 MG: 100 TABLET ORAL at 09:02

## 2024-02-12 RX ADMIN — HYDROCODONE BITARTRATE AND ACETAMINOPHEN 1 TABLET: 10; 325 TABLET ORAL at 09:02

## 2024-02-12 RX ADMIN — CLOPIDOGREL BISULFATE 75 MG: 75 TABLET ORAL at 09:02

## 2024-02-12 RX ADMIN — LINEZOLID 600 MG: 600 TABLET, FILM COATED ORAL at 08:02

## 2024-02-12 RX ADMIN — FERROUS SULFATE TAB 325 MG (65 MG ELEMENTAL FE) 1 EACH: 325 (65 FE) TAB at 09:02

## 2024-02-12 RX ADMIN — HYDROCODONE BITARTRATE AND ACETAMINOPHEN 1 TABLET: 10; 325 TABLET ORAL at 08:02

## 2024-02-12 RX ADMIN — ENOXAPARIN SODIUM 30 MG: 30 INJECTION SUBCUTANEOUS at 05:02

## 2024-02-12 RX ADMIN — POTASSIUM CHLORIDE 20 MEQ: 1500 TABLET, EXTENDED RELEASE ORAL at 09:02

## 2024-02-12 NOTE — PLAN OF CARE
02/12/24 1148   Discharge Reassessment   Assessment Type Discharge Planning Reassessment   Discharge Plan discussed with: Patient   Discharge Plan A Skilled Nursing Facility   Discharge Plan B Skilled Nursing Facility   DME Needed Upon Discharge  none   Transition of Care Barriers None   Why the patient remains in the hospital Requires continued medical care   Post-Acute Status   Post-Acute Authorization Placement   Post-Acute Placement Status Referrals Sent   Discharge Delays (!) Post-Acute Set-up     Met w/pt.  She agreed to SNF placement somewhere here in [place].  Notified CAREY Mendenhall at [facility], for LOC[facility].  Referral sent to [place] [facility].

## 2024-02-12 NOTE — ASSESSMENT & PLAN NOTE
We had been with holding Lovenox because of her anemia and renal insufficiency.  However now in the arm with fracture she has developed a 1+ edema so we will start Lovenox 30 and get an ultrasound of the upper extremity as suspect she is probably developed a DVT.  This is the case we will start her on Eliquis

## 2024-02-12 NOTE — PLAN OF CARE
The LOCET call in to [facility] to process admission request for SNF placement , TC in Case Mgmt notified.

## 2024-02-12 NOTE — PT/OT/SLP PROGRESS
Physical Therapy      Patient Name:  Kam Reyes   MRN:  [id number]    Patient not seen today secondary to US being performed to r/o DVT. Will follow-up tomorrow for results and clearance for PT.

## 2024-02-12 NOTE — PROGRESS NOTES
[facility]  [address] [place] [address]  Phone: [phone]    ([facility]) Internal Medicine  Progress Note      PATIENT NAME: Kam Reyes  MRN: [id number]  TODAY'S DATE: 02/11/2024  ADMIT DATE: 2/5/2024    SUBJECTIVE     PRINCIPLE PROBLEM: <principal problem not specified>    INTERVAL HISTORY:    2/11/2024  On service call for Dr. Heaton coverage.  Patient with a history of an colostomy as well as a urostomy.  She was multidrug resistant urinary tract infection with several pathogens.  It has been very difficult to get a simple regimen for her.     Patient was complaining some nausea today.  Additionally, her CO2 level is decreasing indicative of an acidosis.  Please see time of initiation of linezolid.      Review of patient's allergies indicates:   Allergen Reactions    Oxaprozin Nausea And Vomiting and Swelling     Facial swelling      Sulfamethoxazole-trimethoprim Nausea And Vomiting     Severe nausea and vomiting    Doxycycline     Nsaids (non-steroidal anti-inflammatory drug) Rash    Sulfa (sulfonamide antibiotics) Nausea And Vomiting       ROS-14 point review of systems are except as mentioned above.    OBJECTIVE     VITAL SIGNS (Most Recent)  Temp: 98.1 °F (36.7 °C) (02/11/24 1926)  Pulse: 81 (02/11/24 1926)  Resp: 20 (02/11/24 1838)  BP: 128/71 (02/11/24 1926)  SpO2: 96 % (02/11/24 2000)    VENTILATION STATUS  Resp: 20 (02/11/24 1838)  SpO2: 96 % (02/11/24 2000)           I & O (Last 24H):  Intake/Output Summary (Last 24 hours) at 2/11/2024 2203  Last data filed at 2/11/2024 1719  Gross per 24 hour   Intake 360 ml   Output 1550 ml   Net -1190 ml       WEIGHTS  Wt Readings from Last 3 Encounters:   02/05/24 1118 79.8 kg (176 lb)   01/26/24 1841 79.8 kg (176 lb)   01/26/24 1635 79.8 kg (176 lb)   01/20/24 1839 79.4 kg (175 lb)       Physical Exam    On physical exam patient is alert orient x3.  Cranial nerves 2-12 are intact no JVD regular rate and rhythm no rubs  "gallops or murmurs she looks rather ill but in no significant distress.  She has an emesis basin next to her.  CNA is present in the bedroom with me.  She is taking some vitals.  Cranial nerves 2-12 are intact.  No JVD regular rate and rhythm no rubs gallops or murmurs clear to auscultation bilaterally.  She is got the colostomy site on the right side with clean grew bilious type bowel movement.  The suprapubic catheter present with clear urine but with a definitive ammonia smelling type of odor.        SCHEDULED MEDS:   ascorbic acid (vitamin C)  250 mg Oral Daily    calcitRIOL  0.5 mcg Oral Daily    ceFEPime IV (PEDS and ADULTS)  1 g Intravenous Q12H    ciprofloxacin HCl  750 mg Oral Daily    clopidogreL  75 mg Oral Daily    DULoxetine  30 mg Oral Daily    ferrous sulfate  1 tablet Oral Daily    fluconazole  100 mg Oral Daily    levothyroxine  137 mcg Oral Before breakfast    LIDOcaine  1 patch Transdermal Daily    linezolid  600 mg Oral Q12H    pantoprazole  40 mg Oral Daily    potassium chloride  20 mEq Oral Daily    QUEtiapine  25 mg Oral QHS    sodium chloride 0.9%  10 mL Intravenous Q8H       CONTINUOUS INFUSIONS:   sodium bicarbonate 150 mEq in dextrose 5 % (D5W) 1,150 mL infusion 75 mL/hr at 02/11/24 1051       PRN MEDS:0.9%  NaCl infusion (for blood administration), acetaminophen, HYDROcodone-acetaminophen, ondansetron    LABS AND DIAGNOSTICS     CBC LAST 3 DAYS  Recent Labs   Lab 02/08/24  0853 02/10/24  0304 02/11/24  0443   WBC 6.72 5.77 7.39   RBC 3.00* 3.06* 2.99*   HGB 9.0* 9.2* 9.2*   HCT 28.7* 30.4* 27.9*   MCV 95.7* 99.3* 93.3   MCH 30.0 30.1 30.8   MCHC 31.4* 30.3* 33.0   RDW 14.4 15.0 15.0   * 135 87*   MPV 11.2* 11.1* 11.2*       COAGULATION LAST 3 DAYS  No results for input(s): "LABPT", "INR", "APTT" in the last 168 hours.    CHEMISTRY LAST 3 DAYS  Recent Labs   Lab 02/08/24  0853 02/10/24  0303 02/11/24  0443    140 138   K 3.4* 3.8 3.6   CO2 15* 12* 12*   BUN 48.0* 35.0* 31.0* " "  CREATININE 4.28* 3.62* 3.31*   CALCIUM 8.8 8.9 8.6   MG  --   --  1.20*   ALBUMIN 2.2* 2.1* 2.0*   ALKPHOS 80 83 80   ALT 6 6 6   AST 11 11 11   BILITOT 0.9 0.5 0.6       CARDIAC PROFILE LAST 3 DAYS  No results for input(s): "BNP", "CPK", "CPKMB", "LDH", "TROPONINI" in the last 168 hours.    ENDOCRINE LAST 3 DAYS  No results for input(s): "TSH", "PROCAL" in the last 168 hours.    LAST ARTERIAL BLOOD GAS  ABG  No results for input(s): "PH", "PO2", "PCO2", "HCO3", "BE" in the last 168 hours.    LAST 7 DAYS MICROBIOLOGY   Microbiology Results (last 7 days)       Procedure Component Value Units Date/Time    Blood culture #1 **CANNOT BE ORDERED STAT** [[id number]]  (Normal) Collected: 02/05/24 1312    Order Status: Completed Specimen: Blood from Arm, Right Updated: 02/10/24 1800     CULTURE, BLOOD ([facility]) No Growth at 5 days    Blood culture #2 **CANNOT BE ORDERED STAT** [[id number]]  (Normal) Collected: 02/05/24 1312    Order Status: Completed Specimen: Blood from Arm, Right Updated: 02/10/24 1800     CULTURE, BLOOD ([facility]) No Growth at 5 days    Urine culture [[id number]]  (Abnormal)  (Susceptibility) Collected: 02/05/24 1459    Order Status: Completed Specimen: Urine Updated: 02/10/24 0738     Urine Culture >/= 100,000 colonies/ml Enterococcus faecium     Comment: Ampicillin results may be used to predict susceptibility to amoxicillin-clavulanate, ampicillin-sulbactam, and piperacillin-tazobactam.         >/= 100,000 colonies/ml Candida albicans            MOST RECENT IMAGING  CT Abdomen Pelvis  Without Contrast  Narrative: EXAMINATION:  CT ABDOMEN PELVIS WITHOUT CONTRAST    CLINICAL HISTORY:  UTI, recurrent/complicated (Female);, .    TECHNIQUE:  PATIENT RADIATION DOSE: DLP(mGycm) 384    As per PQRS measures, all CT scans at this facility used dose modulation, iterative reconstruction, and/or weight based dose adjustment when appropriate to reduce radiation dose to as low as reasonably " achievable.    COMPARISON:  06/01/2023    FINDINGS:  Serial axial images were obtained of the abdomen pelvis without the administration of IV contrast.  Additional sagittal and coronal reconstructions were performed. Degenerative changes are noted to the thoracolumbar spine.  A few 3-4 mm subpleural nodule is evident at the right lung base with mild pleural thickening.  The heart is normal in size.  There is mucosal prominence at the GE junction.  There is mucosal prominence versus underdistention at the proximal stomach.  There is beam hardening artifact at the upper abdomen due to patient body habitus and positioning.  The liver, spleen, adrenal glands, and pancreas are grossly within normal limits without the administration of IV contrast.  The gallbladder is surgically absent.  The kidneys are relatively symmetric in size.  There is bilateral extensive perinephric stranding, advanced renal lobulation, and renal cortical thinning.  Prominent renal pelvis sees are evident bilaterally.  A right ureteral stent is identified.  There is bilateral periureteral stranding.  The stomach and duodenum are partially distended with fluid and particulate food matter.  There is a suspect the duodenal diverticulum versus asymmetric prominence of the proximal duodenum.  A few small lymph nodes seen within the periaortic and pericaval region.  Again noted is a ventral hernia containing loops of bowel.  A ostomy is evident at the right lateral anterior abdominal wall.  A suprapubic catheter is present within a nondistended bladder.  A small amount of gas present within the bladder.  There is localized edema/stranding in the perirectal region postsurgical changes are evident at the rectosigmoid region.  No dilated loops of bowel are identified.  The uterus is absent  Impression: 1. Few 3-4 mm subpleural nodules at the posterior right lower lung with the pleural thickening and posterior right lung base  2. Status post  cholecystectomy  3. Bilateral renal cortical thinning, perinephric and periureteral stranding, and renal lobulation with the right renal stent again identified  4. Ventral hernia again identified with adjacent right lateral ostomy which has a similar appearance to the prior exam  5. Suprapubic catheter again identified with small amount of gas in the bladder  6. Perirectal stranding/edema with postsurgical changes at the rectosigmoid junction.    Electronically signed by: Damon Fernandez  Date:    02/07/2024  Time:    14:37      LASTECHO  No results found for this or any previous visit.      CURRENT/PREVIOUS VISIT EKG  No results found for this or any previous visit.    ASSESSMENT/PLAN:     Active Hospital Problems    Diagnosis    Fracture of surgical neck of humerus    Hypertension    Urinary tract infection    Anemia of chronic kidney failure, stage 4 (severe)    Ileostomy status    Recurrent major depression    Dyspnea    GERD (gastroesophageal reflux disease)    Hypothyroid    Anxiety disorder       ASSESSMENT & PLAN:   Continue supportive care for the fractured humerus.      Continue to monitor hypertension.    Continue current antibiotics.  I am concerned about the patient had moderate acidosis of unclear origin.  Could this be infection that has not being cleared or could this be a side-effect of linezolid on under another underlying process.    Continue to monitor her thyroid status    Continue anxiety medications.      RECOMMENDATIONS:  DVT prophylaxis.  Patient is out of the window orthopedically.  Will place on renally dosed Lovenox.        Ash Hlaey III, MD  Department of Hospital Medicine ([facility])   Date of Service: 02/11/2024  10:03 PM

## 2024-02-12 NOTE — ASSESSMENT & PLAN NOTE
Continue current regimen seems to be improving with decreased leukocytosis  No fever during her stay urine is clearing.    Culture done in the hospital shows enterrococcus faeicium   The vancomycin however she has had kidney insufficiency with apparent escalation of her renal insufficiency on vancomycin so we are giving linezolid we will stop all other antibiotics except for her Diflucan at this time as culture shows only Enterococcus faecium.

## 2024-02-12 NOTE — PROGRESS NOTES
[facility] - Medical Surgical Unit  [facility] Medicine  Progress Note    Patient Name: Kam Reyes  MRN: [id number]  Patient Class: IP- Inpatient   Admission Date: 2/5/2024  Length of Stay: 7 days  Attending Physician: Gregor Heaton MD  Primary Care Provider: Gregor Heaton MD        Subjective:     Principal Problem:<principal problem not specified>        HPI:  Patient is a 73-year-old known to me from clinic.  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  Patient had frequent urinary tract infections.  After many procedures was continuing to have them.  She was hospitalized about a week ago for urinary tract infection.  Remained asymptomatic during her stay with no leukocytosis grew only Proteus which was sensitive to Cipro.  She was discharged home and apparently over the weekend was becoming more nauseated and had feeling more ill.  Since the culture has grown out 3 more organisms including Enterobacter faecium Proteus mirabilis Pseudomonas aeruginosa enrol sella plan to coli.  Only the Pseudomonas and Proteus were sensitive to the Cipro my floxacillin she was sent on home on.  Moreover her urinary catheter came out today she would called initially for us to replace it in the office but she was feeling too unwell to come the office and went straight to the emergency room.  There she was complaining of nausea vomiting and some abdominal pain.  Was noted to have leukocytosis of 19,000. And urinary tract infection continuing.    Overview/Hospital Course:  Much better today no shortness a breath less nausea no abdominal pain.  Her leukocytosis resolved.  However her kidney function has improved, at baseline    She does have an H and H moved over 9 after 2 units.  Now stable  Vre per culture, on linesolid  Has swelling to the left arm today, venous doppler is pending  No sob, no cp , no palp    Past Medical History:   Diagnosis Date     Cancer, colon     Chronic pain     GERD (gastroesophageal reflux disease)     Renal disorder     Thyroiditis, unspecified        Past Surgical History:   Procedure Laterality Date    BACK SURGERY       SECTION      CHOLECYSTECTOMY      COLON SURGERY      HYSTERECTOMY      KIDNEY SURGERY         Review of patient's allergies indicates:   Allergen Reactions    Oxaprozin Nausea And Vomiting and Swelling     Facial swelling      Sulfamethoxazole-trimethoprim Nausea And Vomiting     Severe nausea and vomiting    Doxycycline     Nsaids (non-steroidal anti-inflammatory drug) Rash    Sulfa (sulfonamide antibiotics) Nausea And Vomiting       No current facility-administered medications on file prior to encounter.     Current Outpatient Medications on File Prior to Encounter   Medication Sig    ascorbic acid, vitamin C, (VITAMIN C) 100 MG tablet Take 100 mg by mouth once daily.    calcitRIOL (ROCALTROL) 0.5 MCG Cap Take 0.5 mcg by mouth once daily.    ciprofloxacin HCl (CIPRO) 500 MG tablet Take 1 tablet (500 mg total) by mouth every 12 (twelve) hours.    clopidogreL (PLAVIX) 75 mg tablet Take 75 mg by mouth once daily.    DULoxetine (CYMBALTA) 30 MG capsule Take 30 mg by mouth once daily.    ferrous sulfate 325 (65 FE) MG EC tablet Take 325 mg by mouth 3 (three) times daily with meals.    HYDROcodone-acetaminophen (NORCO)  mg per tablet Take 1 tablet by mouth.    levothyroxine (SYNTHROID) 100 MCG tablet Take 137 mcg by mouth before breakfast.    omeprazole (PRILOSEC) 40 MG capsule Take 40 mg by mouth once daily.    QUEtiapine (SEROQUEL) 25 MG Tab Take by mouth.    sodium bicarbonate 650 MG tablet Take 650 mg by mouth 4 (four) times daily.     Family History       Problem Relation (Age of Onset)    Hypertension Mother          Tobacco Use    Smoking status: Never    Smokeless tobacco: Never   Substance and Sexual Activity    Alcohol use: Never    Drug use: Never    Sexual activity: Not Currently     Review of  Systems   Constitutional:  Positive for activity change and fever.   HENT: Negative.     Eyes: Negative.    Respiratory: Negative.     Cardiovascular: Negative.    Gastrointestinal:  Positive for abdominal pain and nausea.   Endocrine: Negative.    Genitourinary: Negative.    Musculoskeletal:  Positive for arthralgias and joint swelling.   Skin: Negative.    Allergic/Immunologic: Negative.    Neurological:  Positive for weakness.   Hematological: Negative.    Psychiatric/Behavioral: Negative.       Objective:     Vital Signs (Most Recent):  Temp: 97.9 °F (36.6 °C) (02/12/24 0334)  Pulse: 83 (02/12/24 0334)  Resp: 20 (02/11/24 1838)  BP: 114/71 (02/12/24 0334)  SpO2: 97 % (02/12/24 0334) Vital Signs (24h Range):  Temp:  [97.7 °F (36.5 °C)-98.3 °F (36.8 °C)] 97.9 °F (36.6 °C)  Pulse:  [78-89] 83  Resp:  [18-20] 20  SpO2:  [94 %-99 %] 97 %  BP: (114-171)/(65-84) 114/71     Weight: 79.8 kg (176 lb)  Body mass index is 33.25 kg/m².     Physical Exam  Constitutional:       General: She is not in acute distress.     Appearance: She is obese.   HENT:      Head: Normocephalic and atraumatic.      Nose: Nose normal.      Mouth/Throat:      Mouth: Mucous membranes are moist.      Pharynx: Oropharynx is clear. No oropharyngeal exudate.   Eyes:      Conjunctiva/sclera: Conjunctivae normal.   Cardiovascular:      Rate and Rhythm: Normal rate and regular rhythm.      Pulses: Normal pulses.      Heart sounds: Normal heart sounds.      Comments: 1 plus edema to the left upper extremity  Pulmonary:      Effort: Pulmonary effort is normal.      Breath sounds: Normal breath sounds.   Abdominal:      General: Abdomen is flat.      Palpations: Abdomen is soft.   Musculoskeletal:         General: No swelling or deformity. Normal range of motion.      Cervical back: Normal range of motion and neck supple. No rigidity.   Skin:     General: Skin is warm and dry.      Capillary Refill: Capillary refill takes less than 2 seconds.    Neurological:      General: No focal deficit present.      Mental Status: She is alert and oriented to person, place, and time.   Psychiatric:         Mood and Affect: Mood normal.         Behavior: Behavior normal.         Thought Content: Thought content normal.         Judgment: Judgment normal.                Significant Labs: All pertinent labs within the past 24 hours have been reviewed.  CBC:   Recent Labs   Lab 02/11/24 0443 02/12/24  0331   WBC 7.39 5.33   HGB 9.2* 8.4*   HCT 27.9* 26.2*   PLT 87* 126*       CMP:   Recent Labs   Lab 02/11/24 0443 02/12/24  0331    139   K 3.6 3.5   CO2 12* 19*   BUN 31.0* 30.0*   CREATININE 3.31* 3.19*   CALCIUM 8.6 8.8   ALBUMIN 2.0* 1.9*   BILITOT 0.6 0.7   ALKPHOS 80 81   AST 11 11   ALT 6 5         Significant Imaging: I have reviewed all pertinent imaging results/findings within the past 24 hours.    Assessment/Plan:      Fracture of surgical neck of humerus  I will consult ortho in-house to assess  Dr. Machado is following      Urinary tract infection  Continue current regimen seems to be improving with decreased leukocytosis  No fever during her stay urine is clearing.    Culture done in the hospital shows enterrococcus faeicium   The vancomycin however she has had kidney insufficiency with apparent escalation of her renal insufficiency on vancomycin so we are giving linezolid we will stop all other antibiotics except for her Diflucan at this time as culture shows only Enterococcus faecium.    Hypertension  Continue home medications as written    Anxiety disorder        Hypothyroid  Continue Synthroid      Personal history of other malignant neoplasm of large intestine  We had been with holding Lovenox because of her anemia and renal insufficiency.  However now in the arm with fracture she has developed a 1+ edema so we will start Lovenox 30 and get an ultrasound of the upper extremity as suspect she is probably developed a DVT.  This is the case we will  start her on Eliquis      Recurrent major depression  Continue her Cymbalta as her allergy duloxetine was put in an error    Ileostomy status  Continue care and maintenance      GERD (gastroesophageal reflux disease)  Continue p.o. Protonix      Anemia of chronic kidney failure, stage 4 (severe)  Creatine stable for now. BMP reviewed- noted Estimated Creatinine Clearance: 15 mL/min (A) (based on SCr of 3.19 mg/dL (H)). according to latest data. Based on current GFR, CKD stage is stage 4 - GFR 15-29.  Monitor UOP and serial BMP and adjust therapy as needed. Renally dose meds. Avoid nephrotoxic medications and procedures.  After a single dose of vancomycin her creatinine did increase we will continue the linesolid per id rec.  She was given a dose of Procrit and is status post 2 units packed red blood cell creatinine has improved slightly currently her hemoglobin is over 8    Dyspnea  Now resolved        VTE Risk Mitigation (From admission, onward)           Ordered     enoxaparin injection 30 mg  Daily         02/11/24 2213     IP VTE HIGH RISK PATIENT  Once         02/05/24 1537     Place sequential compression device  Until discontinued         02/05/24 1537                    Discharge Planning   JOSHUA:      Code Status: Full Code   Is the patient medically ready for discharge?:     Reason for patient still in hospital (select all that apply): Patient new problem  Discharge Plan A: Home with family          No dvt on us, will consult pt/ot and elevate,   apply compression sleeve        Gregor Heaton MD  Department of Hospital Medicine   [facility] - Medical Surgical Unit

## 2024-02-12 NOTE — ASSESSMENT & PLAN NOTE
Creatine stable for now. BMP reviewed- noted Estimated Creatinine Clearance: 15 mL/min (A) (based on SCr of 3.19 mg/dL (H)). according to latest data. Based on current GFR, CKD stage is stage 4 - GFR 15-29.  Monitor UOP and serial BMP and adjust therapy as needed. Renally dose meds. Avoid nephrotoxic medications and procedures.  After a single dose of vancomycin her creatinine did increase we will continue the linesolid per id rec.  She was given a dose of Procrit and is status post 2 units packed red blood cell creatinine has improved slightly currently her hemoglobin is over 8

## 2024-02-12 NOTE — PROGRESS NOTES
[facility]  [address] [place] [address]  Phone: [phone]    (Hospital) Internal Medicine  Progress Note      PATIENT NAME: Kam Reyes  MRN: [id number]  TODAY'S DATE: 02/11/2024  ADMIT DATE: 2/5/2024    SUBJECTIVE     PRINCIPLE PROBLEM: <principal problem not specified>    INTERVAL HISTORY:    2/11/2024  Patient would continued acidosis.  Nausea is a little less today after she has been on some antiemetics.  I started a bicarb drip on her yesterday in hopes that that would taper her acidosis.  Of note I did stop Dr. Heaton citrate/bicarbonate tablets to try to give her more intravenous route.    Review of patient's allergies indicates:   Allergen Reactions    Oxaprozin Nausea And Vomiting and Swelling     Facial swelling      Sulfamethoxazole-trimethoprim Nausea And Vomiting     Severe nausea and vomiting    Doxycycline     Nsaids (non-steroidal anti-inflammatory drug) Rash    Sulfa (sulfonamide antibiotics) Nausea And Vomiting       ROS 14 point review of systems negative except as mentioned above.        OBJECTIVE     VITAL SIGNS (Most Recent)  Temp: 98.1 °F (36.7 °C) (02/11/24 1926)  Pulse: 81 (02/11/24 1926)  Resp: 20 (02/11/24 1838)  BP: 128/71 (02/11/24 1926)  SpO2: 96 % (02/11/24 2000)    VENTILATION STATUS  Resp: 20 (02/11/24 1838)  SpO2: 96 % (02/11/24 2000)           I & O (Last 24H):  Intake/Output Summary (Last 24 hours) at 2/11/2024 2214  Last data filed at 2/11/2024 1719  Gross per 24 hour   Intake 360 ml   Output 1550 ml   Net -1190 ml       WEIGHTS  Wt Readings from Last 3 Encounters:   02/05/24 1118 79.8 kg (176 lb)   01/26/24 1841 79.8 kg (176 lb)   01/26/24 1635 79.8 kg (176 lb)   01/20/24 1839 79.4 kg (175 lb)       Physical Exam    Patient is alert orient x3.  She still appears chronically ill.  She does not appear his it was yesterday because of less nausea cranial nerves 2 12 intact no JVD regular rate and rhythm no rubs gallops or murmurs clear  "to auscultation bilaterally soft nontender nondistended abdomen ostomy present suprapubic present.  Appropriate output.  No clubbing cyanosis or edema bilateral lower extremities.    SCHEDULED MEDS:   ascorbic acid (vitamin C)  250 mg Oral Daily    calcitRIOL  0.5 mcg Oral Daily    ceFEPime IV (PEDS and ADULTS)  1 g Intravenous Q12H    ciprofloxacin HCl  750 mg Oral Daily    clopidogreL  75 mg Oral Daily    DULoxetine  30 mg Oral Daily    enoxaparin  30 mg Subcutaneous Daily    ferrous sulfate  1 tablet Oral Daily    fluconazole  100 mg Oral Daily    levothyroxine  137 mcg Oral Before breakfast    LIDOcaine  1 patch Transdermal Daily    linezolid  600 mg Oral Q12H    pantoprazole  40 mg Oral Daily    potassium chloride  20 mEq Oral Daily    QUEtiapine  25 mg Oral QHS    sodium chloride 0.9%  10 mL Intravenous Q8H       CONTINUOUS INFUSIONS:   sodium bicarbonate 150 mEq in dextrose 5 % (D5W) 1,150 mL infusion 75 mL/hr at 02/11/24 1051       PRN MEDS:0.9%  NaCl infusion (for blood administration), acetaminophen, HYDROcodone-acetaminophen, ondansetron    LABS AND DIAGNOSTICS     CBC LAST 3 DAYS  Recent Labs   Lab 02/08/24  0853 02/10/24  0304 02/11/24  0443   WBC 6.72 5.77 7.39   RBC 3.00* 3.06* 2.99*   HGB 9.0* 9.2* 9.2*   HCT 28.7* 30.4* 27.9*   MCV 95.7* 99.3* 93.3   MCH 30.0 30.1 30.8   MCHC 31.4* 30.3* 33.0   RDW 14.4 15.0 15.0   * 135 87*   MPV 11.2* 11.1* 11.2*       COAGULATION LAST 3 DAYS  No results for input(s): "LABPT", "INR", "APTT" in the last 168 hours.    CHEMISTRY LAST 3 DAYS  Recent Labs   Lab 02/08/24  0853 02/10/24  0303 02/11/24  0443    140 138   K 3.4* 3.8 3.6   CO2 15* 12* 12*   BUN 48.0* 35.0* 31.0*   CREATININE 4.28* 3.62* 3.31*   CALCIUM 8.8 8.9 8.6   MG  --   --  1.20*   ALBUMIN 2.2* 2.1* 2.0*   ALKPHOS 80 83 80   ALT 6 6 6   AST 11 11 11   BILITOT 0.9 0.5 0.6       CARDIAC PROFILE LAST 3 DAYS  No results for input(s): "BNP", "CPK", "CPKMB", "LDH", "TROPONINI" in the last 168 " "hours.    ENDOCRINE LAST 3 DAYS  No results for input(s): "TSH", "PROCAL" in the last 168 hours.    LAST ARTERIAL BLOOD GAS  ABG  No results for input(s): "PH", "PO2", "PCO2", "HCO3", "BE" in the last 168 hours.    LAST 7 DAYS MICROBIOLOGY   Microbiology Results (last 7 days)       Procedure Component Value Units Date/Time    Blood culture #1 **CANNOT BE ORDERED STAT** [[id number]]  (Normal) Collected: 02/05/24 1312    Order Status: Completed Specimen: Blood from Arm, Right Updated: 02/10/24 1800     CULTURE, BLOOD ([facility]) No Growth at 5 days    Blood culture #2 **CANNOT BE ORDERED STAT** [[id number]]  (Normal) Collected: 02/05/24 1312    Order Status: Completed Specimen: Blood from Arm, Right Updated: 02/10/24 1800     CULTURE, BLOOD ([facility]) No Growth at 5 days    Urine culture [[id number]]  (Abnormal)  (Susceptibility) Collected: 02/05/24 1459    Order Status: Completed Specimen: Urine Updated: 02/10/24 0738     Urine Culture >/= 100,000 colonies/ml Enterococcus faecium     Comment: Ampicillin results may be used to predict susceptibility to amoxicillin-clavulanate, ampicillin-sulbactam, and piperacillin-tazobactam.         >/= 100,000 colonies/ml Candida albicans            MOST RECENT IMAGING  CT Abdomen Pelvis  Without Contrast  Narrative: EXAMINATION:  CT ABDOMEN PELVIS WITHOUT CONTRAST    CLINICAL HISTORY:  UTI, recurrent/complicated (Female);, .    TECHNIQUE:  PATIENT RADIATION DOSE: DLP(mGycm) 384    As per PQRS measures, all CT scans at this facility used dose modulation, iterative reconstruction, and/or weight based dose adjustment when appropriate to reduce radiation dose to as low as reasonably achievable.    COMPARISON:  06/01/2023    FINDINGS:  Serial axial images were obtained of the abdomen pelvis without the administration of IV contrast.  Additional sagittal and coronal reconstructions were performed. Degenerative changes are noted to the thoracolumbar spine.  A few 3-4 mm subpleural nodule is evident " at the right lung base with mild pleural thickening.  The heart is normal in size.  There is mucosal prominence at the GE junction.  There is mucosal prominence versus underdistention at the proximal stomach.  There is beam hardening artifact at the upper abdomen due to patient body habitus and positioning.  The liver, spleen, adrenal glands, and pancreas are grossly within normal limits without the administration of IV contrast.  The gallbladder is surgically absent.  The kidneys are relatively symmetric in size.  There is bilateral extensive perinephric stranding, advanced renal lobulation, and renal cortical thinning.  Prominent renal pelvis sees are evident bilaterally.  A right ureteral stent is identified.  There is bilateral periureteral stranding.  The stomach and duodenum are partially distended with fluid and particulate food matter.  There is a suspect the duodenal diverticulum versus asymmetric prominence of the proximal duodenum.  A few small lymph nodes seen within the periaortic and pericaval region.  Again noted is a ventral hernia containing loops of bowel.  A ostomy is evident at the right lateral anterior abdominal wall.  A suprapubic catheter is present within a nondistended bladder.  A small amount of gas present within the bladder.  There is localized edema/stranding in the perirectal region postsurgical changes are evident at the rectosigmoid region.  No dilated loops of bowel are identified.  The uterus is absent  Impression: 1. Few 3-4 mm subpleural nodules at the posterior right lower lung with the pleural thickening and posterior right lung base  2. Status post cholecystectomy  3. Bilateral renal cortical thinning, perinephric and periureteral stranding, and renal lobulation with the right renal stent again identified  4. Ventral hernia again identified with adjacent right lateral ostomy which has a similar appearance to the prior exam  5. Suprapubic catheter again identified with small  amount of gas in the bladder  6. Perirectal stranding/edema with postsurgical changes at the rectosigmoid junction.    Electronically signed by: Damon Fernandez  Date:    02/07/2024  Time:    14:37      LASTECHO  No results found for this or any previous visit.      CURRENT/PREVIOUS VISIT EKG  No results found for this or any previous visit.    ASSESSMENT/PLAN:     Active Hospital Problems    Diagnosis    Fracture of surgical neck of humerus    Hypertension    Urinary tract infection    Anemia of chronic kidney failure, stage 4 (severe)    Ileostomy status    Recurrent major depression    Dyspnea    GERD (gastroesophageal reflux disease)    Hypothyroid    Anxiety disorder       ASSESSMENT & PLAN:   Continue current treatment for her humeral neck fracture     Will discuss with Dr. Heaton on CT see that the patient has a right ureteral stent that is placed.  I will defer to him but question whether Dr. Dorsey needs to entertain removing the stent in case if it is chronically infected with infection/biofilm.  Again I will defer that to Dr. Heaton and Urology.  Perhaps that is why she has polymicrobial infections that are refractory to numerous antibiotics.  Additionally, she has a poor physical situation in that her ostomy site is proximal to her urostomy tube in his creates a poor situation for contamination.      Also consider linezolid causing a lactic acidosis.    RECOMMENDATIONS:  DVT prophylaxis Lovenox.  GI prophylaxis pantoprazole        Ash Haley III, MD  Department of Hospital Medicine ([facility])   Date of Service: 02/11/2024  10:14 PM

## 2024-02-12 NOTE — CONSULTS
[facility]  [address] [place] [address]  Phone: [phone]    Department of Nephrology  Consult Note      PATIENT NAME: Kam Reyes    MRN: [id number]  TODAY'S DATE: 2024  ADMIT DATE: 2024                          CONSULT REQUESTED BY: Gregor Heaton MD    SUBJECTIVE     PRINCIPAL PROBLEM: <principal problem not specified>      REASON FOR CONSULT:  For GAHDA and CKD.  HPI:  Patient is a 73-year-old  female well known to me in the outpatient sector.  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  She was admitted here for IV antibiotics for a UTI that failed outpatient therapy.  Her usual renal functions 12th 15 cc/minute without any uremic symptoms on this hospital stay she dropped down to proximally 10 cc/minute.        Review of patient's allergies indicates:   Allergen Reactions    Oxaprozin Nausea And Vomiting and Swelling     Facial swelling      Sulfamethoxazole-trimethoprim Nausea And Vomiting     Severe nausea and vomiting    Doxycycline     Nsaids (non-steroidal anti-inflammatory drug) Rash    Sulfa (sulfonamide antibiotics) Nausea And Vomiting       Past Medical History:   Diagnosis Date    Cancer, colon     Chronic pain     GERD (gastroesophageal reflux disease)     Renal disorder     Thyroiditis, unspecified      Past Surgical History:   Procedure Laterality Date    BACK SURGERY       SECTION      CHOLECYSTECTOMY      COLON SURGERY      HYSTERECTOMY      KIDNEY SURGERY       Social History     Tobacco Use    Smoking status: Never    Smokeless tobacco: Never   Substance Use Topics    Alcohol use: Never    Drug use: Never        Review of Systems   Constitutional:  Positive for activity change, appetite change and fever.   HENT: Negative.     Respiratory: Negative.  Negative for cough, choking and shortness of breath.    Gastrointestinal:  Positive for  abdominal pain and diarrhea. Negative for constipation, nausea and vomiting.   Genitourinary: Negative.    Musculoskeletal:  Positive for arthralgias.   All other systems reviewed and are negative.      OBJECTIVE     VITAL SIGNS (Most Recent)  Temp: 98.1 °F (36.7 °C) (02/12/24 1155)  Pulse: 85 (02/12/24 1155)  Resp: 18 (02/12/24 0940)  BP: (!) 141/78 (02/12/24 1155)  SpO2: 97 % (02/12/24 1155)    VENTILATION STATUS  Resp: 18 (02/12/24 0940)  SpO2: 97 % (02/12/24 1155)           I & O (Last 24H):  Intake/Output Summary (Last 24 hours) at 2/12/2024 1254  Last data filed at 2/12/2024 0838  Gross per 24 hour   Intake 360 ml   Output 700 ml   Net -340 ml       WEIGHTS  Wt Readings from Last 3 Encounters:   02/05/24 1118 79.8 kg (176 lb)   01/26/24 1841 79.8 kg (176 lb)   01/26/24 1635 79.8 kg (176 lb)   01/20/24 1839 79.4 kg (175 lb)       Physical Exam  Constitutional:       Appearance: Normal appearance. She is ill-appearing. She is not toxic-appearing.   HENT:      Nose: Nose normal.      Mouth/Throat:      Mouth: Mucous membranes are moist.   Eyes:      General: No scleral icterus.     Extraocular Movements: Extraocular movements intact.      Pupils: Pupils are equal, round, and reactive to light.   Neck:      Comments: No JVD  Cardiovascular:      Rate and Rhythm: Normal rate and regular rhythm.      Heart sounds: No murmur heard.  Pulmonary:      Breath sounds: No wheezing or rhonchi.   Abdominal:      Tenderness: There is no abdominal tenderness. There is no guarding or rebound.   Musculoskeletal:      Cervical back: Neck supple.      Right lower leg: No edema.      Left lower leg: No edema.      Comments: Decreased range of motion of left upper extremity.  There is some swelling there she recently fractured her left arm.  She does have a bruit and a thrill over her access     Skin:     General: Skin is warm.      Capillary Refill: Capillary refill takes less than 2 seconds.      Coloration: Skin is not jaundiced.       Findings: No erythema or rash.   Neurological:      General: No focal deficit present.      Mental Status: She is alert.   Psychiatric:         Mood and Affect: Mood normal.         HOME MEDICATIONS:  No current facility-administered medications on file prior to encounter.     Current Outpatient Medications on File Prior to Encounter   Medication Sig Dispense Refill    ascorbic acid, vitamin C, (VITAMIN C) 100 MG tablet Take 100 mg by mouth once daily.      calcitRIOL (ROCALTROL) 0.5 MCG Cap Take 0.5 mcg by mouth once daily.      ciprofloxacin HCl (CIPRO) 500 MG tablet Take 1 tablet (500 mg total) by mouth every 12 (twelve) hours. 14 tablet 0    clopidogreL (PLAVIX) 75 mg tablet Take 75 mg by mouth once daily.      DULoxetine (CYMBALTA) 30 MG capsule Take 30 mg by mouth once daily.      ferrous sulfate 325 (65 FE) MG EC tablet Take 325 mg by mouth 3 (three) times daily with meals.      HYDROcodone-acetaminophen (NORCO)  mg per tablet Take 1 tablet by mouth.      levothyroxine (SYNTHROID) 100 MCG tablet Take 137 mcg by mouth before breakfast.      omeprazole (PRILOSEC) 40 MG capsule Take 40 mg by mouth once daily.      QUEtiapine (SEROQUEL) 25 MG Tab Take by mouth.      sodium bicarbonate 650 MG tablet Take 650 mg by mouth 4 (four) times daily.         SCHEDULED MEDS:   ascorbic acid (vitamin C)  250 mg Oral Daily    calcitRIOL  0.5 mcg Oral Daily    clopidogreL  75 mg Oral Daily    DULoxetine  30 mg Oral Daily    enoxaparin  30 mg Subcutaneous Daily    ferrous sulfate  1 tablet Oral Daily    fluconazole  100 mg Oral Daily    levothyroxine  137 mcg Oral Before breakfast    LIDOcaine  1 patch Transdermal Daily    linezolid  600 mg Oral Q12H    pantoprazole  40 mg Oral Daily    potassium chloride  20 mEq Oral Daily    QUEtiapine  25 mg Oral QHS    sodium chloride 0.9%  10 mL Intravenous Q8H       CONTINUOUS INFUSIONS:   sodium bicarbonate 150 mEq in dextrose 5 % (D5W) 1,150 mL infusion 75 mL/hr at 02/11/24  "1051       PRN MEDS:0.9%  NaCl infusion (for blood administration), acetaminophen, HYDROcodone-acetaminophen, ondansetron    LABS AND DIAGNOSTICS     CBC LAST 3 DAYS  Recent Labs   Lab 02/10/24  0304 02/11/24  0443 02/12/24  0331   WBC 5.77 7.39 5.33   RBC 3.06* 2.99* 2.78*   HGB 9.2* 9.2* 8.4*   HCT 30.4* 27.9* 26.2*   MCV 99.3* 93.3 94.2*   MCH 30.1 30.8 30.2   MCHC 30.3* 33.0 32.1*   RDW 15.0 15.0 14.9    87* 126*   MPV 11.1* 11.2* 10.8*       COAGULATION LAST 3 DAYS  No results for input(s): "LABPT", "INR", "APTT" in the last 168 hours.    CHEMISTRY LAST 3 DAYS  Recent Labs   Lab 02/10/24  0303 02/11/24  0443 02/12/24  0331    138 139   K 3.8 3.6 3.5   CO2 12* 12* 19*   BUN 35.0* 31.0* 30.0*   CREATININE 3.62* 3.31* 3.19*   CALCIUM 8.9 8.6 8.8   MG  --  1.20*  --    ALBUMIN 2.1* 2.0* 1.9*   ALKPHOS 83 80 81   ALT 6 6 5   AST 11 11 11   BILITOT 0.5 0.6 0.7       CARDIAC PROFILE LAST 3 DAYS  No results for input(s): "BNP", "CPK", "CPKMB", "LDH", "TROPONINI" in the last 168 hours.    ENDOCRINE LAST 3 DAYS  No results for input(s): "TSH", "PROCAL" in the last 168 hours.    LAST ARTERIAL BLOOD GAS  ABG  No results for input(s): "PH", "PO2", "PCO2", "HCO3", "BE" in the last 168 hours.    LAST 7 DAYS MICROBIOLOGY   Microbiology Results (last 7 days)       Procedure Component Value Units Date/Time    Blood culture #1 **CANNOT BE ORDERED STAT** [[id number]]  (Normal) Collected: 02/05/24 1312    Order Status: Completed Specimen: Blood from Arm, Right Updated: 02/10/24 1800     CULTURE, BLOOD ([facility]) No Growth at 5 days    Blood culture #2 **CANNOT BE ORDERED STAT** [[id number]]  (Normal) Collected: 02/05/24 1312    Order Status: Completed Specimen: Blood from Arm, Right Updated: 02/10/24 1800     CULTURE, BLOOD ([facility]) No Growth at 5 days    Urine culture [[id number]]  (Abnormal)  (Susceptibility) Collected: 02/05/24 1459    Order Status: Completed Specimen: Urine Updated: 02/10/24 0738     Urine Culture >/= " 100,000 colonies/ml Enterococcus faecium     Comment: Ampicillin results may be used to predict susceptibility to amoxicillin-clavulanate, ampicillin-sulbactam, and piperacillin-tazobactam.         >/= 100,000 colonies/ml Candida albicans            MOST RECENT IMAGING  US Upper Extremity Veins Left  Narrative: EXAMINATION:  Left UPPER EXTREMITY VENOUS DOPPLER EXAM    CLINICAL HISTORY:  Edema, unspecified; edema;.    COMPARISON:  None available.    FINDINGS:  There is flow noted to the left jugular and subclavian veins.  There is normal compression and flow noted to theleft axillary, brachial, radial, and ulnar veins.  The exam is somewhat limited due to patient condition/shoulder fracture  Impression: 1. Unremarkableleft upper extremity venous Doppler exam without sonographic evidence of a deep venous thrombosis    Electronically signed by: Damon Fernandez  Date:    02/12/2024  Time:    09:54      ECHOCARDIOGRAM RESULTS (last 5)  No results found for this or any previous visit.      CURRENT/PREVIOUS VISIT EKG  No results found for this or any previous visit.        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  She is actually doing very well with her level of renal function she does not have any uremic symptoms.      RECOMMENDATIONS:  Would continue to avoid nephrotoxins ACEs ARB use NSAIDs and renally dose medications        Lester Max MD  Department of Nephrology  Date of Service:  02/09/24 12:54 PM

## 2024-02-12 NOTE — PLAN OF CARE
Faxed PSSR to [facility] and rec'd 142.  [facility] evaluating.  They need to know if patient needs IVAB on skilled and if so, how long.  Messaged Dr. Heaton.

## 2024-02-12 NOTE — SUBJECTIVE & OBJECTIVE
Past Medical History:   Diagnosis Date    Cancer, colon     Chronic pain     GERD (gastroesophageal reflux disease)     Renal disorder     Thyroiditis, unspecified        Past Surgical History:   Procedure Laterality Date    BACK SURGERY       SECTION      CHOLECYSTECTOMY      COLON SURGERY      HYSTERECTOMY      KIDNEY SURGERY         Review of patient's allergies indicates:   Allergen Reactions    Oxaprozin Nausea And Vomiting and Swelling     Facial swelling      Sulfamethoxazole-trimethoprim Nausea And Vomiting     Severe nausea and vomiting    Doxycycline     Nsaids (non-steroidal anti-inflammatory drug) Rash    Sulfa (sulfonamide antibiotics) Nausea And Vomiting       No current facility-administered medications on file prior to encounter.     Current Outpatient Medications on File Prior to Encounter   Medication Sig    ascorbic acid, vitamin C, (VITAMIN C) 100 MG tablet Take 100 mg by mouth once daily.    calcitRIOL (ROCALTROL) 0.5 MCG Cap Take 0.5 mcg by mouth once daily.    ciprofloxacin HCl (CIPRO) 500 MG tablet Take 1 tablet (500 mg total) by mouth every 12 (twelve) hours.    clopidogreL (PLAVIX) 75 mg tablet Take 75 mg by mouth once daily.    DULoxetine (CYMBALTA) 30 MG capsule Take 30 mg by mouth once daily.    ferrous sulfate 325 (65 FE) MG EC tablet Take 325 mg by mouth 3 (three) times daily with meals.    HYDROcodone-acetaminophen (NORCO)  mg per tablet Take 1 tablet by mouth.    levothyroxine (SYNTHROID) 100 MCG tablet Take 137 mcg by mouth before breakfast.    omeprazole (PRILOSEC) 40 MG capsule Take 40 mg by mouth once daily.    QUEtiapine (SEROQUEL) 25 MG Tab Take by mouth.    sodium bicarbonate 650 MG tablet Take 650 mg by mouth 4 (four) times daily.     Family History       Problem Relation (Age of Onset)    Hypertension Mother          Tobacco Use    Smoking status: Never    Smokeless tobacco: Never   Substance and Sexual Activity    Alcohol use: Never    Drug use: Never     Sexual activity: Not Currently     Review of Systems   Constitutional:  Positive for activity change and fever.   HENT: Negative.     Eyes: Negative.    Respiratory: Negative.     Cardiovascular: Negative.    Gastrointestinal:  Positive for abdominal pain and nausea.   Endocrine: Negative.    Genitourinary: Negative.    Musculoskeletal:  Positive for arthralgias and joint swelling.   Skin: Negative.    Allergic/Immunologic: Negative.    Neurological:  Positive for weakness.   Hematological: Negative.    Psychiatric/Behavioral: Negative.       Objective:     Vital Signs (Most Recent):  Temp: 97.9 °F (36.6 °C) (02/12/24 0334)  Pulse: 83 (02/12/24 0334)  Resp: 20 (02/11/24 1838)  BP: 114/71 (02/12/24 0334)  SpO2: 97 % (02/12/24 0334) Vital Signs (24h Range):  Temp:  [97.7 °F (36.5 °C)-98.3 °F (36.8 °C)] 97.9 °F (36.6 °C)  Pulse:  [78-89] 83  Resp:  [18-20] 20  SpO2:  [94 %-99 %] 97 %  BP: (114-171)/(65-84) 114/71     Weight: 79.8 kg (176 lb)  Body mass index is 33.25 kg/m².     Physical Exam  Constitutional:       General: She is not in acute distress.     Appearance: She is obese.   HENT:      Head: Normocephalic and atraumatic.      Nose: Nose normal.      Mouth/Throat:      Mouth: Mucous membranes are moist.      Pharynx: Oropharynx is clear. No oropharyngeal exudate.   Eyes:      Conjunctiva/sclera: Conjunctivae normal.   Cardiovascular:      Rate and Rhythm: Normal rate and regular rhythm.      Pulses: Normal pulses.      Heart sounds: Normal heart sounds.      Comments: 1 plus edema to the left upper extremity  Pulmonary:      Effort: Pulmonary effort is normal.      Breath sounds: Normal breath sounds.   Abdominal:      General: Abdomen is flat.      Palpations: Abdomen is soft.   Musculoskeletal:         General: No swelling or deformity. Normal range of motion.      Cervical back: Normal range of motion and neck supple. No rigidity.   Skin:     General: Skin is warm and dry.      Capillary Refill: Capillary  refill takes less than 2 seconds.   Neurological:      General: No focal deficit present.      Mental Status: She is alert and oriented to person, place, and time.   Psychiatric:         Mood and Affect: Mood normal.         Behavior: Behavior normal.         Thought Content: Thought content normal.         Judgment: Judgment normal.                Significant Labs: All pertinent labs within the past 24 hours have been reviewed.  CBC:   Recent Labs   Lab 02/11/24 0443 02/12/24  0331   WBC 7.39 5.33   HGB 9.2* 8.4*   HCT 27.9* 26.2*   PLT 87* 126*       CMP:   Recent Labs   Lab 02/11/24 0443 02/12/24  0331    139   K 3.6 3.5   CO2 12* 19*   BUN 31.0* 30.0*   CREATININE 3.31* 3.19*   CALCIUM 8.6 8.8   ALBUMIN 2.0* 1.9*   BILITOT 0.6 0.7   ALKPHOS 80 81   AST 11 11   ALT 6 5         Significant Imaging: I have reviewed all pertinent imaging results/findings within the past 24 hours.

## 2024-02-12 NOTE — PHYSICIAN QUERY
PT Name: Kam Reyes  MR #: [id number]    DOCUMENTATION CLARIFICATION     CDS/: Justyna Serrano RN         Contact information: [email or web address]    This form is a permanent document in the medical record.     Query Date: February 12, 2024    By submitting this query, we are merely seeking further clarification of documentation. Please utilize your independent clinical judgment when addressing the question(s) below.    Supporting Clinical Findings Location in Medical Record   Kam Reyes is 73 y.o. female who  has a past medical history of Cancer, colon, Chronic pain, GERD (gastroesophageal reflux disease), Renal disorder, and Thyroiditis, unspecified. arrives in ER with c/o Fatigue (C/o weakness, n/v, and reports she was dx with UTI last week. 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 Patient had frequent urinary tract infections.     Positive for fatigue   Positive for weakness     Cefepime   Vancomycin  Ciproflox      2/5 ED Note    Essentially patient had a fall on 15th and she broke her left humerus, then she was diagnosed with COVID-19 on 24th, then she was in the ER again and was diagnosed with the urinary tract infection, and she was essentially admitted in the hospital and day before yesterday she was discharged home on ciprofloxacin by mouth, and today patient says she is having just generalized weakness since she got discharged, and today her Chun came out while she was trying to get out of the bed, so she called the ambulance to bring her to the emergency room.   2/5 ED Note   Urine Culture  Candida Albicans  Enterococcus Faucium  2/5 Culture       Provider, please clarify if there is any clinical correlation between UTI and Chun.           Are the conditions:      [  ] Due to or associated with each other   [ x ] Unrelated to each other   [  ] Other explanation (Please Specify):  ______________                                                                               Please document in your progress notes daily for the duration of treatment until resolved and include in your discharge summary.

## 2024-02-13 PROCEDURE — 63600175 PHARM REV CODE 636 W HCPCS: Performed by: FAMILY MEDICINE

## 2024-02-13 PROCEDURE — A4216 STERILE WATER/SALINE, 10 ML: HCPCS | Performed by: INTERNAL MEDICINE

## 2024-02-13 PROCEDURE — 27000207 HC ISOLATION

## 2024-02-13 PROCEDURE — 63600175 PHARM REV CODE 636 W HCPCS: Performed by: INTERNAL MEDICINE

## 2024-02-13 PROCEDURE — 94761 N-INVAS EAR/PLS OXIMETRY MLT: CPT

## 2024-02-13 PROCEDURE — 25000003 PHARM REV CODE 250: Performed by: FAMILY MEDICINE

## 2024-02-13 PROCEDURE — 63700000 PHARM REV CODE 250 ALT 637 W/O HCPCS: Performed by: FAMILY MEDICINE

## 2024-02-13 PROCEDURE — 11000001 HC ACUTE MED/SURG PRIVATE ROOM

## 2024-02-13 PROCEDURE — 25000003 PHARM REV CODE 250: Performed by: INTERNAL MEDICINE

## 2024-02-13 RX ORDER — DEXAMETHASONE SODIUM PHOSPHATE 4 MG/ML
12 INJECTION, SOLUTION INTRA-ARTICULAR; INTRALESIONAL; INTRAMUSCULAR; INTRAVENOUS; SOFT TISSUE ONCE
Status: COMPLETED | OUTPATIENT
Start: 2024-02-13 | End: 2024-02-13

## 2024-02-13 RX ADMIN — DULOXETINE HYDROCHLORIDE 30 MG: 30 CAPSULE, DELAYED RELEASE ORAL at 09:02

## 2024-02-13 RX ADMIN — PANTOPRAZOLE SODIUM 40 MG: 40 TABLET, DELAYED RELEASE ORAL at 09:02

## 2024-02-13 RX ADMIN — LEVOTHYROXINE SODIUM 137 MCG: 25 TABLET ORAL at 06:02

## 2024-02-13 RX ADMIN — QUETIAPINE FUMARATE 25 MG: 25 TABLET ORAL at 08:02

## 2024-02-13 RX ADMIN — LINEZOLID 600 MG: 600 TABLET, FILM COATED ORAL at 09:02

## 2024-02-13 RX ADMIN — LINEZOLID 600 MG: 600 TABLET, FILM COATED ORAL at 08:02

## 2024-02-13 RX ADMIN — CLOPIDOGREL BISULFATE 75 MG: 75 TABLET ORAL at 09:02

## 2024-02-13 RX ADMIN — FLUCONAZOLE 100 MG: 100 TABLET ORAL at 09:02

## 2024-02-13 RX ADMIN — SODIUM BICARBONATE: 84 INJECTION, SOLUTION INTRAVENOUS at 09:02

## 2024-02-13 RX ADMIN — SODIUM CHLORIDE, PRESERVATIVE FREE 10 ML: 5 INJECTION INTRAVENOUS at 06:02

## 2024-02-13 RX ADMIN — POTASSIUM CHLORIDE 20 MEQ: 1500 TABLET, EXTENDED RELEASE ORAL at 09:02

## 2024-02-13 RX ADMIN — Medication 250 MG: at 09:02

## 2024-02-13 RX ADMIN — LIDOCAINE 5% 1 PATCH: 700 PATCH TOPICAL at 09:02

## 2024-02-13 RX ADMIN — ENOXAPARIN SODIUM 30 MG: 30 INJECTION SUBCUTANEOUS at 04:02

## 2024-02-13 RX ADMIN — FERROUS SULFATE TAB 325 MG (65 MG ELEMENTAL FE) 1 EACH: 325 (65 FE) TAB at 09:02

## 2024-02-13 RX ADMIN — CALCITRIOL CAPSULES 0.25 MCG 0.5 MCG: 0.25 CAPSULE ORAL at 09:02

## 2024-02-13 RX ADMIN — DEXAMETHASONE SODIUM PHOSPHATE 12 MG: 4 INJECTION, SOLUTION INTRA-ARTICULAR; INTRALESIONAL; INTRAMUSCULAR; INTRAVENOUS; SOFT TISSUE at 10:02

## 2024-02-13 RX ADMIN — HYDROCODONE BITARTRATE AND ACETAMINOPHEN 1 TABLET: 10; 325 TABLET ORAL at 06:02

## 2024-02-13 NOTE — PROGRESS NOTES
[facility] - Medical Surgical Unit  [facility] Medicine  Progress Note    Patient Name: Kam Reyes  MRN: [id number]  Patient Class: IP- Inpatient   Admission Date: 2/5/2024  Length of Stay: 8 days  Attending Physician: Gregor Heaton MD  Primary Care Provider: Gregor Heaton MD        Subjective:     Principal Problem:<principal problem not specified>        HPI:  Patient is a 73-year-old known to me from clinic.  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  Patient had frequent urinary tract infections.  After many procedures was continuing to have them.  She was hospitalized about a week ago for urinary tract infection.  Remained asymptomatic during her stay with no leukocytosis grew only Proteus which was sensitive to Cipro.  She was discharged home and apparently over the weekend was becoming more nauseated and had feeling more ill.  Since the culture has grown out 3 more organisms including Enterobacter faecium Proteus mirabilis Pseudomonas aeruginosa enrol sella plan to coli.  Only the Pseudomonas and Proteus were sensitive to the Cipro my floxacillin she was sent on home on.  Moreover her urinary catheter came out today she would called initially for us to replace it in the office but she was feeling too unwell to come the office and went straight to the emergency room.  There she was complaining of nausea vomiting and some abdominal pain.  Was noted to have leukocytosis of 19,000. And urinary tract infection continuing.    Overview/Hospital Course:  Much better today no shortness a breath less nausea no abdominal pain.  Her leukocytosis resolved.  However her kidney function has improved, at baseline    She does have an H and H moved over 9 after 2 units.  Now stable  Vre per culture, on linesolid  Has swelling to the left arm today, venous doppler iswithout dvt, using compression sleeve  No sob, no cp , no palp  C/o neck  pain    Past Medical History:   Diagnosis Date    Cancer, colon     Chronic pain     GERD (gastroesophageal reflux disease)     Renal disorder     Thyroiditis, unspecified        Past Surgical History:   Procedure Laterality Date    BACK SURGERY       SECTION      CHOLECYSTECTOMY      COLON SURGERY      HYSTERECTOMY      KIDNEY SURGERY         Review of patient's allergies indicates:   Allergen Reactions    Oxaprozin Nausea And Vomiting and Swelling     Facial swelling      Sulfamethoxazole-trimethoprim Nausea And Vomiting     Severe nausea and vomiting    Doxycycline     Nsaids (non-steroidal anti-inflammatory drug) Rash    Sulfa (sulfonamide antibiotics) Nausea And Vomiting       No current facility-administered medications on file prior to encounter.     Current Outpatient Medications on File Prior to Encounter   Medication Sig    ascorbic acid, vitamin C, (VITAMIN C) 100 MG tablet Take 100 mg by mouth once daily.    calcitRIOL (ROCALTROL) 0.5 MCG Cap Take 0.5 mcg by mouth once daily.    ciprofloxacin HCl (CIPRO) 500 MG tablet Take 1 tablet (500 mg total) by mouth every 12 (twelve) hours.    clopidogreL (PLAVIX) 75 mg tablet Take 75 mg by mouth once daily.    DULoxetine (CYMBALTA) 30 MG capsule Take 30 mg by mouth once daily.    ferrous sulfate 325 (65 FE) MG EC tablet Take 325 mg by mouth 3 (three) times daily with meals.    HYDROcodone-acetaminophen (NORCO)  mg per tablet Take 1 tablet by mouth.    levothyroxine (SYNTHROID) 100 MCG tablet Take 137 mcg by mouth before breakfast.    omeprazole (PRILOSEC) 40 MG capsule Take 40 mg by mouth once daily.    QUEtiapine (SEROQUEL) 25 MG Tab Take by mouth.    sodium bicarbonate 650 MG tablet Take 650 mg by mouth 4 (four) times daily.     Family History       Problem Relation (Age of Onset)    Hypertension Mother          Tobacco Use    Smoking status: Never    Smokeless tobacco: Never   Substance and Sexual Activity    Alcohol use: Never    Drug use: Never     Sexual activity: Not Currently     Review of Systems   Constitutional:  Positive for activity change and fever.   HENT: Negative.     Eyes: Negative.    Respiratory: Negative.     Cardiovascular: Negative.    Gastrointestinal:  Positive for abdominal pain and nausea.   Endocrine: Negative.    Genitourinary: Negative.    Musculoskeletal:  Positive for arthralgias and joint swelling.   Skin: Negative.    Allergic/Immunologic: Negative.    Neurological:  Positive for weakness.   Hematological: Negative.    Psychiatric/Behavioral: Negative.       Objective:     Vital Signs (Most Recent):  Temp: 97.8 °F (36.6 °C) (02/13/24 0812)  Pulse: 80 (02/13/24 0812)  Resp: 18 (02/13/24 0812)  BP: 132/73 (02/13/24 0812)  SpO2: 98 % (02/13/24 0812) Vital Signs (24h Range):  Temp:  [97.4 °F (36.3 °C)-98.3 °F (36.8 °C)] 97.8 °F (36.6 °C)  Pulse:  [77-91] 80  Resp:  [16-18] 18  SpO2:  [96 %-98 %] 98 %  BP: (114-142)/(66-78) 132/73     Weight: 79.8 kg (176 lb)  Body mass index is 33.25 kg/m².     Physical Exam  Constitutional:       General: She is not in acute distress.     Appearance: She is obese.   HENT:      Head: Normocephalic and atraumatic.      Nose: Nose normal.      Mouth/Throat:      Mouth: Mucous membranes are moist.      Pharynx: Oropharynx is clear. No oropharyngeal exudate.   Eyes:      Conjunctiva/sclera: Conjunctivae normal.   Cardiovascular:      Rate and Rhythm: Normal rate and regular rhythm.      Pulses: Normal pulses.      Heart sounds: Normal heart sounds.      Comments: 1 plus edema to the left upper extremity  Pulmonary:      Effort: Pulmonary effort is normal.      Breath sounds: Normal breath sounds.   Abdominal:      General: Abdomen is flat.      Palpations: Abdomen is soft.   Musculoskeletal:         General: No swelling or deformity. Normal range of motion.      Cervical back: Normal range of motion and neck supple. No rigidity.   Skin:     General: Skin is warm and dry.      Capillary Refill: Capillary  refill takes less than 2 seconds.   Neurological:      General: No focal deficit present.      Mental Status: She is alert and oriented to person, place, and time.   Psychiatric:         Mood and Affect: Mood normal.         Behavior: Behavior normal.         Thought Content: Thought content normal.         Judgment: Judgment normal.                Significant Labs: All pertinent labs within the past 24 hours have been reviewed.  CBC:   Recent Labs   Lab 02/12/24  0331   WBC 5.33   HGB 8.4*   HCT 26.2*   *       CMP:   Recent Labs   Lab 02/12/24  0331      K 3.5   CO2 19*   BUN 30.0*   CREATININE 3.19*   CALCIUM 8.8   ALBUMIN 1.9*   BILITOT 0.7   ALKPHOS 81   AST 11   ALT 5         Significant Imaging: I have reviewed all pertinent imaging results/findings within the past 24 hours.    Assessment/Plan:      Fracture of surgical neck of humerus  I will consult ortho in-house to assess  Dr. Machado is following      Urinary tract infection  Continue current regimen seems to be improving with decreased leukocytosis  No fever during her stay urine is clearing.    Culture done in the hospital shows enterrococcus faeicium   The vancomycin however she has had kidney insufficiency with apparent escalation of her renal insufficiency on vancomycin so we are giving linezolid we will stop all other antibiotics except for her Diflucan at this time as culture shows only Enterococcus faecium.    Hypertension  Continue home medications as written    Anxiety disorder        Hypothyroid  Continue Synthroid      Personal history of other malignant neoplasm of large intestine  We had been with holding Lovenox because of her anemia and renal insufficiency.  However now in the arm with fracture she has developed a 1+ edema so we will start Lovenox 30 and get an ultrasound of the upper extremity as suspect she is probably developed a DVT.  This is the case we will start her on Eliquis      Recurrent major depression  Continue  her Cymbalta as her allergy duloxetine was put in an error    Ileostomy status  Continue care and maintenance      GERD (gastroesophageal reflux disease)  Continue p.o. Protonix      Anemia of chronic kidney failure, stage 4 (severe)  Creatine stable for now. BMP reviewed- noted Estimated Creatinine Clearance: 15 mL/min (A) (based on SCr of 3.19 mg/dL (H)). according to latest data. Based on current GFR, CKD stage is stage 4 - GFR 15-29.  Monitor UOP and serial BMP and adjust therapy as needed. Renally dose meds. Avoid nephrotoxic medications and procedures.  After a single dose of vancomycin her creatinine did increase we will continue the linesolid per id rec.  She was given a dose of Procrit and is status post 2 units packed red blood cell creatinine has improved slightly currently her hemoglobin is over 8    Dyspnea  Now resolved        VTE Risk Mitigation (From admission, onward)           Ordered     enoxaparin injection 30 mg  Daily         02/11/24 2213     IP VTE HIGH RISK PATIENT  Once         02/05/24 1537     Place sequential compression device  Until discontinued         02/05/24 1537                    Discharge Planning   JOSHUA:      Code Status: Full Code   Is the patient medically ready for discharge?:     Reason for patient still in hospital (select all that apply): Patient trending condition  Discharge Plan A: Skilled Nursing Facility   Discharge Delays: (!) Post-Acute Set-up              Gregor Heaton MD  Department of Hospital Medicine   [facility] - Medical Surgical Unit

## 2024-02-13 NOTE — PT/OT/SLP PROGRESS
Physical Therapy      Patient Name:  Kam Reyes   MRN:  [id number]    Patient not seen today secondary to Patient unwilling to participate. Pt remained in room for minutes providing maximal encouragement for patient to sit EOB, however patient adamantly declined and closed eyes. Will follow-up 2/14/24.

## 2024-02-13 NOTE — SUBJECTIVE & OBJECTIVE
Past Medical History:   Diagnosis Date    Cancer, colon     Chronic pain     GERD (gastroesophageal reflux disease)     Renal disorder     Thyroiditis, unspecified        Past Surgical History:   Procedure Laterality Date    BACK SURGERY       SECTION      CHOLECYSTECTOMY      COLON SURGERY      HYSTERECTOMY      KIDNEY SURGERY         Review of patient's allergies indicates:   Allergen Reactions    Oxaprozin Nausea And Vomiting and Swelling     Facial swelling      Sulfamethoxazole-trimethoprim Nausea And Vomiting     Severe nausea and vomiting    Doxycycline     Nsaids (non-steroidal anti-inflammatory drug) Rash    Sulfa (sulfonamide antibiotics) Nausea And Vomiting       No current facility-administered medications on file prior to encounter.     Current Outpatient Medications on File Prior to Encounter   Medication Sig    ascorbic acid, vitamin C, (VITAMIN C) 100 MG tablet Take 100 mg by mouth once daily.    calcitRIOL (ROCALTROL) 0.5 MCG Cap Take 0.5 mcg by mouth once daily.    ciprofloxacin HCl (CIPRO) 500 MG tablet Take 1 tablet (500 mg total) by mouth every 12 (twelve) hours.    clopidogreL (PLAVIX) 75 mg tablet Take 75 mg by mouth once daily.    DULoxetine (CYMBALTA) 30 MG capsule Take 30 mg by mouth once daily.    ferrous sulfate 325 (65 FE) MG EC tablet Take 325 mg by mouth 3 (three) times daily with meals.    HYDROcodone-acetaminophen (NORCO)  mg per tablet Take 1 tablet by mouth.    levothyroxine (SYNTHROID) 100 MCG tablet Take 137 mcg by mouth before breakfast.    omeprazole (PRILOSEC) 40 MG capsule Take 40 mg by mouth once daily.    QUEtiapine (SEROQUEL) 25 MG Tab Take by mouth.    sodium bicarbonate 650 MG tablet Take 650 mg by mouth 4 (four) times daily.     Family History       Problem Relation (Age of Onset)    Hypertension Mother          Tobacco Use    Smoking status: Never    Smokeless tobacco: Never   Substance and Sexual Activity    Alcohol use: Never    Drug use: Never     Sexual activity: Not Currently     Review of Systems   Constitutional:  Positive for activity change and fever.   HENT: Negative.     Eyes: Negative.    Respiratory: Negative.     Cardiovascular: Negative.    Gastrointestinal:  Positive for abdominal pain and nausea.   Endocrine: Negative.    Genitourinary: Negative.    Musculoskeletal:  Positive for arthralgias and joint swelling.   Skin: Negative.    Allergic/Immunologic: Negative.    Neurological:  Positive for weakness.   Hematological: Negative.    Psychiatric/Behavioral: Negative.       Objective:     Vital Signs (Most Recent):  Temp: 97.8 °F (36.6 °C) (02/13/24 0812)  Pulse: 80 (02/13/24 0812)  Resp: 18 (02/13/24 0812)  BP: 132/73 (02/13/24 0812)  SpO2: 98 % (02/13/24 0812) Vital Signs (24h Range):  Temp:  [97.4 °F (36.3 °C)-98.3 °F (36.8 °C)] 97.8 °F (36.6 °C)  Pulse:  [77-91] 80  Resp:  [16-18] 18  SpO2:  [96 %-98 %] 98 %  BP: (114-142)/(66-78) 132/73     Weight: 79.8 kg (176 lb)  Body mass index is 33.25 kg/m².     Physical Exam  Constitutional:       General: She is not in acute distress.     Appearance: She is obese.   HENT:      Head: Normocephalic and atraumatic.      Nose: Nose normal.      Mouth/Throat:      Mouth: Mucous membranes are moist.      Pharynx: Oropharynx is clear. No oropharyngeal exudate.   Eyes:      Conjunctiva/sclera: Conjunctivae normal.   Cardiovascular:      Rate and Rhythm: Normal rate and regular rhythm.      Pulses: Normal pulses.      Heart sounds: Normal heart sounds.      Comments: 1 plus edema to the left upper extremity  Pulmonary:      Effort: Pulmonary effort is normal.      Breath sounds: Normal breath sounds.   Abdominal:      General: Abdomen is flat.      Palpations: Abdomen is soft.   Musculoskeletal:         General: No swelling or deformity. Normal range of motion.      Cervical back: Normal range of motion and neck supple. No rigidity.   Skin:     General: Skin is warm and dry.      Capillary Refill: Capillary  refill takes less than 2 seconds.   Neurological:      General: No focal deficit present.      Mental Status: She is alert and oriented to person, place, and time.   Psychiatric:         Mood and Affect: Mood normal.         Behavior: Behavior normal.         Thought Content: Thought content normal.         Judgment: Judgment normal.                Significant Labs: All pertinent labs within the past 24 hours have been reviewed.  CBC:   Recent Labs   Lab 02/12/24  0331   WBC 5.33   HGB 8.4*   HCT 26.2*   *       CMP:   Recent Labs   Lab 02/12/24  0331      K 3.5   CO2 19*   BUN 30.0*   CREATININE 3.19*   CALCIUM 8.8   ALBUMIN 1.9*   BILITOT 0.7   ALKPHOS 81   AST 11   ALT 5         Significant Imaging: I have reviewed all pertinent imaging results/findings within the past 24 hours.

## 2024-02-14 LAB
ANION GAP SERPL CALC-SCNC: 7 MEQ/L
BASOPHILS # BLD AUTO: 0.01 X10(3)/MCL
BASOPHILS NFR BLD AUTO: 0.1 %
BUN SERPL-MCNC: 36 MG/DL (ref 9.8–20.1)
CALCIUM SERPL-MCNC: 8.8 MG/DL (ref 8.4–10.2)
CHLORIDE SERPL-SCNC: 100 MMOL/L (ref 98–107)
CO2 SERPL-SCNC: 31 MMOL/L (ref 23–31)
CREAT SERPL-MCNC: 3.59 MG/DL (ref 0.55–1.02)
CREAT/UREA NIT SERPL: 10
EOSINOPHIL # BLD AUTO: 0 X10(3)/MCL (ref 0–0.9)
EOSINOPHIL NFR BLD AUTO: 0 %
ERYTHROCYTE [DISTWIDTH] IN BLOOD BY AUTOMATED COUNT: 14.4 % (ref 11.5–17)
GFR SERPLBLD CREATININE-BSD FMLA CKD-EPI: 13 MLS/MIN/1.73/M2
GLUCOSE SERPL-MCNC: 129 MG/DL (ref 82–115)
HCT VFR BLD AUTO: 26.9 % (ref 37–47)
HGB BLD-MCNC: 8.8 G/DL (ref 12–16)
IMM GRANULOCYTES # BLD AUTO: 0.02 X10(3)/MCL (ref 0–0.04)
IMM GRANULOCYTES NFR BLD AUTO: 0.3 %
LYMPHOCYTES # BLD AUTO: 0.58 X10(3)/MCL (ref 0.6–4.6)
LYMPHOCYTES NFR BLD AUTO: 8.1 %
MCH RBC QN AUTO: 30.8 PG (ref 27–31)
MCHC RBC AUTO-ENTMCNC: 32.7 G/DL (ref 33–36)
MCV RBC AUTO: 94.1 FL (ref 80–94)
MONOCYTES # BLD AUTO: 0.13 X10(3)/MCL (ref 0.1–1.3)
MONOCYTES NFR BLD AUTO: 1.8 %
NEUTROPHILS # BLD AUTO: 6.41 X10(3)/MCL (ref 2.1–9.2)
NEUTROPHILS NFR BLD AUTO: 89.7 %
PLATELET # BLD AUTO: 97 X10(3)/MCL (ref 130–400)
PMV BLD AUTO: 11 FL (ref 7.4–10.4)
POTASSIUM SERPL-SCNC: 4.2 MMOL/L (ref 3.5–5.1)
RBC # BLD AUTO: 2.86 X10(6)/MCL (ref 4.2–5.4)
SODIUM SERPL-SCNC: 138 MMOL/L (ref 136–145)
WBC # SPEC AUTO: 7.15 X10(3)/MCL (ref 4.5–11.5)

## 2024-02-14 PROCEDURE — 25000003 PHARM REV CODE 250: Performed by: INTERNAL MEDICINE

## 2024-02-14 PROCEDURE — 63600175 PHARM REV CODE 636 W HCPCS: Performed by: INTERNAL MEDICINE

## 2024-02-14 PROCEDURE — 63700000 PHARM REV CODE 250 ALT 637 W/O HCPCS: Performed by: FAMILY MEDICINE

## 2024-02-14 PROCEDURE — 85025 COMPLETE CBC W/AUTO DIFF WBC: CPT | Performed by: FAMILY MEDICINE

## 2024-02-14 PROCEDURE — 27000207 HC ISOLATION

## 2024-02-14 PROCEDURE — 80048 BASIC METABOLIC PNL TOTAL CA: CPT | Performed by: FAMILY MEDICINE

## 2024-02-14 PROCEDURE — A4216 STERILE WATER/SALINE, 10 ML: HCPCS | Performed by: INTERNAL MEDICINE

## 2024-02-14 PROCEDURE — 25000003 PHARM REV CODE 250: Performed by: FAMILY MEDICINE

## 2024-02-14 PROCEDURE — 94761 N-INVAS EAR/PLS OXIMETRY MLT: CPT

## 2024-02-14 PROCEDURE — 11000001 HC ACUTE MED/SURG PRIVATE ROOM

## 2024-02-14 RX ORDER — LINEZOLID 600 MG/1
600 TABLET, FILM COATED ORAL EVERY 12 HOURS
Qty: 10 TABLET | Refills: 0 | Status: ON HOLD | OUTPATIENT
Start: 2024-02-14 | End: 2024-04-19

## 2024-02-14 RX ORDER — FLUCONAZOLE 100 MG/1
100 TABLET ORAL DAILY
Qty: 30 TABLET | Refills: 0 | Status: SHIPPED | OUTPATIENT
Start: 2024-02-14 | End: 2024-03-15

## 2024-02-14 RX ADMIN — DULOXETINE HYDROCHLORIDE 30 MG: 30 CAPSULE, DELAYED RELEASE ORAL at 08:02

## 2024-02-14 RX ADMIN — HYDROCODONE BITARTRATE AND ACETAMINOPHEN 1 TABLET: 10; 325 TABLET ORAL at 09:02

## 2024-02-14 RX ADMIN — LINEZOLID 600 MG: 600 TABLET, FILM COATED ORAL at 08:02

## 2024-02-14 RX ADMIN — SODIUM BICARBONATE: 84 INJECTION, SOLUTION INTRAVENOUS at 02:02

## 2024-02-14 RX ADMIN — SODIUM CHLORIDE, PRESERVATIVE FREE 10 ML: 5 INJECTION INTRAVENOUS at 09:02

## 2024-02-14 RX ADMIN — LEVOTHYROXINE SODIUM 137 MCG: 25 TABLET ORAL at 06:02

## 2024-02-14 RX ADMIN — LIDOCAINE 5% 1 PATCH: 700 PATCH TOPICAL at 08:02

## 2024-02-14 RX ADMIN — CLOPIDOGREL BISULFATE 75 MG: 75 TABLET ORAL at 08:02

## 2024-02-14 RX ADMIN — LINEZOLID 600 MG: 600 TABLET, FILM COATED ORAL at 09:02

## 2024-02-14 RX ADMIN — QUETIAPINE FUMARATE 25 MG: 25 TABLET ORAL at 09:02

## 2024-02-14 RX ADMIN — FLUCONAZOLE 100 MG: 100 TABLET ORAL at 08:02

## 2024-02-14 RX ADMIN — PANTOPRAZOLE SODIUM 40 MG: 40 TABLET, DELAYED RELEASE ORAL at 08:02

## 2024-02-14 RX ADMIN — CALCITRIOL CAPSULES 0.25 MCG 0.5 MCG: 0.25 CAPSULE ORAL at 08:02

## 2024-02-14 RX ADMIN — POTASSIUM CHLORIDE 20 MEQ: 1500 TABLET, EXTENDED RELEASE ORAL at 08:02

## 2024-02-14 RX ADMIN — Medication 250 MG: at 08:02

## 2024-02-14 RX ADMIN — FERROUS SULFATE TAB 325 MG (65 MG ELEMENTAL FE) 1 EACH: 325 (65 FE) TAB at 08:02

## 2024-02-14 RX ADMIN — ENOXAPARIN SODIUM 30 MG: 30 INJECTION SUBCUTANEOUS at 04:02

## 2024-02-14 NOTE — ASSESSMENT & PLAN NOTE
Creatine stable for now. BMP reviewed- noted Estimated Creatinine Clearance: 15 mL/min (A) (based on SCr of 3.19 mg/dL (H)). according to latest data. Based on current GFR, CKD stage is stage 4 - GFR 15-29.  Monitor UOP and serial BMP and adjust therapy as needed. Renally dose meds. Avoid nephrotoxic medications and procedures.  we will continue the linesolid per id rec.  She was given a dose of Procrit and is status post 2 units packed red blood cell creatinine has improved slightly currently her hemoglobin is over 8

## 2024-02-14 NOTE — CONSULTS
2024  Kam Reyes   1950   [id number]            Psychiatry Evaluation    Date of Admission: 2024 11:19 AM    Chief Complaint: psychiatric evaluation for SNF and history of depression    SUBJECTIVE:   History of Present Illness:   Kam Reyes is a 73 y.o. female with a history of colostomy and urostomy, recurrent UTIs, and GERD who presented to with complaints of weakness, nausea/vomiting, and UTI diagnosed last week. Also had fractured humerus prior to admission. She is seen over telemedicine where she is pleasant and cooperative. Reports occasional anxiety, but reports euthymic mood at this time. No history of psychosis or seema. Denies SI, HI, or halluciantions.         Past Psychiatric History:   Previous Psychiatric Hospitalizations: Denies  Previous Medication Trials: Denies  Previous Suicide Attempts: Denies   Outpatient psychiatrist: Denies    Past Medical/Surgical History:   Past Medical History:   Diagnosis Date    Cancer, colon     Chronic pain     GERD (gastroesophageal reflux disease)     Renal disorder     Thyroiditis, unspecified      Past Surgical History:   Procedure Laterality Date    BACK SURGERY       SECTION      CHOLECYSTECTOMY      COLON SURGERY      HYSTERECTOMY      KIDNEY SURGERY           Family Psychiatric History:   Denies     Allergies:   Review of patient's allergies indicates:   Allergen Reactions    Oxaprozin Nausea And Vomiting and Swelling     Facial swelling      Sulfamethoxazole-trimethoprim Nausea And Vomiting     Severe nausea and vomiting    Doxycycline     Nsaids (non-steroidal anti-inflammatory drug) Rash    Sulfa (sulfonamide antibiotics) Nausea And Vomiting       Substance Abuse History:   Tobacco: Denies  Alcohol: Denies  Illicit Substances: Denies  Treatment: Denies      Current Medications:   Home Psychiatric Meds: Cymbalta 30mg PO Daily; Seroquel 25mg PO QHS    Scheduled Meds:    ascorbic acid (vitamin C)  250 mg Oral Daily     calcitRIOL  0.5 mcg Oral Daily    clopidogreL  75 mg Oral Daily    DULoxetine  30 mg Oral Daily    enoxaparin  30 mg Subcutaneous Daily    ferrous sulfate  1 tablet Oral Daily    fluconazole  100 mg Oral Daily    levothyroxine  137 mcg Oral Before breakfast    LIDOcaine  1 patch Transdermal Daily    linezolid  600 mg Oral Q12H    pantoprazole  40 mg Oral Daily    potassium chloride  20 mEq Oral Daily    QUEtiapine  25 mg Oral QHS    sodium chloride 0.9%  10 mL Intravenous Q8H      PRN Meds: 0.9%  NaCl infusion (for blood administration), acetaminophen, HYDROcodone-acetaminophen, ondansetron   Psychotherapeutics (From admission, onward)      Start     Stop Route Frequency Ordered    02/06/24 0900  DULoxetine DR capsule 30 mg         -- Oral Daily 02/05/24 1853    02/05/24 2100  QUEtiapine tablet 25 mg         -- Oral Nightly 02/05/24 1853              Social History:  Housing Status: Lives with son  Relationship Status/Sexual Orientation:   Children: 2  Education: High School   Employment Status/Info: Not working    history: Denies  History of physical/sexual abuse: Denies   Access to gun: Denies       Legal History:   Past Charges/Incarcerations: Denies   Pending charges: Denies      OBJECTIVE:       Vitals   Vitals:    02/14/24 1119   BP: (!) 128/58   Pulse: 80   Resp: 18   Temp: 97.8 °F (36.6 °C)        Labs/Imaging/Studies:   Recent Results (from the past 48 hour(s))   CBC with Differential    Collection Time: 02/14/24  1:42 PM   Result Value Ref Range    WBC 7.15 4.50 - 11.50 x10(3)/mcL    RBC 2.86 (L) 4.20 - 5.40 x10(6)/mcL    Hgb 8.8 (L) 12.0 - 16.0 g/dL    Hct 26.9 (L) 37.0 - 47.0 %    MCV 94.1 (H) 80.0 - 94.0 fL    MCH 30.8 27.0 - 31.0 pg    MCHC 32.7 (L) 33.0 - 36.0 g/dL    RDW 14.4 11.5 - 17.0 %    Platelet 97 (L) 130 - 400 x10(3)/mcL    MPV 11.0 (H) 7.4 - 10.4 fL    Neut % 89.7 %    Lymph % 8.1 %    Mono % 1.8 %    Eos % 0.0 %    Basophil % 0.1 %    Lymph # 0.58 (L) 0.6 - 4.6 x10(3)/mcL     "Neut # 6.41 2.1 - 9.2 x10(3)/mcL    Mono # 0.13 0.1 - 1.3 x10(3)/mcL    Eos # 0.00 0 - 0.9 x10(3)/mcL    Baso # 0.01 <=0.2 x10(3)/mcL    IG# 0.02 0 - 0.04 x10(3)/mcL    IG% 0.3 %      No results found for: "PHENYTOIN", "PHENOBARB", "VALPROATE", "CBMZ"        Psychiatric Mental Status Exam:  General Appearance: appears stated age, dressed in hospital garb, lying in bed, in no acute distress  Arousal: alert  Behavior: normal; cooperative; reasonably friendly, pleasant, and polite; appropriate eye-contact; under good behavioral control  Movements and Motor Activity: no abnormal involuntary movements noted  Orientation: oriented to person, place, time, and situation  Speech: normal rate, rhythm, volume, tone and pitch  Mood: Euthymic  Affect: normal, euthymic, reactive, full-range, mood-congruent, appropriate to situation and context  Thought Process: intact, linear, goal-directed, organized, logical  Associations: intact, no loosening of associations  Thought Content and Perceptions: no suicidal or homicidal ideation, no auditory or visual hallucinations, no paranoid ideation, no ideas of reference, no evidence of delusions or psychosis  Recent and Remote Memory: grossly intact; per interview/observation with patient  Attention and Concentration: grossly intact; per interview/observation with patient  Fund of Knowledge: grossly intact; based on history, vocabulary, fund of knowledge, syntax, grammar, and content  Insight: adequate; based on understanding of severity of illness and HPI  Judgment: adequate; based on patient's behavior and HPI          ASSESSMENT/PLAN:   Diagnoses:  ANXIETY DISORDERS; 7.11.Anxiety Disorder NOS (F41.9)         Past Medical History:   Diagnosis Date    Cancer, colon     Chronic pain     GERD (gastroesophageal reflux disease)     Renal disorder     Thyroiditis, unspecified           Problem lists and Management Plans:  Understands need for continued care. Not an imminent threat to self or " others, nor gravely disabled due to mental illness. No evidence of psychosis. Safe for discharge/transfer when medically stable.      Shola Tejeda

## 2024-02-14 NOTE — PLAN OF CARE
In submitting PSSR to state for 142, Major Depression was not placed on psych hx.  PSSR has to be resubmitted.  Send in PSSR today.

## 2024-02-14 NOTE — ASSESSMENT & PLAN NOTE
Continue current regimen seems to be improving with decreased leukocytosis  No fever during her stay urine is clearing.    Culture done in the hospital shows enterrococcus faeicium    linezolid p.o. b.i.d. for 5 days

## 2024-02-14 NOTE — DISCHARGE SUMMARY
[facility] - Medical Surgical Unit  Hospital Medicine  Discharge Summary      Patient Name: Kam Reyes  MRN: [id number]  JOI: [id number]  Patient Class: IP- Inpatient  Admission Date: 2/5/2024  Hospital Length of Stay: 9 days  Discharge Date and Time:  02/14/2024 7:51 AM  Attending Physician: Gregor Heaton MD   Discharging Provider: Gregor Heaton MD  Primary Care Provider: Gregor Heaton MD    Primary Care Team: Networked reference to record PCT     HPI:   Patient is a 73-year-old known to me from clinic.  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  Patient had frequent urinary tract infections.  After many procedures was continuing to have them.  She was hospitalized about a week ago for urinary tract infection.  Remained asymptomatic during her stay with no leukocytosis grew only Proteus which was sensitive to Cipro.  She was discharged home and apparently over the weekend was becoming more nauseated and had feeling more ill.  Since the culture has grown out 3 more organisms including Enterobacter faecium Proteus mirabilis Pseudomonas aeruginosa enrol sella plan to coli.  Only the Pseudomonas and Proteus were sensitive to the Cipro my floxacillin she was sent on home on.  Moreover her urinary catheter came out today she would called initially for us to replace it in the office but she was feeling too unwell to come the office and went straight to the emergency room.  There she was complaining of nausea vomiting and some abdominal pain.  Was noted to have leukocytosis of 19,000. And urinary tract infection continuing.    * No surgery found *      Hospital Course:   73-year-old  female well known to me through clinic.  Presented with complaints of her catheter coming out however urostomy.  Upon evaluation was noted to have leukocytosis nausea abdominal pain.  Diagnosed with urinary tract infection.  The admitted to the  hospital and covered initially for her older cultures which had multiple organisms.  With broad-spectrum antibiotic therapy.  Cultures came back with only Enterococcus faecalis this time.  And patient was placed on linezolid only.  She has had clearing of her urinary tract infection with clear urine no nausea no leukocytosis initially she did have some significant loose leukocytosis.  No shortness a breath throughout her stay.  She did have notable anemia requiring transfusion.  As well as a shot of Procrit.  She has not have heme-positive stools in his was attributable to her renal insufficiency.  Initially her she was placed on vancomycin with an increase in her creatinine thus necessitating the switch to linezolid.      Patient had a fractured humerus prior to admission orthopedics was consulted and recommended expectant management with a sling patient did develop swelling to the arm ultrasound was performed did not show a DVT the patient is wearing compression sleeve at this time to aid with edema to the arm.  Physical therapy was consulted during the stay and she will continue physical therapy after discharge.  She is being discharged today to skilled nursing for continued physical therapy because of her weakened state and continued p.o. antibiotic therapy.     Goals of Care Treatment Preferences:  Code Status: Full Code      Consults:   Consults (From admission, onward)          Status Ordering Provider     Inpatient consult to Social Work/Case Management  Once        Provider:  (Not yet assigned)    Acknowledged JUAN PABLO IBANEZ     Inpatient consult to Urology  Once        Provider:  Carlos Eduardo Dorsey MD    Acknowledged JUAN PABLO IBANEZ     Inpatient consult to Nephrology  Once        Provider:  Lester Max MD    Completed JUAN PABLO IBANEZ     Inpatient consult to Orthopedic Surgery  Once        Provider:  Narciso Machado MD    Acknowledged JUAN PABLO IBANEZ             Psychiatric  Anxiety disorder        Recurrent major depression  Continue her Cymbalta as her allergy duloxetine was put in an error    Pulmonary  Dyspnea  Now resolved      Renal/  * Urinary tract infection  Continue current regimen seems to be improving with decreased leukocytosis  No fever during her stay urine is clearing.    Culture done in the hospital shows enterrococcus faeicium    linezolid p.o. b.i.d. for 5 days    Oncology  Anemia of chronic kidney failure, stage 4 (severe)  Creatine stable for now. BMP reviewed- noted Estimated Creatinine Clearance: 15 mL/min (A) (based on SCr of 3.19 mg/dL (H)). according to latest data. Based on current GFR, CKD stage is stage 4 - GFR 15-29.  Monitor UOP and serial BMP and adjust therapy as needed. Renally dose meds. Avoid nephrotoxic medications and procedures.  we will continue the linesolid per id rec.  She was given a dose of Procrit and is status post 2 units packed red blood cell creatinine has improved slightly currently her hemoglobin is over 8    Endocrine  Hypothyroid  Continue Synthroid      GI  Ileostomy status  Continue care and maintenance      GERD (gastroesophageal reflux disease)  Continue p.o. Protonix      Orthopedic  Fracture of surgical neck of humerus  I will consult ortho in-house to assess  Dr. Machado is following      Other  Edema of left upper extremity    No apparent DVT noted we will continue compression sleeve and encourage activity      Final Active Diagnoses:    Diagnosis Date Noted POA    PRINCIPAL PROBLEM:  Urinary tract infection [N39.0] 01/26/2024 Yes    Edema of left upper extremity [R60.0] 02/12/2024 No    Fracture of surgical neck of humerus [S42.213A] 01/30/2024 Yes    Hypertension [I10] 01/26/2024 Yes    Anemia of chronic kidney failure, stage 4 (severe) [N18.4, D63.1] 01/01/2023 Yes    Ileostomy status [Z93.2] 01/01/2023 Not Applicable    Recurrent major depression [F33.9] 01/01/2023 Yes    Personal history of other  malignant neoplasm of large intestine [Z85.038] 01/01/2023 Yes    Dyspnea [R06.00] 08/30/2022 Yes    GERD (gastroesophageal reflux disease) [K21.9] 08/12/2021 Yes    Hypothyroid [E03.9] 08/12/2021 Yes    Anxiety disorder [F41.9] 08/12/2021 Yes      Problems Resolved During this Admission:       Discharged Condition: good    Disposition: Skilled Nursing Facility    Follow Up:   Follow-up Information       Narciso Machado MD. Schedule an appointment as soon as possible for a visit in 1 week(s).    Specialty: Orthopedic Surgery  Contact information:  [address] [place] [id number]  [phone]               Gregor Heaton MD. Schedule an appointment as soon as possible for a visit in 1 week(s).    Specialty: Family Medicine  Contact information:  [address]  [address]  [place] [address]  [phone]                           Patient Instructions:      Diet renal       Significant Diagnostic Studies: N/A    Pending Diagnostic Studies:       Procedure Component Value Units Date/Time    Basic Metabolic Panel [[id number]]     Order Status: Sent Lab Status: No result     Specimen: Blood     CBC Auto Differential [[id number]]     Order Status: Sent Lab Status: No result     Specimen: Blood     Narrative:      The following orders were created for panel order CBC Auto Differential.  Procedure                               Abnormality         Status                     ---------                               -----------         ------                     CBC with Differential[[id number]]                                                        Please view results for these tests on the individual orders.    CBC with Differential [[id number]]     Order Status: Sent Lab Status: No result     Specimen: Blood            Medications:  Reconciled Home Medications:      Medication List        START taking these medications      fluconazole 100 MG tablet  Commonly known as: DIFLUCAN  Take 1 tablet (100 mg total) by  mouth once daily.     linezolid 600 mg Tab  Commonly known as: ZYVOX  Take 1 tablet (600 mg total) by mouth every 12 (twelve) hours.            CONTINUE taking these medications      calcitRIOL 0.5 MCG Cap  Commonly known as: ROCALTROL  Take 0.5 mcg by mouth once daily.     clopidogreL 75 mg tablet  Commonly known as: PLAVIX  Take 75 mg by mouth once daily.     DULoxetine 30 MG capsule  Commonly known as: CYMBALTA  Take 30 mg by mouth once daily.     ferrous sulfate 325 (65 FE) MG EC tablet  Take 325 mg by mouth 3 (three) times daily with meals.     HYDROcodone-acetaminophen  mg per tablet  Commonly known as: NORCO  Take 1 tablet by mouth.     levothyroxine 100 MCG tablet  Commonly known as: SYNTHROID  Take 137 mcg by mouth before breakfast.     omeprazole 40 MG capsule  Commonly known as: PRILOSEC  Take 40 mg by mouth once daily.     QUEtiapine 25 MG Tab  Commonly known as: SEROQUEL  Take by mouth.     sodium bicarbonate 650 MG tablet  Take 650 mg by mouth 4 (four) times daily.     VITAMIN C 100 MG tablet  Generic drug: ascorbic acid (vitamin C)  Take 100 mg by mouth once daily.            STOP taking these medications      ciprofloxacin HCl 500 MG tablet  Commonly known as: CIPRO              Indwelling Lines/Drains at time of discharge:   Lines/Drains/Airways       Drain  Duration                  Hemodialysis AV Fistula Left upper arm -- days         Ileostomy RUQ -- days         Suprapubic Catheter 02/05/24 1355 16 Fr. 8 days                    Time spent on the discharge of patient: 45 minutes         Gregor Heaton MD  Department of Hospital Medicine  [facility]

## 2024-02-15 PROCEDURE — 11000001 HC ACUTE MED/SURG PRIVATE ROOM

## 2024-02-15 PROCEDURE — 25000003 PHARM REV CODE 250: Performed by: FAMILY MEDICINE

## 2024-02-15 PROCEDURE — 97161 PT EVAL LOW COMPLEX 20 MIN: CPT

## 2024-02-15 PROCEDURE — 27000207 HC ISOLATION

## 2024-02-15 PROCEDURE — 25000003 PHARM REV CODE 250: Performed by: INTERNAL MEDICINE

## 2024-02-15 PROCEDURE — A4216 STERILE WATER/SALINE, 10 ML: HCPCS | Performed by: INTERNAL MEDICINE

## 2024-02-15 PROCEDURE — 63700000 PHARM REV CODE 250 ALT 637 W/O HCPCS: Performed by: FAMILY MEDICINE

## 2024-02-15 PROCEDURE — 94761 N-INVAS EAR/PLS OXIMETRY MLT: CPT

## 2024-02-15 PROCEDURE — 97530 THERAPEUTIC ACTIVITIES: CPT

## 2024-02-15 PROCEDURE — 63600175 PHARM REV CODE 636 W HCPCS: Performed by: INTERNAL MEDICINE

## 2024-02-15 RX ORDER — SODIUM CHLORIDE, SODIUM LACTATE, POTASSIUM CHLORIDE, CALCIUM CHLORIDE 600; 310; 30; 20 MG/100ML; MG/100ML; MG/100ML; MG/100ML
INJECTION, SOLUTION INTRAVENOUS CONTINUOUS
Status: CANCELLED | OUTPATIENT
Start: 2024-02-15

## 2024-02-15 RX ADMIN — LINEZOLID 600 MG: 600 TABLET, FILM COATED ORAL at 08:02

## 2024-02-15 RX ADMIN — SODIUM CHLORIDE, PRESERVATIVE FREE 10 ML: 5 INJECTION INTRAVENOUS at 03:02

## 2024-02-15 RX ADMIN — LEVOTHYROXINE SODIUM 137 MCG: 25 TABLET ORAL at 05:02

## 2024-02-15 RX ADMIN — Medication 250 MG: at 09:02

## 2024-02-15 RX ADMIN — SODIUM CHLORIDE, PRESERVATIVE FREE 10 ML: 5 INJECTION INTRAVENOUS at 05:02

## 2024-02-15 RX ADMIN — LIDOCAINE 5% 1 PATCH: 700 PATCH TOPICAL at 09:02

## 2024-02-15 RX ADMIN — FLUCONAZOLE 100 MG: 100 TABLET ORAL at 09:02

## 2024-02-15 RX ADMIN — CALCITRIOL CAPSULES 0.25 MCG 0.5 MCG: 0.25 CAPSULE ORAL at 09:02

## 2024-02-15 RX ADMIN — DULOXETINE HYDROCHLORIDE 30 MG: 30 CAPSULE, DELAYED RELEASE ORAL at 09:02

## 2024-02-15 RX ADMIN — ENOXAPARIN SODIUM 30 MG: 30 INJECTION SUBCUTANEOUS at 04:02

## 2024-02-15 RX ADMIN — PANTOPRAZOLE SODIUM 40 MG: 40 TABLET, DELAYED RELEASE ORAL at 09:02

## 2024-02-15 RX ADMIN — LINEZOLID 600 MG: 600 TABLET, FILM COATED ORAL at 09:02

## 2024-02-15 RX ADMIN — FERROUS SULFATE TAB 325 MG (65 MG ELEMENTAL FE) 1 EACH: 325 (65 FE) TAB at 09:02

## 2024-02-15 RX ADMIN — SODIUM CHLORIDE, PRESERVATIVE FREE 10 ML: 5 INJECTION INTRAVENOUS at 09:02

## 2024-02-15 RX ADMIN — CLOPIDOGREL BISULFATE 75 MG: 75 TABLET ORAL at 09:02

## 2024-02-15 RX ADMIN — QUETIAPINE FUMARATE 25 MG: 25 TABLET ORAL at 08:02

## 2024-02-15 RX ADMIN — POTASSIUM CHLORIDE 20 MEQ: 1500 TABLET, EXTENDED RELEASE ORAL at 09:02

## 2024-02-15 NOTE — SUBJECTIVE & OBJECTIVE
Past Medical History:   Diagnosis Date    Cancer, colon     Chronic pain     GERD (gastroesophageal reflux disease)     Renal disorder     Thyroiditis, unspecified        Past Surgical History:   Procedure Laterality Date    BACK SURGERY       SECTION      CHOLECYSTECTOMY      COLON SURGERY      HYSTERECTOMY      KIDNEY SURGERY         Review of patient's allergies indicates:   Allergen Reactions    Oxaprozin Nausea And Vomiting and Swelling     Facial swelling      Sulfamethoxazole-trimethoprim Nausea And Vomiting     Severe nausea and vomiting    Doxycycline     Nsaids (non-steroidal anti-inflammatory drug) Rash    Sulfa (sulfonamide antibiotics) Nausea And Vomiting       No current facility-administered medications on file prior to encounter.     Current Outpatient Medications on File Prior to Encounter   Medication Sig    ascorbic acid, vitamin C, (VITAMIN C) 100 MG tablet Take 100 mg by mouth once daily.    calcitRIOL (ROCALTROL) 0.5 MCG Cap Take 0.5 mcg by mouth once daily.    clopidogreL (PLAVIX) 75 mg tablet Take 75 mg by mouth once daily.    DULoxetine (CYMBALTA) 30 MG capsule Take 30 mg by mouth once daily.    ferrous sulfate 325 (65 FE) MG EC tablet Take 325 mg by mouth 3 (three) times daily with meals.    HYDROcodone-acetaminophen (NORCO)  mg per tablet Take 1 tablet by mouth.    levothyroxine (SYNTHROID) 100 MCG tablet Take 137 mcg by mouth before breakfast.    omeprazole (PRILOSEC) 40 MG capsule Take 40 mg by mouth once daily.    QUEtiapine (SEROQUEL) 25 MG Tab Take by mouth.    sodium bicarbonate 650 MG tablet Take 650 mg by mouth 4 (four) times daily.     Family History       Problem Relation (Age of Onset)    Hypertension Mother          Tobacco Use    Smoking status: Never    Smokeless tobacco: Never   Substance and Sexual Activity    Alcohol use: Never    Drug use: Never    Sexual activity: Not Currently     Review of Systems   Constitutional:  Positive for activity change and fever.    HENT: Negative.     Eyes: Negative.    Respiratory: Negative.     Cardiovascular: Negative.    Gastrointestinal:  Positive for abdominal pain and nausea.   Endocrine: Negative.    Genitourinary: Negative.    Musculoskeletal:  Positive for arthralgias and joint swelling.   Skin: Negative.    Allergic/Immunologic: Negative.    Neurological:  Positive for weakness.   Hematological: Negative.    Psychiatric/Behavioral: Negative.       Objective:     Vital Signs (Most Recent):  Temp: 97.4 °F (36.3 °C) (02/15/24 0750)  Pulse: 82 (02/15/24 0750)  Resp: 19 (02/14/24 2136)  BP: 113/70 (02/15/24 0750)  SpO2: 96 % (02/15/24 0750) Vital Signs (24h Range):  Temp:  [97.4 °F (36.3 °C)-97.8 °F (36.6 °C)] 97.4 °F (36.3 °C)  Pulse:  [78-87] 82  Resp:  [18-20] 19  SpO2:  [96 %-100 %] 96 %  BP: ()/(52-70) 113/70     Weight: 79.8 kg (176 lb)  Body mass index is 33.25 kg/m².     Physical Exam  Constitutional:       General: She is not in acute distress.     Appearance: She is obese.   HENT:      Head: Normocephalic and atraumatic.      Nose: Nose normal.      Mouth/Throat:      Mouth: Mucous membranes are moist.      Pharynx: Oropharynx is clear. No oropharyngeal exudate.   Eyes:      Conjunctiva/sclera: Conjunctivae normal.   Cardiovascular:      Rate and Rhythm: Normal rate and regular rhythm.      Pulses: Normal pulses.      Heart sounds: Normal heart sounds.      Comments: 1 plus edema to the left upper extremity  Pulmonary:      Effort: Pulmonary effort is normal.      Breath sounds: Normal breath sounds.   Abdominal:      General: Abdomen is flat.      Palpations: Abdomen is soft.   Musculoskeletal:         General: No swelling or deformity. Normal range of motion.      Cervical back: Normal range of motion and neck supple. No rigidity.   Skin:     General: Skin is warm and dry.      Capillary Refill: Capillary refill takes less than 2 seconds.   Neurological:      General: No focal deficit present.      Mental Status: She  is alert and oriented to person, place, and time.   Psychiatric:         Mood and Affect: Mood normal.         Behavior: Behavior normal.         Thought Content: Thought content normal.         Judgment: Judgment normal.                Significant Labs: All pertinent labs within the past 24 hours have been reviewed.  CBC:   Recent Labs   Lab 02/14/24  1342   WBC 7.15   HGB 8.8*   HCT 26.9*   PLT 97*       CMP:   Recent Labs   Lab 02/14/24  1342      K 4.2   CO2 31   BUN 36.0*   CREATININE 3.59*   CALCIUM 8.8         Significant Imaging: I have reviewed all pertinent imaging results/findings within the past 24 hours.

## 2024-02-15 NOTE — ASSESSMENT & PLAN NOTE
Creatine stable for now. BMP reviewed- noted Estimated Creatinine Clearance: 13.4 mL/min (A) (based on SCr of 3.59 mg/dL (H)). according to latest data. Based on current GFR, CKD stage is stage 4 - GFR 15-29.  Monitor UOP and serial BMP and adjust therapy as needed. Renally dose meds. Avoid nephrotoxic medications and procedures.  we will continue the linesolid per id rec.  She was given a dose of Procrit and is status post 2 units packed red blood cell creatinine has improved slightly currently her hemoglobin is over 8

## 2024-02-15 NOTE — DISCHARGE INSTRUCTIONS
Report given to Jose at [facility]. Made aware of of follow up apts that need to be made

## 2024-02-15 NOTE — ASSESSMENT & PLAN NOTE
We had been with holding Lovenox because of her anemia and renal insufficiency.  However now in the arm with fracture she has developed a 1+ edema so we will start Lovenox 30 no dvt on us

## 2024-02-15 NOTE — PT/OT/SLP EVAL
Physical Therapy Evaluation    Patient Name:  Kam Reyes   MRN:  [id number]    Recommendations:     Discharge Recommendations: moderate intensity    Discharge Equipment Recommendations: HW, BSC, TTB    Barriers to discharge: Decreased caregiver support    Assessment:     Kam Reyes is a 73 y.o. female admitted with a medical diagnosis of Urinary tract infection.  She presents with the following impairments/functional limitations:   weakness, left humerus fx, debility, orthopedic precautions, poor balance, decreased functional mobility.    Rehab Prognosis: Fair; patient would benefit from acute skilled PT services to address these deficits and reach maximum level of function.    Recent Surgery: Procedure(s) (LRB):  CYSTOSCOPY (N/A) Day of Surgery    Plan:     During this hospitalization, patient to be seen   to address the identified rehab impairments via   and progress toward the following goals:    Plan of Care Expires:       Subjective     Chief Complaint: weak  Patient/Family Comments/goals: to get up  Pain/Comfort:       Patients cultural, spiritual, Latter-day conflicts given the current situation:      Living Environment:  Pt lives at home  Prior to admission, patients level of function was indep.  Equipment used at home:  .  DME owned (not currently used): none.  Upon discharge, patient will have assistance from family.    Objective:     Communicated with pt and nurse prior to session.  Patient found HOB elevated with    upon PT entry to room.    General Precautions: Standard,    Orthopedic Precautions:    Braces:    Respiratory Status: Room air    Exams:  LUE ROM: in sling for humerus fx, wrapped with ace wrap    Functional Mobility:  Bed Mobility:     Scooting: maximal assistance and of 2 persons  Supine to Sit: maximal assistance and of 2 persons  Sit to Supine: maximal assistance and of 2 persons  Transfers:     Sit to Stand:  attempted with totalAx2 but unable to stand up due to  weakness      AM-PAC 6 CLICK MOBILITY  Total Score:        Treatment & Education:  Pt also tried stand pivot to chair Ax2 but too weak, legs buckle    Patient left HOB elevated with all lines intact, call button in reach, and bed alarm on.    GOALS:   Multidisciplinary Problems       Physical Therapy Goals          Problem: Physical Therapy    Goal Priority Disciplines Outcome Goal Variances Interventions   Physical Therapy Goal     PT, PT/OT Ongoing, Progressing                         History:     Past Medical History:   Diagnosis Date    Cancer, colon     Chronic pain     GERD (gastroesophageal reflux disease)     Renal disorder     Thyroiditis, unspecified        Past Surgical History:   Procedure Laterality Date    BACK SURGERY       SECTION      CHOLECYSTECTOMY      COLON SURGERY      HYSTERECTOMY      KIDNEY SURGERY         Time Tracking:     PT Received On: 02/15/24  PT Start Time: 1100     PT Stop Time: 1130  PT Total Time (min): 30 min     Billable Minutes: Evaluation 15 and Therapeutic Activity 15      02/15/2024

## 2024-02-15 NOTE — PROGRESS NOTES
[facility] - Medical Surgical Unit  Hospital Medicine  Progress Note    Patient Name: Kam Reyes  MRN: [id number]  Patient Class: IP- Inpatient   Admission Date: 2/5/2024  Length of Stay: 10 days  Attending Physician: Gregor Heaton MD  Primary Care Provider: Gregor Heaton MD        Subjective:     Principal Problem:Urinary tract infection        HPI:  Patient is a 73-year-old known to me from clinic.  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  Patient had frequent urinary tract infections.  After many procedures was continuing to have them.  She was hospitalized about a week ago for urinary tract infection.  Remained asymptomatic during her stay with no leukocytosis grew only Proteus which was sensitive to Cipro.  She was discharged home and apparently over the weekend was becoming more nauseated and had feeling more ill.  Since the culture has grown out 3 more organisms including Enterobacter faecium Proteus mirabilis Pseudomonas aeruginosa enrol sella plan to coli.  Only the Pseudomonas and Proteus were sensitive to the Cipro my floxacillin she was sent on home on.  Moreover her urinary catheter came out today she would called initially for us to replace it in the office but she was feeling too unwell to come the office and went straight to the emergency room.  There she was complaining of nausea vomiting and some abdominal pain.  Was noted to have leukocytosis of 19,000. And urinary tract infection continuing.    Overview/Hospital Course:  73-year-old  female well known to me through clinic.  Presented with complaints of her catheter coming out however urostomy.  Upon evaluation was noted to have leukocytosis nausea abdominal pain.  Diagnosed with urinary tract infection.  The admitted to the hospital and covered initially for her older cultures which had multiple organisms.  With broad-spectrum antibiotic therapy.   Cultures came back with only Enterococcus faecalis this time.  And patient was placed on linezolid only.  She has had clearing of her urinary tract infection with clear urine no nausea no leukocytosis initially she did have some significant loose leukocytosis.  No shortness a breath throughout her stay.  She did have notable anemia requiring transfusion.  As well as a shot of Procrit.  She has not have heme-positive stools in his was attributable to her renal insufficiency.  Initially her she was placed on vancomycin with an increase in her creatinine thus necessitating the switch to linezolid.      Patient had a fractured humerus prior to admission orthopedics was consulted and recommended expectant management with a sling patient did develop swelling to the arm ultrasound was performed did not show a DVT the patient is wearing compression sleeve at this time to aid with edema to the arm.  Physical therapy was consulted during the stay and she will continue physical therapy after discharge.  She is being discharged today to skilled nursing for continued physical therapy because of her weakened state and continued p.o. antibiotic therapy.  Discharge delayed due to acceptance by nursing home    Past Medical History:   Diagnosis Date    Cancer, colon     Chronic pain     GERD (gastroesophageal reflux disease)     Renal disorder     Thyroiditis, unspecified        Past Surgical History:   Procedure Laterality Date    BACK SURGERY       SECTION      CHOLECYSTECTOMY      COLON SURGERY      HYSTERECTOMY      KIDNEY SURGERY         Review of patient's allergies indicates:   Allergen Reactions    Oxaprozin Nausea And Vomiting and Swelling     Facial swelling      Sulfamethoxazole-trimethoprim Nausea And Vomiting     Severe nausea and vomiting    Doxycycline     Nsaids (non-steroidal anti-inflammatory drug) Rash    Sulfa (sulfonamide antibiotics) Nausea And Vomiting       No current facility-administered medications  on file prior to encounter.     Current Outpatient Medications on File Prior to Encounter   Medication Sig    ascorbic acid, vitamin C, (VITAMIN C) 100 MG tablet Take 100 mg by mouth once daily.    calcitRIOL (ROCALTROL) 0.5 MCG Cap Take 0.5 mcg by mouth once daily.    clopidogreL (PLAVIX) 75 mg tablet Take 75 mg by mouth once daily.    DULoxetine (CYMBALTA) 30 MG capsule Take 30 mg by mouth once daily.    ferrous sulfate 325 (65 FE) MG EC tablet Take 325 mg by mouth 3 (three) times daily with meals.    HYDROcodone-acetaminophen (NORCO)  mg per tablet Take 1 tablet by mouth.    levothyroxine (SYNTHROID) 100 MCG tablet Take 137 mcg by mouth before breakfast.    omeprazole (PRILOSEC) 40 MG capsule Take 40 mg by mouth once daily.    QUEtiapine (SEROQUEL) 25 MG Tab Take by mouth.    sodium bicarbonate 650 MG tablet Take 650 mg by mouth 4 (four) times daily.     Family History       Problem Relation (Age of Onset)    Hypertension Mother          Tobacco Use    Smoking status: Never    Smokeless tobacco: Never   Substance and Sexual Activity    Alcohol use: Never    Drug use: Never    Sexual activity: Not Currently     Review of Systems   Constitutional:  Positive for activity change and fever.   HENT: Negative.     Eyes: Negative.    Respiratory: Negative.     Cardiovascular: Negative.    Gastrointestinal:  Positive for abdominal pain and nausea.   Endocrine: Negative.    Genitourinary: Negative.    Musculoskeletal:  Positive for arthralgias and joint swelling.   Skin: Negative.    Allergic/Immunologic: Negative.    Neurological:  Positive for weakness.   Hematological: Negative.    Psychiatric/Behavioral: Negative.       Objective:     Vital Signs (Most Recent):  Temp: 97.4 °F (36.3 °C) (02/15/24 0750)  Pulse: 82 (02/15/24 0750)  Resp: 19 (02/14/24 2136)  BP: 113/70 (02/15/24 0750)  SpO2: 96 % (02/15/24 0750) Vital Signs (24h Range):  Temp:  [97.4 °F (36.3 °C)-97.8 °F (36.6 °C)] 97.4 °F (36.3 °C)  Pulse:  [78-87]  82  Resp:  [18-20] 19  SpO2:  [96 %-100 %] 96 %  BP: ()/(52-70) 113/70     Weight: 79.8 kg (176 lb)  Body mass index is 33.25 kg/m².     Physical Exam  Constitutional:       General: She is not in acute distress.     Appearance: She is obese.   HENT:      Head: Normocephalic and atraumatic.      Nose: Nose normal.      Mouth/Throat:      Mouth: Mucous membranes are moist.      Pharynx: Oropharynx is clear. No oropharyngeal exudate.   Eyes:      Conjunctiva/sclera: Conjunctivae normal.   Cardiovascular:      Rate and Rhythm: Normal rate and regular rhythm.      Pulses: Normal pulses.      Heart sounds: Normal heart sounds.      Comments: 1 plus edema to the left upper extremity  Pulmonary:      Effort: Pulmonary effort is normal.      Breath sounds: Normal breath sounds.   Abdominal:      General: Abdomen is flat.      Palpations: Abdomen is soft.   Musculoskeletal:         General: No swelling or deformity. Normal range of motion.      Cervical back: Normal range of motion and neck supple. No rigidity.   Skin:     General: Skin is warm and dry.      Capillary Refill: Capillary refill takes less than 2 seconds.   Neurological:      General: No focal deficit present.      Mental Status: She is alert and oriented to person, place, and time.   Psychiatric:         Mood and Affect: Mood normal.         Behavior: Behavior normal.         Thought Content: Thought content normal.         Judgment: Judgment normal.                Significant Labs: All pertinent labs within the past 24 hours have been reviewed.  CBC:   Recent Labs   Lab 02/14/24  1342   WBC 7.15   HGB 8.8*   HCT 26.9*   PLT 97*       CMP:   Recent Labs   Lab 02/14/24  1342      K 4.2   CO2 31   BUN 36.0*   CREATININE 3.59*   CALCIUM 8.8         Significant Imaging: I have reviewed all pertinent imaging results/findings within the past 24 hours.    Assessment/Plan:      * Urinary tract infection  Continue current regimen seems to be improving with  decreased leukocytosis  No fever during her stay urine is clearing.    Culture done in the hospital shows enterrococcus faeicium    linezolid p.o. b.i.d. for 5 days    Edema of left upper extremity    No apparent DVT noted we will continue compression sleeve and encourage activity    Fracture of surgical neck of humerus  I will consult ortho in-house to assess  Dr. Machado is following      Hypertension  Continue home medications as written    Anxiety disorder        Hypothyroid  Continue Synthroid      Personal history of other malignant neoplasm of large intestine  We had been with holding Lovenox because of her anemia and renal insufficiency.  However now in the arm with fracture she has developed a 1+ edema so we will start Lovenox 30 no dvt on us    Recurrent major depression  Continue her Cymbalta as her allergy duloxetine was put in an error    Ileostomy status  Continue care and maintenance      GERD (gastroesophageal reflux disease)  Continue p.o. Protonix      Anemia of chronic kidney failure, stage 4 (severe)  Creatine stable for now. BMP reviewed- noted Estimated Creatinine Clearance: 13.4 mL/min (A) (based on SCr of 3.59 mg/dL (H)). according to latest data. Based on current GFR, CKD stage is stage 4 - GFR 15-29.  Monitor UOP and serial BMP and adjust therapy as needed. Renally dose meds. Avoid nephrotoxic medications and procedures.  we will continue the linesolid per id rec.  She was given a dose of Procrit and is status post 2 units packed red blood cell creatinine has improved slightly currently her hemoglobin is over 8    Dyspnea  Now resolved        VTE Risk Mitigation (From admission, onward)           Ordered     enoxaparin injection 30 mg  Daily         02/11/24 2213     IP VTE HIGH RISK PATIENT  Once         02/05/24 1537     Place sequential compression device  Until discontinued         02/05/24 1537                    Discharge Planning   JOSHUA: 2/13/2024     Code Status: Full Code   Is the  patient medically ready for discharge?:     Reason for patient still in hospital (select all that apply): Patient trending condition  Discharge Plan A: Skilled Nursing Facility   Discharge Delays: (!) Post-Acute Set-up              Gregor Heaton MD  Department of Hospital Medicine   [facility] - Medical Surgical Unit

## 2024-02-16 PROBLEM — F33.9 RECURRENT MAJOR DEPRESSION: Chronic | Status: ACTIVE | Noted: 2023-01-01

## 2024-02-16 LAB
ANION GAP SERPL CALC-SCNC: 9 MEQ/L
BASOPHILS # BLD AUTO: 0 X10(3)/MCL
BASOPHILS NFR BLD AUTO: 0 %
BUN SERPL-MCNC: 39 MG/DL (ref 9.8–20.1)
CALCIUM SERPL-MCNC: 8.9 MG/DL (ref 8.4–10.2)
CHLORIDE SERPL-SCNC: 103 MMOL/L (ref 98–107)
CO2 SERPL-SCNC: 26 MMOL/L (ref 23–31)
CREAT SERPL-MCNC: 3.57 MG/DL (ref 0.55–1.02)
CREAT/UREA NIT SERPL: 11
EOSINOPHIL # BLD AUTO: 0.23 X10(3)/MCL (ref 0–0.9)
EOSINOPHIL NFR BLD AUTO: 5.3 %
ERYTHROCYTE [DISTWIDTH] IN BLOOD BY AUTOMATED COUNT: 14.8 % (ref 11.5–17)
GFR SERPLBLD CREATININE-BSD FMLA CKD-EPI: 13 MLS/MIN/1.73/M2
GLUCOSE SERPL-MCNC: 87 MG/DL (ref 82–115)
HCT VFR BLD AUTO: 25.7 % (ref 37–47)
HGB BLD-MCNC: 7.9 G/DL (ref 12–16)
IMM GRANULOCYTES # BLD AUTO: 0.01 X10(3)/MCL (ref 0–0.04)
IMM GRANULOCYTES NFR BLD AUTO: 0.2 %
LYMPHOCYTES # BLD AUTO: 0.97 X10(3)/MCL (ref 0.6–4.6)
LYMPHOCYTES NFR BLD AUTO: 22.1 %
MCH RBC QN AUTO: 30.3 PG (ref 27–31)
MCHC RBC AUTO-ENTMCNC: 30.7 G/DL (ref 33–36)
MCV RBC AUTO: 98.5 FL (ref 80–94)
MONOCYTES # BLD AUTO: 0.15 X10(3)/MCL (ref 0.1–1.3)
MONOCYTES NFR BLD AUTO: 3.4 %
NEUTROPHILS # BLD AUTO: 3.02 X10(3)/MCL (ref 2.1–9.2)
NEUTROPHILS NFR BLD AUTO: 69 %
PLATELET # BLD AUTO: 72 X10(3)/MCL (ref 130–400)
PMV BLD AUTO: 10.5 FL (ref 7.4–10.4)
POTASSIUM SERPL-SCNC: 4.3 MMOL/L (ref 3.5–5.1)
RBC # BLD AUTO: 2.61 X10(6)/MCL (ref 4.2–5.4)
SODIUM SERPL-SCNC: 138 MMOL/L (ref 136–145)
WBC # SPEC AUTO: 4.38 X10(3)/MCL (ref 4.5–11.5)

## 2024-02-16 PROCEDURE — 63700000 PHARM REV CODE 250 ALT 637 W/O HCPCS: Performed by: FAMILY MEDICINE

## 2024-02-16 PROCEDURE — 63600175 PHARM REV CODE 636 W HCPCS: Mod: EC,JG | Performed by: FAMILY MEDICINE

## 2024-02-16 PROCEDURE — 80048 BASIC METABOLIC PNL TOTAL CA: CPT | Performed by: FAMILY MEDICINE

## 2024-02-16 PROCEDURE — 25000003 PHARM REV CODE 250: Performed by: INTERNAL MEDICINE

## 2024-02-16 PROCEDURE — 85025 COMPLETE CBC W/AUTO DIFF WBC: CPT | Performed by: FAMILY MEDICINE

## 2024-02-16 PROCEDURE — 97530 THERAPEUTIC ACTIVITIES: CPT

## 2024-02-16 PROCEDURE — 11000001 HC ACUTE MED/SURG PRIVATE ROOM

## 2024-02-16 PROCEDURE — 94761 N-INVAS EAR/PLS OXIMETRY MLT: CPT

## 2024-02-16 PROCEDURE — 27000207 HC ISOLATION

## 2024-02-16 PROCEDURE — A4216 STERILE WATER/SALINE, 10 ML: HCPCS | Performed by: INTERNAL MEDICINE

## 2024-02-16 PROCEDURE — 97110 THERAPEUTIC EXERCISES: CPT

## 2024-02-16 PROCEDURE — 25000003 PHARM REV CODE 250: Performed by: FAMILY MEDICINE

## 2024-02-16 PROCEDURE — 63600175 PHARM REV CODE 636 W HCPCS: Performed by: INTERNAL MEDICINE

## 2024-02-16 RX ADMIN — LEVOTHYROXINE SODIUM 137 MCG: 25 TABLET ORAL at 06:02

## 2024-02-16 RX ADMIN — SODIUM CHLORIDE, PRESERVATIVE FREE 10 ML: 5 INJECTION INTRAVENOUS at 06:02

## 2024-02-16 RX ADMIN — LINEZOLID 600 MG: 600 TABLET, FILM COATED ORAL at 09:02

## 2024-02-16 RX ADMIN — HYDROCODONE BITARTRATE AND ACETAMINOPHEN 1 TABLET: 10; 325 TABLET ORAL at 09:02

## 2024-02-16 RX ADMIN — PANTOPRAZOLE SODIUM 40 MG: 40 TABLET, DELAYED RELEASE ORAL at 08:02

## 2024-02-16 RX ADMIN — DULOXETINE HYDROCHLORIDE 30 MG: 30 CAPSULE, DELAYED RELEASE ORAL at 09:02

## 2024-02-16 RX ADMIN — ERYTHROPOIETIN 40000 UNITS: 20000 INJECTION, SOLUTION INTRAVENOUS; SUBCUTANEOUS at 01:02

## 2024-02-16 RX ADMIN — LINEZOLID 600 MG: 600 TABLET, FILM COATED ORAL at 08:02

## 2024-02-16 RX ADMIN — Medication 250 MG: at 08:02

## 2024-02-16 RX ADMIN — SODIUM CHLORIDE, PRESERVATIVE FREE 10 ML: 5 INJECTION INTRAVENOUS at 08:02

## 2024-02-16 RX ADMIN — FERROUS SULFATE TAB 325 MG (65 MG ELEMENTAL FE) 1 EACH: 325 (65 FE) TAB at 08:02

## 2024-02-16 RX ADMIN — LIDOCAINE 5% 1 PATCH: 700 PATCH TOPICAL at 08:02

## 2024-02-16 RX ADMIN — POTASSIUM CHLORIDE 20 MEQ: 1500 TABLET, EXTENDED RELEASE ORAL at 09:02

## 2024-02-16 RX ADMIN — ENOXAPARIN SODIUM 30 MG: 30 INJECTION SUBCUTANEOUS at 05:02

## 2024-02-16 RX ADMIN — CALCITRIOL CAPSULES 0.25 MCG 0.5 MCG: 0.25 CAPSULE ORAL at 09:02

## 2024-02-16 RX ADMIN — FLUCONAZOLE 100 MG: 100 TABLET ORAL at 09:02

## 2024-02-16 RX ADMIN — CLOPIDOGREL BISULFATE 75 MG: 75 TABLET ORAL at 09:02

## 2024-02-16 RX ADMIN — QUETIAPINE FUMARATE 25 MG: 25 TABLET ORAL at 08:02

## 2024-02-16 NOTE — PT/OT/SLP PROGRESS
Physical Therapy Treatment    Patient Name:  Kam Reyes   MRN:  [id number]    Recommendations:     Discharge Recommendations:    Discharge Equipment Recommendations:    Barriers to discharge: Decreased caregiver support    Assessment:     Kam Reyes is a 73 y.o. female admitted with a medical diagnosis of Urinary tract infection.  She presents with the following impairments/functional limitations:   weakness, left humerus fx, dificulty with tfs.    Rehab Prognosis: Good; patient would benefit from acute skilled PT services to address these deficits and reach maximum level of function.    Recent Surgery: Procedure(s) (LRB):  CYSTOSCOPY (N/A) 1 Day Post-Op    Plan:     During this hospitalization, patient to be seen   to address the identified rehab impairments via   and progress toward the following goals:    Plan of Care Expires:       Subjective     Chief Complaint: weak  Patient/Family Comments/goals: to rtn home  Pain/Comfort:         Objective:     Communicated with pt prior to session.  Patient found up in chair with fatigue   upon PT entry to room.     General Precautions: Standard,    Orthopedic Precautions:    Braces:    Respiratory Status: Room air     Functional Mobility:  Chair>bed stand pivot max-totalAx2  Sit to supine maxA      AM-PAC 6 CLICK MOBILITY          Treatment & Education:  Pt educated use of call bell    Patient left with HOB up with all lines intact, call button in reach, and chair alarm on..    GOALS:   Multidisciplinary Problems       Physical Therapy Goals          Problem: Physical Therapy    Goal Priority Disciplines Outcome Goal Variances Interventions   Physical Therapy Goal     PT, PT/OT Ongoing, Progressing                         Time Tracking:     PT Received On: 02/16/24  PT Start Time: 1030     PT Stop Time: 1100  PT Total Time (min): 30 min     Billable Minutes: Therapeutic Activity 30    Treatment Type: Treatment  PT/PTA: PT           02/16/2024

## 2024-02-16 NOTE — SUBJECTIVE & OBJECTIVE
Past Medical History:   Diagnosis Date    Cancer, colon     Chronic pain     GERD (gastroesophageal reflux disease)     Renal disorder     Thyroiditis, unspecified        Past Surgical History:   Procedure Laterality Date    BACK SURGERY       SECTION      CHOLECYSTECTOMY      COLON SURGERY      HYSTERECTOMY      KIDNEY SURGERY         Review of patient's allergies indicates:   Allergen Reactions    Oxaprozin Nausea And Vomiting and Swelling     Facial swelling      Sulfamethoxazole-trimethoprim Nausea And Vomiting     Severe nausea and vomiting    Doxycycline     Nsaids (non-steroidal anti-inflammatory drug) Rash    Sulfa (sulfonamide antibiotics) Nausea And Vomiting       No current facility-administered medications on file prior to encounter.     Current Outpatient Medications on File Prior to Encounter   Medication Sig    ascorbic acid, vitamin C, (VITAMIN C) 100 MG tablet Take 100 mg by mouth once daily.    calcitRIOL (ROCALTROL) 0.5 MCG Cap Take 0.5 mcg by mouth once daily.    clopidogreL (PLAVIX) 75 mg tablet Take 75 mg by mouth once daily.    DULoxetine (CYMBALTA) 30 MG capsule Take 30 mg by mouth once daily.    ferrous sulfate 325 (65 FE) MG EC tablet Take 325 mg by mouth 3 (three) times daily with meals.    HYDROcodone-acetaminophen (NORCO)  mg per tablet Take 1 tablet by mouth.    levothyroxine (SYNTHROID) 100 MCG tablet Take 137 mcg by mouth before breakfast.    omeprazole (PRILOSEC) 40 MG capsule Take 40 mg by mouth once daily.    QUEtiapine (SEROQUEL) 25 MG Tab Take by mouth.    sodium bicarbonate 650 MG tablet Take 650 mg by mouth 4 (four) times daily.     Family History       Problem Relation (Age of Onset)    Hypertension Mother          Tobacco Use    Smoking status: Never    Smokeless tobacco: Never   Substance and Sexual Activity    Alcohol use: Never    Drug use: Never    Sexual activity: Not Currently     Review of Systems   Constitutional:  Positive for activity change and fever.    HENT: Negative.     Eyes: Negative.    Respiratory: Negative.     Cardiovascular: Negative.    Gastrointestinal:  Positive for abdominal pain and nausea.   Endocrine: Negative.    Genitourinary: Negative.    Musculoskeletal:  Positive for arthralgias and joint swelling.   Skin: Negative.    Allergic/Immunologic: Negative.    Neurological:  Positive for weakness.   Hematological: Negative.    Psychiatric/Behavioral: Negative.       Objective:     Vital Signs (Most Recent):  Temp: 98.1 °F (36.7 °C) (02/16/24 0819)  Pulse: 80 (02/16/24 0819)  Resp: 18 (02/16/24 0901)  BP: 123/73 (02/16/24 0819)  SpO2: 98 % (02/16/24 0819) Vital Signs (24h Range):  Temp:  [98 °F (36.7 °C)-98.3 °F (36.8 °C)] 98.1 °F (36.7 °C)  Pulse:  [78-95] 80  Resp:  [18-20] 18  SpO2:  [95 %-98 %] 98 %  BP: (108-128)/(66-73) 123/73     Weight: 79.8 kg (176 lb)  Body mass index is 33.25 kg/m².     Physical Exam  Constitutional:       General: She is not in acute distress.     Appearance: She is obese.   HENT:      Head: Normocephalic and atraumatic.      Nose: Nose normal.      Mouth/Throat:      Mouth: Mucous membranes are moist.      Pharynx: Oropharynx is clear. No oropharyngeal exudate.   Eyes:      Conjunctiva/sclera: Conjunctivae normal.   Cardiovascular:      Rate and Rhythm: Normal rate and regular rhythm.      Pulses: Normal pulses.      Heart sounds: Normal heart sounds.      Comments: 1 plus edema to the left upper extremity  Pulmonary:      Effort: Pulmonary effort is normal.      Breath sounds: Normal breath sounds.   Abdominal:      General: Abdomen is flat.      Palpations: Abdomen is soft.   Musculoskeletal:         General: No swelling or deformity. Normal range of motion.      Cervical back: Normal range of motion and neck supple. No rigidity.   Skin:     General: Skin is warm and dry.      Capillary Refill: Capillary refill takes less than 2 seconds.   Neurological:      General: No focal deficit present.      Mental Status: She is  alert and oriented to person, place, and time.   Psychiatric:         Mood and Affect: Mood normal.         Behavior: Behavior normal.         Thought Content: Thought content normal.         Judgment: Judgment normal.                Significant Labs: All pertinent labs within the past 24 hours have been reviewed.  CBC:   Recent Labs   Lab 02/14/24  1342 02/16/24  0511   WBC 7.15 4.38*   HGB 8.8* 7.9*   HCT 26.9* 25.7*   PLT 97* 72*       CMP:   Recent Labs   Lab 02/14/24  1342 02/16/24  0418    138   K 4.2 4.3   CO2 31 26   BUN 36.0* 39.0*   CREATININE 3.59* 3.57*   CALCIUM 8.8 8.9         Significant Imaging: I have reviewed all pertinent imaging results/findings within the past 24 hours.

## 2024-02-16 NOTE — PROGRESS NOTES
"Inpatient Nutrition Evaluation    Admit Date: 2/5/2024   Total duration of encounter: 11 days    Nutrition Recommendation/Prescription     Continue current Renal, Non-Dialysis Diet as tolerated.   Monitor intake, weight, and labs.     RD available as needed. Thank you.     Nutrition Assessment     Chart Review    Reason Seen: length of stay and follow-up    Malnutrition Screening Tool Results   Have you recently lost weight without trying?: No  Have you been eating poorly because of a decreased appetite?: No   MST Score: 0     Diagnosis:  Fracture of surgical neck of humerus     Relevant Medical History:   Cancer, colon      Chronic pain      GERD (gastroesophageal reflux disease)      Renal disorder      Thyroiditis, unspecified          Nutrition-Related Medications:   Ascorbic Acid; Ferrous Sulfate; Levothyroxine; Kcl; Calcitriol.     Nutrition-Related Labs:  2/16: WBC 4.38(L); H/H 7.9/25.7(L); BUN/Crea 39.0/3.57(H); GFR 13(L).  2/8: H/H 9.0/28.7(L); K+ 3.4(L); Cl 115(H); BUN/Crea 48//4.28(H); GFR 10(L).    Diet Order: Diet Renal Non-Dialysis  Diet renal  Oral Supplement Order: none  Appetite/Oral Intake: fair/50-75% of meals  Factors Affecting Nutritional Intake: none identified  Food/Hinduism/Cultural Preferences: none reported  Food Allergies: none reported    Skin Integrity: intact, bruised (ecchymotic)  Wound(s):       Comments    2/16: Renal indices continue to improve. Pt consuming 50-75% of meals. No new weight. Will continue to monitor during stay.     2/9: Renal indices abnormal; however, improving since admit. No recent weight loss noted/reported on nursing admit screen. Will continue to monitor during stay.     Anthropometrics    Height: 5' 1" (154.9 cm) Height Method: Stated  Last Weight: 79.8 kg (176 lb) (02/05/24 1118) Weight Method: Bed Scale  BMI (Calculated): 33.3  BMI Classification: obese grade I (BMI 30-34.9)     Ideal Body Weight (IBW), Female: 105 lb     % Ideal Body Weight, Female (lb): " 167.62 %                             Usual Weight Provided By: EMR weight history    Wt Readings from Last 5 Encounters:   02/05/24 79.8 kg (176 lb)   01/26/24 79.8 kg (176 lb)   01/20/24 79.4 kg (175 lb)   06/22/23 74.8 kg (165 lb)   05/18/23 74.4 kg (164 lb)     Weight Change(s) Since Admission:  Admit Weight: 79.8 kg (176 lb) (02/05/24 1118)      Patient Education    Not applicable.    Monitoring & Evaluation     Dietitian will monitor food and beverage intake, energy intake, weight, weight change, electrolyte/renal panel, glucose/endocrine profile, and gastrointestinal profile.  Nutrition Risk/Follow-Up: low (follow-up in 5-7 days)  Patients assigned 'low nutrition risk' status do not qualify for a full nutritional assessment but will be monitored and re-evaluated in a 5-7 day time period. Please consult if re-evaluation needed sooner.

## 2024-02-16 NOTE — ASSESSMENT & PLAN NOTE
Creatine stable for now. BMP reviewed- noted Estimated Creatinine Clearance: 13.4 mL/min (A) (based on SCr of 3.57 mg/dL (H)). according to latest data. Based on current GFR, CKD stage is stage 4 - GFR 15-29.  Monitor UOP and serial BMP and adjust therapy as needed. Renally dose meds. Avoid nephrotoxic medications and procedures.  we will continue the linesolid per id rec.  She was given a dose of Procrit and is status post 2 units packed red blood cell creatinine has improved slightly currently her hemoglobin is over 8

## 2024-02-16 NOTE — PROGRESS NOTES
[facility] - Medical Surgical Unit  Hospital Medicine  Progress Note    Patient Name: Kam Reyes  MRN: [id number]  Patient Class: IP- Inpatient   Admission Date: 2/5/2024  Length of Stay: 11 days  Attending Physician: Gregor Heaton MD  Primary Care Provider: Gregor Heaton MD        Subjective:     Principal Problem:Urinary tract infection        HPI:  Patient is a 73-year-old known to me from clinic.  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  Patient had frequent urinary tract infections.  After many procedures was continuing to have them.  She was hospitalized about a week ago for urinary tract infection.  Remained asymptomatic during her stay with no leukocytosis grew only Proteus which was sensitive to Cipro.  She was discharged home and apparently over the weekend was becoming more nauseated and had feeling more ill.  Since the culture has grown out 3 more organisms including Enterobacter faecium Proteus mirabilis Pseudomonas aeruginosa enrol sella plan to coli.  Only the Pseudomonas and Proteus were sensitive to the Cipro my floxacillin she was sent on home on.  Moreover her urinary catheter came out today she would called initially for us to replace it in the office but she was feeling too unwell to come the office and went straight to the emergency room.  There she was complaining of nausea vomiting and some abdominal pain.  Was noted to have leukocytosis of 19,000. And urinary tract infection continuing.    Overview/Hospital Course:  73-year-old  female well known to me through clinic.  Presented with complaints of her catheter coming out however urostomy.  Upon evaluation was noted to have leukocytosis nausea abdominal pain.  Diagnosed with urinary tract infection.  The admitted to the hospital and covered initially for her older cultures which had multiple organisms.  With broad-spectrum antibiotic therapy.   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 She states that she has been tolerating the bouncer better at home and is placing both feet down on the ground. She has become more resistant to the exercise ball activities.     OBJECTIVE                                                                                                                             TREATMENT                                                                                                                  Therapeutic Exercise:  Facilitation to take weight through extended legs in standing, therapist blocking her knees  Pull to stand next to the water table  Standing propped next to the water table           ASSESSMENT                                                                                                               Had Reggie standing next to the water table today to encourage her to maintain standing. She will prop stand next to the water table with assistance, keeps her weight shifted over her left lower extremity. When her weight is shifted onto her right lower extremity, she immediately starts to cry and will not calm until her weight is brought back onto her left LE. When held in supported standing, she does not bring her legs down and will not hold weight through extended legs. Does demonstrate symmetrical use of her upper extremities now and will reach with both right and left hands. Pivoting in prone has progressed, continues to struggle more to the right compared to her left.            Therapy procedure time and  Cultures came back with only Enterococcus faecalis this time.  And patient was placed on linezolid only.  She has had clearing of her urinary tract infection with clear urine no nausea no leukocytosis initially she did have some significant loose leukocytosis.  No shortness a breath throughout her stay.  She did have notable anemia requiring transfusion.  As well as a shot of Procrit.  She has not have heme-positive stools in his was attributable to her renal insufficiency.  Initially her she was placed on vancomycin with an increase in her creatinine thus necessitating the switch to linezolid.      Patient had a fractured humerus prior to admission orthopedics was consulted and recommended expectant management with a sling patient did develop swelling to the arm ultrasound was performed did not show a DVT the patient is wearing compression sleeve at this time to aid with edema to the arm.  Physical therapy was consulted during the stay and she will continue physical therapy after discharge.  She is being discharged today to skilled nursing for continued physical therapy because of her weakened state and continued p.o. antibiotic therapy.  Discharge delayed due to acceptance by nursing home  Decreased h/h today    Past Medical History:   Diagnosis Date    Cancer, colon     Chronic pain     GERD (gastroesophageal reflux disease)     Renal disorder     Thyroiditis, unspecified        Past Surgical History:   Procedure Laterality Date    BACK SURGERY       SECTION      CHOLECYSTECTOMY      COLON SURGERY      HYSTERECTOMY      KIDNEY SURGERY         Review of patient's allergies indicates:   Allergen Reactions    Oxaprozin Nausea And Vomiting and Swelling     Facial swelling      Sulfamethoxazole-trimethoprim Nausea And Vomiting     Severe nausea and vomiting    Doxycycline     Nsaids (non-steroidal anti-inflammatory drug) Rash    Sulfa (sulfonamide antibiotics) Nausea And Vomiting       No current  total treatment time can be found documented on the Time Entry flowsheet   facility-administered medications on file prior to encounter.     Current Outpatient Medications on File Prior to Encounter   Medication Sig    ascorbic acid, vitamin C, (VITAMIN C) 100 MG tablet Take 100 mg by mouth once daily.    calcitRIOL (ROCALTROL) 0.5 MCG Cap Take 0.5 mcg by mouth once daily.    clopidogreL (PLAVIX) 75 mg tablet Take 75 mg by mouth once daily.    DULoxetine (CYMBALTA) 30 MG capsule Take 30 mg by mouth once daily.    ferrous sulfate 325 (65 FE) MG EC tablet Take 325 mg by mouth 3 (three) times daily with meals.    HYDROcodone-acetaminophen (NORCO)  mg per tablet Take 1 tablet by mouth.    levothyroxine (SYNTHROID) 100 MCG tablet Take 137 mcg by mouth before breakfast.    omeprazole (PRILOSEC) 40 MG capsule Take 40 mg by mouth once daily.    QUEtiapine (SEROQUEL) 25 MG Tab Take by mouth.    sodium bicarbonate 650 MG tablet Take 650 mg by mouth 4 (four) times daily.     Family History       Problem Relation (Age of Onset)    Hypertension Mother          Tobacco Use    Smoking status: Never    Smokeless tobacco: Never   Substance and Sexual Activity    Alcohol use: Never    Drug use: Never    Sexual activity: Not Currently     Review of Systems   Constitutional:  Positive for activity change and fever.   HENT: Negative.     Eyes: Negative.    Respiratory: Negative.     Cardiovascular: Negative.    Gastrointestinal:  Positive for abdominal pain and nausea.   Endocrine: Negative.    Genitourinary: Negative.    Musculoskeletal:  Positive for arthralgias and joint swelling.   Skin: Negative.    Allergic/Immunologic: Negative.    Neurological:  Positive for weakness.   Hematological: Negative.    Psychiatric/Behavioral: Negative.       Objective:     Vital Signs (Most Recent):  Temp: 98.1 °F (36.7 °C) (02/16/24 0819)  Pulse: 80 (02/16/24 0819)  Resp: 18 (02/16/24 0901)  BP: 123/73 (02/16/24 0819)  SpO2: 98 % (02/16/24 0819) Vital Signs (24h Range):  Temp:  [98 °F (36.7 °C)-98.3 °F (36.8 °C)]  98.1 °F (36.7 °C)  Pulse:  [78-95] 80  Resp:  [18-20] 18  SpO2:  [95 %-98 %] 98 %  BP: (108-128)/(66-73) 123/73     Weight: 79.8 kg (176 lb)  Body mass index is 33.25 kg/m².     Physical Exam  Constitutional:       General: She is not in acute distress.     Appearance: She is obese.   HENT:      Head: Normocephalic and atraumatic.      Nose: Nose normal.      Mouth/Throat:      Mouth: Mucous membranes are moist.      Pharynx: Oropharynx is clear. No oropharyngeal exudate.   Eyes:      Conjunctiva/sclera: Conjunctivae normal.   Cardiovascular:      Rate and Rhythm: Normal rate and regular rhythm.      Pulses: Normal pulses.      Heart sounds: Normal heart sounds.      Comments: 1 plus edema to the left upper extremity  Pulmonary:      Effort: Pulmonary effort is normal.      Breath sounds: Normal breath sounds.   Abdominal:      General: Abdomen is flat.      Palpations: Abdomen is soft.   Musculoskeletal:         General: No swelling or deformity. Normal range of motion.      Cervical back: Normal range of motion and neck supple. No rigidity.   Skin:     General: Skin is warm and dry.      Capillary Refill: Capillary refill takes less than 2 seconds.   Neurological:      General: No focal deficit present.      Mental Status: She is alert and oriented to person, place, and time.   Psychiatric:         Mood and Affect: Mood normal.         Behavior: Behavior normal.         Thought Content: Thought content normal.         Judgment: Judgment normal.                Significant Labs: All pertinent labs within the past 24 hours have been reviewed.  CBC:   Recent Labs   Lab 02/14/24  1342 02/16/24  0511   WBC 7.15 4.38*   HGB 8.8* 7.9*   HCT 26.9* 25.7*   PLT 97* 72*       CMP:   Recent Labs   Lab 02/14/24  1342 02/16/24  0418    138   K 4.2 4.3   CO2 31 26   BUN 36.0* 39.0*   CREATININE 3.59* 3.57*   CALCIUM 8.8 8.9         Significant Imaging: I have reviewed all pertinent imaging results/findings within the past  24 hours.    Assessment/Plan:      * Urinary tract infection  Continue current regimen seems to be improving with decreased leukocytosis  No fever during her stay urine is clearing.    Culture done in the hospital shows enterrococcus faeicium    linezolid p.o. b.i.d. for 5 days    Edema of left upper extremity    No apparent DVT noted we will continue compression sleeve and encourage activity    Fracture of surgical neck of humerus  I will consult ortho in-house to assess  Dr. Machado is following      Hypertension  Continue home medications as written    Anxiety disorder        Hypothyroid  Continue Synthroid      Personal history of other malignant neoplasm of large intestine  We had been with holding Lovenox because of her anemia and renal insufficiency.  However now in the arm with fracture she has developed a 1+ edema so we will start Lovenox 30 no dvt on us    Recurrent major depression  Continue her Cymbalta as her allergy duloxetine was put in an error    Ileostomy status  Continue care and maintenance      GERD (gastroesophageal reflux disease)  Continue p.o. Protonix      Anemia of chronic kidney failure, stage 4 (severe)  Creatine stable for now. BMP reviewed- noted Estimated Creatinine Clearance: 13.4 mL/min (A) (based on SCr of 3.57 mg/dL (H)). according to latest data. Based on current GFR, CKD stage is stage 4 - GFR 15-29.  Monitor UOP and serial BMP and adjust therapy as needed. Renally dose meds. Avoid nephrotoxic medications and procedures.  we will continue the linesolid per id rec.  She was given a dose of Procrit and is status post 2 units packed red blood cell creatinine has improved slightly currently her hemoglobin is over 8    Dyspnea  Now resolved        VTE Risk Mitigation (From admission, onward)           Ordered     enoxaparin injection 30 mg  Daily         02/11/24 2213     IP VTE HIGH RISK PATIENT  Once         02/05/24 1537     Place sequential compression device  Until discontinued          02/05/24 1537                    Discharge Planning   JOSHUA: 2/13/2024     Code Status: Full Code   Is the patient medically ready for discharge?:     Reason for patient still in hospital (select all that apply): Patient trending condition  Discharge Plan A: Skilled Nursing Facility   Discharge Delays: (!) Post-Acute Set-up              Gregor Heaton MD  Department of Hospital Medicine   [facility] - Medical Surgical Unit

## 2024-02-16 NOTE — PT/OT/SLP PROGRESS
Physical Therapy Treatment    Patient Name:  Kam Reyes   MRN:  [id number]    Recommendations:     Discharge Recommendations:    Discharge Equipment Recommendations:    Barriers to discharge: Decreased caregiver support    Assessment:     Kam Reyes is a 73 y.o. female admitted with a medical diagnosis of Urinary tract infection.  She presents with the following impairments/functional limitations:   weakness, left humerus fx, dificulty with tfs.    Rehab Prognosis: Good; patient would benefit from acute skilled PT services to address these deficits and reach maximum level of function.    Recent Surgery: Procedure(s) (LRB):  CYSTOSCOPY (N/A) 1 Day Post-Op    Plan:     During this hospitalization, patient to be seen   to address the identified rehab impairments via   and progress toward the following goals:    Plan of Care Expires:       Subjective     Chief Complaint: weak  Patient/Family Comments/goals: to rtn home  Pain/Comfort:         Objective:     Communicated with pt prior to session.  Patient found HOB elevated with   upon PT entry to room.     General Precautions: Standard,    Orthopedic Precautions:    Braces:    Respiratory Status: Room air     Functional Mobility:  Bed Mobility:     Scooting: maximal assistance  Supine to Sit: maximal assistance  Transfers:     Bed to Chair: total assistance and of 2 persons with  gt belt, sling LUE  using  Stand Pivot      AM-PAC 6 CLICK MOBILITY          Treatment & Education:  Pt educated use of call bell    Patient left up in chair with all lines intact, call button in reach, and chair alarm on..    GOALS:   Multidisciplinary Problems       Physical Therapy Goals          Problem: Physical Therapy    Goal Priority Disciplines Outcome Goal Variances Interventions   Physical Therapy Goal     PT, PT/OT Ongoing, Progressing                         Time Tracking:     PT Received On: 02/16/24  PT Start Time: 0745     PT Stop Time: 0815  PT Total Time (min): 30  min     Billable Minutes: Therapeutic Activity 30    Treatment Type: Treatment  PT/PTA: PT           02/16/2024

## 2024-02-17 PROCEDURE — 63700000 PHARM REV CODE 250 ALT 637 W/O HCPCS: Performed by: FAMILY MEDICINE

## 2024-02-17 PROCEDURE — 63600175 PHARM REV CODE 636 W HCPCS: Performed by: INTERNAL MEDICINE

## 2024-02-17 PROCEDURE — 11000001 HC ACUTE MED/SURG PRIVATE ROOM

## 2024-02-17 PROCEDURE — 94761 N-INVAS EAR/PLS OXIMETRY MLT: CPT

## 2024-02-17 PROCEDURE — A4216 STERILE WATER/SALINE, 10 ML: HCPCS | Performed by: INTERNAL MEDICINE

## 2024-02-17 PROCEDURE — 25000003 PHARM REV CODE 250: Performed by: FAMILY MEDICINE

## 2024-02-17 PROCEDURE — 27000207 HC ISOLATION

## 2024-02-17 PROCEDURE — 97530 THERAPEUTIC ACTIVITIES: CPT

## 2024-02-17 PROCEDURE — 25000003 PHARM REV CODE 250: Performed by: INTERNAL MEDICINE

## 2024-02-17 RX ADMIN — DULOXETINE HYDROCHLORIDE 30 MG: 30 CAPSULE, DELAYED RELEASE ORAL at 08:02

## 2024-02-17 RX ADMIN — FERROUS SULFATE TAB 325 MG (65 MG ELEMENTAL FE) 1 EACH: 325 (65 FE) TAB at 08:02

## 2024-02-17 RX ADMIN — PANTOPRAZOLE SODIUM 40 MG: 40 TABLET, DELAYED RELEASE ORAL at 08:02

## 2024-02-17 RX ADMIN — FLUCONAZOLE 100 MG: 100 TABLET ORAL at 08:02

## 2024-02-17 RX ADMIN — LINEZOLID 600 MG: 600 TABLET, FILM COATED ORAL at 09:02

## 2024-02-17 RX ADMIN — SODIUM CHLORIDE, PRESERVATIVE FREE 10 ML: 5 INJECTION INTRAVENOUS at 01:02

## 2024-02-17 RX ADMIN — SODIUM CHLORIDE, PRESERVATIVE FREE 10 ML: 5 INJECTION INTRAVENOUS at 05:02

## 2024-02-17 RX ADMIN — CLOPIDOGREL BISULFATE 75 MG: 75 TABLET ORAL at 08:02

## 2024-02-17 RX ADMIN — LINEZOLID 600 MG: 600 TABLET, FILM COATED ORAL at 08:02

## 2024-02-17 RX ADMIN — QUETIAPINE FUMARATE 25 MG: 25 TABLET ORAL at 09:02

## 2024-02-17 RX ADMIN — CALCITRIOL CAPSULES 0.25 MCG 0.5 MCG: 0.25 CAPSULE ORAL at 08:02

## 2024-02-17 RX ADMIN — LEVOTHYROXINE SODIUM 137 MCG: 25 TABLET ORAL at 05:02

## 2024-02-17 RX ADMIN — HYDROCODONE BITARTRATE AND ACETAMINOPHEN 1 TABLET: 10; 325 TABLET ORAL at 09:02

## 2024-02-17 RX ADMIN — LIDOCAINE 5% 1 PATCH: 700 PATCH TOPICAL at 08:02

## 2024-02-17 RX ADMIN — ENOXAPARIN SODIUM 30 MG: 30 INJECTION SUBCUTANEOUS at 05:02

## 2024-02-17 RX ADMIN — POTASSIUM CHLORIDE 20 MEQ: 1500 TABLET, EXTENDED RELEASE ORAL at 08:02

## 2024-02-17 RX ADMIN — Medication 250 MG: at 08:02

## 2024-02-17 NOTE — PT/OT/SLP PROGRESS
Physical Therapy Treatment    Patient Name:  Kam Reyes   MRN:  [id number]    Recommendations:     Discharge Recommendations:    Discharge Equipment Recommendations:    Barriers to discharge:  current medical status    Assessment:     Kam Reyes is a 73 y.o. female admitted with a medical diagnosis of Urinary tract infection.  She presents with the following impairments/functional limitations:   weakness, impaired functional mobility    Patient agreeable to therapy this morning, Patient tolerated supine to sit with mod/max A to sit EOB. Upon sitting EOB, therapist repositioned sling and ace bandage on arm. Patient tolerated minimal BLE seated therex before requesting to lay back down due to her neck bothering her. Patient returned to supine with mod/max A and repositioned for comfort in bed.    Rehab Prognosis: Good and Fair; patient would benefit from acute skilled PT services to address these deficits and reach maximum level of function.    Recent Surgery: Procedure(s) (LRB):  CYSTOSCOPY (N/A) 2 Days Post-Op    Plan:     During this hospitalization, patient to be seen   to address the identified rehab impairments via   and progress toward the following goals:    Plan of Care Expires:       Subjective     Chief Complaint: weakness  Patient/Family Comments/goals: to get stronger  Pain/Comfort:         Objective:     Communicated with nursing prior to session.  Patient found HOB elevated with   upon PT entry to room.     General Precautions: Standard,    Orthopedic Precautions:    Braces:    Respiratory Status: Room air     Functional Mobility:  Bed Mobility:     Scooting: moderate assistance and maximal assistance  Supine to Sit: moderate assistance and maximal assistance  Sit to Supine: moderate assistance and maximal assistance      AM-PAC 6 CLICK MOBILITY          Treatment & Education:  See above.     Patient left HOB elevated with all lines intact, call button in reach, and bed alarm on..    GOALS:    Multidisciplinary Problems       Physical Therapy Goals          Problem: Physical Therapy    Goal Priority Disciplines Outcome Goal Variances Interventions   Physical Therapy Goal     PT, PT/OT Ongoing, Progressing                         Time Tracking:     PT Received On: 02/17/24  PT Start Time: 0920     PT Stop Time: 0935  PT Total Time (min): 15 min     Billable Minutes: Therapeutic Activity 15    Treatment Type: Treatment  PT/PTA: PT           02/17/2024

## 2024-02-18 LAB
ANION GAP SERPL CALC-SCNC: 6 MEQ/L
BASOPHILS # BLD AUTO: 0 X10(3)/MCL
BASOPHILS NFR BLD AUTO: 0 %
BUN SERPL-MCNC: 33 MG/DL (ref 9.8–20.1)
CALCIUM SERPL-MCNC: 9.4 MG/DL (ref 8.4–10.2)
CHLORIDE SERPL-SCNC: 108 MMOL/L (ref 98–107)
CO2 SERPL-SCNC: 25 MMOL/L (ref 23–31)
CREAT SERPL-MCNC: 3.6 MG/DL (ref 0.55–1.02)
CREAT/UREA NIT SERPL: 9
EOSINOPHIL # BLD AUTO: 0.19 X10(3)/MCL (ref 0–0.9)
EOSINOPHIL NFR BLD AUTO: 5.6 %
ERYTHROCYTE [DISTWIDTH] IN BLOOD BY AUTOMATED COUNT: 14.6 % (ref 11.5–17)
GFR SERPLBLD CREATININE-BSD FMLA CKD-EPI: 13 MLS/MIN/1.73/M2
GLUCOSE SERPL-MCNC: 83 MG/DL (ref 82–115)
GROUP & RH: ABNORMAL
HCT VFR BLD AUTO: 23.8 % (ref 37–47)
HGB BLD-MCNC: 7.1 G/DL (ref 12–16)
IMM GRANULOCYTES # BLD AUTO: 0.01 X10(3)/MCL (ref 0–0.04)
IMM GRANULOCYTES NFR BLD AUTO: 0.3 %
INDIRECT COOMBS: ABNORMAL
LYMPHOCYTES # BLD AUTO: 0.9 X10(3)/MCL (ref 0.6–4.6)
LYMPHOCYTES NFR BLD AUTO: 26.3 %
MAGNESIUM SERPL-MCNC: 1.5 MG/DL (ref 1.6–2.6)
MCH RBC QN AUTO: 30.3 PG (ref 27–31)
MCHC RBC AUTO-ENTMCNC: 29.8 G/DL (ref 33–36)
MCV RBC AUTO: 101.7 FL (ref 80–94)
MONOCYTES # BLD AUTO: 0.08 X10(3)/MCL (ref 0.1–1.3)
MONOCYTES NFR BLD AUTO: 2.3 %
NEUTROPHILS # BLD AUTO: 2.24 X10(3)/MCL (ref 2.1–9.2)
NEUTROPHILS NFR BLD AUTO: 65.5 %
PHOSPHATE SERPL-MCNC: 2.4 MG/DL (ref 2.3–4.7)
PLATELET # BLD AUTO: 51 X10(3)/MCL (ref 130–400)
PMV BLD AUTO: 11.6 FL (ref 7.4–10.4)
POTASSIUM SERPL-SCNC: 4.7 MMOL/L (ref 3.5–5.1)
RBC # BLD AUTO: 2.34 X10(6)/MCL (ref 4.2–5.4)
SODIUM SERPL-SCNC: 139 MMOL/L (ref 136–145)
SPECIMEN OUTDATE: ABNORMAL
WBC # SPEC AUTO: 3.42 X10(3)/MCL (ref 4.5–11.5)

## 2024-02-18 PROCEDURE — 25000003 PHARM REV CODE 250: Performed by: INTERNAL MEDICINE

## 2024-02-18 PROCEDURE — 86870 RBC ANTIBODY IDENTIFICATION: CPT | Performed by: INTERNAL MEDICINE

## 2024-02-18 PROCEDURE — 63600175 PHARM REV CODE 636 W HCPCS: Performed by: INTERNAL MEDICINE

## 2024-02-18 PROCEDURE — 11000001 HC ACUTE MED/SURG PRIVATE ROOM

## 2024-02-18 PROCEDURE — 84100 ASSAY OF PHOSPHORUS: CPT | Performed by: INTERNAL MEDICINE

## 2024-02-18 PROCEDURE — 27000207 HC ISOLATION

## 2024-02-18 PROCEDURE — 80048 BASIC METABOLIC PNL TOTAL CA: CPT | Performed by: INTERNAL MEDICINE

## 2024-02-18 PROCEDURE — 85025 COMPLETE CBC W/AUTO DIFF WBC: CPT | Performed by: INTERNAL MEDICINE

## 2024-02-18 PROCEDURE — 86850 RBC ANTIBODY SCREEN: CPT | Performed by: INTERNAL MEDICINE

## 2024-02-18 PROCEDURE — 94761 N-INVAS EAR/PLS OXIMETRY MLT: CPT

## 2024-02-18 PROCEDURE — A4216 STERILE WATER/SALINE, 10 ML: HCPCS | Performed by: INTERNAL MEDICINE

## 2024-02-18 PROCEDURE — 83735 ASSAY OF MAGNESIUM: CPT | Performed by: INTERNAL MEDICINE

## 2024-02-18 PROCEDURE — 63700000 PHARM REV CODE 250 ALT 637 W/O HCPCS: Performed by: FAMILY MEDICINE

## 2024-02-18 PROCEDURE — 25000003 PHARM REV CODE 250: Performed by: FAMILY MEDICINE

## 2024-02-18 RX ORDER — MAGNESIUM SULFATE 1 G/100ML
1 INJECTION INTRAVENOUS ONCE
Status: COMPLETED | OUTPATIENT
Start: 2024-02-18 | End: 2024-02-18

## 2024-02-18 RX ORDER — HYDROCODONE BITARTRATE AND ACETAMINOPHEN 500; 5 MG/1; MG/1
TABLET ORAL
Status: DISCONTINUED | OUTPATIENT
Start: 2024-02-18 | End: 2024-02-26 | Stop reason: HOSPADM

## 2024-02-18 RX ADMIN — FLUCONAZOLE 100 MG: 100 TABLET ORAL at 09:02

## 2024-02-18 RX ADMIN — LINEZOLID 600 MG: 600 TABLET, FILM COATED ORAL at 09:02

## 2024-02-18 RX ADMIN — FERROUS SULFATE TAB 325 MG (65 MG ELEMENTAL FE) 1 EACH: 325 (65 FE) TAB at 09:02

## 2024-02-18 RX ADMIN — LIDOCAINE 5% 1 PATCH: 700 PATCH TOPICAL at 09:02

## 2024-02-18 RX ADMIN — PANTOPRAZOLE SODIUM 40 MG: 40 TABLET, DELAYED RELEASE ORAL at 09:02

## 2024-02-18 RX ADMIN — ENOXAPARIN SODIUM 30 MG: 30 INJECTION SUBCUTANEOUS at 04:02

## 2024-02-18 RX ADMIN — SODIUM CHLORIDE, PRESERVATIVE FREE 10 ML: 5 INJECTION INTRAVENOUS at 02:02

## 2024-02-18 RX ADMIN — LEVOTHYROXINE SODIUM 137 MCG: 25 TABLET ORAL at 05:02

## 2024-02-18 RX ADMIN — Medication 250 MG: at 09:02

## 2024-02-18 RX ADMIN — SODIUM CHLORIDE, PRESERVATIVE FREE 10 ML: 5 INJECTION INTRAVENOUS at 09:02

## 2024-02-18 RX ADMIN — POTASSIUM CHLORIDE 20 MEQ: 1500 TABLET, EXTENDED RELEASE ORAL at 09:02

## 2024-02-18 RX ADMIN — QUETIAPINE FUMARATE 25 MG: 25 TABLET ORAL at 09:02

## 2024-02-18 RX ADMIN — MAGNESIUM SULFATE 1 G: 1 INJECTION INTRAVENOUS at 02:02

## 2024-02-18 RX ADMIN — DULOXETINE HYDROCHLORIDE 30 MG: 30 CAPSULE, DELAYED RELEASE ORAL at 09:02

## 2024-02-18 RX ADMIN — CALCITRIOL CAPSULES 0.25 MCG 0.5 MCG: 0.25 CAPSULE ORAL at 09:02

## 2024-02-18 RX ADMIN — CLOPIDOGREL BISULFATE 75 MG: 75 TABLET ORAL at 09:02

## 2024-02-18 NOTE — PROGRESS NOTES
[facility]                     [address] [address]    PATIENT NAME:       FEDERICO CANTU  YOB: 1950  CSN:                [id number]   MRN:                [id number]  ADMIT DATE:         02/05/2024 11:19:00  PHYSICIAN:          Jt Haskins MD                            PROGRESS NOTE    DATE:      CODE STATUS:  Full code.    SUBJECTIVE:  The patient was visited in the room.  Actually, she has no specific   complaint.  The patient is awaiting to be placed in a nursing home in the   skilled unit.  The patient does have a history of colostomy and urostomy tube   placed from the previous vesicular colonic fistula and she has had frequent   UTIs.  At this time, she was again admitted for urinary tract infection.  The   urine culture showed Enterococcus faecium and Candida albicans.  She is on   linezolid orally as well as on fluconazole.  She had some anxiety issues.  The   psych consult was done and also has recurrent major depression for which she is   on medications, but we are awaiting and a psych consult was done and the   recommendations have been noted.  We are waiting for the necessary document for   the patient to be placed in the nursing home in the SNF unit for continuation of   the antibiotics as well as management of the ongoing problems.  She also has a   fracture of surgical neck of humerus and Dr. Machado is following it.  Besides   that, the patient did not have any specific complaint.    OBJECTIVE:  VITAL SIGNS:  Her vitals are as follows:  Blood pressure 150/75, 97%   O2 sat, 98.5 temperature, 87 pulse.  HEENT:  Head; normocephalic.  Pupils bilaterally react to light and equal in   size.  Mild conjunctival pallor.  No scleral icterus.  Trachea is in midline.  CHEST:  She has good bilateral air entry.  CVS:  First and second heart sounds are heard normally without any murmurs.  ABDOMEN:   Soft.  Ileostomy tube in place, so also the colostomy.  EXTREMITIES:  Mild edema.    LABS AND INVESTIGATIONS:  None new.    IMPRESSION:    1. Urinary tract infection with Enterococcus faecium, on linezolid.  2. History of ileostomy.  3. History of urostomy tube placements.  Major depression, status post psych   consult, on Cymbalta.  Awaiting the necessary document for psychiatric patient   for nursing home placement in the SNF unit.  4. History of hypothyroidism.  5. History of anxiety.  6. History of hypertension.  7. Fracture of surgical neck of humerus, status per Ortho consult.    PLAN:  Continue the present line of treatment.  Await placement on status post   surgical consult for the surgical neck fracture of the humerus to follow with   Dr. Machado.  Alignment is satisfactory.  Continue the IV antibiotics.    Pleasure taking care of Ms. Amy Humphrey during her stay at [facility] on the 3rd floor.        ______________________________  Jt Haskins MD    SS/ANNYS  DD:  02/17/2024  Time:  06:18PM  DT:  02/17/2024  Time:  07:58PM  Job #:  [id number]      PROGRESS NOTE

## 2024-02-19 LAB
ANION GAP SERPL CALC-SCNC: 6 MEQ/L
ANTIBODY IDENTIFICATION: NORMAL
BASOPHILS # BLD AUTO: 0 X10(3)/MCL
BASOPHILS NFR BLD AUTO: 0 %
BUN SERPL-MCNC: 33 MG/DL (ref 9.8–20.1)
CALCIUM SERPL-MCNC: 9.7 MG/DL (ref 8.4–10.2)
CHLORIDE SERPL-SCNC: 109 MMOL/L (ref 98–107)
CO2 SERPL-SCNC: 26 MMOL/L (ref 23–31)
COLOR STL: ABNORMAL
CONSISTENCY STL: ABNORMAL
CREAT SERPL-MCNC: 3.78 MG/DL (ref 0.55–1.02)
CREAT/UREA NIT SERPL: 9
EOSINOPHIL # BLD AUTO: 0.19 X10(3)/MCL (ref 0–0.9)
EOSINOPHIL NFR BLD AUTO: 5 %
ERYTHROCYTE [DISTWIDTH] IN BLOOD BY AUTOMATED COUNT: 14.6 % (ref 11.5–17)
FERRITIN SERPL-MCNC: 1227.09 NG/ML (ref 4.63–204)
FOLATE SERPL-MCNC: 11.1 NG/ML (ref 7–31.4)
GFR SERPLBLD CREATININE-BSD FMLA CKD-EPI: 12 MLS/MIN/1.73/M2
GLUCOSE SERPL-MCNC: 85 MG/DL (ref 82–115)
HCT VFR BLD AUTO: 22.7 % (ref 37–47)
HEMOCCULT SP1 STL QL: POSITIVE
HGB BLD-MCNC: 7 G/DL (ref 12–16)
IMM GRANULOCYTES # BLD AUTO: 0.01 X10(3)/MCL (ref 0–0.04)
IMM GRANULOCYTES NFR BLD AUTO: 0.3 %
IRON SATN MFR SERPL: ABNORMAL %
IRON SERPL-MCNC: 155 UG/DL (ref 50–170)
LYMPHOCYTES # BLD AUTO: 0.84 X10(3)/MCL (ref 0.6–4.6)
LYMPHOCYTES NFR BLD AUTO: 22.2 %
MAGNESIUM SERPL-MCNC: 1.8 MG/DL (ref 1.6–2.6)
MCH RBC QN AUTO: 29.7 PG (ref 27–31)
MCHC RBC AUTO-ENTMCNC: 30.8 G/DL (ref 33–36)
MCV RBC AUTO: 96.2 FL (ref 80–94)
MONOCYTES # BLD AUTO: 0.05 X10(3)/MCL (ref 0.1–1.3)
MONOCYTES NFR BLD AUTO: 1.3 %
NEUTROPHILS # BLD AUTO: 2.69 X10(3)/MCL (ref 2.1–9.2)
NEUTROPHILS NFR BLD AUTO: 71.2 %
PHOSPHATE SERPL-MCNC: 2.4 MG/DL (ref 2.3–4.7)
PLATELET # BLD AUTO: 48 X10(3)/MCL (ref 130–400)
PMV BLD AUTO: 12.2 FL (ref 7.4–10.4)
POTASSIUM SERPL-SCNC: 5 MMOL/L (ref 3.5–5.1)
RBC # BLD AUTO: 2.36 X10(6)/MCL (ref 4.2–5.4)
RET# (OHS): 0.01 X10E6/UL (ref 0.02–0.08)
RETICULOCYTE COUNT AUTOMATED (OLG): 0.24 % (ref 1.1–2.1)
SODIUM SERPL-SCNC: 141 MMOL/L (ref 136–145)
TIBC SERPL-MCNC: <180 UG/DL (ref 250–450)
TIBC SERPL-MCNC: <25 UG/DL (ref 70–310)
TRANSFERRIN SERPL-MCNC: 128 MG/DL (ref 173–360)
VIT B12 SERPL-MCNC: 559 PG/ML (ref 213–816)
WBC # SPEC AUTO: 3.78 X10(3)/MCL (ref 4.5–11.5)

## 2024-02-19 PROCEDURE — P9016 RBC LEUKOCYTES REDUCED: HCPCS | Performed by: FAMILY MEDICINE

## 2024-02-19 PROCEDURE — 82272 OCCULT BLD FECES 1-3 TESTS: CPT | Performed by: FAMILY MEDICINE

## 2024-02-19 PROCEDURE — 27000207 HC ISOLATION

## 2024-02-19 PROCEDURE — 82728 ASSAY OF FERRITIN: CPT | Performed by: FAMILY MEDICINE

## 2024-02-19 PROCEDURE — 63700000 PHARM REV CODE 250 ALT 637 W/O HCPCS: Performed by: FAMILY MEDICINE

## 2024-02-19 PROCEDURE — 63600175 PHARM REV CODE 636 W HCPCS: Performed by: FAMILY MEDICINE

## 2024-02-19 PROCEDURE — 63600175 PHARM REV CODE 636 W HCPCS: Performed by: INTERNAL MEDICINE

## 2024-02-19 PROCEDURE — C9113 INJ PANTOPRAZOLE SODIUM, VIA: HCPCS | Performed by: FAMILY MEDICINE

## 2024-02-19 PROCEDURE — 83540 ASSAY OF IRON: CPT | Performed by: FAMILY MEDICINE

## 2024-02-19 PROCEDURE — 85025 COMPLETE CBC W/AUTO DIFF WBC: CPT | Performed by: INTERNAL MEDICINE

## 2024-02-19 PROCEDURE — 85045 AUTOMATED RETICULOCYTE COUNT: CPT | Performed by: FAMILY MEDICINE

## 2024-02-19 PROCEDURE — 82607 VITAMIN B-12: CPT | Performed by: FAMILY MEDICINE

## 2024-02-19 PROCEDURE — A4216 STERILE WATER/SALINE, 10 ML: HCPCS | Performed by: INTERNAL MEDICINE

## 2024-02-19 PROCEDURE — 86922 COMPATIBILITY TEST ANTIGLOB: CPT | Performed by: FAMILY MEDICINE

## 2024-02-19 PROCEDURE — 94761 N-INVAS EAR/PLS OXIMETRY MLT: CPT

## 2024-02-19 PROCEDURE — 11000001 HC ACUTE MED/SURG PRIVATE ROOM

## 2024-02-19 PROCEDURE — 80048 BASIC METABOLIC PNL TOTAL CA: CPT | Performed by: INTERNAL MEDICINE

## 2024-02-19 PROCEDURE — 84466 ASSAY OF TRANSFERRIN: CPT | Performed by: FAMILY MEDICINE

## 2024-02-19 PROCEDURE — 82746 ASSAY OF FOLIC ACID SERUM: CPT | Performed by: FAMILY MEDICINE

## 2024-02-19 PROCEDURE — 25000003 PHARM REV CODE 250: Performed by: INTERNAL MEDICINE

## 2024-02-19 PROCEDURE — 25000003 PHARM REV CODE 250: Performed by: FAMILY MEDICINE

## 2024-02-19 PROCEDURE — 84100 ASSAY OF PHOSPHORUS: CPT | Performed by: INTERNAL MEDICINE

## 2024-02-19 PROCEDURE — 83735 ASSAY OF MAGNESIUM: CPT | Performed by: INTERNAL MEDICINE

## 2024-02-19 RX ORDER — PANTOPRAZOLE SODIUM 40 MG/10ML
40 INJECTION, POWDER, LYOPHILIZED, FOR SOLUTION INTRAVENOUS DAILY
Status: DISCONTINUED | OUTPATIENT
Start: 2024-02-19 | End: 2024-02-26

## 2024-02-19 RX ORDER — HYDROCODONE BITARTRATE AND ACETAMINOPHEN 500; 5 MG/1; MG/1
TABLET ORAL
Status: DISCONTINUED | OUTPATIENT
Start: 2024-02-19 | End: 2024-02-26 | Stop reason: HOSPADM

## 2024-02-19 RX ADMIN — LIDOCAINE 5% 1 PATCH: 700 PATCH TOPICAL at 08:02

## 2024-02-19 RX ADMIN — CALCITRIOL CAPSULES 0.25 MCG 0.5 MCG: 0.25 CAPSULE ORAL at 08:02

## 2024-02-19 RX ADMIN — LEVOTHYROXINE SODIUM 137 MCG: 25 TABLET ORAL at 05:02

## 2024-02-19 RX ADMIN — FERROUS SULFATE TAB 325 MG (65 MG ELEMENTAL FE) 1 EACH: 325 (65 FE) TAB at 08:02

## 2024-02-19 RX ADMIN — FLUCONAZOLE 100 MG: 100 TABLET ORAL at 08:02

## 2024-02-19 RX ADMIN — SODIUM CHLORIDE, PRESERVATIVE FREE 10 ML: 5 INJECTION INTRAVENOUS at 01:02

## 2024-02-19 RX ADMIN — PANTOPRAZOLE SODIUM 40 MG: 40 INJECTION, POWDER, FOR SOLUTION INTRAVENOUS at 06:02

## 2024-02-19 RX ADMIN — ENOXAPARIN SODIUM 30 MG: 30 INJECTION SUBCUTANEOUS at 05:02

## 2024-02-19 RX ADMIN — CLOPIDOGREL BISULFATE 75 MG: 75 TABLET ORAL at 08:02

## 2024-02-19 RX ADMIN — PANTOPRAZOLE SODIUM 40 MG: 40 TABLET, DELAYED RELEASE ORAL at 08:02

## 2024-02-19 RX ADMIN — DULOXETINE HYDROCHLORIDE 30 MG: 30 CAPSULE, DELAYED RELEASE ORAL at 08:02

## 2024-02-19 RX ADMIN — ONDANSETRON 4 MG: 2 INJECTION INTRAMUSCULAR; INTRAVENOUS at 12:02

## 2024-02-19 RX ADMIN — QUETIAPINE FUMARATE 25 MG: 25 TABLET ORAL at 08:02

## 2024-02-19 RX ADMIN — HYDROCODONE BITARTRATE AND ACETAMINOPHEN 1 TABLET: 10; 325 TABLET ORAL at 08:02

## 2024-02-19 RX ADMIN — Medication 250 MG: at 08:02

## 2024-02-19 RX ADMIN — POTASSIUM CHLORIDE 20 MEQ: 1500 TABLET, EXTENDED RELEASE ORAL at 08:02

## 2024-02-19 RX ADMIN — LINEZOLID 600 MG: 600 TABLET, FILM COATED ORAL at 08:02

## 2024-02-19 NOTE — PROGRESS NOTES
[facility] - Medical Surgical Unit  Hospital Medicine  Progress Note    Patient Name: Kam Reyes  MRN: [id number]  Patient Class: IP- Inpatient   Admission Date: 2/5/2024  Length of Stay: 14 days  Attending Physician: Gregor Heaton MD  Primary Care Provider: Gregor Heaton MD        Subjective:     Principal Problem:Urinary tract infection        HPI:  Patient is a 73-year-old known to me from clinic.  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  Patient had frequent urinary tract infections.  After many procedures was continuing to have them.  She was hospitalized about a week ago for urinary tract infection.  Remained asymptomatic during her stay with no leukocytosis grew only Proteus which was sensitive to Cipro.  She was discharged home and apparently over the weekend was becoming more nauseated and had feeling more ill.  Since the culture has grown out 3 more organisms including Enterobacter faecium Proteus mirabilis Pseudomonas aeruginosa enrol sella plan to coli.  Only the Pseudomonas and Proteus were sensitive to the Cipro my floxacillin she was sent on home on.  Moreover her urinary catheter came out today she would called initially for us to replace it in the office but she was feeling too unwell to come the office and went straight to the emergency room.  There she was complaining of nausea vomiting and some abdominal pain.  Was noted to have leukocytosis of 19,000. And urinary tract infection continuing.    Overview/Hospital Course:  73-year-old  female well known to me through clinic.  Presented with complaints of her catheter coming out however urostomy.  Upon evaluation was noted to have leukocytosis nausea abdominal pain.  Diagnosed with urinary tract infection.  The admitted to the hospital and covered initially for her older cultures which had multiple organisms.  With broad-spectrum antibiotic therapy.   Cultures came back with only Enterococcus faecalis this time.  And patient was placed on linezolid only.  She has had clearing of her urinary tract infection with clear urine no nausea no leukocytosis initially she did have some significant loose leukocytosis.  No shortness a breath throughout her stay.  She did have notable anemia requiring transfusion.  As well as a shot of Procrit.  She has not have heme-positive stools in his was attributable to her renal insufficiency.  Initially her she was placed on vancomycin with an increase in her creatinine thus necessitating the switch to linezolid.      Patient had a fractured humerus prior to admission orthopedics was consulted and recommended expectant management with a sling patient did develop swelling to the arm ultrasound was performed did not show a DVT the patient is wearing compression sleeve at this time to aid with edema to the arm.  Physical therapy was consulted during the stay and she will continue physical therapy after discharge.  She is being discharged today to skilled nursing for continued physical therapy because of her weakened state and continued p.o. antibiotic therapy.  Discharge delayed due to acceptance by nursing home  Decreased h/h today hemoglobin 7  C/o head ache    Past Medical History:   Diagnosis Date    Cancer, colon     Chronic pain     GERD (gastroesophageal reflux disease)     Renal disorder     Thyroiditis, unspecified        Past Surgical History:   Procedure Laterality Date    BACK SURGERY       SECTION      CHOLECYSTECTOMY      COLON SURGERY      HYSTERECTOMY      KIDNEY SURGERY         Review of patient's allergies indicates:   Allergen Reactions    Oxaprozin Nausea And Vomiting and Swelling     Facial swelling      Sulfamethoxazole-trimethoprim Nausea And Vomiting     Severe nausea and vomiting    Doxycycline     Nsaids (non-steroidal anti-inflammatory drug) Rash    Sulfa (sulfonamide antibiotics) Nausea And Vomiting        No current facility-administered medications on file prior to encounter.     Current Outpatient Medications on File Prior to Encounter   Medication Sig    ascorbic acid, vitamin C, (VITAMIN C) 100 MG tablet Take 100 mg by mouth once daily.    calcitRIOL (ROCALTROL) 0.5 MCG Cap Take 0.5 mcg by mouth once daily.    clopidogreL (PLAVIX) 75 mg tablet Take 75 mg by mouth once daily.    DULoxetine (CYMBALTA) 30 MG capsule Take 30 mg by mouth once daily.    ferrous sulfate 325 (65 FE) MG EC tablet Take 325 mg by mouth 3 (three) times daily with meals.    HYDROcodone-acetaminophen (NORCO)  mg per tablet Take 1 tablet by mouth.    levothyroxine (SYNTHROID) 100 MCG tablet Take 137 mcg by mouth before breakfast.    omeprazole (PRILOSEC) 40 MG capsule Take 40 mg by mouth once daily.    QUEtiapine (SEROQUEL) 25 MG Tab Take by mouth.    sodium bicarbonate 650 MG tablet Take 650 mg by mouth 4 (four) times daily.     Family History       Problem Relation (Age of Onset)    Hypertension Mother          Tobacco Use    Smoking status: Never    Smokeless tobacco: Never   Substance and Sexual Activity    Alcohol use: Never    Drug use: Never    Sexual activity: Not Currently     Review of Systems   Constitutional:  Positive for activity change and fever.   HENT: Negative.     Eyes: Negative.    Respiratory: Negative.     Cardiovascular: Negative.    Gastrointestinal:  Positive for abdominal pain and nausea.   Endocrine: Negative.    Genitourinary: Negative.    Musculoskeletal:  Positive for arthralgias and joint swelling.   Skin: Negative.    Allergic/Immunologic: Negative.    Neurological:  Positive for weakness.   Hematological: Negative.    Psychiatric/Behavioral: Negative.       Objective:     Vital Signs (Most Recent):  Temp: 98.1 °F (36.7 °C) (02/19/24 0449)  Pulse: 87 (02/19/24 0449)  Resp: 16 (02/18/24 0731)  BP: 131/72 (02/19/24 0449)  SpO2: 96 % (02/19/24 0449) Vital Signs (24h Range):  Temp:  [98 °F (36.7 °C)-98.6  °F (37 °C)] 98.1 °F (36.7 °C)  Pulse:  [83-94] 87  SpO2:  [95 %-98 %] 96 %  BP: (131-158)/(72-89) 131/72     Weight: 79.8 kg (176 lb)  Body mass index is 33.25 kg/m².     Physical Exam  Constitutional:       General: She is not in acute distress.     Appearance: She is obese.   HENT:      Head: Normocephalic and atraumatic.      Nose: Nose normal.      Mouth/Throat:      Mouth: Mucous membranes are moist.      Pharynx: Oropharynx is clear. No oropharyngeal exudate.   Eyes:      Conjunctiva/sclera: Conjunctivae normal.   Cardiovascular:      Rate and Rhythm: Normal rate and regular rhythm.      Pulses: Normal pulses.      Heart sounds: Normal heart sounds.      Comments: 1 plus edema to the left upper extremity  Pulmonary:      Effort: Pulmonary effort is normal.      Breath sounds: Normal breath sounds.   Abdominal:      General: Abdomen is flat.      Palpations: Abdomen is soft.   Musculoskeletal:         General: No swelling or deformity. Normal range of motion.      Cervical back: Normal range of motion and neck supple. No rigidity.   Skin:     General: Skin is warm and dry.      Capillary Refill: Capillary refill takes less than 2 seconds.   Neurological:      General: No focal deficit present.      Mental Status: She is alert and oriented to person, place, and time.   Psychiatric:         Mood and Affect: Mood normal.         Behavior: Behavior normal.         Thought Content: Thought content normal.         Judgment: Judgment normal.                Significant Labs: All pertinent labs within the past 24 hours have been reviewed.  CBC:   Recent Labs   Lab 02/18/24  0532 02/19/24  0508   WBC 3.42* 3.78*   HGB 7.1* 7.0*   HCT 23.8* 22.7*   PLT 51* 48*       CMP:   Recent Labs   Lab 02/18/24  0532 02/19/24  0508    141   K 4.7 5.0   CO2 25 26   BUN 33.0* 33.0*   CREATININE 3.60* 3.78*   CALCIUM 9.4 9.7         Significant Imaging: I have reviewed all pertinent imaging results/findings within the past 24  hours.    Assessment/Plan:      * Urinary tract infection  Continue current regimen seems to be improving with decreased leukocytosis  No fever during her stay urine is clearing.    Culture done in the hospital shows enterrococcus faeicium    linezolid p.o. b.i.d. for 5 days    Edema of left upper extremity    No apparent DVT noted we will continue compression sleeve and encourage activity    Fracture of surgical neck of humerus  I will consult ortho in-house to assess  Dr. Machado is following      Hypertension  Continue home medications as written    Anxiety disorder        Hypothyroid  Continue Synthroid      Personal history of other malignant neoplasm of large intestine  We had been with holding Lovenox because of her anemia and renal insufficiency.  However now in the arm with fracture she has developed a 1+ edema so we will start Lovenox 30 no dvt on us    Recurrent major depression  Continue her Cymbalta as her allergy duloxetine was put in an error    Ileostomy status  Continue care and maintenance      GERD (gastroesophageal reflux disease)  Continue p.o. Protonix      Anemia of chronic kidney failure, stage 4 (severe)  Creatine stable for now. BMP reviewed- noted Estimated Creatinine Clearance: 12.7 mL/min (A) (based on SCr of 3.78 mg/dL (H)). according to latest data. Based on current GFR, CKD stage is stage 4 - GFR 15-29.  Monitor UOP and serial BMP and adjust therapy as needed. Renally dose meds. Avoid nephrotoxic medications and procedures.  we will continue the linesolid per id rec.  She was given a dose of Procrit and is status post 2 units packed red blood cell creatinine has improved slightly currently her hemoglobin is 7  Transfuse 2 units packed red blood cells today and do a full anemia panel with Hemoccult of stools urine appears clear with no gross blood    Dyspnea  Now resolved        VTE Risk Mitigation (From admission, onward)           Ordered     enoxaparin injection 30 mg  Daily          02/11/24 2213     IP VTE HIGH RISK PATIENT  Once         02/05/24 1537     Place sequential compression device  Until discontinued         02/05/24 1537                    Discharge Planning   JOSHUA: 2/13/2024     Code Status: Full Code   Is the patient medically ready for discharge?:     Reason for patient still in hospital (select all that apply): Patient trending condition  Discharge Plan A: Skilled Nursing Facility   Discharge Delays: (!) Post-Acute Set-up              Gregor Heaton MD  Department of Hospital Medicine   [facility] - [facility] Surgical Unit

## 2024-02-19 NOTE — PT/OT/SLP PROGRESS
Physical Therapy      Patient Name:  Kam Reyes   MRN:  [id number]    Patient not seen today secondary to low H+H (Hgb<8), awaiting blood transfusion. Will follow-up tomorrow.

## 2024-02-19 NOTE — ASSESSMENT & PLAN NOTE
Creatine stable for now. BMP reviewed- noted Estimated Creatinine Clearance: 12.7 mL/min (A) (based on SCr of 3.78 mg/dL (H)). according to latest data. Based on current GFR, CKD stage is stage 4 - GFR 15-29.  Monitor UOP and serial BMP and adjust therapy as needed. Renally dose meds. Avoid nephrotoxic medications and procedures.  we will continue the linesolid per id rec.  She was given a dose of Procrit and is status post 2 units packed red blood cell creatinine has improved slightly currently her hemoglobin is 7  Transfuse 2 units packed red blood cells today and do a full anemia panel with Hemoccult of stools urine appears clear with no gross blood

## 2024-02-19 NOTE — SUBJECTIVE & OBJECTIVE
Past Medical History:   Diagnosis Date    Cancer, colon     Chronic pain     GERD (gastroesophageal reflux disease)     Renal disorder     Thyroiditis, unspecified        Past Surgical History:   Procedure Laterality Date    BACK SURGERY       SECTION      CHOLECYSTECTOMY      COLON SURGERY      HYSTERECTOMY      KIDNEY SURGERY         Review of patient's allergies indicates:   Allergen Reactions    Oxaprozin Nausea And Vomiting and Swelling     Facial swelling      Sulfamethoxazole-trimethoprim Nausea And Vomiting     Severe nausea and vomiting    Doxycycline     Nsaids (non-steroidal anti-inflammatory drug) Rash    Sulfa (sulfonamide antibiotics) Nausea And Vomiting       No current facility-administered medications on file prior to encounter.     Current Outpatient Medications on File Prior to Encounter   Medication Sig    ascorbic acid, vitamin C, (VITAMIN C) 100 MG tablet Take 100 mg by mouth once daily.    calcitRIOL (ROCALTROL) 0.5 MCG Cap Take 0.5 mcg by mouth once daily.    clopidogreL (PLAVIX) 75 mg tablet Take 75 mg by mouth once daily.    DULoxetine (CYMBALTA) 30 MG capsule Take 30 mg by mouth once daily.    ferrous sulfate 325 (65 FE) MG EC tablet Take 325 mg by mouth 3 (three) times daily with meals.    HYDROcodone-acetaminophen (NORCO)  mg per tablet Take 1 tablet by mouth.    levothyroxine (SYNTHROID) 100 MCG tablet Take 137 mcg by mouth before breakfast.    omeprazole (PRILOSEC) 40 MG capsule Take 40 mg by mouth once daily.    QUEtiapine (SEROQUEL) 25 MG Tab Take by mouth.    sodium bicarbonate 650 MG tablet Take 650 mg by mouth 4 (four) times daily.     Family History       Problem Relation (Age of Onset)    Hypertension Mother          Tobacco Use    Smoking status: Never    Smokeless tobacco: Never   Substance and Sexual Activity    Alcohol use: Never    Drug use: Never    Sexual activity: Not Currently     Review of Systems   Constitutional:  Positive for activity change and fever.    HENT: Negative.     Eyes: Negative.    Respiratory: Negative.     Cardiovascular: Negative.    Gastrointestinal:  Positive for abdominal pain and nausea.   Endocrine: Negative.    Genitourinary: Negative.    Musculoskeletal:  Positive for arthralgias and joint swelling.   Skin: Negative.    Allergic/Immunologic: Negative.    Neurological:  Positive for weakness.   Hematological: Negative.    Psychiatric/Behavioral: Negative.       Objective:     Vital Signs (Most Recent):  Temp: 98.1 °F (36.7 °C) (02/19/24 0449)  Pulse: 87 (02/19/24 0449)  Resp: 16 (02/18/24 0731)  BP: 131/72 (02/19/24 0449)  SpO2: 96 % (02/19/24 0449) Vital Signs (24h Range):  Temp:  [98 °F (36.7 °C)-98.6 °F (37 °C)] 98.1 °F (36.7 °C)  Pulse:  [83-94] 87  SpO2:  [95 %-98 %] 96 %  BP: (131-158)/(72-89) 131/72     Weight: 79.8 kg (176 lb)  Body mass index is 33.25 kg/m².     Physical Exam  Constitutional:       General: She is not in acute distress.     Appearance: She is obese.   HENT:      Head: Normocephalic and atraumatic.      Nose: Nose normal.      Mouth/Throat:      Mouth: Mucous membranes are moist.      Pharynx: Oropharynx is clear. No oropharyngeal exudate.   Eyes:      Conjunctiva/sclera: Conjunctivae normal.   Cardiovascular:      Rate and Rhythm: Normal rate and regular rhythm.      Pulses: Normal pulses.      Heart sounds: Normal heart sounds.      Comments: 1 plus edema to the left upper extremity  Pulmonary:      Effort: Pulmonary effort is normal.      Breath sounds: Normal breath sounds.   Abdominal:      General: Abdomen is flat.      Palpations: Abdomen is soft.   Musculoskeletal:         General: No swelling or deformity. Normal range of motion.      Cervical back: Normal range of motion and neck supple. No rigidity.   Skin:     General: Skin is warm and dry.      Capillary Refill: Capillary refill takes less than 2 seconds.   Neurological:      General: No focal deficit present.      Mental Status: She is alert and oriented  to person, place, and time.   Psychiatric:         Mood and Affect: Mood normal.         Behavior: Behavior normal.         Thought Content: Thought content normal.         Judgment: Judgment normal.                Significant Labs: All pertinent labs within the past 24 hours have been reviewed.  CBC:   Recent Labs   Lab 02/18/24  0532 02/19/24  0508   WBC 3.42* 3.78*   HGB 7.1* 7.0*   HCT 23.8* 22.7*   PLT 51* 48*       CMP:   Recent Labs   Lab 02/18/24  0532 02/19/24  0508    141   K 4.7 5.0   CO2 25 26   BUN 33.0* 33.0*   CREATININE 3.60* 3.78*   CALCIUM 9.4 9.7         Significant Imaging: I have reviewed all pertinent imaging results/findings within the past 24 hours.

## 2024-02-19 NOTE — PROGRESS NOTES
[facility] - Medical Surgical Unit  Wound Care    Patient Name: Kam Reyes  MRN: [id number]  Date: 2/19/2024  Diagnosis: Urinary tract infection      Subjective:           Patient ID: Kam Reyes is a 73 y.o. female.    Chief Complaint: Fatigue (C/o weakness, n/v, and reports she was dx with UTI last week. Currently taking Cipro since 1/30.)      HPI      Past Medical History:     1. Chronic kidney disease, stage 4 (severe)    2. Infection due to extended spectrum beta lactamase (ESBL) producing Proteus mirabilis    3. Enterococcus faecalis infection    4. Pseudomonas aeruginosa infection    5. Edema      Wound Assessment:           Altered Skin Integrity 02/18/24 0410 Coccyx (Active)   02/18/24 0410   Altered Skin Integrity Present on Admission - Did Patient arrive to the hospital with altered skin?:    Side:    Orientation:    Location: Coccyx   Wound Number:    Is this injury device related?:    Primary Wound Type:    Description of Altered Skin Integrity:    Ankle-Brachial Index:    Pulses:    Removal Indication and Assessment:    Wound Outcome:    (Retired) Wound Length (cm):    (Retired) Wound Width (cm):    (Retired) Depth (cm):    Wound Description (Comments):    Removal Indications:    Wound Image   02/19/24 1615   Description of Altered Skin Integrity Partial thickness tissue loss. Shallow open ulcer with a red or pink wound bed, without slough. Intact or Open/Ruptured Serum-filled blister. 02/18/24 2000   Dressing Appearance Dry;Intact;Clean 02/19/24 1615   Drainage Amount None 02/19/24 1615   Appearance Pink;Moist 02/19/24 1615   Tissue loss description Full thickness 02/19/24 1615   Periwound Area Dry;Redness 02/19/24 1615   Wound Length (cm) 13 cm 02/19/24 1615   Wound Width (cm) 6 cm 02/19/24 1615   Wound Depth (cm) 0.2 cm 02/19/24 1615   Wound Volume (cm^3) 15.6 cm^3 02/19/24 1615   Wound Surface Area (cm^2) 78 cm^2 02/19/24 1615   Care Cleansed with:;Other (see comments)  02/19/24 1615   Dressing Applied 02/19/24 1615   Dressing Change Due 02/20/24 02/19/24 1615           Plan:       Daily dressing changes per nursing staff and re-evaluation per wound care in 5-7 days      Recommendations:    Coccyx/buttocks: keep clean and dry, apply moisture barrier cream to affected area, cover open areas with foam, change daily and prn soilage         Time spent in room:     Roula Owens RN

## 2024-02-19 NOTE — PROGRESS NOTES
[facility]                     [address] [id number]    PATIENT NAME:       FEDERICO CANTU  YOB: 1950  CSN:                [id number]   MRN:                [id number]  ADMIT DATE:         02/05/2024 11:19:00  PHYSICIAN:          Jt Haskins MD                            PROGRESS NOTE    DATE:      CODE STATUS:  Full code.    SUBJECTIVE:  The patient was visited in the room and I informed her that we are   going to give a unit of transfusion of PRBC to her as the patient has developed   some anemia and she is comfortable with that.  The patient was admitted because   the patient who has a history of colostomy and urostomy as well as urinary tract   infection at this time.  She was admitted again with the UTI with Enterococcus   faecium and Candida albicans.  She is being treated for that.  She is on   linezolid orally, and as mentioned, she has also history of major depression for   which a psych consult was done as she is being placed in LTAC and they needed   psych clearance and that has been done in the form has to be filled tomorrow and   most likely the patient will be going to the LTAC to continue for the treatment   for the UTI as well as for the debility and deconditioning and today we are   going to transfuse the patient.    OBJECTIVE:  VITAL SIGNS:  As follows:  She is afebrile, 158/89 blood pressure,   88 pulse, and 98% O2 sat.  HEENT:  Head normocephalic.  Pupils bilaterally reactive to light and equal in   size.  Mild conjunctival pallor.  No scleral icterus.  Trachea is in midline.  CHEST:  Has good bilateral air entry.  CVS:  First and second heart sounds are heard normally without any murmurs.  ABDOMEN:  Soft, nontender.  EXTREMITIES:  Devoid of edema, cyanosis, or clubbing.    LABS AND INVESTIGATIONS:  WBC 3.42, hemoglobin 7.1, hematocrit 23.8, and   platelet count 51.  Sodium 139,  potassium 4.7, chloride 108, BUN 33, creatinine   3.60, and magnesium 1.50.    IMPRESSION:    1. Urinary tract infection with Enterococcus faecium and Candida albicans, on   appropriate antibiotics.  2. Major depression, status post psych consult before placement in LTAC in the   nursing home.  3. Hypomagnesemia.  4. Anemia.  5. Thrombocytopenia.  6. Acute over chronic kidney disease.  7. History of urostomy and colostomy.  8. History of primary hypothyroidism.  9. History of anxiety.  10. Fracture of surgical neck of humerus, status post Ortho consult, being   treated conservatively.    PLAN:    1. To supplement the potassium.  2. Keep a close eye on the platelets.  3. Transfuse the patient a unit of blood.  4. Continue the oral antibiotics in the form of linezolid.  5. Avoid any nephrotoxic drugs.  6. Follow the Ortho recommendations.  Pleasure taking care of Ms. Amy Humphrey during her stay at [facility].        ______________________________  Jt Haskins MD    SS/AQS  DD:  02/18/2024  Time:  04:04PM  DT:  02/18/2024  Time:  06:29PM  Job #:  [id number]      PROGRESS NOTE

## 2024-02-20 ENCOUNTER — ANESTHESIA (OUTPATIENT)
Dept: SURGERY | Facility: HOSPITAL | Age: 74
DRG: 690 | End: 2024-02-20
Payer: MEDICARE

## 2024-02-20 ENCOUNTER — ANESTHESIA EVENT (OUTPATIENT)
Dept: SURGERY | Facility: HOSPITAL | Age: 74
DRG: 690 | End: 2024-02-20
Payer: MEDICARE

## 2024-02-20 PROBLEM — K92.1 GASTROINTESTINAL HEMORRHAGE WITH MELENA: Status: ACTIVE | Noted: 2024-02-20

## 2024-02-20 LAB
ABO + RH BLD: NORMAL
ABO + RH BLD: NORMAL
BASOPHILS # BLD AUTO: 0 X10(3)/MCL
BASOPHILS NFR BLD AUTO: 0 %
BLD PROD TYP BPU: NORMAL
BLD PROD TYP BPU: NORMAL
BLOOD UNIT EXPIRATION DATE: NORMAL
BLOOD UNIT EXPIRATION DATE: NORMAL
BLOOD UNIT TYPE CODE: 5100
BLOOD UNIT TYPE CODE: 5100
CROSSMATCH INTERPRETATION: NORMAL
CROSSMATCH INTERPRETATION: NORMAL
DISPENSE STATUS: NORMAL
DISPENSE STATUS: NORMAL
EOSINOPHIL # BLD AUTO: 0.23 X10(3)/MCL (ref 0–0.9)
EOSINOPHIL NFR BLD AUTO: 5.2 %
ERYTHROCYTE [DISTWIDTH] IN BLOOD BY AUTOMATED COUNT: 14.9 % (ref 11.5–17)
HCT VFR BLD AUTO: 27 % (ref 37–47)
HGB BLD-MCNC: 8.4 G/DL (ref 12–16)
IMM GRANULOCYTES # BLD AUTO: 0 X10(3)/MCL (ref 0–0.04)
IMM GRANULOCYTES NFR BLD AUTO: 0 %
LYMPHOCYTES # BLD AUTO: 0.64 X10(3)/MCL (ref 0.6–4.6)
LYMPHOCYTES NFR BLD AUTO: 14.3 %
MCH RBC QN AUTO: 30.1 PG (ref 27–31)
MCHC RBC AUTO-ENTMCNC: 31.1 G/DL (ref 33–36)
MCV RBC AUTO: 96.8 FL (ref 80–94)
MONOCYTES # BLD AUTO: 0.09 X10(3)/MCL (ref 0.1–1.3)
MONOCYTES NFR BLD AUTO: 2 %
NEUTROPHILS # BLD AUTO: 3.5 X10(3)/MCL (ref 2.1–9.2)
NEUTROPHILS NFR BLD AUTO: 78.5 %
PLATELET # BLD AUTO: 41 X10(3)/MCL (ref 130–400)
PMV BLD AUTO: 11.4 FL (ref 7.4–10.4)
RBC # BLD AUTO: 2.79 X10(6)/MCL (ref 4.2–5.4)
UNIT NUMBER: NORMAL
UNIT NUMBER: NORMAL
WBC # SPEC AUTO: 4.46 X10(3)/MCL (ref 4.5–11.5)

## 2024-02-20 PROCEDURE — 85025 COMPLETE CBC W/AUTO DIFF WBC: CPT | Performed by: FAMILY MEDICINE

## 2024-02-20 PROCEDURE — A4216 STERILE WATER/SALINE, 10 ML: HCPCS | Performed by: INTERNAL MEDICINE

## 2024-02-20 PROCEDURE — P9016 RBC LEUKOCYTES REDUCED: HCPCS | Performed by: FAMILY MEDICINE

## 2024-02-20 PROCEDURE — 63600175 PHARM REV CODE 636 W HCPCS: Performed by: FAMILY MEDICINE

## 2024-02-20 PROCEDURE — 27000207 HC ISOLATION

## 2024-02-20 PROCEDURE — 11000001 HC ACUTE MED/SURG PRIVATE ROOM

## 2024-02-20 PROCEDURE — 25000003 PHARM REV CODE 250: Performed by: FAMILY MEDICINE

## 2024-02-20 PROCEDURE — 25000003 PHARM REV CODE 250: Performed by: INTERNAL MEDICINE

## 2024-02-20 PROCEDURE — 63600175 PHARM REV CODE 636 W HCPCS: Performed by: INTERNAL MEDICINE

## 2024-02-20 PROCEDURE — 94761 N-INVAS EAR/PLS OXIMETRY MLT: CPT

## 2024-02-20 RX ORDER — SODIUM CHLORIDE, SODIUM LACTATE, POTASSIUM CHLORIDE, CALCIUM CHLORIDE 600; 310; 30; 20 MG/100ML; MG/100ML; MG/100ML; MG/100ML
INJECTION, SOLUTION INTRAVENOUS CONTINUOUS
OUTPATIENT
Start: 2024-02-20

## 2024-02-20 RX ORDER — HYDRALAZINE HYDROCHLORIDE 20 MG/ML
10 INJECTION INTRAMUSCULAR; INTRAVENOUS EVERY 6 HOURS PRN
Status: DISCONTINUED | OUTPATIENT
Start: 2024-02-20 | End: 2024-02-26 | Stop reason: HOSPADM

## 2024-02-20 RX ADMIN — QUETIAPINE FUMARATE 25 MG: 25 TABLET ORAL at 08:02

## 2024-02-20 RX ADMIN — ONDANSETRON 4 MG: 2 INJECTION INTRAMUSCULAR; INTRAVENOUS at 07:02

## 2024-02-20 RX ADMIN — SODIUM CHLORIDE, PRESERVATIVE FREE 10 ML: 5 INJECTION INTRAVENOUS at 02:02

## 2024-02-20 RX ADMIN — ACETAMINOPHEN 650 MG: 325 TABLET, FILM COATED ORAL at 08:02

## 2024-02-20 RX ADMIN — HYDRALAZINE HYDROCHLORIDE 10 MG: 20 INJECTION, SOLUTION INTRAMUSCULAR; INTRAVENOUS at 06:02

## 2024-02-20 RX ADMIN — SODIUM CHLORIDE, PRESERVATIVE FREE 10 ML: 5 INJECTION INTRAVENOUS at 09:02

## 2024-02-20 NOTE — ANESTHESIA PREPROCEDURE EVALUATION
02/20/2024  Kam Reyes is a 73 y.o., female.      Pre-op Assessment    I have reviewed the Patient Summary Reports.     I have reviewed the Nursing Notes. I have reviewed the NPO Status.   I have reviewed the Medications.     Review of Systems  Anesthesia Hx:             Denies Family Hx of Anesthesia complications.    Denies Personal Hx of Anesthesia complications.                    Social:  No Alcohol Use, Non-Smoker       Hematology/Oncology:  Hematology Normal   Oncology Normal                                   EENT/Dental:  EENT/Dental Normal           Cardiovascular:     Hypertension, well controlled                                        Pulmonary:      Shortness of breath                  Renal/:  Chronic Renal Disease, CKD                Hepatic/GI:     GERD, well controlled             Neurological:  Neurology Normal                                      Endocrine:   Hypothyroidism          Dermatological:  Skin Normal    Psych:  Psychiatric History                  Physical Exam  General: Cooperative, Alert and Oriented    Airway:  Mallampati: II   Mouth Opening: Normal  TM Distance: Normal  Tongue: Normal  Neck ROM: Normal ROM    Dental:  Intact        Anesthesia Plan  Type of Anesthesia, risks & benefits discussed:    Anesthesia Type: Gen Natural Airway  Intra-op Monitoring Plan: Standard ASA Monitors  Post Op Pain Control Plan:   (medical reason for not using multimodal pain management)  Induction:  IV  Informed Consent: Informed consent signed with the Patient and all parties understand the risks and agree with anesthesia plan.  All questions answered. Patient consented to blood products? Yes  ASA Score: 3    Ready For Surgery From Anesthesia Perspective.     .

## 2024-02-20 NOTE — SUBJECTIVE & OBJECTIVE
Past Medical History:   Diagnosis Date    Cancer, colon     Chronic pain     GERD (gastroesophageal reflux disease)     Renal disorder     Thyroiditis, unspecified        Past Surgical History:   Procedure Laterality Date    BACK SURGERY       SECTION      CHOLECYSTECTOMY      COLON SURGERY      HYSTERECTOMY      KIDNEY SURGERY         Review of patient's allergies indicates:   Allergen Reactions    Oxaprozin Nausea And Vomiting and Swelling     Facial swelling      Sulfamethoxazole-trimethoprim Nausea And Vomiting     Severe nausea and vomiting    Doxycycline     Nsaids (non-steroidal anti-inflammatory drug) Rash    Sulfa (sulfonamide antibiotics) Nausea And Vomiting       No current facility-administered medications on file prior to encounter.     Current Outpatient Medications on File Prior to Encounter   Medication Sig    ascorbic acid, vitamin C, (VITAMIN C) 100 MG tablet Take 100 mg by mouth once daily.    calcitRIOL (ROCALTROL) 0.5 MCG Cap Take 0.5 mcg by mouth once daily.    clopidogreL (PLAVIX) 75 mg tablet Take 75 mg by mouth once daily.    DULoxetine (CYMBALTA) 30 MG capsule Take 30 mg by mouth once daily.    ferrous sulfate 325 (65 FE) MG EC tablet Take 325 mg by mouth 3 (three) times daily with meals.    HYDROcodone-acetaminophen (NORCO)  mg per tablet Take 1 tablet by mouth.    levothyroxine (SYNTHROID) 100 MCG tablet Take 137 mcg by mouth before breakfast.    omeprazole (PRILOSEC) 40 MG capsule Take 40 mg by mouth once daily.    QUEtiapine (SEROQUEL) 25 MG Tab Take by mouth.    sodium bicarbonate 650 MG tablet Take 650 mg by mouth 4 (four) times daily.     Family History       Problem Relation (Age of Onset)    Hypertension Mother          Tobacco Use    Smoking status: Never    Smokeless tobacco: Never   Substance and Sexual Activity    Alcohol use: Never    Drug use: Never    Sexual activity: Not Currently     Review of Systems   Constitutional:  Positive for activity change and fever.    HENT: Negative.     Eyes: Negative.    Respiratory: Negative.     Cardiovascular: Negative.    Gastrointestinal:  Positive for abdominal pain and nausea.   Endocrine: Negative.    Genitourinary: Negative.    Musculoskeletal:  Positive for arthralgias and joint swelling.   Skin: Negative.    Allergic/Immunologic: Negative.    Neurological:  Positive for weakness.   Hematological: Negative.    Psychiatric/Behavioral: Negative.       Objective:     Vital Signs (Most Recent):  Temp: 97.8 °F (36.6 °C) (02/20/24 0334)  Pulse: 76 (02/20/24 0723)  Resp: 16 (02/20/24 0723)  BP: (!) 146/83 (02/20/24 0334)  SpO2: 97 % (02/20/24 0723) Vital Signs (24h Range):  Temp:  [97.3 °F (36.3 °C)-98.2 °F (36.8 °C)] 97.8 °F (36.6 °C)  Pulse:  [76-85] 76  Resp:  [16-20] 16  SpO2:  [95 %-98 %] 97 %  BP: (119-171)/(70-94) 146/83     Weight: 79.8 kg (176 lb)  Body mass index is 33.25 kg/m².     Physical Exam  Constitutional:       General: She is not in acute distress.     Appearance: She is obese.   HENT:      Head: Normocephalic and atraumatic.      Nose: Nose normal.      Mouth/Throat:      Mouth: Mucous membranes are moist.      Pharynx: Oropharynx is clear. No oropharyngeal exudate.   Eyes:      Conjunctiva/sclera: Conjunctivae normal.   Cardiovascular:      Rate and Rhythm: Normal rate and regular rhythm.      Pulses: Normal pulses.      Heart sounds: Normal heart sounds.      Comments: 1 plus edema to the left upper extremity  Pulmonary:      Effort: Pulmonary effort is normal.      Breath sounds: Normal breath sounds.   Abdominal:      General: Abdomen is flat.      Palpations: Abdomen is soft.   Musculoskeletal:         General: No swelling or deformity. Normal range of motion.      Cervical back: Normal range of motion and neck supple. No rigidity.   Skin:     General: Skin is warm and dry.      Capillary Refill: Capillary refill takes less than 2 seconds.   Neurological:      General: No focal deficit present.      Mental Status:  She is alert and oriented to person, place, and time.   Psychiatric:         Mood and Affect: Mood normal.         Behavior: Behavior normal.         Thought Content: Thought content normal.         Judgment: Judgment normal.                Significant Labs: All pertinent labs within the past 24 hours have been reviewed.  CBC:   Recent Labs   Lab 02/19/24  0508   WBC 3.78*   HGB 7.0*   HCT 22.7*   PLT 48*       CMP:   Recent Labs   Lab 02/19/24  0508      K 5.0   CO2 26   BUN 33.0*   CREATININE 3.78*   CALCIUM 9.7         Significant Imaging: I have reviewed all pertinent imaging results/findings within the past 24 hours.

## 2024-02-20 NOTE — PT/OT/SLP PROGRESS
Physical Therapy      Patient Name:  Kam Reyes   MRN:  [id number]    Patient not seen today secondary to Off the floor for procedure/surgery. Will follow-up 2/21/24.

## 2024-02-20 NOTE — ASSESSMENT & PLAN NOTE
Creatine stable for now. BMP reviewed- noted Estimated Creatinine Clearance: 12.7 mL/min (A) (based on SCr of 3.78 mg/dL (H)). according to latest data. Based on current GFR, CKD stage is stage 4 - GFR 15-29.  Monitor UOP and serial BMP and adjust therapy as needed. Renally dose meds. Avoid nephrotoxic medications and procedures.  we will continue the linesolid per id rec.  She was given a dose of Procrit and is status post 2 units packed red blood cell creatinine has improved slightly currently her hemoglobin is 7  Transfuse remaining units packed red blood cells today .   Dr krishnamurthy will egd

## 2024-02-20 NOTE — PROGRESS NOTES
[facility] - Medical Surgical Unit  Hospital Medicine  Progress Note    Patient Name: Kam Reyes  MRN: [id number]  Patient Class: IP- Inpatient   Admission Date: 2/5/2024  Length of Stay: 15 days  Attending Physician: Gregor Heaton MD  Primary Care Provider: Gregor Heaton MD        Subjective:     Principal Problem:Urinary tract infection        HPI:  Patient is a 73-year-old known to me from clinic.  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  Patient had frequent urinary tract infections.  After many procedures was continuing to have them.  She was hospitalized about a week ago for urinary tract infection.  Remained asymptomatic during her stay with no leukocytosis grew only Proteus which was sensitive to Cipro.  She was discharged home and apparently over the weekend was becoming more nauseated and had feeling more ill.  Since the culture has grown out 3 more organisms including Enterobacter faecium Proteus mirabilis Pseudomonas aeruginosa enrol sella plan to coli.  Only the Pseudomonas and Proteus were sensitive to the Cipro my floxacillin she was sent on home on.  Moreover her urinary catheter came out today she would called initially for us to replace it in the office but she was feeling too unwell to come the office and went straight to the emergency room.  There she was complaining of nausea vomiting and some abdominal pain.  Was noted to have leukocytosis of 19,000. And urinary tract infection continuing.    Overview/Hospital Course:  73-year-old  female well known to me through clinic.  Presented with complaints of her catheter coming out however urostomy.  Upon evaluation was noted to have leukocytosis nausea abdominal pain.  Diagnosed with urinary tract infection.  The admitted to the hospital and covered initially for her older cultures which had multiple organisms.  With broad-spectrum antibiotic therapy.   Cultures came back with only Enterococcus faecalis this time.  And patient was placed on linezolid only.  She has had clearing of her urinary tract infection with clear urine no nausea no leukocytosis initially she did have some significant loose leukocytosis.  No shortness a breath throughout her stay.  She did have notable anemia requiring transfusion.  As well as a shot of Procrit.  She has not have heme-positive stools in his was attributable to her renal insufficiency.  Initially her she was placed on vancomycin with an increase in her creatinine thus necessitating the switch to linezolid.      Patient had a fractured humerus prior to admission orthopedics was consulted and recommended expectant management with a sling patient did develop swelling to the arm ultrasound was performed did not show a DVT the patient is wearing compression sleeve at this time to aid with edema to the arm.  Physical therapy was consulted during the stay and she will continue physical therapy after discharge.  She is being discharged today to skilled nursing for continued physical therapy because of her weakened state and continued p.o. antibiotic therapy.  Discharge delayed due to acceptance by nursing home  Decreased h/h received 1 unit blood yesterday, one pending central line, will get egd today      Past Medical History:   Diagnosis Date    Cancer, colon     Chronic pain     GERD (gastroesophageal reflux disease)     Renal disorder     Thyroiditis, unspecified        Past Surgical History:   Procedure Laterality Date    BACK SURGERY       SECTION      CHOLECYSTECTOMY      COLON SURGERY      HYSTERECTOMY      KIDNEY SURGERY         Review of patient's allergies indicates:   Allergen Reactions    Oxaprozin Nausea And Vomiting and Swelling     Facial swelling      Sulfamethoxazole-trimethoprim Nausea And Vomiting     Severe nausea and vomiting    Doxycycline     Nsaids (non-steroidal anti-inflammatory drug) Rash    Sulfa  (sulfonamide antibiotics) Nausea And Vomiting       No current facility-administered medications on file prior to encounter.     Current Outpatient Medications on File Prior to Encounter   Medication Sig    ascorbic acid, vitamin C, (VITAMIN C) 100 MG tablet Take 100 mg by mouth once daily.    calcitRIOL (ROCALTROL) 0.5 MCG Cap Take 0.5 mcg by mouth once daily.    clopidogreL (PLAVIX) 75 mg tablet Take 75 mg by mouth once daily.    DULoxetine (CYMBALTA) 30 MG capsule Take 30 mg by mouth once daily.    ferrous sulfate 325 (65 FE) MG EC tablet Take 325 mg by mouth 3 (three) times daily with meals.    HYDROcodone-acetaminophen (NORCO)  mg per tablet Take 1 tablet by mouth.    levothyroxine (SYNTHROID) 100 MCG tablet Take 137 mcg by mouth before breakfast.    omeprazole (PRILOSEC) 40 MG capsule Take 40 mg by mouth once daily.    QUEtiapine (SEROQUEL) 25 MG Tab Take by mouth.    sodium bicarbonate 650 MG tablet Take 650 mg by mouth 4 (four) times daily.     Family History       Problem Relation (Age of Onset)    Hypertension Mother          Tobacco Use    Smoking status: Never    Smokeless tobacco: Never   Substance and Sexual Activity    Alcohol use: Never    Drug use: Never    Sexual activity: Not Currently     Review of Systems   Constitutional:  Positive for activity change and fever.   HENT: Negative.     Eyes: Negative.    Respiratory: Negative.     Cardiovascular: Negative.    Gastrointestinal:  Positive for abdominal pain and nausea.   Endocrine: Negative.    Genitourinary: Negative.    Musculoskeletal:  Positive for arthralgias and joint swelling.   Skin: Negative.    Allergic/Immunologic: Negative.    Neurological:  Positive for weakness.   Hematological: Negative.    Psychiatric/Behavioral: Negative.       Objective:     Vital Signs (Most Recent):  Temp: 97.8 °F (36.6 °C) (02/20/24 0334)  Pulse: 76 (02/20/24 0723)  Resp: 16 (02/20/24 0723)  BP: (!) 146/83 (02/20/24 0334)  SpO2: 97 % (02/20/24 0723) Vital  Signs (24h Range):  Temp:  [97.3 °F (36.3 °C)-98.2 °F (36.8 °C)] 97.8 °F (36.6 °C)  Pulse:  [76-85] 76  Resp:  [16-20] 16  SpO2:  [95 %-98 %] 97 %  BP: (119-171)/(70-94) 146/83     Weight: 79.8 kg (176 lb)  Body mass index is 33.25 kg/m².     Physical Exam  Constitutional:       General: She is not in acute distress.     Appearance: She is obese.   HENT:      Head: Normocephalic and atraumatic.      Nose: Nose normal.      Mouth/Throat:      Mouth: Mucous membranes are moist.      Pharynx: Oropharynx is clear. No oropharyngeal exudate.   Eyes:      Conjunctiva/sclera: Conjunctivae normal.   Cardiovascular:      Rate and Rhythm: Normal rate and regular rhythm.      Pulses: Normal pulses.      Heart sounds: Normal heart sounds.      Comments: 1 plus edema to the left upper extremity  Pulmonary:      Effort: Pulmonary effort is normal.      Breath sounds: Normal breath sounds.   Abdominal:      General: Abdomen is flat.      Palpations: Abdomen is soft.   Musculoskeletal:         General: No swelling or deformity. Normal range of motion.      Cervical back: Normal range of motion and neck supple. No rigidity.   Skin:     General: Skin is warm and dry.      Capillary Refill: Capillary refill takes less than 2 seconds.   Neurological:      General: No focal deficit present.      Mental Status: She is alert and oriented to person, place, and time.   Psychiatric:         Mood and Affect: Mood normal.         Behavior: Behavior normal.         Thought Content: Thought content normal.         Judgment: Judgment normal.                Significant Labs: All pertinent labs within the past 24 hours have been reviewed.  CBC:   Recent Labs   Lab 02/19/24  0508   WBC 3.78*   HGB 7.0*   HCT 22.7*   PLT 48*       CMP:   Recent Labs   Lab 02/19/24  0508      K 5.0   CO2 26   BUN 33.0*   CREATININE 3.78*   CALCIUM 9.7         Significant Imaging: I have reviewed all pertinent imaging results/findings within the past 24  hours.    Assessment/Plan:      * Urinary tract infection  Continue current regimen seems to be improving with decreased leukocytosis  No fever during her stay urine is clearing.    Culture done in the hospital shows enterrococcus faeicium    linezolid p.o. b.i.d. for 5 days    Edema of left upper extremity    No apparent DVT noted we will continue compression sleeve and encourage activity    Fracture of surgical neck of humerus    Dr. Machado is following      Vesicovaginal fistula    Dr krishnamurthy  to do egd today after blood    Hypertension  Continue home medications as written    Anxiety disorder        Hypothyroid  Continue Synthroid      Personal history of other malignant neoplasm of large intestine  We had been with holding Lovenox because of her anemia and renal insufficiency.  However now in the arm with fracture she has developed a 1+ edema so we will start Lovenox 30 no dvt on us    Recurrent major depression  Continue her Cymbalta as her allergy duloxetine was put in an error  Stable for several years    Ileostomy status  Continue care and maintenance      GERD (gastroesophageal reflux disease)  Continue iv Protonix      Anemia of chronic kidney failure, stage 4 (severe)  Creatine stable for now. BMP reviewed- noted Estimated Creatinine Clearance: 12.7 mL/min (A) (based on SCr of 3.78 mg/dL (H)). according to latest data. Based on current GFR, CKD stage is stage 4 - GFR 15-29.  Monitor UOP and serial BMP and adjust therapy as needed. Renally dose meds. Avoid nephrotoxic medications and procedures.  we will continue the linesolid per id rec.  She was given a dose of Procrit and is status post 2 units packed red blood cell creatinine has improved slightly currently her hemoglobin is 7  Transfuse remaining units packed red blood cells today .   Dr krishnamurthy will egd    Dyspnea  Now resolved        VTE Risk Mitigation (From admission, onward)           Ordered     enoxaparin injection 30 mg  Daily         02/11/24 3883      IP VTE HIGH RISK PATIENT  Once         02/05/24 1537     Place sequential compression device  Until discontinued         02/05/24 1537                    Discharge Planning   JOSHUA: 2/13/2024     Code Status: Full Code   Is the patient medically ready for discharge?:     Reason for patient still in hospital (select all that apply): Patient trending condition  Discharge Plan A: Skilled Nursing Facility   Discharge Delays: (!) Post-Acute Set-up              Gregor Heatno MD  Department of Hospital Medicine   [facility] - Medical Surgical Unit

## 2024-02-21 PROBLEM — R40.4 ALTERED AWARENESS, TRANSIENT: Status: ACTIVE | Noted: 2024-02-21

## 2024-02-21 LAB
ALBUMIN SERPL-MCNC: 2.5 G/DL (ref 3.4–4.8)
ALBUMIN/GLOB SERPL: 0.7 RATIO (ref 1.1–2)
ALP SERPL-CCNC: 91 UNIT/L (ref 40–150)
ALT SERPL-CCNC: 9 UNIT/L (ref 0–55)
AST SERPL-CCNC: 15 UNIT/L (ref 5–34)
BASOPHILS # BLD AUTO: 0.02 X10(3)/MCL
BASOPHILS NFR BLD AUTO: 0.4 %
BILIRUB SERPL-MCNC: 0.9 MG/DL
BUN SERPL-MCNC: 35 MG/DL (ref 9.8–20.1)
CALCIUM SERPL-MCNC: 10 MG/DL (ref 8.4–10.2)
CHLORIDE SERPL-SCNC: 112 MMOL/L (ref 98–107)
CO2 SERPL-SCNC: 18 MMOL/L (ref 23–31)
CREAT SERPL-MCNC: 3.43 MG/DL (ref 0.55–1.02)
EOSINOPHIL # BLD AUTO: 0.06 X10(3)/MCL (ref 0–0.9)
EOSINOPHIL NFR BLD AUTO: 1.3 %
ERYTHROCYTE [DISTWIDTH] IN BLOOD BY AUTOMATED COUNT: 14.8 % (ref 11.5–17)
GFR SERPLBLD CREATININE-BSD FMLA CKD-EPI: 14 MLS/MIN/1.73/M2
GLOBULIN SER-MCNC: 3.5 GM/DL (ref 2.4–3.5)
GLUCOSE SERPL-MCNC: 76 MG/DL (ref 82–115)
HCT VFR BLD AUTO: 33.5 % (ref 37–47)
HGB BLD-MCNC: 10.6 G/DL (ref 12–16)
IMM GRANULOCYTES # BLD AUTO: 0.02 X10(3)/MCL (ref 0–0.04)
IMM GRANULOCYTES NFR BLD AUTO: 0.4 %
LYMPHOCYTES # BLD AUTO: 0.68 X10(3)/MCL (ref 0.6–4.6)
LYMPHOCYTES NFR BLD AUTO: 14.3 %
MCH RBC QN AUTO: 29.9 PG (ref 27–31)
MCHC RBC AUTO-ENTMCNC: 31.6 G/DL (ref 33–36)
MCV RBC AUTO: 94.4 FL (ref 80–94)
MONOCYTES # BLD AUTO: 0.09 X10(3)/MCL (ref 0.1–1.3)
MONOCYTES NFR BLD AUTO: 1.9 %
NEUTROPHILS # BLD AUTO: 3.87 X10(3)/MCL (ref 2.1–9.2)
NEUTROPHILS NFR BLD AUTO: 81.7 %
PLATELET # BLD AUTO: 40 X10(3)/MCL (ref 130–400)
PMV BLD AUTO: 12.8 FL (ref 7.4–10.4)
POTASSIUM SERPL-SCNC: 5.4 MMOL/L (ref 3.5–5.1)
PROT SERPL-MCNC: 6 GM/DL (ref 5.8–7.6)
RBC # BLD AUTO: 3.55 X10(6)/MCL (ref 4.2–5.4)
SODIUM SERPL-SCNC: 140 MMOL/L (ref 136–145)
WBC # SPEC AUTO: 4.74 X10(3)/MCL (ref 4.5–11.5)

## 2024-02-21 PROCEDURE — 11000001 HC ACUTE MED/SURG PRIVATE ROOM

## 2024-02-21 PROCEDURE — 97112 NEUROMUSCULAR REEDUCATION: CPT

## 2024-02-21 PROCEDURE — 27000207 HC ISOLATION

## 2024-02-21 PROCEDURE — 97530 THERAPEUTIC ACTIVITIES: CPT

## 2024-02-21 PROCEDURE — 25000003 PHARM REV CODE 250: Performed by: INTERNAL MEDICINE

## 2024-02-21 PROCEDURE — 94761 N-INVAS EAR/PLS OXIMETRY MLT: CPT

## 2024-02-21 PROCEDURE — A4216 STERILE WATER/SALINE, 10 ML: HCPCS | Performed by: INTERNAL MEDICINE

## 2024-02-21 PROCEDURE — 25000003 PHARM REV CODE 250: Performed by: FAMILY MEDICINE

## 2024-02-21 PROCEDURE — 63700000 PHARM REV CODE 250 ALT 637 W/O HCPCS: Performed by: FAMILY MEDICINE

## 2024-02-21 PROCEDURE — 80053 COMPREHEN METABOLIC PANEL: CPT | Performed by: FAMILY MEDICINE

## 2024-02-21 PROCEDURE — 87040 BLOOD CULTURE FOR BACTERIA: CPT | Performed by: FAMILY MEDICINE

## 2024-02-21 PROCEDURE — A9512 TC99M PERTECHNETATE: HCPCS | Performed by: FAMILY MEDICINE

## 2024-02-21 PROCEDURE — 63600175 PHARM REV CODE 636 W HCPCS: Performed by: FAMILY MEDICINE

## 2024-02-21 PROCEDURE — 85025 COMPLETE CBC W/AUTO DIFF WBC: CPT | Performed by: FAMILY MEDICINE

## 2024-02-21 PROCEDURE — C9113 INJ PANTOPRAZOLE SODIUM, VIA: HCPCS | Performed by: FAMILY MEDICINE

## 2024-02-21 RX ORDER — AMLODIPINE BESYLATE 5 MG/1
5 TABLET ORAL DAILY
Status: DISCONTINUED | OUTPATIENT
Start: 2024-02-21 | End: 2024-02-23

## 2024-02-21 RX ADMIN — FERROUS SULFATE TAB 325 MG (65 MG ELEMENTAL FE) 1 EACH: 325 (65 FE) TAB at 09:02

## 2024-02-21 RX ADMIN — SODIUM POLYSTYRENE SULFONATE 30 G: 15 SUSPENSION ORAL; RECTAL at 09:02

## 2024-02-21 RX ADMIN — DULOXETINE HYDROCHLORIDE 30 MG: 30 CAPSULE, DELAYED RELEASE ORAL at 09:02

## 2024-02-21 RX ADMIN — QUETIAPINE FUMARATE 25 MG: 25 TABLET ORAL at 09:02

## 2024-02-21 RX ADMIN — SODIUM CHLORIDE, PRESERVATIVE FREE 10 ML: 5 INJECTION INTRAVENOUS at 05:02

## 2024-02-21 RX ADMIN — TECHNETIUM TC99M GENERATOR 30 MILLICURIE: 250 INJECTION, SOLUTION INTRAVENOUS at 11:02

## 2024-02-21 RX ADMIN — PANTOPRAZOLE SODIUM 40 MG: 40 INJECTION, POWDER, FOR SOLUTION INTRAVENOUS at 09:02

## 2024-02-21 RX ADMIN — AMLODIPINE BESYLATE 5 MG: 5 TABLET ORAL at 09:02

## 2024-02-21 RX ADMIN — CALCITRIOL CAPSULES 0.25 MCG 0.5 MCG: 0.25 CAPSULE ORAL at 09:02

## 2024-02-21 RX ADMIN — ACETAMINOPHEN 650 MG: 325 TABLET, FILM COATED ORAL at 09:02

## 2024-02-21 RX ADMIN — FLUCONAZOLE 100 MG: 100 TABLET ORAL at 09:02

## 2024-02-21 RX ADMIN — LIDOCAINE 5% 1 PATCH: 700 PATCH TOPICAL at 09:02

## 2024-02-21 RX ADMIN — SODIUM CHLORIDE, PRESERVATIVE FREE 10 ML: 5 INJECTION INTRAVENOUS at 09:02

## 2024-02-21 RX ADMIN — Medication 250 MG: at 09:02

## 2024-02-21 NOTE — ASSESSMENT & PLAN NOTE
He is somnolence over the last 2 days.  Has not improved after blood.  I had done a CT scan of her head which was within normal limits.  I will repeat a UA at this time.  We will do blood cultures well to make sure there is no sepsis.  Continuing to seek placement we will try for LTAC since skilled nursing has been delayed

## 2024-02-21 NOTE — PLAN OF CARE
Still no 142.  Will ask Za at [facility] to see if she can contact someone at state level to check on it.

## 2024-02-21 NOTE — PROGRESS NOTES
[facility] Unit  Hospital Medicine  Progress Note    Patient Name: Kam Reyes  MRN: [id number]  Patient Class: IP- Inpatient   Admission Date: 2/5/2024  Length of Stay: 16 days  Attending Physician: Gregor Heaton MD  Primary Care Provider: Gregor Heaton MD        Subjective:     Principal Problem:Urinary tract infection        HPI:  Patient is a 73-year-old known to me from clinic.  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  Patient had frequent urinary tract infections.  After many procedures was continuing to have them.  She was hospitalized about a week ago for urinary tract infection.  Remained asymptomatic during her stay with no leukocytosis grew only Proteus which was sensitive to Cipro.  She was discharged home and apparently over the weekend was becoming more nauseated and had feeling more ill.  Since the culture has grown out 3 more organisms including Enterobacter faecium Proteus mirabilis Pseudomonas aeruginosa enrol sella plan to coli.  Only the Pseudomonas and Proteus were sensitive to the Cipro my floxacillin she was sent on home on.  Moreover her urinary catheter came out today she would called initially for us to replace it in the office but she was feeling too unwell to come the office and went straight to the emergency room.  There she was complaining of nausea vomiting and some abdominal pain.  Was noted to have leukocytosis of 19,000. And urinary tract infection continuing.    Overview/Hospital Course:  73-year-old  female well known to me through clinic.  Presented with complaints of her catheter coming out however urostomy.  Upon evaluation was noted to have leukocytosis nausea abdominal pain.  Diagnosed with urinary tract infection.  The admitted to the hospital and covered initially for her older cultures which had multiple organisms.  With broad-spectrum antibiotic therapy.   Cultures came back with only Enterococcus faecalis this time.  And patient was placed on linezolid only.  She has had clearing of her urinary tract infection with clear urine no nausea no leukocytosis initially she did have some significant loose leukocytosis.  No shortness a breath throughout her stay.  She did have notable anemia requiring transfusion.  As well as a shot of Procrit.  She has not have heme-positive stools in his was attributable to her renal insufficiency.  Initially her she was placed on vancomycin with an increase in her creatinine thus necessitating the switch to linezolid.      Patient had a fractured humerus prior to admission orthopedics was consulted and recommended expectant management with a sling patient did develop swelling to the arm ultrasound was performed did not show a DVT the patient is wearing compression sleeve at this time to aid with edema to the arm.  Physical therapy was consulted during the stay and she will continue physical therapy after discharge.  She is being discharged today to skilled nursing for continued physical therapy because of her weakened state and continued p.o. antibiotic therapy.  Discharge delayed due to acceptance by nursing home  Decreased h/h received 2 unit blood yesterday, pending  egd today  Seems overly somnolant  Continued delay in placement      Past Medical History:   Diagnosis Date    Cancer, colon     Chronic pain     GERD (gastroesophageal reflux disease)     Renal disorder     Thyroiditis, unspecified        Past Surgical History:   Procedure Laterality Date    BACK SURGERY       SECTION      CHOLECYSTECTOMY      COLON SURGERY      HYSTERECTOMY      KIDNEY SURGERY         Review of patient's allergies indicates:   Allergen Reactions    Oxaprozin Nausea And Vomiting and Swelling     Facial swelling      Sulfamethoxazole-trimethoprim Nausea And Vomiting     Severe nausea and vomiting    Doxycycline     Nsaids (non-steroidal  anti-inflammatory drug) Rash    Sulfa (sulfonamide antibiotics) Nausea And Vomiting       No current facility-administered medications on file prior to encounter.     Current Outpatient Medications on File Prior to Encounter   Medication Sig    ascorbic acid, vitamin C, (VITAMIN C) 100 MG tablet Take 100 mg by mouth once daily.    calcitRIOL (ROCALTROL) 0.5 MCG Cap Take 0.5 mcg by mouth once daily.    clopidogreL (PLAVIX) 75 mg tablet Take 75 mg by mouth once daily.    DULoxetine (CYMBALTA) 30 MG capsule Take 30 mg by mouth once daily.    ferrous sulfate 325 (65 FE) MG EC tablet Take 325 mg by mouth 3 (three) times daily with meals.    HYDROcodone-acetaminophen (NORCO)  mg per tablet Take 1 tablet by mouth.    levothyroxine (SYNTHROID) 100 MCG tablet Take 137 mcg by mouth before breakfast.    omeprazole (PRILOSEC) 40 MG capsule Take 40 mg by mouth once daily.    QUEtiapine (SEROQUEL) 25 MG Tab Take by mouth.    sodium bicarbonate 650 MG tablet Take 650 mg by mouth 4 (four) times daily.     Family History       Problem Relation (Age of Onset)    Hypertension Mother          Tobacco Use    Smoking status: Never    Smokeless tobacco: Never   Substance and Sexual Activity    Alcohol use: Never    Drug use: Never    Sexual activity: Not Currently     Review of Systems   Constitutional:  Positive for activity change and fever.   HENT: Negative.     Eyes: Negative.    Respiratory: Negative.     Cardiovascular: Negative.    Gastrointestinal:  Positive for abdominal pain and nausea.   Endocrine: Negative.    Genitourinary: Negative.    Musculoskeletal:  Positive for arthralgias and joint swelling.   Skin: Negative.    Allergic/Immunologic: Negative.    Neurological:  Positive for weakness.   Hematological: Negative.    Psychiatric/Behavioral: Negative.       Objective:     Vital Signs (Most Recent):  Temp: 97.7 °F (36.5 °C) (02/21/24 0734)  Pulse: 79 (02/21/24 0734)  Resp: 20 (02/21/24 0342)  BP: (!) 153/75 (02/21/24  0734)  SpO2: 96 % (02/21/24 0734) Vital Signs (24h Range):  Temp:  [97.6 °F (36.4 °C)-99 °F (37.2 °C)] 97.7 °F (36.5 °C)  Pulse:  [] 79  Resp:  [18-20] 20  SpO2:  [95 %-100 %] 96 %  BP: (131-181)/(58-93) 153/75     Weight: 79.8 kg (176 lb)  Body mass index is 33.25 kg/m².     Physical Exam  Constitutional:       General: She is not in acute distress.     Appearance: She is obese.   HENT:      Head: Normocephalic and atraumatic.      Nose: Nose normal.      Mouth/Throat:      Mouth: Mucous membranes are moist.      Pharynx: Oropharynx is clear. No oropharyngeal exudate.   Eyes:      Conjunctiva/sclera: Conjunctivae normal.   Cardiovascular:      Rate and Rhythm: Normal rate and regular rhythm.      Pulses: Normal pulses.      Heart sounds: Normal heart sounds.      Comments: 1 plus edema to the left upper extremity  Pulmonary:      Effort: Pulmonary effort is normal.      Breath sounds: Normal breath sounds.   Abdominal:      General: Abdomen is flat.      Palpations: Abdomen is soft.   Musculoskeletal:         General: No swelling or deformity. Normal range of motion.      Cervical back: Normal range of motion and neck supple. No rigidity.   Skin:     General: Skin is warm and dry.      Capillary Refill: Capillary refill takes less than 2 seconds.   Neurological:      General: No focal deficit present.      Mental Status: She is alert and oriented to person, place, and time.   Psychiatric:         Mood and Affect: Mood normal.         Behavior: Behavior normal.         Thought Content: Thought content normal.         Judgment: Judgment normal.                Significant Labs: All pertinent labs within the past 24 hours have been reviewed.  CBC:   Recent Labs   Lab 02/20/24  0750 02/21/24  0448   WBC 4.46* 4.74   HGB 8.4* 10.6*   HCT 27.0* 33.5*   PLT 41* 40*       CMP:   Recent Labs   Lab 02/21/24  0448      K 5.4*   CO2 18*   BUN 35.0*   CREATININE 3.43*   CALCIUM 10.0   ALBUMIN 2.5*   BILITOT 0.9    ALKPHOS 91   AST 15   ALT 9         Significant Imaging: I have reviewed all pertinent imaging results/findings within the past 24 hours.    Assessment/Plan:      * Urinary tract infection  Finished course of abx will repeat ua due to somnolance    Gastrointestinal hemorrhage with melena  S/p 2 units prbc, pending egd      Edema of left upper extremity    No apparent DVT noted we will continue compression sleeve and encourage activity    Hypertensive chronic kidney disease w stg 1-4/unsp chr kdny  Creatine stable for now. BMP reviewed- noted Estimated Creatinine Clearance: 14 mL/min (A) (based on SCr of 3.43 mg/dL (H)). according to latest data. Based on current GFR, CKD stage is stage 4 - GFR 15-29.  Monitor UOP and serial BMP and adjust therapy as needed. Renally dose meds. Avoid nephrotoxic medications and procedures.    Fracture of surgical neck of humerus    Dr. Machado is following      Vesicovaginal fistula    Dr krishnamurthy  to do egd today after blood    Hypertension  Norvasc 5 mg po qhs,  hydralazine prn    Anxiety disorder        Hypothyroid  Continue Synthroid      Personal history of other malignant neoplasm of large intestine  We had been with holding Lovenox because of her anemia and renal insufficiency.  However now in the arm with fracture she has developed a 1+ edema so we will start Lovenox 30 no dvt on us    Recurrent major depression  Continue her Cymbalta as her allergy duloxetine was put in an error  Stable for several years    Ileostomy status  Continue care and maintenance      GERD (gastroesophageal reflux disease)  Continue iv Protonix      Anemia of chronic kidney failure, stage 4 (severe)  Creatine stable for now. BMP reviewed- noted Estimated Creatinine Clearance: 14 mL/min (A) (based on SCr of 3.43 mg/dL (H)). according to latest data. Based on current GFR, CKD stage is stage 4 - GFR 15-29.  Monitor UOP and serial BMP and adjust therapy as needed. Renally dose meds. Avoid nephrotoxic  medications and procedures.  we will continue the linesolid per id rec.  She was given a dose of Procrit and is status post 2 units packed red blood cell creatinine has improved slightly currently her hemoglobin is 10     Dr krishnamurthy will egd    Dyspnea  Now resolved        VTE Risk Mitigation (From admission, onward)           Ordered     IP VTE HIGH RISK PATIENT  Once         02/05/24 1537     Place sequential compression device  Until discontinued         02/05/24 1537                    Discharge Planning   JOSHUA: 2/13/2024     Code Status: Full Code   Is the patient medically ready for discharge?:     Reason for patient still in hospital (select all that apply): Patient trending condition  Discharge Plan A: Skilled Nursing Facility   Discharge Delays: (!) Post-Acute Set-up              Gregor Heaton MD  Department of Hospital Medicine   [facility]

## 2024-02-21 NOTE — PT/OT/SLP PROGRESS
Physical Therapy Treatment    Patient Name:  Kam Reyes   MRN:  [id number]    Recommendations:     Discharge Recommendations:    Discharge Equipment Recommendations:    Barriers to discharge:  current medical status    Assessment:     Kam Reyes is a 73 y.o. female admitted with a medical diagnosis of Urinary tract infection.  She presents with the following impairments/functional limitations:   weakness, impaired functional mobility    Patient agreeable to therapy this afternoon. Patient tolerated supine to sit with mod/max A to sit EOB. Upon sitting EOB, therapist repositioned sling and ace bandage on arm. Patient tolerated sitting EOB with Sid x1 person to maintain sitting balance due to weakness and fatigue. Patient returned to supine with mod/max A and repositioned for comfort in bed.    Rehab Prognosis: Good and Fair; patient would benefit from acute skilled PT services to address these deficits and reach maximum level of function.    Recent Surgery: Procedure(s) (LRB):  CYSTOSCOPY (N/A) 6 Days Post-Op    Plan:     During this hospitalization, patient to be seen   to address the identified rehab impairments via   and progress toward the following goals:    Plan of Care Expires:       Subjective     Chief Complaint: weakness  Patient/Family Comments/goals: to get stronger  Pain/Comfort:         Objective:     Communicated with nursing prior to session.  Patient found HOB elevated with   upon PT entry to room.     General Precautions: Standard,    Orthopedic Precautions:    Braces:    Respiratory Status: Room air     Functional Mobility:  Bed Mobility:     Scooting: moderate assistance and maximal assistance  Supine to Sit: moderate assistance and maximal assistance  Sit to Supine: moderate assistance and maximal assistance      AM-PAC 6 CLICK MOBILITY          Treatment & Education:  See above.     Patient left HOB elevated with all lines intact, call button in reach, and bed alarm on..    GOALS:    Multidisciplinary Problems       Physical Therapy Goals          Problem: Physical Therapy    Goal Priority Disciplines Outcome Goal Variances Interventions   Physical Therapy Goal     PT, PT/OT Ongoing, Progressing                         Time Tracking:     PT Received On: 02/21/24  PT Start Time: 1300     PT Stop Time: 1325  PT Total Time (min): 25 min     Billable Minutes: Therapeutic Activity 25    Treatment Type: Treatment  PT/PTA: PT           02/21/2024

## 2024-02-21 NOTE — PROGRESS NOTES
[facility]  Hospital Medicine  Progress Note    Patient Name: Kam Reyes  MRN: [id number]  Patient Class: IP- Inpatient   Admission Date: 2/5/2024  Length of Stay: 16 days  Attending Physician: Gregor Heaton MD  Primary Care Provider: Gregor Heaton MD        Subjective:     Principal Problem:Urinary tract infection        HPI:  Patient is a 73-year-old known to me from clinic.  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  Patient had frequent urinary tract infections.  After many procedures was continuing to have them.  She was hospitalized about a week ago for urinary tract infection.  Remained asymptomatic during her stay with no leukocytosis grew only Proteus which was sensitive to Cipro.  She was discharged home and apparently over the weekend was becoming more nauseated and had feeling more ill.  Since the culture has grown out 3 more organisms including Enterobacter faecium Proteus mirabilis Pseudomonas aeruginosa enrol sella plan to coli.  Only the Pseudomonas and Proteus were sensitive to the Cipro my floxacillin she was sent on home on.  Moreover her urinary catheter came out today she would called initially for us to replace it in the office but she was feeling too unwell to come the office and went straight to the emergency room.  There she was complaining of nausea vomiting and some abdominal pain.  Was noted to have leukocytosis of 19,000. And urinary tract infection continuing.    Overview/Hospital Course:  73-year-old  female well known to me through clinic.  Presented with complaints of her catheter coming out however urostomy.  Upon evaluation was noted to have leukocytosis nausea abdominal pain.  Diagnosed with urinary tract infection.  The admitted to the hospital and covered initially for her older cultures which had multiple organisms.  With broad-spectrum antibiotic therapy.   Cultures came back with only Enterococcus faecalis this time.  And patient was placed on linezolid only.  She has had clearing of her urinary tract infection with clear urine no nausea no leukocytosis initially she did have some significant loose leukocytosis.  No shortness a breath throughout her stay.  She did have notable anemia requiring transfusion.  As well as a shot of Procrit.  She has not have heme-positive stools in his was attributable to her renal insufficiency.  Initially her she was placed on vancomycin with an increase in her creatinine thus necessitating the switch to linezolid.      Patient had a fractured humerus prior to admission orthopedics was consulted and recommended expectant management with a sling patient did develop swelling to the arm ultrasound was performed did not show a DVT the patient is wearing compression sleeve at this time to aid with edema to the arm.  Physical therapy was consulted during the stay and she will continue physical therapy after discharge.  She is being discharged today to skilled nursing for continued physical therapy because of her weakened state and continued p.o. antibiotic therapy.  Discharge delayed due to acceptance by nursing home  Decreased h/h received 2 unit blood yesterday, pending  egd today  Seems overly somnolant  Continued delay in placement      No new subjective & objective note has been filed under this hospital service since the last note was generated.      Assessment/Plan:      * Urinary tract infection  Finished course of abx will repeat ua due to somnolance    Altered awareness, transient  He is somnolence over the last 2 days.  Has not improved after blood.  I had done a CT scan of her head which was within normal limits.  I will repeat a UA at this time.  We will do blood cultures well to make sure there is no sepsis.  Continuing to seek placement we will try for LTAC since skilled nursing has been delayed      Gastrointestinal hemorrhage  with melena  S/p 2 units prbc, pending egd      Edema of left upper extremity    No apparent DVT noted we will continue compression sleeve and encourage activity    Hypertensive chronic kidney disease w stg 1-4/unsp chr kdny  Creatine stable for now. BMP reviewed- noted Estimated Creatinine Clearance: 14 mL/min (A) (based on SCr of 3.43 mg/dL (H)). according to latest data. Based on current GFR, CKD stage is stage 4 - GFR 15-29.  Monitor UOP and serial BMP and adjust therapy as needed. Renally dose meds. Avoid nephrotoxic medications and procedures.    Fracture of surgical neck of humerus    Dr. Machado is following      Vesicovaginal fistula    Dr krishnamurthy  to do egd today after blood    Hypertension  Norvasc 5 mg po qhs,  hydralazine prn    Anxiety disorder        Hypothyroid  Continue Synthroid      Personal history of other malignant neoplasm of large intestine  We had been with holding Lovenox because of her anemia and renal insufficiency.  However now in the arm with fracture she has developed a 1+ edema so we will start Lovenox 30 no dvt on us    Recurrent major depression  Continue her Cymbalta as her allergy duloxetine was put in an error  Stable for several years    Ileostomy status  Continue care and maintenance      GERD (gastroesophageal reflux disease)  Continue iv Protonix      Anemia of chronic kidney failure, stage 4 (severe)  Creatine stable for now. BMP reviewed- noted Estimated Creatinine Clearance: 14 mL/min (A) (based on SCr of 3.43 mg/dL (H)). according to latest data. Based on current GFR, CKD stage is stage 4 - GFR 15-29.  Monitor UOP and serial BMP and adjust therapy as needed. Renally dose meds. Avoid nephrotoxic medications and procedures.  we will continue the linesolid per id rec.  She was given a dose of Procrit and is status post 2 units packed red blood cell creatinine has improved slightly currently her hemoglobin is 10     Dr krishnamurthy will egd    Dyspnea  Now resolved        VTE Risk  Mitigation (From admission, onward)           Ordered     IP VTE HIGH RISK PATIENT  Once         02/05/24 1537     Place sequential compression device  Until discontinued         02/05/24 1537                    Discharge Planning   JOSHUA: 2/13/2024     Code Status: Full Code   Is the patient medically ready for discharge?:     Reason for patient still in hospital (select all that apply): Patient trending condition  Discharge Plan A: Skilled Nursing Facility   Discharge Delays: (!) Post-Acute Set-up              Gregor Heaton MD  Department of Hospital Medicine   [facility]

## 2024-02-21 NOTE — ASSESSMENT & PLAN NOTE
Creatine stable for now. BMP reviewed- noted Estimated Creatinine Clearance: 14 mL/min (A) (based on SCr of 3.43 mg/dL (H)). according to latest data. Based on current GFR, CKD stage is stage 4 - GFR 15-29.  Monitor UOP and serial BMP and adjust therapy as needed. Renally dose meds. Avoid nephrotoxic medications and procedures.

## 2024-02-21 NOTE — SUBJECTIVE & OBJECTIVE
Past Medical History:   Diagnosis Date    Cancer, colon     Chronic pain     GERD (gastroesophageal reflux disease)     Renal disorder     Thyroiditis, unspecified        Past Surgical History:   Procedure Laterality Date    BACK SURGERY       SECTION      CHOLECYSTECTOMY      COLON SURGERY      HYSTERECTOMY      KIDNEY SURGERY         Review of patient's allergies indicates:   Allergen Reactions    Oxaprozin Nausea And Vomiting and Swelling     Facial swelling      Sulfamethoxazole-trimethoprim Nausea And Vomiting     Severe nausea and vomiting    Doxycycline     Nsaids (non-steroidal anti-inflammatory drug) Rash    Sulfa (sulfonamide antibiotics) Nausea And Vomiting       No current facility-administered medications on file prior to encounter.     Current Outpatient Medications on File Prior to Encounter   Medication Sig    ascorbic acid, vitamin C, (VITAMIN C) 100 MG tablet Take 100 mg by mouth once daily.    calcitRIOL (ROCALTROL) 0.5 MCG Cap Take 0.5 mcg by mouth once daily.    clopidogreL (PLAVIX) 75 mg tablet Take 75 mg by mouth once daily.    DULoxetine (CYMBALTA) 30 MG capsule Take 30 mg by mouth once daily.    ferrous sulfate 325 (65 FE) MG EC tablet Take 325 mg by mouth 3 (three) times daily with meals.    HYDROcodone-acetaminophen (NORCO)  mg per tablet Take 1 tablet by mouth.    levothyroxine (SYNTHROID) 100 MCG tablet Take 137 mcg by mouth before breakfast.    omeprazole (PRILOSEC) 40 MG capsule Take 40 mg by mouth once daily.    QUEtiapine (SEROQUEL) 25 MG Tab Take by mouth.    sodium bicarbonate 650 MG tablet Take 650 mg by mouth 4 (four) times daily.     Family History       Problem Relation (Age of Onset)    Hypertension Mother          Tobacco Use    Smoking status: Never    Smokeless tobacco: Never   Substance and Sexual Activity    Alcohol use: Never    Drug use: Never    Sexual activity: Not Currently     Review of Systems   Constitutional:  Positive for activity change and fever.    HENT: Negative.     Eyes: Negative.    Respiratory: Negative.     Cardiovascular: Negative.    Gastrointestinal:  Positive for abdominal pain and nausea.   Endocrine: Negative.    Genitourinary: Negative.    Musculoskeletal:  Positive for arthralgias and joint swelling.   Skin: Negative.    Allergic/Immunologic: Negative.    Neurological:  Positive for weakness.   Hematological: Negative.    Psychiatric/Behavioral: Negative.       Objective:     Vital Signs (Most Recent):  Temp: 97.7 °F (36.5 °C) (02/21/24 0734)  Pulse: 79 (02/21/24 0734)  Resp: 20 (02/21/24 0342)  BP: (!) 153/75 (02/21/24 0734)  SpO2: 96 % (02/21/24 0734) Vital Signs (24h Range):  Temp:  [97.6 °F (36.4 °C)-99 °F (37.2 °C)] 97.7 °F (36.5 °C)  Pulse:  [] 79  Resp:  [18-20] 20  SpO2:  [95 %-100 %] 96 %  BP: (131-181)/(58-93) 153/75     Weight: 79.8 kg (176 lb)  Body mass index is 33.25 kg/m².     Physical Exam  Constitutional:       General: She is not in acute distress.     Appearance: She is obese.   HENT:      Head: Normocephalic and atraumatic.      Nose: Nose normal.      Mouth/Throat:      Mouth: Mucous membranes are moist.      Pharynx: Oropharynx is clear. No oropharyngeal exudate.   Eyes:      Conjunctiva/sclera: Conjunctivae normal.   Cardiovascular:      Rate and Rhythm: Normal rate and regular rhythm.      Pulses: Normal pulses.      Heart sounds: Normal heart sounds.      Comments: 1 plus edema to the left upper extremity  Pulmonary:      Effort: Pulmonary effort is normal.      Breath sounds: Normal breath sounds.   Abdominal:      General: Abdomen is flat.      Palpations: Abdomen is soft.   Musculoskeletal:         General: No swelling or deformity. Normal range of motion.      Cervical back: Normal range of motion and neck supple. No rigidity.   Skin:     General: Skin is warm and dry.      Capillary Refill: Capillary refill takes less than 2 seconds.   Neurological:      General: No focal deficit present.      Mental Status:  She is alert and oriented to person, place, and time.   Psychiatric:         Mood and Affect: Mood normal.         Behavior: Behavior normal.         Thought Content: Thought content normal.         Judgment: Judgment normal.                Significant Labs: All pertinent labs within the past 24 hours have been reviewed.  CBC:   Recent Labs   Lab 02/20/24  0750 02/21/24  0448   WBC 4.46* 4.74   HGB 8.4* 10.6*   HCT 27.0* 33.5*   PLT 41* 40*       CMP:   Recent Labs   Lab 02/21/24  0448      K 5.4*   CO2 18*   BUN 35.0*   CREATININE 3.43*   CALCIUM 10.0   ALBUMIN 2.5*   BILITOT 0.9   ALKPHOS 91   AST 15   ALT 9         Significant Imaging: I have reviewed all pertinent imaging results/findings within the past 24 hours.

## 2024-02-21 NOTE — PLAN OF CARE
Problem: Adult Inpatient Plan of Care  Goal: Plan of Care Review  Outcome: Ongoing, Progressing  Goal: Patient-Specific Goal (Individualized)  Outcome: Ongoing, Progressing  Goal: Absence of Hospital-Acquired Illness or Injury  Outcome: Ongoing, Progressing  Goal: Optimal Comfort and Wellbeing  Outcome: Ongoing, Progressing  Goal: Readiness for Transition of Care  Outcome: Ongoing, Progressing     Problem: Fall Injury Risk  Goal: Absence of Fall and Fall-Related Injury  Outcome: Ongoing, Progressing     Problem: Infection  Goal: Absence of Infection Signs and Symptoms  Outcome: Ongoing, Progressing     Problem: Pain Chronic (Persistent) (Comorbidity Management)  Goal: Acceptable Pain Control and Functional Ability  Outcome: Ongoing, Progressing     Problem: Skin Injury Risk Increased  Goal: Skin Health and Integrity  Outcome: Ongoing, Progressing     Problem: Impaired Wound Healing  Goal: Optimal Wound Healing  Outcome: Ongoing, Progressing

## 2024-02-22 PROBLEM — E44.0 MODERATE PROTEIN-CALORIE MALNUTRITION: Status: ACTIVE | Noted: 2024-02-22

## 2024-02-22 LAB
ABO + RH BLD: NORMAL
ANION GAP SERPL CALC-SCNC: 7 MEQ/L
APPEARANCE UR: ABNORMAL
BACTERIA #/AREA URNS AUTO: ABNORMAL /HPF
BASOPHILS # BLD AUTO: 0.02 X10(3)/MCL
BASOPHILS NFR BLD AUTO: 0.5 %
BILIRUB UR QL STRIP.AUTO: NEGATIVE
BLD PROD TYP BPU: NORMAL
BLOOD UNIT EXPIRATION DATE: NORMAL
BLOOD UNIT TYPE CODE: 6200
BUN SERPL-MCNC: 32 MG/DL (ref 9.8–20.1)
CALCIUM SERPL-MCNC: 9.7 MG/DL (ref 8.4–10.2)
CHLORIDE SERPL-SCNC: 109 MMOL/L (ref 98–107)
CO2 SERPL-SCNC: 22 MMOL/L (ref 23–31)
COLOR UR AUTO: YELLOW
CREAT SERPL-MCNC: 3.34 MG/DL (ref 0.55–1.02)
CREAT/UREA NIT SERPL: 10
CROSSMATCH INTERPRETATION: NORMAL
DISPENSE STATUS: NORMAL
EOSINOPHIL # BLD AUTO: 0.13 X10(3)/MCL (ref 0–0.9)
EOSINOPHIL NFR BLD AUTO: 3.5 %
ERYTHROCYTE [DISTWIDTH] IN BLOOD BY AUTOMATED COUNT: 14.4 % (ref 11.5–17)
GFR SERPLBLD CREATININE-BSD FMLA CKD-EPI: 14 MLS/MIN/1.73/M2
GLUCOSE SERPL-MCNC: 80 MG/DL (ref 82–115)
GLUCOSE UR QL STRIP.AUTO: NEGATIVE
HCT VFR BLD AUTO: 31.3 % (ref 37–47)
HGB BLD-MCNC: 9.6 G/DL (ref 12–16)
IMM GRANULOCYTES # BLD AUTO: 0.01 X10(3)/MCL (ref 0–0.04)
IMM GRANULOCYTES NFR BLD AUTO: 0.3 %
KETONES UR QL STRIP.AUTO: ABNORMAL
LEUKOCYTE ESTERASE UR QL STRIP.AUTO: ABNORMAL
LYMPHOCYTES # BLD AUTO: 0.52 X10(3)/MCL (ref 0.6–4.6)
LYMPHOCYTES NFR BLD AUTO: 14 %
MCH RBC QN AUTO: 29.5 PG (ref 27–31)
MCHC RBC AUTO-ENTMCNC: 30.7 G/DL (ref 33–36)
MCV RBC AUTO: 96.3 FL (ref 80–94)
MONOCYTES # BLD AUTO: 0.15 X10(3)/MCL (ref 0.1–1.3)
MONOCYTES NFR BLD AUTO: 4 %
NEUTROPHILS # BLD AUTO: 2.88 X10(3)/MCL (ref 2.1–9.2)
NEUTROPHILS NFR BLD AUTO: 77.7 %
NITRITE UR QL STRIP.AUTO: POSITIVE
NRBC BLD AUTO-RTO: 0 %
PH UR STRIP.AUTO: 7 [PH]
PLATELET # BLD AUTO: 38 X10(3)/MCL (ref 130–400)
PMV BLD AUTO: 12.3 FL (ref 7.4–10.4)
POTASSIUM SERPL-SCNC: 4.6 MMOL/L (ref 3.5–5.1)
PROT UR QL STRIP.AUTO: ABNORMAL
RBC # BLD AUTO: 3.25 X10(6)/MCL (ref 4.2–5.4)
RBC #/AREA URNS AUTO: ABNORMAL /HPF
RBC UR QL AUTO: ABNORMAL
SODIUM SERPL-SCNC: 138 MMOL/L (ref 136–145)
SP GR UR STRIP.AUTO: 1.02 (ref 1–1.03)
SQUAMOUS #/AREA URNS AUTO: ABNORMAL /HPF
UNIT NUMBER: NORMAL
UROBILINOGEN UR STRIP-ACNC: 0.2
WBC # SPEC AUTO: 3.71 X10(3)/MCL (ref 4.5–11.5)
WBC #/AREA URNS AUTO: ABNORMAL /HPF

## 2024-02-22 PROCEDURE — 94761 N-INVAS EAR/PLS OXIMETRY MLT: CPT

## 2024-02-22 PROCEDURE — 63600175 PHARM REV CODE 636 W HCPCS: Mod: JZ,EC,JG | Performed by: FAMILY MEDICINE

## 2024-02-22 PROCEDURE — 27000207 HC ISOLATION

## 2024-02-22 PROCEDURE — 0DBA8ZX EXCISION OF JEJUNUM, VIA NATURAL OR ARTIFICIAL OPENING ENDOSCOPIC, DIAGNOSTIC: ICD-10-PCS | Performed by: SURGERY

## 2024-02-22 PROCEDURE — 25000003 PHARM REV CODE 250: Performed by: NURSE ANESTHETIST, CERTIFIED REGISTERED

## 2024-02-22 PROCEDURE — P9035 PLATELET PHERES LEUKOREDUCED: HCPCS | Performed by: FAMILY MEDICINE

## 2024-02-22 PROCEDURE — 25000003 PHARM REV CODE 250: Performed by: FAMILY MEDICINE

## 2024-02-22 PROCEDURE — 43239 EGD BIOPSY SINGLE/MULTIPLE: CPT | Performed by: SURGERY

## 2024-02-22 PROCEDURE — 81003 URINALYSIS AUTO W/O SCOPE: CPT | Performed by: FAMILY MEDICINE

## 2024-02-22 PROCEDURE — 0DB48ZX EXCISION OF ESOPHAGOGASTRIC JUNCTION, VIA NATURAL OR ARTIFICIAL OPENING ENDOSCOPIC, DIAGNOSTIC: ICD-10-PCS | Performed by: SURGERY

## 2024-02-22 PROCEDURE — A4216 STERILE WATER/SALINE, 10 ML: HCPCS | Performed by: INTERNAL MEDICINE

## 2024-02-22 PROCEDURE — 37000009 HC ANESTHESIA EA ADD 15 MINS: Performed by: SURGERY

## 2024-02-22 PROCEDURE — 11000001 HC ACUTE MED/SURG PRIVATE ROOM

## 2024-02-22 PROCEDURE — 88305 TISSUE EXAM BY PATHOLOGIST: CPT | Performed by: SURGERY

## 2024-02-22 PROCEDURE — 25000003 PHARM REV CODE 250: Performed by: INTERNAL MEDICINE

## 2024-02-22 PROCEDURE — 85025 COMPLETE CBC W/AUTO DIFF WBC: CPT | Performed by: FAMILY MEDICINE

## 2024-02-22 PROCEDURE — 27201423 OPTIME MED/SURG SUP & DEVICES STERILE SUPPLY: Performed by: SURGERY

## 2024-02-22 PROCEDURE — 87086 URINE CULTURE/COLONY COUNT: CPT | Performed by: FAMILY MEDICINE

## 2024-02-22 PROCEDURE — 0DB68ZX EXCISION OF STOMACH, VIA NATURAL OR ARTIFICIAL OPENING ENDOSCOPIC, DIAGNOSTIC: ICD-10-PCS | Performed by: SURGERY

## 2024-02-22 PROCEDURE — 88312 SPECIAL STAINS GROUP 1: CPT

## 2024-02-22 PROCEDURE — 80048 BASIC METABOLIC PNL TOTAL CA: CPT | Performed by: FAMILY MEDICINE

## 2024-02-22 PROCEDURE — 99900035 HC TECH TIME PER 15 MIN (STAT)

## 2024-02-22 PROCEDURE — 37000008 HC ANESTHESIA 1ST 15 MINUTES: Performed by: SURGERY

## 2024-02-22 PROCEDURE — 88313 SPECIAL STAINS GROUP 2: CPT

## 2024-02-22 PROCEDURE — D9220A PRA ANESTHESIA: Mod: ,,, | Performed by: NURSE ANESTHETIST, CERTIFIED REGISTERED

## 2024-02-22 RX ORDER — SODIUM BICARBONATE 650 MG/1
650 TABLET ORAL 2 TIMES DAILY
Status: DISCONTINUED | OUTPATIENT
Start: 2024-02-22 | End: 2024-02-26 | Stop reason: HOSPADM

## 2024-02-22 RX ORDER — MEGESTROL ACETATE 20 MG/1
40 TABLET ORAL DAILY
Status: DISCONTINUED | OUTPATIENT
Start: 2024-02-22 | End: 2024-02-26 | Stop reason: HOSPADM

## 2024-02-22 RX ORDER — LIDOCAINE HYDROCHLORIDE 20 MG/ML
INJECTION, SOLUTION EPIDURAL; INFILTRATION; INTRACAUDAL; PERINEURAL
Status: DISCONTINUED | OUTPATIENT
Start: 2024-02-22 | End: 2024-02-22

## 2024-02-22 RX ORDER — PROPOFOL 10 MG/ML
VIAL (ML) INTRAVENOUS
Status: DISCONTINUED | OUTPATIENT
Start: 2024-02-22 | End: 2024-02-22

## 2024-02-22 RX ORDER — CEFTRIAXONE 1 G/1
1 INJECTION, POWDER, FOR SOLUTION INTRAMUSCULAR; INTRAVENOUS
Status: DISCONTINUED | OUTPATIENT
Start: 2024-02-22 | End: 2024-02-22

## 2024-02-22 RX ORDER — HYDROCODONE BITARTRATE AND ACETAMINOPHEN 500; 5 MG/1; MG/1
TABLET ORAL
Status: DISCONTINUED | OUTPATIENT
Start: 2024-02-22 | End: 2024-02-26 | Stop reason: HOSPADM

## 2024-02-22 RX ADMIN — LIDOCAINE 5% 1 PATCH: 700 PATCH TOPICAL at 08:02

## 2024-02-22 RX ADMIN — ACETAMINOPHEN 650 MG: 325 TABLET, FILM COATED ORAL at 08:02

## 2024-02-22 RX ADMIN — SODIUM CHLORIDE, PRESERVATIVE FREE 10 ML: 5 INJECTION INTRAVENOUS at 03:02

## 2024-02-22 RX ADMIN — QUETIAPINE FUMARATE 25 MG: 25 TABLET ORAL at 08:02

## 2024-02-22 RX ADMIN — Medication 100 MG: at 12:02

## 2024-02-22 RX ADMIN — CEFTRIAXONE SODIUM 1 G: 1 INJECTION, POWDER, FOR SOLUTION INTRAMUSCULAR; INTRAVENOUS at 09:02

## 2024-02-22 RX ADMIN — SODIUM CHLORIDE, PRESERVATIVE FREE 10 ML: 5 INJECTION INTRAVENOUS at 06:02

## 2024-02-22 RX ADMIN — SODIUM BICARBONATE 650 MG: 650 TABLET ORAL at 08:02

## 2024-02-22 RX ADMIN — ERYTHROPOIETIN 40000 UNITS: 40000 INJECTION, SOLUTION INTRAVENOUS; SUBCUTANEOUS at 03:02

## 2024-02-22 RX ADMIN — LIDOCAINE 5% 1 PATCH: 700 PATCH TOPICAL at 09:02

## 2024-02-22 RX ADMIN — Medication 50 MG: at 12:02

## 2024-02-22 RX ADMIN — AMLODIPINE BESYLATE 5 MG: 5 TABLET ORAL at 09:02

## 2024-02-22 RX ADMIN — LIDOCAINE HYDROCHLORIDE 120 MG: 20 INJECTION, SOLUTION EPIDURAL; INFILTRATION; INTRACAUDAL; PERINEURAL at 12:02

## 2024-02-22 RX ADMIN — HYDROCODONE BITARTRATE AND ACETAMINOPHEN 1 TABLET: 10; 325 TABLET ORAL at 03:02

## 2024-02-22 NOTE — ASSESSMENT & PLAN NOTE
Creatine stable for now. BMP reviewed- noted Estimated Creatinine Clearance: 13.7 mL/min (A) (based on SCr of 3.34 mg/dL (H)). according to latest data. Based on current GFR, CKD stage is stage 4 - GFR 15-29.  Monitor UOP and serial BMP and adjust therapy as needed. Renally dose meds. Avoid nephrotoxic medications and procedures.

## 2024-02-22 NOTE — ANESTHESIA POSTPROCEDURE EVALUATION
Anesthesia Post Evaluation    Patient: Kam Reyes    Procedure(s) Performed: Procedure(s) (LRB):  EGD (ESOPHAGOGASTRODUODENOSCOPY) (N/A)  EGD, WITH CLOSED BIOPSY (N/A)    Final Anesthesia Type: general      Patient participation: Yes- Able to Participate  Level of consciousness: awake and alert and oriented  Post-procedure vital signs: reviewed and stable  Pain management: adequate  Airway patency: patent    PONV status at discharge: No PONV  Anesthetic complications: no      Cardiovascular status: stable  Respiratory status: unassisted, spontaneous ventilation and room air  Hydration status: euvolemic  Follow-up not needed.              Vitals Value Taken Time   /62 02/22/24 1028   Temp 36.6 °C (97.8 °F) 02/22/24 1028   Pulse 86 02/22/24 1028   Resp 20 02/22/24 0400   SpO2 96 % 02/22/24 1028         No case tracking events are documented in the log.      Pain/Peterson Score: Pain Rating Prior to Med Admin: 5 (2/21/2024  9:32 PM)  Pain Rating Post Med Admin: 0 (2/21/2024 10:31 PM)

## 2024-02-22 NOTE — OP NOTE
[facility]  Medical Surgical Unit  Operative Note      Date of Procedure: 2/22/2024     Procedure: Procedure(s) (LRB):  EGD (ESOPHAGOGASTRODUODENOSCOPY) (N/A)  EGD, WITH CLOSED BIOPSY (N/A)     Surgeon(s) and Role:     * Robson Tripp MD - Primary    Assisting Surgeon: None    Pre-Operative Diagnosis: Gastrointestinal hemorrhage, unspecified gastrointestinal hemorrhage type [K92.2]    Post-Operative Diagnosis: Post-Op Diagnosis Codes:     * Gastrointestinal hemorrhage, unspecified gastrointestinal hemorrhage type [K92.2]  1. Normal duodenal in all 4 portions and into the jejunum biopsy taken with the cold biopsy forceps in the 1st portion   2. Gastritis of the antrum with biopsy taken x2 with the cold biopsy forceps  3. Streaks of gastritis in the fundus and cardia of the stomach  4. GE junction at 40 cm with no hiatal hernia   5. Reflux esophagitis biopsy taken with the cold biopsy forceps of the GE junction at 40 cm   6. Normal esophagus cricopharyngeus muscle vocal cords    Anesthesia: General    Operative Findings (including complications, if any):  Patient is a 73-year-old  female who had a history of gastrointestinal bleeding and melena.  Etiology unclear 2 unit transfusion was given.  I was consulted for EGD.  Patient had altered mental status and awareness along with edema of the left upper extremity and hypertension.  Patient needed medical stabilization and then was scheduled for EGD.  Laboratory studies show white count 3.71 with a hemoglobin of 9.6 hematocrit 31 platelet count was 38.  Patient did have renal profile showed a CO2 of 22 BUN of 32 and a creatinine of 3.34 consistent with putting renal insufficiency.  UA showed blood in the urine with ketones and 21-50 RBCs per high-powered field and 50-99 WBCs per high-power field.  Patient was treated for urinary tract infection.  Patient was COVID positive January of 2024  UA cultures on 02/05/2024 showed evidence of E coli  faecium and Candida albicans.    Patient was sedated and brought to the GI suite underwent examination of the esophagus stomach and duodenal with a flexible Olympus scope     Duodenum was normal in all 4 portions and into the jejunum I took a biopsy of the 1st portion for histo path and H pylori   Antrum fundus and cardia of the stomach with diffuse gastritis in the antrum and streaky gastritis in the fundus and cardia of the stomach biopsy taken x2 for histo path   Z-line at 40 cm with a biopsy taken of the GE junction for reflux esophagitis   Normal esophagus cricopharyngeus muscle vocal cords    Plan  1. Consider colonoscopy  2. Review biopsy results of the duodenum antrum x2 and GE junction x1        Description of Technical Procedures:  Noted above       Significant Surgical Tasks Conducted by the Assistant(s), if Applicable:  None       Estimated Blood Loss (EBL): 0 mL           Implants: * No implants in log *    Specimens:   Specimen (24h ago, onward)       Start     Ordered    02/22/24 1217  Specimen to Pathology  RELEASE UPON ORDERING        References:    Click here for ordering Quick Tip   Question:  Release to patient  Answer:  Immediate    02/22/24 1217                            Condition: Good    Disposition: PACU - hemodynamically stable.    Attestation: I was present and scrubbed for the entire procedure.    Discharge Note    OUTCOME: Patient tolerated treatment/procedure well without complication and is now ready for discharge.      DISPOSITION: Home or Self Care    FINAL DIAGNOSIS:  Urinary tract infection    FOLLOWUP: In clinic one-week    DISCHARGE INSTRUCTIONS:    Discharge Procedure Orders   Diet renal        Clinical Reference Documents Added to Patient Instructions         Document    CYSTOSCOPY DISCHARGE INSTRUCTIONS (ENGLISH)    MODERATE SEDATION IN ADULTS DISCHARGE INSTRUCTIONS (ENGLISH)

## 2024-02-22 NOTE — ASSESSMENT & PLAN NOTE
Creatine stable for now. BMP reviewed- noted Estimated Creatinine Clearance: 13.7 mL/min (A) (based on SCr of 3.34 mg/dL (H)). according to latest data. Based on current GFR, CKD stage is stage 4 - GFR 15-29.  Monitor UOP and serial BMP and adjust therapy as needed. Renally dose meds. Avoid nephrotoxic medications and procedures.  we will continue the linesolid per id rec.  She was given a dose of Procrit and is status post 2 units packed red blood cell creatinine has improved slightly currently her hemoglobin is 9     Dr krishnamurthy will egd today  Procrit to q week

## 2024-02-22 NOTE — INTERVAL H&P NOTE
The patient has been examined and the H&P has been reviewed:    I concur with the findings and no changes have occurred since H&P was written.    Surgery risks, benefits and alternative options discussed and understood by patient/family.          Active Hospital Problems    Diagnosis  POA    *Urinary tract infection [N39.0]  Yes    Moderate protein-calorie malnutrition [E44.0]  No    Altered awareness, transient [R40.4]  No    Gastrointestinal hemorrhage with melena [K92.1]  Yes    Edema of left upper extremity [R60.0]  No    Fracture of surgical neck of humerus [S42.213A]  Yes    Hypertension [I10]  Yes    Anemia of chronic kidney failure, stage 4 (severe) [N18.4, D63.1]  Yes    Ileostomy status [Z93.2]  Not Applicable    Recurrent major depression [F33.9]  Yes     Chronic    Personal history of other malignant neoplasm of large intestine [Z85.038]  Yes    Hypertensive chronic kidney disease w stg 1-4/unsp chr kdny [I12.9]  Yes    Dyspnea [R06.00]  Yes    GERD (gastroesophageal reflux disease) [K21.9]  Yes    Hypothyroid [E03.9]  Yes    Anxiety disorder [F41.9]  Yes    Vesicovaginal fistula [N82.0]  Yes      Resolved Hospital Problems   No resolved problems to display.

## 2024-02-22 NOTE — ASSESSMENT & PLAN NOTE
Finished course of abx will repeat ua due to somnolance  Ua pos  will start rocephin and await culture

## 2024-02-22 NOTE — PT/OT/SLP PROGRESS
Physical Therapy      Patient Name:  Kam Reyes   MRN:  [id number]    Patient not seen today secondary to Off the floor for procedure/surgery. Will follow-up tomorrow.

## 2024-02-22 NOTE — CONSULTS
Patient is a 73 yr old female who has a past medical history of Cancer, colon, Chronic pain, GERD(gastroesophageal reflux disease), Renal disorder, and Thyroiditis, unspecified, arrives in ER with complaints of weakness, nausea/vomiting and reports that she was dx with UTI last week. Currently Cipro since 2024.      Known Allergens   Oxaprozin reaction is Facial swelling, nausea and vomiting   Sulfamethoxazole-trimethoprim - severe nausea and vomiting   Doxycycline reaction unknown  Duloxetine reaction unknown  Nsaids(non-steroidal anti-inflammatory drug reaction is Rash  Sulfa (sulfonamide antibiotics) reaction is nausea and vomiting     Past Medical History   Colon Ca - 35 yrs old and again at 42 yrs old   Chronic pain -  GERD(gastroesophageal reflux disease) -   Renal Disorder -Stents every 6 months   Thyroiditis, unspecified     Past Surgical History   Back Surgery -  ( Back pain from Career)   Section - 44 yrs ago   Cholecystectomy  -  70's   Colon Surgery - 31 yrs ago and again 38 yrs ago   Hysterectomy - 20 yrs ago   Kidney Surgery - Put in stents every 6 mo   Broken upper arm on Left Side - 01/15/2024 - from fall  Colonoscopy - about 2 yrs ago ( Son was unsure of when her last colonoscopy was performed)  No EGD  No Angiogram  No Stress Test  No Echocardiogram    Immunizations   Covid 19, Vaccine - X 3  No Hepatitis vaccine  No Shingles vaccine  Pneumonia -   Flu vaccine -   No DTAP vaccine    Family History   Father dies in  from Colon Ca which he was only 30 yrs of age  Mother  in  - from Brain Ca.    Social History   Non Smoker   Alcohol -- Many yrs has not had a drink - Drink of choice was Slow Gin Fizz - back in her 20's'  No Drugs   No    No [facility] Time  No Rehab    22+ years -    G2, P2, AB0- 2 Sons - 1 Son  in his teens from a fever that caused him to become a paraplegia, which became a diabetic which caused Renal Failure and  before  "age 20 . 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   Patient resides in [place]   PCP is Dr Heaton     Review of Systems   Patient is positive for fatigue   Patient is positive for Dislodged Chun   Patient is positive for Weakness      Objective -  Vital Signs -02/05/2024.  BP is 164/133  Pulse is 96  Resp is 18  Temp is 97.5F (36.4 C0  SPO2 is 99%  Weight is 72.8 kg(160 lb 8 oz)  Height is 5' 1" (154.9 cm)  BMI Is 30.3 kg/m2      Physical Exam  Patient appears well -developed and well nourished.   Head is atraumatic  Pupils are equal, and round and reactive to light   Patient is Noted to wear glasses to read  Neck is supple with normal range of motion  Breath sounds are normal with no respiratory distress. She has no wheezes and has no rales.   Abdomen is soft and bowel sounds are normal.  There is no abdominal tenderness.   Bruising noted to bilateral shoulders, her left arm is in the sling, decreased range of motion  on the left.   Skin is warm and dry     Lab Findings   CBC 02/22/2024  3.71>     9.6/31.3      <38    96.3/29.5  CMP  138/4.6        109/22           32.0/3.34   <80       9.7/         Ct Abdomen Pelvis Without Contrast - 02/07/2024  Impression   1. Few 3-4 mm subpleural nodules at the posterior right lower lung with the pleural thickening and posterior right lung base  2. Status post cholecystectomy  3. Bilateral renal cortical thinning, perinephric and periureteral stranding, and renal lobulation with the right renal stent again identified  4. Ventral hernia again identified with adjacent right lateral ostomy which has a similar appearance to the prior exam  5. Suprapubic catheter again identified with small amount of gas in the bladder  6. Perirectal stranding/edema with postsurgical changes at the rectosigmoid junction.      CT Head without Contrast 02/20/2024  Impression  1. No acute intracranial abnormality identified.  2. Generalized " cerebral and cerebellar atrophy  3. Scattered mucosal thickening at the ethmoid and sphenoid sinuses      Xray Chest 1 view   Impression  1. Mild cardiomegaly  2. Mild scattered patchy hazy opacities at the mid and lower lungs bilaterally suspicious for multifocal infiltrate and or subsegmental atelectasis  3. Thoracic spondylosis       NM GI Bleed Scan (Tagged RBC)  Impression  1. Faint asymmetric pooling of radioisotope at the right mid and lower abdomen.  A active gastrointestinal bleed must be considered      US Upper Extremity Veins Left   Impression  1. Unremarkableleft upper extremity venous Doppler exam without sonographic evidence of a deep venous thrombosis      Assessment   Patient is a 73 yr old female who has a past medical history of Cancer, colon, Chronic pain, GERD(gastroesophageal reflux disease), Renal disorder, and Thyroiditis, unspecified, arrives in ER with complaints of weakness, nausea/vomiting and reports that she was dx with UTI last week. Currently Cipro since 01/30/2024.       Plan   IV Fluids  EGD (Esophagogastroduodenoscopy )

## 2024-02-22 NOTE — ASSESSMENT & PLAN NOTE
Nutrition consulted. Most recent weight and BMI monitored-     Measurements:  Wt Readings from Last 1 Encounters:   02/22/24 72.8 kg (160 lb 8 oz)   Body mass index is 30.33 kg/m².    Patient has been screened and assessed by RD.    Malnutrition Type:  Context:    Level:      Malnutrition Characteristic Summary:       Interventions/Recommendations (treatment strategy):boost bid,  start megace

## 2024-02-22 NOTE — SUBJECTIVE & OBJECTIVE
Past Medical History:   Diagnosis Date    Cancer, colon     Chronic pain     GERD (gastroesophageal reflux disease)     Renal disorder     Thyroiditis, unspecified        Past Surgical History:   Procedure Laterality Date    BACK SURGERY       SECTION      CHOLECYSTECTOMY      COLON SURGERY      HYSTERECTOMY      KIDNEY SURGERY         Review of patient's allergies indicates:   Allergen Reactions    Oxaprozin Nausea And Vomiting and Swelling     Facial swelling      Sulfamethoxazole-trimethoprim Nausea And Vomiting     Severe nausea and vomiting    Doxycycline     Nsaids (non-steroidal anti-inflammatory drug) Rash    Sulfa (sulfonamide antibiotics) Nausea And Vomiting       No current facility-administered medications on file prior to encounter.     Current Outpatient Medications on File Prior to Encounter   Medication Sig    ascorbic acid, vitamin C, (VITAMIN C) 100 MG tablet Take 100 mg by mouth once daily.    calcitRIOL (ROCALTROL) 0.5 MCG Cap Take 0.5 mcg by mouth once daily.    clopidogreL (PLAVIX) 75 mg tablet Take 75 mg by mouth once daily.    DULoxetine (CYMBALTA) 30 MG capsule Take 30 mg by mouth once daily.    ferrous sulfate 325 (65 FE) MG EC tablet Take 325 mg by mouth 3 (three) times daily with meals.    HYDROcodone-acetaminophen (NORCO)  mg per tablet Take 1 tablet by mouth.    levothyroxine (SYNTHROID) 100 MCG tablet Take 137 mcg by mouth before breakfast.    omeprazole (PRILOSEC) 40 MG capsule Take 40 mg by mouth once daily.    QUEtiapine (SEROQUEL) 25 MG Tab Take by mouth.    sodium bicarbonate 650 MG tablet Take 650 mg by mouth 4 (four) times daily.     Family History       Problem Relation (Age of Onset)    Hypertension Mother          Tobacco Use    Smoking status: Never    Smokeless tobacco: Never   Substance and Sexual Activity    Alcohol use: Never    Drug use: Never    Sexual activity: Not Currently     Review of Systems   Constitutional:  Positive for activity change and fever.    HENT: Negative.     Eyes: Negative.    Respiratory: Negative.     Cardiovascular: Negative.    Gastrointestinal:  Positive for abdominal pain and nausea.   Endocrine: Negative.    Genitourinary: Negative.    Musculoskeletal:  Positive for arthralgias and joint swelling.   Skin: Negative.    Allergic/Immunologic: Negative.    Neurological:  Positive for weakness.   Hematological: Negative.    Psychiatric/Behavioral: Negative.       Objective:     Vital Signs (Most Recent):  Temp: 97.5 °F (36.4 °C) (02/22/24 0354)  Pulse: 69 (02/22/24 0354)  Resp: 20 (02/22/24 0400)  BP: 121/63 (02/22/24 0354)  SpO2: 97 % (02/22/24 0700) Vital Signs (24h Range):  Temp:  [97.5 °F (36.4 °C)-98.2 °F (36.8 °C)] 97.5 °F (36.4 °C)  Pulse:  [69-83] 69  Resp:  [18-20] 20  SpO2:  [96 %-99 %] 97 %  BP: (121-162)/(63-77) 121/63     Weight: 72.8 kg (160 lb 8 oz)  Body mass index is 30.33 kg/m².     Physical Exam  Constitutional:       General: She is not in acute distress.     Appearance: She is obese.   HENT:      Head: Normocephalic and atraumatic.      Nose: Nose normal.      Mouth/Throat:      Mouth: Mucous membranes are moist.      Pharynx: Oropharynx is clear. No oropharyngeal exudate.   Eyes:      Conjunctiva/sclera: Conjunctivae normal.   Cardiovascular:      Rate and Rhythm: Normal rate and regular rhythm.      Pulses: Normal pulses.      Heart sounds: Normal heart sounds.      Comments: 1 plus edema to the left upper extremity  Pulmonary:      Effort: Pulmonary effort is normal.      Breath sounds: Normal breath sounds.   Abdominal:      General: Abdomen is flat.      Palpations: Abdomen is soft.   Musculoskeletal:         General: No swelling or deformity. Normal range of motion.      Cervical back: Normal range of motion and neck supple. No rigidity.   Skin:     General: Skin is warm and dry.      Capillary Refill: Capillary refill takes less than 2 seconds.   Neurological:      General: No focal deficit present.      Mental Status:  She is alert and oriented to person, place, and time.   Psychiatric:         Mood and Affect: Mood normal.         Behavior: Behavior normal.         Thought Content: Thought content normal.         Judgment: Judgment normal.                Significant Labs: All pertinent labs within the past 24 hours have been reviewed.  CBC:   Recent Labs   Lab 02/20/24  0750 02/21/24 0448 02/22/24  0608   WBC 4.46* 4.74 3.71*   HGB 8.4* 10.6* 9.6*   HCT 27.0* 33.5* 31.3*   PLT 41* 40* 38*       CMP:   Recent Labs   Lab 02/21/24  0448 02/22/24  0608    138   K 5.4* 4.6   CO2 18* 22*   BUN 35.0* 32.0*   CREATININE 3.43* 3.34*   CALCIUM 10.0 9.7   ALBUMIN 2.5*  --    BILITOT 0.9  --    ALKPHOS 91  --    AST 15  --    ALT 9  --          Significant Imaging: I have reviewed all pertinent imaging results/findings within the past 24 hours.

## 2024-02-22 NOTE — PROGRESS NOTES
[facility] - Medical Surgical Unit  Hospital Medicine  Progress Note    Patient Name: Kam Reyes  MRN: [id number]  Patient Class: IP- Inpatient   Admission Date: 2/5/2024  Length of Stay: 17 days  Attending Physician: Gregor Heaton MD  Primary Care Provider: Gregor Heaton MD        Subjective:     Principal Problem:Urinary tract infection        HPI:  Patient is a 73-year-old known to me from clinic.  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  Patient had frequent urinary tract infections.  After many procedures was continuing to have them.  She was hospitalized about a week ago for urinary tract infection.  Remained asymptomatic during her stay with no leukocytosis grew only Proteus which was sensitive to Cipro.  She was discharged home and apparently over the weekend was becoming more nauseated and had feeling more ill.  Since the culture has grown out 3 more organisms including Enterobacter faecium Proteus mirabilis Pseudomonas aeruginosa enrol sella plan to coli.  Only the Pseudomonas and Proteus were sensitive to the Cipro my floxacillin she was sent on home on.  Moreover her urinary catheter came out today she would called initially for us to replace it in the office but she was feeling too unwell to come the office and went straight to the emergency room.  There she was complaining of nausea vomiting and some abdominal pain.  Was noted to have leukocytosis of 19,000. And urinary tract infection continuing.    Overview/Hospital Course:  73-year-old  female well known to me through clinic.  Presented with complaints of her catheter coming out however urostomy.  Upon evaluation was noted to have leukocytosis nausea abdominal pain.  Diagnosed with urinary tract infection.  The admitted to the hospital and covered initially for her older cultures which had multiple organisms.  With broad-spectrum antibiotic therapy.   Cultures came back with only Enterococcus faecalis this time.  And patient was placed on linezolid only.  She has had clearing of her urinary tract infection with clear urine no nausea no leukocytosis initially she did have some significant loose leukocytosis.  No shortness a breath throughout her stay.  She did have notable anemia requiring transfusion.  As well as a shot of Procrit.  She has not have heme-positive stools in his was attributable to her renal insufficiency.  Initially her she was placed on vancomycin with an increase in her creatinine thus necessitating the switch to linezolid.      Patient had a fractured humerus prior to admission orthopedics was consulted and recommended expectant management with a sling patient did develop swelling to the arm ultrasound was performed did not show a DVT the patient is wearing compression sleeve at this time to aid with edema to the arm.  Physical therapy was consulted during the stay and she will continue physical therapy after discharge.  She is being discharged today to skilled nursing for continued physical therapy because of her weakened state and continued p.o. antibiotic therapy.  Discharge delayed due to acceptance by nursing home  Decreased h/h received 2 unit blood , pending  egd today  More alert today, cxr with atelectasis, ct no acute changes. Blood cx no growth  Poor appetite  Continued delay in placement      Past Medical History:   Diagnosis Date    Cancer, colon     Chronic pain     GERD (gastroesophageal reflux disease)     Renal disorder     Thyroiditis, unspecified        Past Surgical History:   Procedure Laterality Date    BACK SURGERY       SECTION      CHOLECYSTECTOMY      COLON SURGERY      HYSTERECTOMY      KIDNEY SURGERY         Review of patient's allergies indicates:   Allergen Reactions    Oxaprozin Nausea And Vomiting and Swelling     Facial swelling      Sulfamethoxazole-trimethoprim Nausea And Vomiting     Severe nausea and  vomiting    Doxycycline     Nsaids (non-steroidal anti-inflammatory drug) Rash    Sulfa (sulfonamide antibiotics) Nausea And Vomiting       No current facility-administered medications on file prior to encounter.     Current Outpatient Medications on File Prior to Encounter   Medication Sig    ascorbic acid, vitamin C, (VITAMIN C) 100 MG tablet Take 100 mg by mouth once daily.    calcitRIOL (ROCALTROL) 0.5 MCG Cap Take 0.5 mcg by mouth once daily.    clopidogreL (PLAVIX) 75 mg tablet Take 75 mg by mouth once daily.    DULoxetine (CYMBALTA) 30 MG capsule Take 30 mg by mouth once daily.    ferrous sulfate 325 (65 FE) MG EC tablet Take 325 mg by mouth 3 (three) times daily with meals.    HYDROcodone-acetaminophen (NORCO)  mg per tablet Take 1 tablet by mouth.    levothyroxine (SYNTHROID) 100 MCG tablet Take 137 mcg by mouth before breakfast.    omeprazole (PRILOSEC) 40 MG capsule Take 40 mg by mouth once daily.    QUEtiapine (SEROQUEL) 25 MG Tab Take by mouth.    sodium bicarbonate 650 MG tablet Take 650 mg by mouth 4 (four) times daily.     Family History       Problem Relation (Age of Onset)    Hypertension Mother          Tobacco Use    Smoking status: Never    Smokeless tobacco: Never   Substance and Sexual Activity    Alcohol use: Never    Drug use: Never    Sexual activity: Not Currently     Review of Systems   Constitutional:  Positive for activity change and fever.   HENT: Negative.     Eyes: Negative.    Respiratory: Negative.     Cardiovascular: Negative.    Gastrointestinal:  Positive for abdominal pain and nausea.   Endocrine: Negative.    Genitourinary: Negative.    Musculoskeletal:  Positive for arthralgias and joint swelling.   Skin: Negative.    Allergic/Immunologic: Negative.    Neurological:  Positive for weakness.   Hematological: Negative.    Psychiatric/Behavioral: Negative.       Objective:     Vital Signs (Most Recent):  Temp: 97.5 °F (36.4 °C) (02/22/24 0354)  Pulse: 69 (02/22/24  0354)  Resp: 20 (02/22/24 0400)  BP: 121/63 (02/22/24 0354)  SpO2: 97 % (02/22/24 0700) Vital Signs (24h Range):  Temp:  [97.5 °F (36.4 °C)-98.2 °F (36.8 °C)] 97.5 °F (36.4 °C)  Pulse:  [69-83] 69  Resp:  [18-20] 20  SpO2:  [96 %-99 %] 97 %  BP: (121-162)/(63-77) 121/63     Weight: 72.8 kg (160 lb 8 oz)  Body mass index is 30.33 kg/m².     Physical Exam  Constitutional:       General: She is not in acute distress.     Appearance: She is obese.   HENT:      Head: Normocephalic and atraumatic.      Nose: Nose normal.      Mouth/Throat:      Mouth: Mucous membranes are moist.      Pharynx: Oropharynx is clear. No oropharyngeal exudate.   Eyes:      Conjunctiva/sclera: Conjunctivae normal.   Cardiovascular:      Rate and Rhythm: Normal rate and regular rhythm.      Pulses: Normal pulses.      Heart sounds: Normal heart sounds.      Comments: 1 plus edema to the left upper extremity  Pulmonary:      Effort: Pulmonary effort is normal.      Breath sounds: Normal breath sounds.   Abdominal:      General: Abdomen is flat.      Palpations: Abdomen is soft.   Musculoskeletal:         General: No swelling or deformity. Normal range of motion.      Cervical back: Normal range of motion and neck supple. No rigidity.   Skin:     General: Skin is warm and dry.      Capillary Refill: Capillary refill takes less than 2 seconds.   Neurological:      General: No focal deficit present.      Mental Status: She is alert and oriented to person, place, and time.   Psychiatric:         Mood and Affect: Mood normal.         Behavior: Behavior normal.         Thought Content: Thought content normal.         Judgment: Judgment normal.                Significant Labs: All pertinent labs within the past 24 hours have been reviewed.  CBC:   Recent Labs   Lab 02/20/24  0750 02/21/24  0448 02/22/24  0608   WBC 4.46* 4.74 3.71*   HGB 8.4* 10.6* 9.6*   HCT 27.0* 33.5* 31.3*   PLT 41* 40* 38*       CMP:   Recent Labs   Lab 02/21/24  0448  02/22/24  0608    138   K 5.4* 4.6   CO2 18* 22*   BUN 35.0* 32.0*   CREATININE 3.43* 3.34*   CALCIUM 10.0 9.7   ALBUMIN 2.5*  --    BILITOT 0.9  --    ALKPHOS 91  --    AST 15  --    ALT 9  --          Significant Imaging: I have reviewed all pertinent imaging results/findings within the past 24 hours.    Assessment/Plan:      * Urinary tract infection  Finished course of abx will repeat ua due to somnolance  Ua pos  will start rocephin and await culture    Moderate protein-calorie malnutrition  Nutrition consulted. Most recent weight and BMI monitored-     Measurements:  Wt Readings from Last 1 Encounters:   02/22/24 72.8 kg (160 lb 8 oz)   Body mass index is 30.33 kg/m².    Patient has been screened and assessed by RD.    Malnutrition Type:  Context:    Level:      Malnutrition Characteristic Summary:       Interventions/Recommendations (treatment strategy):boost bid,  start megace          Altered awareness, transient  He is somnolence over the last 2 days.  Has not improved after blood.  I had done a CT scan of her head which was within normal limits.  UA at this time with leukocytosis.  I will start rocephin until culture.   follow blood cultures well to make sure there is no sepsis.  Likely renal relarted and poor poContinuing to seek placement we will try for LTAC since skilled nursing has been delayed      Gastrointestinal hemorrhage with melena  S/p 2 units prbc, pending egd      Edema of left upper extremity    No apparent DVT noted we will continue compression sleeve and encourage activity    Hypertensive chronic kidney disease w stg 1-4/unsp chr kdny  Creatine stable for now. BMP reviewed- noted Estimated Creatinine Clearance: 13.7 mL/min (A) (based on SCr of 3.34 mg/dL (H)). according to latest data. Based on current GFR, CKD stage is stage 4 - GFR 15-29.  Monitor UOP and serial BMP and adjust therapy as needed. Renally dose meds. Avoid nephrotoxic medications and procedures.    Fracture of  surgical neck of humerus    Dr. Machado is following      Vesicovaginal fistula    Dr krishnamurthy  to do egd today after blood    Hypertension  Norvasc 5 mg po qhs,  hydralazine prn    Anxiety disorder        Hypothyroid  Continue Synthroid      Personal history of other malignant neoplasm of large intestine  We had been with holding Lovenox because of her anemia and renal insufficiency.  However now in the arm with fracture she has developed a 1+ edema so we will start Lovenox 30 no dvt on us    Recurrent major depression  Continue her Cymbalta as her allergy duloxetine was put in an error  Stable for several years    Ileostomy status  Continue care and maintenance      GERD (gastroesophageal reflux disease)  Continue iv Protonix      Anemia of chronic kidney failure, stage 4 (severe)  Creatine stable for now. BMP reviewed- noted Estimated Creatinine Clearance: 13.7 mL/min (A) (based on SCr of 3.34 mg/dL (H)). according to latest data. Based on current GFR, CKD stage is stage 4 - GFR 15-29.  Monitor UOP and serial BMP and adjust therapy as needed. Renally dose meds. Avoid nephrotoxic medications and procedures.  we will continue the linesolid per id rec.  She was given a dose of Procrit and is status post 2 units packed red blood cell creatinine has improved slightly currently her hemoglobin is 9     Dr krishnamurthy will egd today  Procrit to q week    Dyspnea  Now resolved        VTE Risk Mitigation (From admission, onward)           Ordered     IP VTE HIGH RISK PATIENT  Once         02/05/24 1537     Place sequential compression device  Until discontinued         02/05/24 1537                    Discharge Planning   JOSHUA: 2/13/2024     Code Status: Full Code   Is the patient medically ready for discharge?:     Reason for patient still in hospital (select all that apply): Patient trending condition  Discharge Plan A: Skilled Nursing Facility   Discharge Delays: (!) Post-Acute Set-up              Gregor Heaton MD  Department of  [facility] [place]   [facility]

## 2024-02-22 NOTE — H&P (VIEW-ONLY)
Patient is a 73 yr old female who has a past medical history of Cancer, colon, Chronic pain, GERD(gastroesophageal reflux disease), Renal disorder, and Thyroiditis, unspecified, arrives in ER with complaints of weakness, nausea/vomiting and reports that she was dx with UTI last week. Currently Cipro since 2024.      Known Allergens   Oxaprozin reaction is Facial swelling, nausea and vomiting   Sulfamethoxazole-trimethoprim - severe nausea and vomiting   Doxycycline reaction unknown  Duloxetine reaction unknown  Nsaids(non-steroidal anti-inflammatory drug reaction is Rash  Sulfa (sulfonamide antibiotics) reaction is nausea and vomiting     Past Medical History   Colon Ca - 35 yrs old and again at 42 yrs old   Chronic pain -  GERD(gastroesophageal reflux disease) -   Renal Disorder -Stents every 6 months   Thyroiditis, unspecified     Past Surgical History   Back Surgery -  ( Back pain from Career)   Section - 44 yrs ago   Cholecystectomy  -  70's   Colon Surgery - 31 yrs ago and again 38 yrs ago   Hysterectomy - 20 yrs ago   Kidney Surgery - Put in stents every 6 mo   Broken upper arm on Left Side - 01/15/2024 - from fall  Colonoscopy - about 2 yrs ago ( Son was unsure of when her last colonoscopy was performed)  No EGD  No Angiogram  No Stress Test  No Echocardiogram    Immunizations   Covid 19, Vaccine - X 3  No Hepatitis vaccine  No Shingles vaccine  Pneumonia -   Flu vaccine -   No DTAP vaccine    Family History   Father dies in  from Colon Ca which he was only 30 yrs of age  Mother  in  - from Brain Ca.    Social History   Non Smoker   Alcohol -- Many yrs has not had a drink - Drink of choice was Slow Gin Fizz - back in her 20's'  No Drugs   No    No custodial Time  No Rehab    22+ years -    G2, P2, AB0- 2 Sons - 1 Son  in his teens from a fever that caused him to become a paraplegia, which became a diabetic which caused Renal Failure and  before  "age 20 . 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   Patient resides in [place]   PCP is Dr Heaton     Review of Systems   Patient is positive for fatigue   Patient is positive for Dislodged Chun   Patient is positive for Weakness      Objective -  Vital Signs -02/05/2024.  BP is 164/133  Pulse is 96  Resp is 18  Temp is 97.5F (36.4 C0  SPO2 is 99%  Weight is 72.8 kg(160 lb 8 oz)  Height is 5' 1" (154.9 cm)  BMI Is 30.3 kg/m2      Physical Exam  Patient appears well -developed and well nourished.   Head is atraumatic  Pupils are equal, and round and reactive to light   Patient is Noted to wear glasses to read  Neck is supple with normal range of motion  Breath sounds are normal with no respiratory distress. She has no wheezes and has no rales.   Abdomen is soft and bowel sounds are normal.  There is no abdominal tenderness.   Bruising noted to bilateral shoulders, her left arm is in the sling, decreased range of motion  on the left.   Skin is warm and dry     Lab Findings   CBC 02/22/2024  3.71>     9.6/31.3      <38    96.3/29.5  CMP  138/4.6        109/22           32.0/3.34   <80       9.7/         Ct Abdomen Pelvis Without Contrast - 02/07/2024  Impression   1. Few 3-4 mm subpleural nodules at the posterior right lower lung with the pleural thickening and posterior right lung base  2. Status post cholecystectomy  3. Bilateral renal cortical thinning, perinephric and periureteral stranding, and renal lobulation with the right renal stent again identified  4. Ventral hernia again identified with adjacent right lateral ostomy which has a similar appearance to the prior exam  5. Suprapubic catheter again identified with small amount of gas in the bladder  6. Perirectal stranding/edema with postsurgical changes at the rectosigmoid junction.      CT Head without Contrast 02/20/2024  Impression  1. No acute intracranial abnormality identified.  2. Generalized " cerebral and cerebellar atrophy  3. Scattered mucosal thickening at the ethmoid and sphenoid sinuses      Xray Chest 1 view   Impression  1. Mild cardiomegaly  2. Mild scattered patchy hazy opacities at the mid and lower lungs bilaterally suspicious for multifocal infiltrate and or subsegmental atelectasis  3. Thoracic spondylosis       NM GI Bleed Scan (Tagged RBC)  Impression  1. Faint asymmetric pooling of radioisotope at the right mid and lower abdomen.  A active gastrointestinal bleed must be considered      US Upper Extremity Veins Left   Impression  1. Unremarkableleft upper extremity venous Doppler exam without sonographic evidence of a deep venous thrombosis      Assessment   Patient is a 73 yr old female who has a past medical history of Cancer, colon, Chronic pain, GERD(gastroesophageal reflux disease), Renal disorder, and Thyroiditis, unspecified, arrives in ER with complaints of weakness, nausea/vomiting and reports that she was dx with UTI last week. Currently Cipro since 01/30/2024.       Plan   IV Fluids  EGD (Esophagogastroduodenoscopy )

## 2024-02-22 NOTE — ASSESSMENT & PLAN NOTE
He is somnolence over the last 2 days.  Has not improved after blood.  I had done a CT scan of her head which was within normal limits.  UA at this time with leukocytosis.  I will start rocephin until culture.   follow blood cultures well to make sure there is no sepsis.  Likely renal relarted and poor poContinuing to seek placement we will try for LTAC since skilled nursing has been delayed

## 2024-02-23 ENCOUNTER — ANESTHESIA EVENT (OUTPATIENT)
Dept: MEDSURG UNIT | Facility: HOSPITAL | Age: 74
End: 2024-02-23

## 2024-02-23 ENCOUNTER — ANESTHESIA (OUTPATIENT)
Dept: MEDSURG UNIT | Facility: HOSPITAL | Age: 74
End: 2024-02-23

## 2024-02-23 LAB
ANION GAP SERPL CALC-SCNC: 8 MEQ/L
BASOPHILS # BLD AUTO: 0.01 X10(3)/MCL
BASOPHILS NFR BLD AUTO: 0.3 %
BUN SERPL-MCNC: 26 MG/DL (ref 9.8–20.1)
CALCIUM SERPL-MCNC: 10 MG/DL (ref 8.4–10.2)
CHLORIDE SERPL-SCNC: 107 MMOL/L (ref 98–107)
CO2 SERPL-SCNC: 22 MMOL/L (ref 23–31)
CREAT SERPL-MCNC: 2.94 MG/DL (ref 0.55–1.02)
CREAT/UREA NIT SERPL: 9
EOSINOPHIL # BLD AUTO: 0.09 X10(3)/MCL (ref 0–0.9)
EOSINOPHIL NFR BLD AUTO: 2.9 %
ERYTHROCYTE [DISTWIDTH] IN BLOOD BY AUTOMATED COUNT: 14.2 % (ref 11.5–17)
GFR SERPLBLD CREATININE-BSD FMLA CKD-EPI: 16 MLS/MIN/1.73/M2
GLUCOSE SERPL-MCNC: 79 MG/DL (ref 82–115)
HCT VFR BLD AUTO: 31.9 % (ref 37–47)
HGB BLD-MCNC: 9.8 G/DL (ref 12–16)
IMM GRANULOCYTES # BLD AUTO: 0.04 X10(3)/MCL (ref 0–0.04)
IMM GRANULOCYTES NFR BLD AUTO: 1.3 %
LYMPHOCYTES # BLD AUTO: 0.59 X10(3)/MCL (ref 0.6–4.6)
LYMPHOCYTES NFR BLD AUTO: 18.8 %
MCH RBC QN AUTO: 30 PG (ref 27–31)
MCHC RBC AUTO-ENTMCNC: 30.7 G/DL (ref 33–36)
MCV RBC AUTO: 97.6 FL (ref 80–94)
MONOCYTES # BLD AUTO: 0.25 X10(3)/MCL (ref 0.1–1.3)
MONOCYTES NFR BLD AUTO: 8 %
NEUTROPHILS # BLD AUTO: 2.15 X10(3)/MCL (ref 2.1–9.2)
NEUTROPHILS NFR BLD AUTO: 68.7 %
PLATELET # BLD AUTO: 44 X10(3)/MCL (ref 130–400)
PMV BLD AUTO: 12.8 FL (ref 7.4–10.4)
POTASSIUM SERPL-SCNC: 4.3 MMOL/L (ref 3.5–5.1)
RBC # BLD AUTO: 3.27 X10(6)/MCL (ref 4.2–5.4)
SODIUM SERPL-SCNC: 137 MMOL/L (ref 136–145)
TSH SERPL-ACNC: 4.88 UIU/ML (ref 0.35–4.94)
WBC # SPEC AUTO: 3.13 X10(3)/MCL (ref 4.5–11.5)

## 2024-02-23 PROCEDURE — 25000003 PHARM REV CODE 250: Performed by: FAMILY MEDICINE

## 2024-02-23 PROCEDURE — 25000003 PHARM REV CODE 250: Performed by: SURGERY

## 2024-02-23 PROCEDURE — 97530 THERAPEUTIC ACTIVITIES: CPT

## 2024-02-23 PROCEDURE — 84443 ASSAY THYROID STIM HORMONE: CPT | Performed by: FAMILY MEDICINE

## 2024-02-23 PROCEDURE — 25000003 PHARM REV CODE 250: Performed by: INTERNAL MEDICINE

## 2024-02-23 PROCEDURE — A4216 STERILE WATER/SALINE, 10 ML: HCPCS | Performed by: INTERNAL MEDICINE

## 2024-02-23 PROCEDURE — 97110 THERAPEUTIC EXERCISES: CPT

## 2024-02-23 PROCEDURE — C9113 INJ PANTOPRAZOLE SODIUM, VIA: HCPCS | Performed by: FAMILY MEDICINE

## 2024-02-23 PROCEDURE — 63600175 PHARM REV CODE 636 W HCPCS: Performed by: FAMILY MEDICINE

## 2024-02-23 PROCEDURE — 85025 COMPLETE CBC W/AUTO DIFF WBC: CPT | Performed by: FAMILY MEDICINE

## 2024-02-23 PROCEDURE — 99900035 HC TECH TIME PER 15 MIN (STAT)

## 2024-02-23 PROCEDURE — 80048 BASIC METABOLIC PNL TOTAL CA: CPT | Performed by: FAMILY MEDICINE

## 2024-02-23 PROCEDURE — 11000001 HC ACUTE MED/SURG PRIVATE ROOM

## 2024-02-23 PROCEDURE — 63700000 PHARM REV CODE 250 ALT 637 W/O HCPCS: Performed by: FAMILY MEDICINE

## 2024-02-23 PROCEDURE — S5010 5% DEXTROSE AND 0.45% SALINE: HCPCS | Performed by: FAMILY MEDICINE

## 2024-02-23 PROCEDURE — 27000207 HC ISOLATION

## 2024-02-23 PROCEDURE — 94761 N-INVAS EAR/PLS OXIMETRY MLT: CPT

## 2024-02-23 RX ORDER — POLYETHYLENE GLYCOL 3350 17 G/17G
255 POWDER, FOR SOLUTION ORAL ONCE
Status: COMPLETED | OUTPATIENT
Start: 2024-02-23 | End: 2024-02-23

## 2024-02-23 RX ORDER — DEXTROSE MONOHYDRATE AND SODIUM CHLORIDE 5; .45 G/100ML; G/100ML
INJECTION, SOLUTION INTRAVENOUS CONTINUOUS
Status: DISCONTINUED | OUTPATIENT
Start: 2024-02-23 | End: 2024-02-26 | Stop reason: HOSPADM

## 2024-02-23 RX ORDER — POLYETHYLENE GLYCOL 3350 17 G/17G
255 POWDER, FOR SOLUTION ORAL ONCE
Status: DISCONTINUED | OUTPATIENT
Start: 2024-02-23 | End: 2024-02-23

## 2024-02-23 RX ORDER — AMLODIPINE BESYLATE 10 MG/1
10 TABLET ORAL DAILY
Status: DISCONTINUED | OUTPATIENT
Start: 2024-02-23 | End: 2024-02-26 | Stop reason: HOSPADM

## 2024-02-23 RX ADMIN — HYDROCODONE BITARTRATE AND ACETAMINOPHEN 1 TABLET: 10; 325 TABLET ORAL at 05:02

## 2024-02-23 RX ADMIN — SODIUM CHLORIDE, PRESERVATIVE FREE 10 ML: 5 INJECTION INTRAVENOUS at 01:02

## 2024-02-23 RX ADMIN — FLUCONAZOLE 100 MG: 100 TABLET ORAL at 09:02

## 2024-02-23 RX ADMIN — SODIUM BICARBONATE 650 MG: 650 TABLET ORAL at 09:02

## 2024-02-23 RX ADMIN — QUETIAPINE FUMARATE 25 MG: 25 TABLET ORAL at 08:02

## 2024-02-23 RX ADMIN — POLYETHYLENE GLYCOL 3350 255 G: 17 POWDER, FOR SOLUTION ORAL at 01:02

## 2024-02-23 RX ADMIN — FERROUS SULFATE TAB 325 MG (65 MG ELEMENTAL FE) 1 EACH: 325 (65 FE) TAB at 09:02

## 2024-02-23 RX ADMIN — SODIUM CHLORIDE, PRESERVATIVE FREE 10 ML: 5 INJECTION INTRAVENOUS at 05:02

## 2024-02-23 RX ADMIN — CALCITRIOL CAPSULES 0.25 MCG 0.5 MCG: 0.25 CAPSULE ORAL at 09:02

## 2024-02-23 RX ADMIN — DULOXETINE HYDROCHLORIDE 30 MG: 30 CAPSULE, DELAYED RELEASE ORAL at 09:02

## 2024-02-23 RX ADMIN — SODIUM BICARBONATE 650 MG: 650 TABLET ORAL at 08:02

## 2024-02-23 RX ADMIN — ACETAMINOPHEN 650 MG: 325 TABLET, FILM COATED ORAL at 11:02

## 2024-02-23 RX ADMIN — LEVOTHYROXINE SODIUM 137 MCG: 25 TABLET ORAL at 05:02

## 2024-02-23 RX ADMIN — SODIUM CHLORIDE, PRESERVATIVE FREE 10 ML: 5 INJECTION INTRAVENOUS at 12:02

## 2024-02-23 RX ADMIN — DEXTROSE AND SODIUM CHLORIDE: 5; 450 INJECTION, SOLUTION INTRAVENOUS at 08:02

## 2024-02-23 RX ADMIN — HYDROCORTISONE SODIUM SUCCINATE 100 MG: 100 INJECTION, POWDER, FOR SOLUTION INTRAMUSCULAR; INTRAVENOUS at 09:02

## 2024-02-23 RX ADMIN — DEXTROSE AND SODIUM CHLORIDE: 5; 450 INJECTION, SOLUTION INTRAVENOUS at 09:02

## 2024-02-23 RX ADMIN — CEFTRIAXONE SODIUM 1 G: 1 INJECTION, POWDER, FOR SOLUTION INTRAMUSCULAR; INTRAVENOUS at 09:02

## 2024-02-23 RX ADMIN — LIDOCAINE 5% 1 PATCH: 700 PATCH TOPICAL at 09:02

## 2024-02-23 RX ADMIN — SODIUM CHLORIDE, PRESERVATIVE FREE 10 ML: 5 INJECTION INTRAVENOUS at 09:02

## 2024-02-23 RX ADMIN — Medication 250 MG: at 09:02

## 2024-02-23 RX ADMIN — PANTOPRAZOLE SODIUM 40 MG: 40 INJECTION, POWDER, FOR SOLUTION INTRAVENOUS at 09:02

## 2024-02-23 RX ADMIN — AMLODIPINE BESYLATE 10 MG: 10 TABLET ORAL at 09:02

## 2024-02-23 RX ADMIN — MEGESTROL ACETATE 40 MG: 20 TABLET ORAL at 09:02

## 2024-02-23 NOTE — ASSESSMENT & PLAN NOTE
Finished course of abx  l repeat ua due to somnolance po with gram neg rods on culture  start rocephin and await sensitivity

## 2024-02-23 NOTE — ASSESSMENT & PLAN NOTE
Closed left 3 part surgical neck fracture, satisfactory alignment at this point.    She may continue with sling, removing multiple times a day for gentle range of motion of the elbow wrist and hand.    She will follow up with Dr. Machado in 1 week.    At this time x-rays will be taken to ensure that the alignment is remaining acceptable.  Thank you for the consultation.    Physical therapy for Codman's and gentle range of motion as tolerated.  May remove the sling daily.  Follow up with Orthopedics in 2 weeks.

## 2024-02-23 NOTE — ANESTHESIA PREPROCEDURE EVALUATION
02/23/2024  Kam Reyes is a 73 y.o., female.      Pre-op Assessment    I have reviewed the Patient Summary Reports.     I have reviewed the Nursing Notes. I have reviewed the NPO Status.   I have reviewed the Medications.     Review of Systems  Anesthesia Hx:             Denies Family Hx of Anesthesia complications.    Denies Personal Hx of Anesthesia complications.                    Social:  No Alcohol Use, Non-Smoker       Hematology/Oncology:  Hematology Normal   Oncology Normal                                   EENT/Dental:  EENT/Dental Normal           Cardiovascular:     Hypertension, well controlled                                        Pulmonary:      Shortness of breath                  Renal/:  Chronic Renal Disease, CKD                Hepatic/GI:     GERD, well controlled             Musculoskeletal:  Musculoskeletal Normal                Neurological:  Neurology Normal                                      Endocrine:   Hypothyroidism          Dermatological:  Skin Normal    Psych:  Psychiatric History                  Physical Exam  General: Cooperative, Alert and Oriented    Airway:  Mallampati: II   Mouth Opening: Normal  TM Distance: Normal  Tongue: Normal  Neck ROM: Normal ROM    Dental:  Intact        Anesthesia Plan  Type of Anesthesia, risks & benefits discussed:    Anesthesia Type: Gen Natural Airway  Intra-op Monitoring Plan: Standard ASA Monitors  Post Op Pain Control Plan:   (medical reason for not using multimodal pain management)  Induction:  IV  Informed Consent: Informed consent signed with the Patient and all parties understand the risks and agree with anesthesia plan.  All questions answered. Patient consented to blood products? Yes  ASA Score: 3    Ready For Surgery From Anesthesia Perspective.     .

## 2024-02-23 NOTE — PROGRESS NOTES
Inpatient Nutrition Evaluation    Admit Date: 2/5/2024   Total duration of encounter: 18 days    Nutrition Recommendation/Prescription     Continue current Renal, Non-Dialysis Diet as tolerated.   Continue ONS.   Continue Megace.   If intake does not improve, may consider enteral/parenteral nutrition if medically appropriate. RD available to provide recs.    Monitor intake, weight, and labs.     RD available as needed. Thank you.     Nutrition Assessment     Chart Review    Reason Seen: length of stay and follow-up    Malnutrition Screening Tool Results   Have you recently lost weight without trying?: No  Have you been eating poorly because of a decreased appetite?: No   MST Score: 0     Diagnosis:  Fracture of surgical neck of humerus     Relevant Medical History:   Cancer, colon      Chronic pain      GERD (gastroesophageal reflux disease)      Renal disorder      Thyroiditis, unspecified          Nutrition-Related Medications:   Ascorbic Acid; Ferrous Sulfate; Levothyroxine; Kcl; Calcitriol.     Nutrition-Related Labs:  2/23: BUN/Crea 26.0/2.94(H); GFR 16(L); GLU 79(L).   2/16: WBC 4.38(L); H/H 7.9/25.7(L); BUN/Crea 39.0/3.57(H); GFR 13(L).  2/8: H/H 9.0/28.7(L); K+ 3.4(L); Cl 115(H); BUN/Crea 48//4.28(H); GFR 10(L).    Diet Order: Diet renal  Diet Renal Non-Dialysis  Oral Supplement Order: none  Appetite/Oral Intake: poor/25-50% of meals  Factors Affecting Nutritional Intake: none identified  Food/Pentecostalism/Cultural Preferences: none reported  Food Allergies: none reported    Skin Integrity: bruised (ecchymotic), drain/device(s), other (see comments) (ostomy/ suprapubic; redness to abd/buttock)  Wound(s):      Altered Skin Integrity 02/18/24 0410 Coccyx-Tissue loss description: Full thickness     Comments    2/23: Pt with poor appetite and intake today. Megace started yesterday/ Receiving ONS. Renal indices improving. Recorded weight indicate ~ 15# weight loss since admission. Discuss weights with nursing. CM  "working on placement. Will continue to monitor during stay.     2/16: Renal indices continue to improve. Pt consuming 50-75% of meals. No new weight. Will continue to monitor during stay.     2/9: Renal indices abnormal; however, improving since admit. No recent weight loss noted/reported on nursing admit screen. Will continue to monitor during stay.     Anthropometrics    Height: 5' 1" (154.9 cm) Height Method: Stated  Last Weight: 72.8 kg (160 lb 8 oz) (02/22/24 0500) Weight Method: Bed Scale  BMI (Calculated): 30.3  BMI Classification: obese grade I (BMI 30-34.9)     Ideal Body Weight (IBW), Female: 105 lb     % Ideal Body Weight, Female (lb): 167.62 %                             Usual Weight Provided By: EMR weight history    Wt Readings from Last 5 Encounters:   02/22/24 72.8 kg (160 lb 8 oz)   01/26/24 79.8 kg (176 lb)   01/20/24 79.4 kg (175 lb)   06/22/23 74.8 kg (165 lb)   05/18/23 74.4 kg (164 lb)     Weight Change(s) Since Admission: 15# weight loss since admit per EMR recorded weight.   Admit Weight: 79.8 kg (176 lb) (02/05/24 1118)      Patient Education    Not applicable.    Monitoring & Evaluation     Dietitian will monitor food and beverage intake, energy intake, weight, weight change, electrolyte/renal panel, glucose/endocrine profile, and gastrointestinal profile.  Nutrition Risk/Follow-Up: low (follow-up in 5-7 days)  Patients assigned 'low nutrition risk' status do not qualify for a full nutritional assessment but will be monitored and re-evaluated in a 5-7 day time period. Please consult if re-evaluation needed sooner.  "

## 2024-02-23 NOTE — NURSING
"Patient educated on colon prep. Patient verbalized understanding. Patient consumed 240cc of colon prep and stated, " I'm not drinking anymore until I talk to my doctor, that's to much fluid and I dont have that much rectum because of my colon cancer." Patient unable to redirect, will notified on coming nurse and MD.  "

## 2024-02-23 NOTE — PLAN OF CARE
142 rec'd from [facility].  Sent PSSR/142 and updates to [facility].  Spoke to Gabbie at [facility] and she said that they are starting on auth from insurance.

## 2024-02-23 NOTE — SUBJECTIVE & OBJECTIVE
Past Medical History:   Diagnosis Date    Cancer, colon     Chronic pain     GERD (gastroesophageal reflux disease)     Renal disorder     Thyroiditis, unspecified        Past Surgical History:   Procedure Laterality Date    BACK SURGERY       SECTION      CHOLECYSTECTOMY      COLON SURGERY      EGD, WITH CLOSED BIOPSY N/A 2024    Procedure: EGD, WITH CLOSED BIOPSY;  Surgeon: Robson Tripp MD;  Location: [facility];  Service: Endoscopy;  Laterality: N/A;  A) BX DUODENUM, B) BX ANTRUM FOR H.PYLORI, C) BX GE JUNCTION    ESOPHAGOGASTRODUODENOSCOPY N/A 2024    Procedure: EGD (ESOPHAGOGASTRODUODENOSCOPY);  Surgeon: Robson Tripp MD;  Location: [facility] ENDO;  Service: Endoscopy;  Laterality: N/A;  A) DIFFUSE GASTRITIS OF ANTRUM & STOMACH    HYSTERECTOMY      KIDNEY SURGERY         Review of patient's allergies indicates:   Allergen Reactions    Oxaprozin Nausea And Vomiting and Swelling     Facial swelling      Sulfamethoxazole-trimethoprim Nausea And Vomiting     Severe nausea and vomiting    Doxycycline     Nsaids (non-steroidal anti-inflammatory drug) Rash    Sulfa (sulfonamide antibiotics) Nausea And Vomiting       No current facility-administered medications on file prior to encounter.     Current Outpatient Medications on File Prior to Encounter   Medication Sig    ascorbic acid, vitamin C, (VITAMIN C) 100 MG tablet Take 100 mg by mouth once daily.    calcitRIOL (ROCALTROL) 0.5 MCG Cap Take 0.5 mcg by mouth once daily.    clopidogreL (PLAVIX) 75 mg tablet Take 75 mg by mouth once daily.    DULoxetine (CYMBALTA) 30 MG capsule Take 30 mg by mouth once daily.    ferrous sulfate 325 (65 FE) MG EC tablet Take 325 mg by mouth 3 (three) times daily with meals.    HYDROcodone-acetaminophen (NORCO)  mg per tablet Take 1 tablet by mouth.    levothyroxine (SYNTHROID) 100 MCG tablet Take 137 mcg by mouth before breakfast.    omeprazole (PRILOSEC) 40 MG capsule Take 40 mg by mouth once daily.     QUEtiapine (SEROQUEL) 25 MG Tab Take by mouth.    sodium bicarbonate 650 MG tablet Take 650 mg by mouth 4 (four) times daily.     Family History       Problem Relation (Age of Onset)    Hypertension Mother          Tobacco Use    Smoking status: Never    Smokeless tobacco: Never   Substance and Sexual Activity    Alcohol use: Never    Drug use: Never    Sexual activity: Not Currently     Review of Systems   Constitutional:  Positive for activity change and fever.   HENT: Negative.     Eyes: Negative.    Respiratory: Negative.     Cardiovascular: Negative.    Gastrointestinal:  Positive for abdominal pain and nausea.   Endocrine: Negative.    Genitourinary: Negative.    Musculoskeletal:  Positive for arthralgias and joint swelling.   Skin: Negative.    Allergic/Immunologic: Negative.    Neurological:  Positive for weakness.   Hematological: Negative.    Psychiatric/Behavioral: Negative.       Objective:     Vital Signs (Most Recent):  Temp: 97.9 °F (36.6 °C) (02/23/24 0307)  Pulse: 74 (02/23/24 0307)  Resp: 20 (02/23/24 0537)  BP: (!) 149/72 (02/23/24 0307)  SpO2: 96 % (02/23/24 0700) Vital Signs (24h Range):  Temp:  [97.6 °F (36.4 °C)-98.8 °F (37.1 °C)] 97.9 °F (36.6 °C)  Pulse:  [70-89] 74  Resp:  [18-20] 20  SpO2:  [96 %-98 %] 96 %  BP: (125-150)/(62-76) 149/72     Weight: 72.8 kg (160 lb 8 oz)  Body mass index is 30.33 kg/m².     Physical Exam  Constitutional:       General: She is not in acute distress.     Appearance: She is obese.   HENT:      Head: Normocephalic and atraumatic.      Nose: Nose normal.      Mouth/Throat:      Mouth: Mucous membranes are moist.      Pharynx: Oropharynx is clear. No oropharyngeal exudate.   Eyes:      Conjunctiva/sclera: Conjunctivae normal.   Cardiovascular:      Rate and Rhythm: Normal rate and regular rhythm.      Pulses: Normal pulses.      Heart sounds: Normal heart sounds.      Comments: 1 plus edema to the left upper extremity  Pulmonary:      Effort: Pulmonary effort is  normal.      Breath sounds: Normal breath sounds.   Abdominal:      General: Abdomen is flat.      Palpations: Abdomen is soft.   Musculoskeletal:         General: No swelling or deformity. Normal range of motion.      Cervical back: Normal range of motion and neck supple. No rigidity.   Skin:     General: Skin is warm and dry.      Capillary Refill: Capillary refill takes less than 2 seconds.   Neurological:      General: No focal deficit present.      Mental Status: She is alert and oriented to person, place, and time.   Psychiatric:         Mood and Affect: Mood normal.         Behavior: Behavior normal.         Thought Content: Thought content normal.         Judgment: Judgment normal.                Significant Labs: All pertinent labs within the past 24 hours have been reviewed.  CBC:   Recent Labs   Lab 02/22/24  0608 02/23/24  0431   WBC 3.71* 3.13*   HGB 9.6* 9.8*   HCT 31.3* 31.9*   PLT 38* 44*       CMP:   Recent Labs   Lab 02/22/24  0608 02/23/24  0431    137   K 4.6 4.3   CO2 22* 22*   BUN 32.0* 26.0*   CREATININE 3.34* 2.94*   CALCIUM 9.7 10.0         Significant Imaging: I have reviewed all pertinent imaging results/findings within the past 24 hours.

## 2024-02-23 NOTE — ASSESSMENT & PLAN NOTE
Creatine stable for now. BMP reviewed- noted Estimated Creatinine Clearance: 15.6 mL/min (A) (based on SCr of 2.94 mg/dL (H)). according to latest data. Based on current GFR, CKD stage is stage 4 - GFR 15-29.  Monitor UOP and serial BMP and adjust therapy as needed. Renally dose meds. Avoid nephrotoxic medications and procedures.  we will continue the linesolid per id rec.  She was given a dose of Procrit and is status post 2 units packed red blood cell creatinine has improved slightly currently her hemoglobin is 9     Dr krishnamurthy will egd today  Procrit to q week

## 2024-02-23 NOTE — ASSESSMENT & PLAN NOTE
Had somnolence over the last 2 days.  Has  improved after blood.   CT scan of her head which was within normal limits.  UA at this time with leukocytyes I will start rocephin until sensitivity.   Improved to baseline today Likely renal relarted and poor po. Continuing to seek placement we will try for LTAC since skilled nursing has been delayed

## 2024-02-23 NOTE — PT/OT/SLP PROGRESS
Physical Therapy Treatment    Patient Name:  Kam Reyes   MRN:  [id number]    Recommendations:     Discharge Recommendations:    Discharge Equipment Recommendations:    Barriers to discharge: Decreased caregiver support    Assessment:     Kam Reyes is a 73 y.o. female admitted with a medical diagnosis of Urinary tract infection.  She presents with the following impairments/functional limitations:   weakness, left arm orthopedic precautions.    Rehab Prognosis: Good; patient would benefit from acute skilled PT services to address these deficits and reach maximum level of function.    Recent Surgery: Procedure(s) (LRB):  EGD (ESOPHAGOGASTRODUODENOSCOPY) (N/A)  EGD, WITH CLOSED BIOPSY (N/A) 1 Day Post-Op    Plan:     During this hospitalization, patient to be seen   to address the identified rehab impairments via   and progress toward the following goals:    Plan of Care Expires:       Subjective     Chief Complaint: weakness  Patient/Family Comments/goals: to get stronger and return home  Pain/Comfort:         Objective:     Communicated with pt prior to session.  Patient found HOB elevated with   upon PT entry to room.     General Precautions: Standard,    Orthopedic Precautions:    Braces:    Respiratory Status: Room air     Functional Mobility:  Bed Mobility:     Rolling Left:  moderate assistance  Rolling Right: moderate assistance  Scooting: moderate assistance  Supine to Sit: moderate assistance  Sit to Supine: moderate assistance      AM-PAC 6 CLICK MOBILITY          Treatment & Education:  Sitting EOB with fair- balance, leans backward and right, pt worked AROM left elbow wrist and hand with PT instruction including demo and PROM gentle to L shoulder.    Patient left HOB elevated with all lines intact, call button in reach, and bed alarm on..    GOALS:   Multidisciplinary Problems       Physical Therapy Goals          Problem: Physical Therapy    Goal Priority Disciplines Outcome Goal Variances  Interventions   Physical Therapy Goal     PT, PT/OT Ongoing, Progressing     Description: 1.  Pt to receive gentle PROM L shoulder and AAROM L elbow, wrist and hand  2. Pt to improve bed mob to modA with sling on L UE  3. Pt to improve tfs to chair modA NWB LUE with sling on                       Time Tracking:     PT Received On: 02/23/24  PT Start Time: 0955     PT Stop Time: 1030  PT Total Time (min): 35 min     Billable Minutes: Therapeutic Activity 15 and Therapeutic Exercise 15    Treatment Type: Treatment  PT/PTA: PT           02/23/2024

## 2024-02-23 NOTE — SUBJECTIVE & OBJECTIVE
Principal Problem:Urinary tract infection    Principal Orthopedic Problem:  Left shoulder fracture    Interval History:  Healing left shoulder fracture.  She was still in the hospital for medical reasons.    Review of patient's allergies indicates:   Allergen Reactions    Oxaprozin Nausea And Vomiting and Swelling     Facial swelling      Sulfamethoxazole-trimethoprim Nausea And Vomiting     Severe nausea and vomiting    Doxycycline     Nsaids (non-steroidal anti-inflammatory drug) Rash    Sulfa (sulfonamide antibiotics) Nausea And Vomiting       Current Facility-Administered Medications   Medication    0.9%  NaCl infusion (for blood administration)    0.9%  NaCl infusion (for blood administration)    0.9%  NaCl infusion (for blood administration)    0.9%  NaCl infusion (for blood administration)    acetaminophen tablet 650 mg    amLODIPine tablet 10 mg    ascorbic acid (vitamin C) tablet 250 mg    calcitRIOL capsule 0.5 mcg    cefTRIAXone (Rocephin) 1 g in dextrose 5 % in water (D5W) 100 mL IVPB (MB+)    dextrose 5 % and 0.45 % NaCl infusion    DULoxetine DR capsule 30 mg    epoetin louis injection 40,000 Units    ferrous sulfate tablet 1 each    fluconazole tablet 100 mg    hydrALAZINE injection 10 mg    HYDROcodone-acetaminophen  mg per tablet 1 tablet    hydrocortisone sodium succinate injection 100 mg    levothyroxine tablet 137 mcg    LIDOcaine 5 % patch 1 patch    megestroL tablet 40 mg    ondansetron injection 4 mg    pantoprazole injection 40 mg    QUEtiapine tablet 25 mg    sodium bicarbonate tablet 650 mg    sodium chloride 0.9% flush 10 mL     Objective:     Vital Signs (Most Recent):  Temp: 97.9 °F (36.6 °C) (02/23/24 0307)  Pulse: 74 (02/23/24 0307)  Resp: 20 (02/23/24 0537)  BP: (!) 149/72 (02/23/24 0307)  SpO2: 96 % (02/23/24 0700) Vital Signs (24h Range):  Temp:  [97.6 °F (36.4 °C)-98.8 °F (37.1 °C)] 97.9 °F (36.6 °C)  Pulse:  [70-89] 74  Resp:  [18-20] 20  SpO2:  [96 %-98 %] 96 %  BP:  "(125-150)/(62-76) 149/72     Weight: 72.8 kg (160 lb 8 oz)  Height: 5' 1" (154.9 cm)  Body mass index is 30.33 kg/m².      Intake/Output Summary (Last 24 hours) at 2/23/2024 0806  Last data filed at 2/23/2024 0730  Gross per 24 hour   Intake 570.4 ml   Output 650 ml   Net -79.6 ml                    Left Shoulder Exam      Comments:  Sling and swath was removed.  I took her shoulder through gentle range of motion without pain.  The sling was reapplied.         Significant Labs: All pertinent labs within the past 24 hours have been reviewed.    Significant Imaging: None  "

## 2024-02-23 NOTE — PROGRESS NOTES
[facility] - Medical Surgical Unit  Orthopedics  Progress Note    Patient Name: Kam Reyes  MRN: [id number]  Admission Date: 2/5/2024  Hospital Length of Stay: 18 days  Attending Provider: Gregor Heaton MD  Primary Care Provider: Gregor Heaton MD  Follow-up For: Procedure(s) (LRB):  EGD (ESOPHAGOGASTRODUODENOSCOPY) (N/A)  EGD, WITH CLOSED BIOPSY (N/A)    Post-Operative Day: 1 Day Post-Op  Subjective:     Principal Problem:Urinary tract infection    Principal Orthopedic Problem:  Left shoulder fracture    Interval History:  Healing left shoulder fracture.  She was still in the hospital for medical reasons.    Review of patient's allergies indicates:   Allergen Reactions    Oxaprozin Nausea And Vomiting and Swelling     Facial swelling      Sulfamethoxazole-trimethoprim Nausea And Vomiting     Severe nausea and vomiting    Doxycycline     Nsaids (non-steroidal anti-inflammatory drug) Rash    Sulfa (sulfonamide antibiotics) Nausea And Vomiting       Current Facility-Administered Medications   Medication    0.9%  NaCl infusion (for blood administration)    0.9%  NaCl infusion (for blood administration)    0.9%  NaCl infusion (for blood administration)    0.9%  NaCl infusion (for blood administration)    acetaminophen tablet 650 mg    amLODIPine tablet 10 mg    ascorbic acid (vitamin C) tablet 250 mg    calcitRIOL capsule 0.5 mcg    cefTRIAXone (Rocephin) 1 g in dextrose 5 % in water (D5W) 100 mL IVPB (MB+)    dextrose 5 % and 0.45 % NaCl infusion    DULoxetine DR capsule 30 mg    epoetin louis injection 40,000 Units    ferrous sulfate tablet 1 each    fluconazole tablet 100 mg    hydrALAZINE injection 10 mg    HYDROcodone-acetaminophen  mg per tablet 1 tablet    hydrocortisone sodium succinate injection 100 mg    levothyroxine tablet 137 mcg    LIDOcaine 5 % patch 1 patch    megestroL tablet 40 mg    ondansetron injection 4 mg    pantoprazole injection 40 mg    QUEtiapine tablet 25 mg  "   sodium bicarbonate tablet 650 mg    sodium chloride 0.9% flush 10 mL     Objective:     Vital Signs (Most Recent):  Temp: 97.9 °F (36.6 °C) (02/23/24 0307)  Pulse: 74 (02/23/24 0307)  Resp: 20 (02/23/24 0537)  BP: (!) 149/72 (02/23/24 0307)  SpO2: 96 % (02/23/24 0700) Vital Signs (24h Range):  Temp:  [97.6 °F (36.4 °C)-98.8 °F (37.1 °C)] 97.9 °F (36.6 °C)  Pulse:  [70-89] 74  Resp:  [18-20] 20  SpO2:  [96 %-98 %] 96 %  BP: (125-150)/(62-76) 149/72     Weight: 72.8 kg (160 lb 8 oz)  Height: 5' 1" (154.9 cm)  Body mass index is 30.33 kg/m².      Intake/Output Summary (Last 24 hours) at 2/23/2024 0806  Last data filed at 2/23/2024 0730  Gross per 24 hour   Intake 570.4 ml   Output 650 ml   Net -79.6 ml                    Left Shoulder Exam      Comments:  Sling and swath was removed.  I took her shoulder through gentle range of motion without pain.  The sling was reapplied.         Significant Labs: All pertinent labs within the past 24 hours have been reviewed.    Significant Imaging: None  Assessment/Plan:     Fracture of surgical neck of humerus  Closed left 3 part surgical neck fracture, satisfactory alignment at this point.    She may continue with sling, removing multiple times a day for gentle range of motion of the elbow wrist and hand.    She will follow up with Dr. Machado in 1 week.    At this time x-rays will be taken to ensure that the alignment is remaining acceptable.  Thank you for the consultation.          Narciso Machado MD  Orthopedics  [facility] - [facility] Surgical Unit    "

## 2024-02-23 NOTE — ASSESSMENT & PLAN NOTE
Creatine stable for now. BMP reviewed- noted Estimated Creatinine Clearance: 15.6 mL/min (A) (based on SCr of 2.94 mg/dL (H)). according to latest data. Based on current GFR, CKD stage is stage 4 - GFR 15-29.  Monitor UOP and serial BMP and adjust therapy as needed. Renally dose meds. Avoid nephrotoxic medications and procedures.

## 2024-02-23 NOTE — PROGRESS NOTES
[facility] - Medical Surgical Unit  Hospital Medicine  Progress Note    Patient Name: Kam Reyes  MRN: [id number]  Patient Class: IP- Inpatient   Admission Date: 2/5/2024  Length of Stay: 18 days  Attending Physician: Gregor Heaton MD  Primary Care Provider: Gregor Heaton MD        Subjective:     Principal Problem:Urinary tract infection        HPI:  Patient is a 73-year-old known to me from clinic.  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  Patient had frequent urinary tract infections.  After many procedures was continuing to have them.  She was hospitalized about a week ago for urinary tract infection.  Remained asymptomatic during her stay with no leukocytosis grew only Proteus which was sensitive to Cipro.  She was discharged home and apparently over the weekend was becoming more nauseated and had feeling more ill.  Since the culture has grown out 3 more organisms including Enterobacter faecium Proteus mirabilis Pseudomonas aeruginosa enrol sella plan to coli.  Only the Pseudomonas and Proteus were sensitive to the Cipro my floxacillin she was sent on home on.  Moreover her urinary catheter came out today she would called initially for us to replace it in the office but she was feeling too unwell to come the office and went straight to the emergency room.  There she was complaining of nausea vomiting and some abdominal pain.  Was noted to have leukocytosis of 19,000. And urinary tract infection continuing.    Overview/Hospital Course:  73-year-old  female well known to me through clinic.  Presented with complaints of her catheter coming out however urostomy.  Upon evaluation was noted to have leukocytosis nausea abdominal pain.  Diagnosed with urinary tract infection.  The admitted to the hospital and covered initially for her older cultures which had multiple organisms.  With broad-spectrum antibiotic therapy.   Cultures came back with only Enterococcus faecalis this time.  And patient was placed on linezolid only.  She has had clearing of her urinary tract infection with clear urine no nausea no leukocytosis initially she did have some significant loose leukocytosis.  No shortness a breath throughout her stay.  She did have notable anemia requiring transfusion.  As well as a shot of Procrit.  She has not have heme-positive stools in his was attributable to her renal insufficiency.  Initially her she was placed on vancomycin with an increase in her creatinine thus necessitating the switch to linezolid.      Patient had a fractured humerus prior to admission orthopedics was consulted and recommended expectant management with a sling patient did develop swelling to the arm ultrasound was performed did not show a DVT the patient is wearing compression sleeve at this time to aid with edema to the arm.  Physical therapy was consulted during the stay and she will continue physical therapy after discharge.  She is being discharged today to skilled nursing for continued physical therapy because of her weakened state and continued p.o. antibiotic therapy.  Discharge delayed due to acceptance by nursing home  Decreased h/h received 2 unit blood  egd with gastritis and erosive esophagitis, no active bleed, has colonoscopy planned today  More alert today, cxr with atelectasis, ct no acute changes. Blood cx no growth, urine with gram neg rods, current ly on rocephin pending sensitivities  She is more alert today, seems at her baseline.  C/o head ache only  Poor appetite  Continued delay in placement      Past Medical History:   Diagnosis Date    Cancer, colon     Chronic pain     GERD (gastroesophageal reflux disease)     Renal disorder     Thyroiditis, unspecified        Past Surgical History:   Procedure Laterality Date    BACK SURGERY       SECTION      CHOLECYSTECTOMY      COLON SURGERY      EGD, WITH CLOSED BIOPSY N/A  2/22/2024    Procedure: EGD, WITH CLOSED BIOPSY;  Surgeon: Robson Tripp MD;  Location: [facility] ENDO;  Service: Endoscopy;  Laterality: N/A;  A) BX DUODENUM, B) BX ANTRUM FOR H.PYLORI, C) BX GE JUNCTION    ESOPHAGOGASTRODUODENOSCOPY N/A 2/22/2024    Procedure: EGD (ESOPHAGOGASTRODUODENOSCOPY);  Surgeon: Robson Tripp MD;  Location: [facility] ENDO;  Service: Endoscopy;  Laterality: N/A;  A) DIFFUSE GASTRITIS OF ANTRUM & STOMACH    HYSTERECTOMY      KIDNEY SURGERY         Review of patient's allergies indicates:   Allergen Reactions    Oxaprozin Nausea And Vomiting and Swelling     Facial swelling      Sulfamethoxazole-trimethoprim Nausea And Vomiting     Severe nausea and vomiting    Doxycycline     Nsaids (non-steroidal anti-inflammatory drug) Rash    Sulfa (sulfonamide antibiotics) Nausea And Vomiting       No current facility-administered medications on file prior to encounter.     Current Outpatient Medications on File Prior to Encounter   Medication Sig    ascorbic acid, vitamin C, (VITAMIN C) 100 MG tablet Take 100 mg by mouth once daily.    calcitRIOL (ROCALTROL) 0.5 MCG Cap Take 0.5 mcg by mouth once daily.    clopidogreL (PLAVIX) 75 mg tablet Take 75 mg by mouth once daily.    DULoxetine (CYMBALTA) 30 MG capsule Take 30 mg by mouth once daily.    ferrous sulfate 325 (65 FE) MG EC tablet Take 325 mg by mouth 3 (three) times daily with meals.    HYDROcodone-acetaminophen (NORCO)  mg per tablet Take 1 tablet by mouth.    levothyroxine (SYNTHROID) 100 MCG tablet Take 137 mcg by mouth before breakfast.    omeprazole (PRILOSEC) 40 MG capsule Take 40 mg by mouth once daily.    QUEtiapine (SEROQUEL) 25 MG Tab Take by mouth.    sodium bicarbonate 650 MG tablet Take 650 mg by mouth 4 (four) times daily.     Family History       Problem Relation (Age of Onset)    Hypertension Mother          Tobacco Use    Smoking status: Never    Smokeless tobacco: Never   Substance and Sexual Activity    Alcohol use: Never     Drug use: Never    Sexual activity: Not Currently     Review of Systems   Constitutional:  Positive for activity change and fever.   HENT: Negative.     Eyes: Negative.    Respiratory: Negative.     Cardiovascular: Negative.    Gastrointestinal:  Positive for abdominal pain and nausea.   Endocrine: Negative.    Genitourinary: Negative.    Musculoskeletal:  Positive for arthralgias and joint swelling.   Skin: Negative.    Allergic/Immunologic: Negative.    Neurological:  Positive for weakness.   Hematological: Negative.    Psychiatric/Behavioral: Negative.       Objective:     Vital Signs (Most Recent):  Temp: 97.9 °F (36.6 °C) (02/23/24 0307)  Pulse: 74 (02/23/24 0307)  Resp: 20 (02/23/24 0537)  BP: (!) 149/72 (02/23/24 0307)  SpO2: 96 % (02/23/24 0700) Vital Signs (24h Range):  Temp:  [97.6 °F (36.4 °C)-98.8 °F (37.1 °C)] 97.9 °F (36.6 °C)  Pulse:  [70-89] 74  Resp:  [18-20] 20  SpO2:  [96 %-98 %] 96 %  BP: (125-150)/(62-76) 149/72     Weight: 72.8 kg (160 lb 8 oz)  Body mass index is 30.33 kg/m².     Physical Exam  Constitutional:       General: She is not in acute distress.     Appearance: She is obese.   HENT:      Head: Normocephalic and atraumatic.      Nose: Nose normal.      Mouth/Throat:      Mouth: Mucous membranes are moist.      Pharynx: Oropharynx is clear. No oropharyngeal exudate.   Eyes:      Conjunctiva/sclera: Conjunctivae normal.   Cardiovascular:      Rate and Rhythm: Normal rate and regular rhythm.      Pulses: Normal pulses.      Heart sounds: Normal heart sounds.      Comments: 1 plus edema to the left upper extremity  Pulmonary:      Effort: Pulmonary effort is normal.      Breath sounds: Normal breath sounds.   Abdominal:      General: Abdomen is flat.      Palpations: Abdomen is soft.   Musculoskeletal:         General: No swelling or deformity. Normal range of motion.      Cervical back: Normal range of motion and neck supple. No rigidity.   Skin:     General: Skin is warm and dry.       Capillary Refill: Capillary refill takes less than 2 seconds.   Neurological:      General: No focal deficit present.      Mental Status: She is alert and oriented to person, place, and time.   Psychiatric:         Mood and Affect: Mood normal.         Behavior: Behavior normal.         Thought Content: Thought content normal.         Judgment: Judgment normal.                Significant Labs: All pertinent labs within the past 24 hours have been reviewed.  CBC:   Recent Labs   Lab 02/22/24  0608 02/23/24  0431   WBC 3.71* 3.13*   HGB 9.6* 9.8*   HCT 31.3* 31.9*   PLT 38* 44*       CMP:   Recent Labs   Lab 02/22/24  0608 02/23/24  0431    137   K 4.6 4.3   CO2 22* 22*   BUN 32.0* 26.0*   CREATININE 3.34* 2.94*   CALCIUM 9.7 10.0         Significant Imaging: I have reviewed all pertinent imaging results/findings within the past 24 hours.    Assessment/Plan:      * Urinary tract infection  Finished course of abx  l repeat ua due to somnolance po with gram neg rods on culture  start rocephin and await sensitivity    Moderate protein-calorie malnutrition  Nutrition consulted. Most recent weight and BMI monitored-     Measurements:  Wt Readings from Last 1 Encounters:   02/22/24 72.8 kg (160 lb 8 oz)   Body mass index is 30.33 kg/m².    Patient has been screened and assessed by RD.    Malnutrition Type:  Context:    Level:      Malnutrition Characteristic Summary:       Interventions/Recommendations (treatment strategy):boost bid,  start megace   Single stress dose of solu cortef         Altered awareness, transient  Had somnolence over the last 2 days.  Has  improved after blood.   CT scan of her head which was within normal limits.  UA at this time with leukocytyes I will start rocephin until sensitivity.   Improved to baseline today Likely renal relarted and poor po. Continuing to seek placement we will try for LTAC since skilled nursing has been delayed      Gastrointestinal hemorrhage with melena  S/p 2 units  prbc,   Contiue iv protonix, colonoscopy today  Egd with gastritis see note per dr gordillo    Edema of left upper extremity    No apparent DVT noted , edema resolved    Hypertensive chronic kidney disease w stg 1-4/unsp chr kdny  Creatine stable for now. BMP reviewed- noted Estimated Creatinine Clearance: 15.6 mL/min (A) (based on SCr of 2.94 mg/dL (H)). according to latest data. Based on current GFR, CKD stage is stage 4 - GFR 15-29.  Monitor UOP and serial BMP and adjust therapy as needed. Renally dose meds. Avoid nephrotoxic medications and procedures.    Fracture of surgical neck of humerus    Dr. Machado is following      Vesicovaginal fistula    Dr krishnamurthy  to do egd today after blood    Hypertension  Norvasc 10 mg po qhs,  hydralazine prn    Anxiety disorder        Hypothyroid  Continue Synthroid      Personal history of other malignant neoplasm of large intestine  We had been with holding Lovenox because of her anemia and renal insufficiency.  However now in the arm with fracture she has developed a 1+ edema so we will start Lovenox 30 no dvt on us    Recurrent major depression  Continue her Cymbalta as her allergy duloxetine was put in an error  Stable for several years    Ileostomy status  Continue care and maintenance      GERD (gastroesophageal reflux disease)  Continue iv Protonix      Anemia of chronic kidney failure, stage 4 (severe)  Creatine stable for now. BMP reviewed- noted Estimated Creatinine Clearance: 15.6 mL/min (A) (based on SCr of 2.94 mg/dL (H)). according to latest data. Based on current GFR, CKD stage is stage 4 - GFR 15-29.  Monitor UOP and serial BMP and adjust therapy as needed. Renally dose meds. Avoid nephrotoxic medications and procedures.  we will continue the linesolid per id rec.  She was given a dose of Procrit and is status post 2 units packed red blood cell creatinine has improved slightly currently her hemoglobin is 9     Dr krishnamurthy will egd today  Procrit to q  week    Dyspnea  Now resolved        VTE Risk Mitigation (From admission, onward)           Ordered     IP VTE HIGH RISK PATIENT  Once         02/05/24 1537     Place sequential compression device  Until discontinued         02/05/24 1537                    Discharge Planning   JOSHUA: 2/13/2024     Code Status: Full Code   Is the patient medically ready for discharge?:     Reason for patient still in hospital (select all that apply): Patient trending condition  Discharge Plan A: Skilled Nursing Facility   Discharge Delays: (!) Post-Acute Set-up              Gregor Heaton MD  Department of Hospital Medicine   [facility] - [facility]

## 2024-02-23 NOTE — ASSESSMENT & PLAN NOTE
Nutrition consulted. Most recent weight and BMI monitored-     Measurements:  Wt Readings from Last 1 Encounters:   02/22/24 72.8 kg (160 lb 8 oz)   Body mass index is 30.33 kg/m².    Patient has been screened and assessed by RD.    Malnutrition Type:  Context:    Level:      Malnutrition Characteristic Summary:       Interventions/Recommendations (treatment strategy):boost bid,  start megace   Single stress dose of solu cortef

## 2024-02-23 NOTE — ASSESSMENT & PLAN NOTE
S/p 2 units prbc,   Contiue iv protonix, colonoscopy today  Egd with gastritis see note per dr gordillo

## 2024-02-24 LAB — BACTERIA UR CULT: ABNORMAL

## 2024-02-24 PROCEDURE — 25000003 PHARM REV CODE 250: Performed by: INTERNAL MEDICINE

## 2024-02-24 PROCEDURE — 11000001 HC ACUTE MED/SURG PRIVATE ROOM

## 2024-02-24 PROCEDURE — 97530 THERAPEUTIC ACTIVITIES: CPT

## 2024-02-24 PROCEDURE — 94761 N-INVAS EAR/PLS OXIMETRY MLT: CPT

## 2024-02-24 PROCEDURE — 63700000 PHARM REV CODE 250 ALT 637 W/O HCPCS: Performed by: FAMILY MEDICINE

## 2024-02-24 PROCEDURE — 99900035 HC TECH TIME PER 15 MIN (STAT)

## 2024-02-24 PROCEDURE — 25000003 PHARM REV CODE 250: Performed by: FAMILY MEDICINE

## 2024-02-24 PROCEDURE — 27000207 HC ISOLATION

## 2024-02-24 PROCEDURE — 97110 THERAPEUTIC EXERCISES: CPT

## 2024-02-24 PROCEDURE — S5010 5% DEXTROSE AND 0.45% SALINE: HCPCS | Performed by: FAMILY MEDICINE

## 2024-02-24 PROCEDURE — A4216 STERILE WATER/SALINE, 10 ML: HCPCS | Performed by: INTERNAL MEDICINE

## 2024-02-24 PROCEDURE — 63600175 PHARM REV CODE 636 W HCPCS: Performed by: FAMILY MEDICINE

## 2024-02-24 PROCEDURE — C9113 INJ PANTOPRAZOLE SODIUM, VIA: HCPCS | Performed by: FAMILY MEDICINE

## 2024-02-24 RX ADMIN — PANTOPRAZOLE SODIUM 40 MG: 40 INJECTION, POWDER, FOR SOLUTION INTRAVENOUS at 08:02

## 2024-02-24 RX ADMIN — SODIUM BICARBONATE 650 MG: 650 TABLET ORAL at 08:02

## 2024-02-24 RX ADMIN — CEFTRIAXONE SODIUM 1 G: 1 INJECTION, POWDER, FOR SOLUTION INTRAMUSCULAR; INTRAVENOUS at 08:02

## 2024-02-24 RX ADMIN — SODIUM CHLORIDE, PRESERVATIVE FREE 10 ML: 5 INJECTION INTRAVENOUS at 05:02

## 2024-02-24 RX ADMIN — LIDOCAINE 5% 1 PATCH: 700 PATCH TOPICAL at 08:02

## 2024-02-24 RX ADMIN — CALCITRIOL CAPSULES 0.25 MCG 0.5 MCG: 0.25 CAPSULE ORAL at 08:02

## 2024-02-24 RX ADMIN — SODIUM CHLORIDE, PRESERVATIVE FREE 10 ML: 5 INJECTION INTRAVENOUS at 09:02

## 2024-02-24 RX ADMIN — DEXTROSE AND SODIUM CHLORIDE: 5; 450 INJECTION, SOLUTION INTRAVENOUS at 04:02

## 2024-02-24 RX ADMIN — LEVOTHYROXINE SODIUM 137 MCG: 25 TABLET ORAL at 05:02

## 2024-02-24 RX ADMIN — DEXTROSE AND SODIUM CHLORIDE: 5; 450 INJECTION, SOLUTION INTRAVENOUS at 06:02

## 2024-02-24 RX ADMIN — FLUCONAZOLE 100 MG: 100 TABLET ORAL at 08:02

## 2024-02-24 RX ADMIN — PIPERACILLIN SODIUM AND TAZOBACTAM SODIUM 4.5 G: 4; .5 INJECTION, POWDER, LYOPHILIZED, FOR SOLUTION INTRAVENOUS at 04:02

## 2024-02-24 RX ADMIN — Medication 250 MG: at 08:02

## 2024-02-24 RX ADMIN — FERROUS SULFATE TAB 325 MG (65 MG ELEMENTAL FE) 1 EACH: 325 (65 FE) TAB at 08:02

## 2024-02-24 RX ADMIN — QUETIAPINE FUMARATE 25 MG: 25 TABLET ORAL at 08:02

## 2024-02-24 RX ADMIN — MEGESTROL ACETATE 40 MG: 20 TABLET ORAL at 08:02

## 2024-02-24 RX ADMIN — AMLODIPINE BESYLATE 10 MG: 10 TABLET ORAL at 08:02

## 2024-02-24 RX ADMIN — DULOXETINE HYDROCHLORIDE 30 MG: 30 CAPSULE, DELAYED RELEASE ORAL at 08:02

## 2024-02-24 NOTE — PT/OT/SLP PROGRESS
Physical Therapy Treatment    Patient Name:  Kam Reyes   MRN:  [id number]    Recommendations:     Discharge Recommendations:    Discharge Equipment Recommendations:    Barriers to discharge: Decreased caregiver support    Assessment:     Kam Reyes is a 73 y.o. female admitted with a medical diagnosis of Urinary tract infection.  She presents with the following impairments/functional limitations:   weakness, left arm orthopedic precautions.    Rehab Prognosis: Good; patient would benefit from acute skilled PT services to address these deficits and reach maximum level of function.    Recent Surgery: Procedure(s) (LRB):  EGD (ESOPHAGOGASTRODUODENOSCOPY) (N/A)  EGD, WITH CLOSED BIOPSY (N/A) 2 Days Post-Op    Plan:     During this hospitalization, patient to be seen   to address the identified rehab impairments via   and progress toward the following goals:    Plan of Care Expires:       Subjective     Chief Complaint: weakness  Patient/Family Comments/goals: to get stronger and return home  Pain/Comfort:         Objective:     Communicated with pt prior to session.  Patient found HOB elevated with   upon PT entry to room.     General Precautions: Standard,    Orthopedic Precautions:    Braces:    Respiratory Status: Room air     Functional Mobility:  Bed Mobility:     Rolling Left:  moderate assistance  Rolling Right: moderate assistance  Scooting: moderate assistance  Supine to Sit: moderate assistance  Sit to Supine: moderate assistance      AM-PAC 6 CLICK MOBILITY          Treatment & Education:  Sitting EOB with fair- balance, leans backward and right, pt worked AROM left elbow wrist and hand with PT instruction including demo and PROM gentle to L shoulder.    Patient left HOB elevated with all lines intact, call button in reach, and bed alarm on..    GOALS:   Multidisciplinary Problems       Physical Therapy Goals          Problem: Physical Therapy    Goal Priority Disciplines Outcome Goal Variances  Interventions   Physical Therapy Goal     PT, PT/OT Ongoing, Progressing     Description: 1.  Pt to receive gentle PROM L shoulder and AAROM L elbow, wrist and hand  2. Pt to improve bed mob to modA with sling on L UE  3. Pt to improve tfs to chair modA NWB LUE with sling on                       Time Tracking:     PT Received On: 02/24/24  PT Start Time: 0945     PT Stop Time: 1015  PT Total Time (min): 30 min     Billable Minutes: Therapeutic Activity 15 and Therapeutic Exercise 15    Treatment Type: Treatment  PT/PTA: PT           02/24/2024

## 2024-02-24 NOTE — PROGRESS NOTES
Progress Note    Admit Date: 2/5/2024   LOS: 19 days     SUBJECTIVE:     Follow-up For:   UTI.  Patient has been adm she has a history of urinary tract infections admitted by Dr. Heaton.  She has a history of colostomy, urostomy.  She also has history of the vesicular colonic fistula.  She has a suprapubic catheter.  She has been hospitalized several times in the past and also a few times recently.  She is here on a prolonged hospital stay because she is awaiting placement at skilled nursing facility.    She has a history of chronic kidney disease.  She does not want to be on dialysis.  She was chronic anemia.  She did receive 1 unit of packed red blood cells    She does have a history of a recently fractured humerus.  This occurred prior to admission.  Dr. Machado was consulted.  Does not feel that she needs any surgical intervention at this time.  She does have the arm in a sling.  No DVT has been appreciated on exam radiologic findings.  She does have edema to her arm.  Scheduled Meds:   amLODIPine  10 mg Oral Daily    ascorbic acid (vitamin C)  250 mg Oral Daily    calcitRIOL  0.5 mcg Oral Daily    cefTRIAXone (Rocephin) IV (PEDS and ADULTS)  1 g Intravenous Q24H    DULoxetine  30 mg Oral Daily    epoetin louis  40,000 Units Subcutaneous Q7 Days    ferrous sulfate  1 tablet Oral Daily    fluconazole  100 mg Oral Daily    levothyroxine  137 mcg Oral Before breakfast    LIDOcaine  1 patch Transdermal Daily    megestroL  40 mg Oral Daily    pantoprazole  40 mg Intravenous Daily    QUEtiapine  25 mg Oral QHS    sodium bicarbonate  650 mg Oral BID    sodium chloride 0.9%  10 mL Intravenous Q8H     Continuous Infusions:   dextrose 5 % and 0.45 % NaCl 100 mL/hr at 02/24/24 0632     PRN Meds:0.9%  NaCl infusion (for blood administration), 0.9%  NaCl infusion (for blood administration), 0.9%  NaCl infusion (for blood administration), 0.9%  NaCl infusion (for blood administration), acetaminophen, hydrALAZINE,  HYDROcodone-acetaminophen, ondansetron    Review of patient's allergies indicates:   Allergen Reactions    Oxaprozin Nausea And Vomiting and Swelling     Facial swelling      Sulfamethoxazole-trimethoprim Nausea And Vomiting     Severe nausea and vomiting    Doxycycline     Nsaids (non-steroidal anti-inflammatory drug) Rash    Sulfa (sulfonamide antibiotics) Nausea And Vomiting       Review of Systems  ROS    OBJECTIVE:     Vital Signs (Most Recent)  Temp: 98.2 °F (36.8 °C) (02/24/24 0416)  Pulse: 77 (02/24/24 0416)  Resp: 18 (02/23/24 2323)  BP: (!) 152/75 (02/24/24 0416)  SpO2: 99 % (02/24/24 0416)    Vital Signs Range (Last 24H):  Temp:  [97.6 °F (36.4 °C)-98.2 °F (36.8 °C)]   Pulse:  [72-79]   Resp:  [18-19]   BP: (141-156)/(72-78)   SpO2:  [95 %-99 %]     I & O (Last 24H):  Intake/Output Summary (Last 24 hours) at 2/24/2024 0739  Last data filed at 2/24/2024 0537  Gross per 24 hour   Intake 2001.89 ml   Output 2700 ml   Net -698.11 ml     Physical Exam:  Physical Exam   Vitals:    02/24/24 1131   BP: 132/74   Pulse: 92   Resp: 18   Temp: 98.4 °F (36.9 °C)       Physical Exam   Constitutional: alert & oriented, no acute distress  HENT:   Head: Normocephalic and atraumatic.   Eyes: Conjunctivae normal and EOM are normal. Pupils are equal, round, and reactive to light.   Neck: Normal range of motion. Neck supple.   Cardiovascular: Normal rate, regular rhythm, normal heart sounds   Pulmonary/Chest: CTAB, nonlabored respiration  Abdominal: Soft, nontender, bowel sounds normal  Neurological: nonfocal  Skin: warm, dry intact  Superpubic  catheter  Psychiatric: normal mood and affect, cooperative      No  edema   Lidocaine  patch   Echhymoses  to  arm   Laboratory:  No results found for this or any previous visit (from the past 24 hour(s)).     Diagnostic Results:  X-Ray Shoulder 2 or More Views Left    Addendum Date: 2/6/2024    A additional transscapular Y-view was performed a revealing again mildly comminuted  humeral neck fracture with mild displacement of the greater tubercle. Electronically signed by: Damon Fernandez Date:    02/06/2024 Time:    11:42    Result Date: 2/6/2024  EXAMINATION: XR SHOULDER COMPLETE 2 OR MORE VIEWS LEFT CLINICAL HISTORY: ; fracture;. COMPARISON: 01/20/2024 FINDINGS: Internal rotation and external rotation views reveala mildly comminuted left humeral neck fracture with lateral displacement of the greater tubercle again identified.  Bony structures are osteopenic.  A stent is evident at the left axilla.  Arthritic changes at the acromioclavicular joint.     1. Mildly comminuted left humeral neck fracture with slight displacement of the greater tubercle which has a similar appearance to the prior exam 2. Mild osteoarthritis 3. Mild osteopenia 4. Stent left axilla Electronically signed by: Dmaon Fernandez Date:    02/06/2024 Time:    10:51       ASSESSMENT/PLAN:       Plan:   Patient Active Problem List    Diagnosis Date Noted    Moderate protein-calorie malnutrition 02/22/2024    Altered awareness, transient 02/21/2024    Gastrointestinal hemorrhage with melena 02/20/2024    Edema of left upper extremity 02/12/2024    Fracture of surgical neck of humerus 01/30/2024    Hypertension 01/26/2024    COVID-19 01/26/2024    Urinary tract infection 01/26/2024    Anemia of chronic kidney failure, stage 4 (severe) 01/01/2023    Ileostomy status 01/01/2023    Recurrent major depression 01/01/2023    Personal history of other malignant neoplasm of large intestine 01/01/2023    Hypertensive chronic kidney disease w stg 1-4/unsp chr kdny 01/01/2023    Dyspnea 08/30/2022    GERD (gastroesophageal reflux disease) 08/12/2021    Hypothyroid 08/12/2021    Anxiety disorder 08/12/2021    Vesicovaginal fistula 03/01/2021        Currently she has a UA with cultures pending.  She was on Rocephin.  This urinary culture was repeated secondary to new onset somnolence while she has been admitted     Patient also received  blood.  Her somnolence had improved after the transfusion.  She also had a CT of her head to evaluate due to her decreased mental status.    Anemia secondary to chronic renal disease and gastritis with GI loss and positive blood in stools.  Status post 2 units of packed red blood cells continue IV Protonix.  She does have a colonoscopy with Dr. Castro.  She was on no Lovenox at this time secondary to her anemia but she was placed on Lovenox 30 secondary to the fracture in her arm and the lack of mobility in the potential for clot development.  She has also been given a dose of Procrit and scheduled to receive Procrit q.week    Per Dr. Tripp  op note she had gastritis in the antrum of her stomach reflux esophagitis    Comminuted fracture the upper humerus.  No surgical intervention is needed at this time     .  No DVT at this time.  She was hypertension with chronic kidney disease.  Her creatinine is usually around 3.  Avoid nephrotoxic medications.    For blood pressure she was on Norvasc 10 and has hydralazine p.r.n..

## 2024-02-25 LAB
ALBUMIN SERPL-MCNC: 2.4 G/DL (ref 3.4–4.8)
ALBUMIN/GLOB SERPL: 0.8 RATIO (ref 1.1–2)
ALP SERPL-CCNC: 104 UNIT/L (ref 40–150)
ALT SERPL-CCNC: 9 UNIT/L (ref 0–55)
AST SERPL-CCNC: 13 UNIT/L (ref 5–34)
BASOPHILS # BLD AUTO: 0.02 X10(3)/MCL
BASOPHILS NFR BLD AUTO: 0.5 %
BILIRUB SERPL-MCNC: 0.5 MG/DL
BUN SERPL-MCNC: 15 MG/DL (ref 9.8–20.1)
CALCIUM SERPL-MCNC: 9.3 MG/DL (ref 8.4–10.2)
CHLORIDE SERPL-SCNC: 111 MMOL/L (ref 98–107)
CO2 SERPL-SCNC: 22 MMOL/L (ref 23–31)
CREAT SERPL-MCNC: 2.35 MG/DL (ref 0.55–1.02)
EOSINOPHIL # BLD AUTO: 0.13 X10(3)/MCL (ref 0–0.9)
EOSINOPHIL NFR BLD AUTO: 3.4 %
ERYTHROCYTE [DISTWIDTH] IN BLOOD BY AUTOMATED COUNT: 13.9 % (ref 11.5–17)
GFR SERPLBLD CREATININE-BSD FMLA CKD-EPI: 21 MLS/MIN/1.73/M2
GLOBULIN SER-MCNC: 3.2 GM/DL (ref 2.4–3.5)
GLUCOSE SERPL-MCNC: 108 MG/DL (ref 82–115)
HCT VFR BLD AUTO: 30.2 % (ref 37–47)
HGB BLD-MCNC: 10.1 G/DL (ref 12–16)
IMM GRANULOCYTES # BLD AUTO: 0.18 X10(3)/MCL (ref 0–0.04)
IMM GRANULOCYTES NFR BLD AUTO: 4.7 %
LYMPHOCYTES # BLD AUTO: 0.69 X10(3)/MCL (ref 0.6–4.6)
LYMPHOCYTES NFR BLD AUTO: 18 %
MAGNESIUM SERPL-MCNC: 1.3 MG/DL (ref 1.6–2.6)
MCH RBC QN AUTO: 29.8 PG (ref 27–31)
MCHC RBC AUTO-ENTMCNC: 33.4 G/DL (ref 33–36)
MCV RBC AUTO: 89.1 FL (ref 80–94)
MONOCYTES # BLD AUTO: 0.58 X10(3)/MCL (ref 0.1–1.3)
MONOCYTES NFR BLD AUTO: 15.1 %
NEUTROPHILS # BLD AUTO: 2.24 X10(3)/MCL (ref 2.1–9.2)
NEUTROPHILS NFR BLD AUTO: 58.3 %
PHOSPHATE SERPL-MCNC: 1 MG/DL (ref 2.3–4.7)
PLATELET # BLD AUTO: 69 X10(3)/MCL (ref 130–400)
PMV BLD AUTO: 12.1 FL (ref 7.4–10.4)
POTASSIUM SERPL-SCNC: 3.7 MMOL/L (ref 3.5–5.1)
PROT SERPL-MCNC: 5.6 GM/DL (ref 5.8–7.6)
RBC # BLD AUTO: 3.39 X10(6)/MCL (ref 4.2–5.4)
SODIUM SERPL-SCNC: 139 MMOL/L (ref 136–145)
WBC # SPEC AUTO: 3.84 X10(3)/MCL (ref 4.5–11.5)

## 2024-02-25 PROCEDURE — 80053 COMPREHEN METABOLIC PANEL: CPT | Performed by: FAMILY MEDICINE

## 2024-02-25 PROCEDURE — 94761 N-INVAS EAR/PLS OXIMETRY MLT: CPT

## 2024-02-25 PROCEDURE — 84100 ASSAY OF PHOSPHORUS: CPT | Performed by: FAMILY MEDICINE

## 2024-02-25 PROCEDURE — 25000003 PHARM REV CODE 250: Performed by: FAMILY MEDICINE

## 2024-02-25 PROCEDURE — 83735 ASSAY OF MAGNESIUM: CPT | Performed by: FAMILY MEDICINE

## 2024-02-25 PROCEDURE — 63700000 PHARM REV CODE 250 ALT 637 W/O HCPCS: Performed by: FAMILY MEDICINE

## 2024-02-25 PROCEDURE — 25000003 PHARM REV CODE 250: Performed by: INTERNAL MEDICINE

## 2024-02-25 PROCEDURE — 85025 COMPLETE CBC W/AUTO DIFF WBC: CPT | Performed by: FAMILY MEDICINE

## 2024-02-25 PROCEDURE — S5010 5% DEXTROSE AND 0.45% SALINE: HCPCS | Performed by: FAMILY MEDICINE

## 2024-02-25 PROCEDURE — 27000207 HC ISOLATION

## 2024-02-25 PROCEDURE — 99900035 HC TECH TIME PER 15 MIN (STAT)

## 2024-02-25 PROCEDURE — 63600175 PHARM REV CODE 636 W HCPCS: Performed by: FAMILY MEDICINE

## 2024-02-25 PROCEDURE — A4216 STERILE WATER/SALINE, 10 ML: HCPCS | Performed by: INTERNAL MEDICINE

## 2024-02-25 PROCEDURE — C9113 INJ PANTOPRAZOLE SODIUM, VIA: HCPCS | Performed by: FAMILY MEDICINE

## 2024-02-25 PROCEDURE — 11000001 HC ACUTE MED/SURG PRIVATE ROOM

## 2024-02-25 RX ORDER — SODIUM,POTASSIUM PHOSPHATES 280-250MG
1 POWDER IN PACKET (EA) ORAL 3 TIMES DAILY
Status: COMPLETED | OUTPATIENT
Start: 2024-02-25 | End: 2024-02-25

## 2024-02-25 RX ADMIN — SODIUM CHLORIDE, PRESERVATIVE FREE 10 ML: 5 INJECTION INTRAVENOUS at 05:02

## 2024-02-25 RX ADMIN — POTASSIUM & SODIUM PHOSPHATES POWDER PACK 280-160-250 MG 1 PACKET: 280-160-250 PACK at 09:02

## 2024-02-25 RX ADMIN — PANTOPRAZOLE SODIUM 40 MG: 40 INJECTION, POWDER, FOR SOLUTION INTRAVENOUS at 08:02

## 2024-02-25 RX ADMIN — PIPERACILLIN SODIUM AND TAZOBACTAM SODIUM 4.5 G: 4; .5 INJECTION, POWDER, LYOPHILIZED, FOR SOLUTION INTRAVENOUS at 04:02

## 2024-02-25 RX ADMIN — SODIUM BICARBONATE 650 MG: 650 TABLET ORAL at 08:02

## 2024-02-25 RX ADMIN — POTASSIUM & SODIUM PHOSPHATES POWDER PACK 280-160-250 MG 1 PACKET: 280-160-250 PACK at 04:02

## 2024-02-25 RX ADMIN — MEGESTROL ACETATE 40 MG: 20 TABLET ORAL at 08:02

## 2024-02-25 RX ADMIN — SODIUM BICARBONATE 650 MG: 650 TABLET ORAL at 09:02

## 2024-02-25 RX ADMIN — AMLODIPINE BESYLATE 10 MG: 10 TABLET ORAL at 08:02

## 2024-02-25 RX ADMIN — DEXTROSE AND SODIUM CHLORIDE: 5; 450 INJECTION, SOLUTION INTRAVENOUS at 11:02

## 2024-02-25 RX ADMIN — SODIUM CHLORIDE, PRESERVATIVE FREE 10 ML: 5 INJECTION INTRAVENOUS at 10:02

## 2024-02-25 RX ADMIN — FLUCONAZOLE 100 MG: 100 TABLET ORAL at 08:02

## 2024-02-25 RX ADMIN — CALCITRIOL CAPSULES 0.25 MCG 0.5 MCG: 0.25 CAPSULE ORAL at 08:02

## 2024-02-25 RX ADMIN — DULOXETINE HYDROCHLORIDE 30 MG: 30 CAPSULE, DELAYED RELEASE ORAL at 08:02

## 2024-02-25 RX ADMIN — LEVOTHYROXINE SODIUM 137 MCG: 25 TABLET ORAL at 05:02

## 2024-02-25 RX ADMIN — FERROUS SULFATE TAB 325 MG (65 MG ELEMENTAL FE) 1 EACH: 325 (65 FE) TAB at 08:02

## 2024-02-25 RX ADMIN — PIPERACILLIN SODIUM AND TAZOBACTAM SODIUM 4.5 G: 4; .5 INJECTION, POWDER, LYOPHILIZED, FOR SOLUTION INTRAVENOUS at 03:02

## 2024-02-25 RX ADMIN — LIDOCAINE 5% 1 PATCH: 700 PATCH TOPICAL at 08:02

## 2024-02-25 RX ADMIN — Medication 250 MG: at 08:02

## 2024-02-25 RX ADMIN — POTASSIUM & SODIUM PHOSPHATES POWDER PACK 280-160-250 MG 1 PACKET: 280-160-250 PACK at 08:02

## 2024-02-25 RX ADMIN — QUETIAPINE FUMARATE 25 MG: 25 TABLET ORAL at 09:02

## 2024-02-25 NOTE — PROGRESS NOTES
Progress Note    Admit Date: 2/5/2024   LOS: 20 days     SUBJECTIVE:     Follow-up For:   UTI.  Patient has been adm she has a history of urinary tract infections admitted by Dr. Heaton.  She has a history of colostomy, urostomy.  She also has history of the vesicular colonic fistula.  She has a suprapubic catheter.  She has been hospitalized several times in the past and also a few times recently.  She is here on a prolonged hospital stay because she is awaiting placement at skilled nursing facility.    She has a history of chronic kidney disease.  She does not want to be on dialysis.  She was chronic anemia.  She did receive 1 unit of packed red blood cells    She does have a history of a recently fractured humerus.  This occurred prior to admission.  Dr. Machado was consulted.  Does not feel that she needs any surgical intervention at this time.  She does have the arm in a sling.  No DVT has been appreciated on exam radiologic findings.  She does have edema to her arm.  Scheduled Meds:   amLODIPine  10 mg Oral Daily    ascorbic acid (vitamin C)  250 mg Oral Daily    calcitRIOL  0.5 mcg Oral Daily    DULoxetine  30 mg Oral Daily    epoetin louis  40,000 Units Subcutaneous Q7 Days    ferrous sulfate  1 tablet Oral Daily    fluconazole  100 mg Oral Daily    levothyroxine  137 mcg Oral Before breakfast    LIDOcaine  1 patch Transdermal Daily    megestroL  40 mg Oral Daily    pantoprazole  40 mg Intravenous Daily    piperacillin-tazobactam (Zosyn) IV (PEDS and ADULTS) (extended infusion is not appropriate)  4.5 g Intravenous Q12H    potassium, sodium phosphates  1 packet Oral TID    QUEtiapine  25 mg Oral QHS    sodium bicarbonate  650 mg Oral BID    sodium chloride 0.9%  10 mL Intravenous Q8H     Continuous Infusions:   dextrose 5 % and 0.45 % NaCl 100 mL/hr at 02/25/24 1125     PRN Meds:0.9%  NaCl infusion (for blood administration), 0.9%  NaCl infusion (for blood administration), 0.9%  NaCl infusion (for blood  administration), 0.9%  NaCl infusion (for blood administration), acetaminophen, hydrALAZINE, HYDROcodone-acetaminophen, ondansetron    Review of patient's allergies indicates:   Allergen Reactions    Oxaprozin Nausea And Vomiting and Swelling     Facial swelling      Sulfamethoxazole-trimethoprim Nausea And Vomiting     Severe nausea and vomiting    Doxycycline     Nsaids (non-steroidal anti-inflammatory drug) Rash    Sulfa (sulfonamide antibiotics) Nausea And Vomiting       Review of Systems  ROS    OBJECTIVE:     Vital Signs (Most Recent)  Temp: 98.4 °F (36.9 °C) (02/25/24 1149)  Pulse: 92 (02/25/24 1149)  Resp: 18 (02/24/24 2013)  BP: (!) 141/67 (02/25/24 1149)  SpO2: 98 % (02/25/24 1149)    Vital Signs Range (Last 24H):  Temp:  [97.9 °F (36.6 °C)-98.5 °F (36.9 °C)]   Pulse:  [80-92]   Resp:  [18]   BP: (131-154)/(58-74)   SpO2:  [95 %-99 %]     I & O (Last 24H):  Intake/Output Summary (Last 24 hours) at 2/25/2024 1307  Last data filed at 2/25/2024 0526  Gross per 24 hour   Intake 2433.6 ml   Output 2500 ml   Net -66.4 ml       Physical Exam:  Physical Exam   Vitals:    02/25/24 1149   BP: (!) 141/67   Pulse: 92   Resp:    Temp: 98.4 °F (36.9 °C)       Physical Exam   Constitutional: alert & oriented, no acute distress  HENT:   Head: Normocephalic and atraumatic.   Eyes: Conjunctivae normal and EOM are normal. Pupils are equal, round, and reactive to light.   Neck: Normal range of motion. Neck supple.   Cardiovascular: Normal rate, regular rhythm, normal heart sounds   Pulmonary/Chest: CTAB, nonlabored respiration  Abdominal: Soft, nontender, bowel sounds normal  Neurological: nonfocal  Skin: warm, dry intact  Superpubic  catheter  Psychiatric: normal mood and affect, cooperative      No  edema   Lidocaine  patch   Echhymoses  to  arm   Laboratory:  Recent Results (from the past 24 hour(s))   Magnesium    Collection Time: 02/25/24  3:13 AM   Result Value Ref Range    Magnesium Level 1.30 (L) 1.60 - 2.60 mg/dL    Phosphorus    Collection Time: 02/25/24  3:13 AM   Result Value Ref Range    Phosphorus Level 1.0 (LL) 2.3 - 4.7 mg/dL   Comprehensive Metabolic Panel    Collection Time: 02/25/24  3:13 AM   Result Value Ref Range    Sodium Level 139 136 - 145 mmol/L    Potassium Level 3.7 3.5 - 5.1 mmol/L    Chloride 111 (H) 98 - 107 mmol/L    Carbon Dioxide 22 (L) 23 - 31 mmol/L    Glucose Level 108 82 - 115 mg/dL    Blood Urea Nitrogen 15.0 9.8 - 20.1 mg/dL    Creatinine 2.35 (H) 0.55 - 1.02 mg/dL    Calcium Level Total 9.3 8.4 - 10.2 mg/dL    Protein Total 5.6 (L) 5.8 - 7.6 gm/dL    Albumin Level 2.4 (L) 3.4 - 4.8 g/dL    Globulin 3.2 2.4 - 3.5 gm/dL    Albumin/Globulin Ratio 0.8 (L) 1.1 - 2.0 ratio    Bilirubin Total 0.5 <=1.5 mg/dL    Alkaline Phosphatase 104 40 - 150 unit/L    Alanine Aminotransferase 9 0 - 55 unit/L    Aspartate Aminotransferase 13 5 - 34 unit/L    eGFR 21 mls/min/1.73/m2   CBC with Differential    Collection Time: 02/25/24  3:13 AM   Result Value Ref Range    WBC 3.84 (L) 4.50 - 11.50 x10(3)/mcL    RBC 3.39 (L) 4.20 - 5.40 x10(6)/mcL    Hgb 10.1 (L) 12.0 - 16.0 g/dL    Hct 30.2 (L) 37.0 - 47.0 %    MCV 89.1 80.0 - 94.0 fL    MCH 29.8 27.0 - 31.0 pg    MCHC 33.4 33.0 - 36.0 g/dL    RDW 13.9 11.5 - 17.0 %    Platelet 69 (L) 130 - 400 x10(3)/mcL    MPV 12.1 (H) 7.4 - 10.4 fL    Neut % 58.3 %    Lymph % 18.0 %    Mono % 15.1 %    Eos % 3.4 %    Basophil % 0.5 %    Lymph # 0.69 0.6 - 4.6 x10(3)/mcL    Neut # 2.24 2.1 - 9.2 x10(3)/mcL    Mono # 0.58 0.1 - 1.3 x10(3)/mcL    Eos # 0.13 0 - 0.9 x10(3)/mcL    Baso # 0.02 <=0.2 x10(3)/mcL    IG# 0.18 (H) 0 - 0.04 x10(3)/mcL    IG% 4.7 %        Diagnostic Results:  X-Ray Shoulder 2 or More Views Left    Addendum Date: 2/6/2024    A additional transscapular Y-view was performed a revealing again mildly comminuted humeral neck fracture with mild displacement of the greater tubercle. Electronically signed by: Damon Fernandez Date:    02/06/2024 Time:    11:42    Result  Date: 2/6/2024  EXAMINATION: XR SHOULDER COMPLETE 2 OR MORE VIEWS LEFT CLINICAL HISTORY: ; fracture;. COMPARISON: 01/20/2024 FINDINGS: Internal rotation and external rotation views reveala mildly comminuted left humeral neck fracture with lateral displacement of the greater tubercle again identified.  Bony structures are osteopenic.  A stent is evident at the left axilla.  Arthritic changes at the acromioclavicular joint.     1. Mildly comminuted left humeral neck fracture with slight displacement of the greater tubercle which has a similar appearance to the prior exam 2. Mild osteoarthritis 3. Mild osteopenia 4. Stent left axilla Electronically signed by: Damon Fernandez Date:    02/06/2024 Time:    10:51       ASSESSMENT/PLAN:       Plan:   Patient Active Problem List    Diagnosis Date Noted    Moderate protein-calorie malnutrition 02/22/2024    Altered awareness, transient 02/21/2024    Gastrointestinal hemorrhage with melena 02/20/2024    Edema of left upper extremity 02/12/2024    Fracture of surgical neck of humerus 01/30/2024    Hypertension 01/26/2024    COVID-19 01/26/2024    Urinary tract infection 01/26/2024    Anemia of chronic kidney failure, stage 4 (severe) 01/01/2023    Ileostomy status 01/01/2023    Recurrent major depression 01/01/2023    Personal history of other malignant neoplasm of large intestine 01/01/2023    Hypertensive chronic kidney disease w stg 1-4/unsp chr kdny 01/01/2023    Dyspnea 08/30/2022    GERD (gastroesophageal reflux disease) 08/12/2021    Hypothyroid 08/12/2021    Anxiety disorder 08/12/2021    Vesicovaginal fistula 03/01/2021        Currently she has a UA with cultures pending.  She was on Rocephin.  This urinary culture was repeated secondary to new onset somnolence while she has been admitted     Patient also received blood.  Her somnolence had improved after the transfusion.  She also had a CT of her head to evaluate due to her decreased mental status.    Anemia secondary  to chronic renal disease and gastritis with GI loss and positive blood in stools.  Status post 2 units of packed red blood cells continue IV Protonix.  She does have a colonoscopy with Dr. Castro.  She was on no Lovenox at this time secondary to her anemia but she was placed on Lovenox 30 secondary to the fracture in her arm and the lack of mobility in the potential for clot development.  She has also been given a dose of Procrit and scheduled to receive Procrit q.week    Per Dr. Tripp  op note she had gastritis in the antrum of her stomach reflux esophagitis    Comminuted fracture the upper humerus.  No surgical intervention is needed at this time     .  No DVT at this time.  She was hypertension with chronic kidney disease.  Her creatinine is usually around 3.  Avoid nephrotoxic medications.    For blood pressure she was on Norvasc 10 and has hydralazine p.r.n..    2/25  She  has not  new  changes  And  no new  ordered

## 2024-02-26 VITALS
HEART RATE: 87 BPM | BODY MASS INDEX: 30.3 KG/M2 | OXYGEN SATURATION: 98 % | SYSTOLIC BLOOD PRESSURE: 129 MMHG | TEMPERATURE: 98 F | RESPIRATION RATE: 20 BRPM | HEIGHT: 61 IN | WEIGHT: 160.5 LBS | DIASTOLIC BLOOD PRESSURE: 68 MMHG

## 2024-02-26 LAB
BACTERIA BLD CULT: NORMAL
PSYCHE PATHOLOGY RESULT: NORMAL

## 2024-02-26 PROCEDURE — A4216 STERILE WATER/SALINE, 10 ML: HCPCS | Performed by: INTERNAL MEDICINE

## 2024-02-26 PROCEDURE — 25000003 PHARM REV CODE 250: Performed by: INTERNAL MEDICINE

## 2024-02-26 PROCEDURE — C9113 INJ PANTOPRAZOLE SODIUM, VIA: HCPCS | Performed by: FAMILY MEDICINE

## 2024-02-26 PROCEDURE — 94761 N-INVAS EAR/PLS OXIMETRY MLT: CPT

## 2024-02-26 PROCEDURE — 97530 THERAPEUTIC ACTIVITIES: CPT

## 2024-02-26 PROCEDURE — 63700000 PHARM REV CODE 250 ALT 637 W/O HCPCS: Performed by: FAMILY MEDICINE

## 2024-02-26 PROCEDURE — 63600175 PHARM REV CODE 636 W HCPCS: Performed by: FAMILY MEDICINE

## 2024-02-26 PROCEDURE — 25000003 PHARM REV CODE 250: Performed by: FAMILY MEDICINE

## 2024-02-26 RX ORDER — PANTOPRAZOLE SODIUM 40 MG/1
40 TABLET, DELAYED RELEASE ORAL DAILY
Status: DISCONTINUED | OUTPATIENT
Start: 2024-02-27 | End: 2024-02-26 | Stop reason: HOSPADM

## 2024-02-26 RX ORDER — AMLODIPINE BESYLATE 10 MG/1
10 TABLET ORAL DAILY
Qty: 30 TABLET | Refills: 11 | Status: SHIPPED | OUTPATIENT
Start: 2024-02-27 | End: 2025-02-26

## 2024-02-26 RX ORDER — MEGESTROL ACETATE 40 MG/1
40 TABLET ORAL DAILY
Qty: 30 TABLET | Refills: 11 | Status: SHIPPED | OUTPATIENT
Start: 2024-02-27 | End: 2025-02-26

## 2024-02-26 RX ORDER — SODIUM BICARBONATE 650 MG/1
650 TABLET ORAL 2 TIMES DAILY
Qty: 60 TABLET | Refills: 11 | Status: ON HOLD | OUTPATIENT
Start: 2024-02-26 | End: 2024-04-19 | Stop reason: HOSPADM

## 2024-02-26 RX ADMIN — FERROUS SULFATE TAB 325 MG (65 MG ELEMENTAL FE) 1 EACH: 325 (65 FE) TAB at 08:02

## 2024-02-26 RX ADMIN — SODIUM BICARBONATE 650 MG: 650 TABLET ORAL at 08:02

## 2024-02-26 RX ADMIN — PIPERACILLIN SODIUM AND TAZOBACTAM SODIUM 4.5 G: 4; .5 INJECTION, POWDER, LYOPHILIZED, FOR SOLUTION INTRAVENOUS at 04:02

## 2024-02-26 RX ADMIN — PANTOPRAZOLE SODIUM 40 MG: 40 INJECTION, POWDER, FOR SOLUTION INTRAVENOUS at 08:02

## 2024-02-26 RX ADMIN — LIDOCAINE 5% 1 PATCH: 700 PATCH TOPICAL at 08:02

## 2024-02-26 RX ADMIN — SODIUM CHLORIDE, PRESERVATIVE FREE 10 ML: 5 INJECTION INTRAVENOUS at 06:02

## 2024-02-26 RX ADMIN — DULOXETINE HYDROCHLORIDE 30 MG: 30 CAPSULE, DELAYED RELEASE ORAL at 08:02

## 2024-02-26 RX ADMIN — Medication 250 MG: at 08:02

## 2024-02-26 RX ADMIN — MEGESTROL ACETATE 40 MG: 20 TABLET ORAL at 08:02

## 2024-02-26 RX ADMIN — AMLODIPINE BESYLATE 10 MG: 10 TABLET ORAL at 08:02

## 2024-02-26 RX ADMIN — FLUCONAZOLE 100 MG: 100 TABLET ORAL at 08:02

## 2024-02-26 RX ADMIN — LEVOTHYROXINE SODIUM 137 MCG: 25 TABLET ORAL at 05:02

## 2024-02-26 RX ADMIN — CALCITRIOL CAPSULES 0.25 MCG 0.5 MCG: 0.25 CAPSULE ORAL at 08:02

## 2024-02-26 NOTE — PROGRESS NOTES
[facility] - Medical Surgical Unit  Hospital Medicine  Progress Note    Patient Name: Kam Reyes  MRN: [id number]  Patient Class: IP- Inpatient   Admission Date: 2/5/2024  Length of Stay: 21 days  Attending Physician: Gregor Heaton MD  Primary Care Provider: Gregor Heaton MD        Subjective:     Principal Problem:Urinary tract infection        HPI:  Patient is a 73-year-old known to me from clinic.  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  Patient had frequent urinary tract infections.  After many procedures was continuing to have them.  She was hospitalized about a week ago for urinary tract infection.  Remained asymptomatic during her stay with no leukocytosis grew only Proteus which was sensitive to Cipro.  She was discharged home and apparently over the weekend was becoming more nauseated and had feeling more ill.  Since the culture has grown out 3 more organisms including Enterobacter faecium Proteus mirabilis Pseudomonas aeruginosa enrol sella plan to coli.  Only the Pseudomonas and Proteus were sensitive to the Cipro my floxacillin she was sent on home on.  Moreover her urinary catheter came out today she would called initially for us to replace it in the office but she was feeling too unwell to come the office and went straight to the emergency room.  There she was complaining of nausea vomiting and some abdominal pain.  Was noted to have leukocytosis of 19,000. And urinary tract infection continuing.    Overview/Hospital Course:  73-year-old  female well known to me through clinic.  Presented with complaints of her catheter coming out however urostomy.  Upon evaluation was noted to have leukocytosis nausea abdominal pain.  Diagnosed with urinary tract infection.  The admitted to the hospital and covered initially for her older cultures which had multiple organisms.  With broad-spectrum antibiotic therapy.   Cultures came back with only Enterococcus faecalis this time.  And patient was placed on linezolid only.  She has had clearing of her urinary tract infection with clear urine no nausea no leukocytosis initially she did have some significant loose leukocytosis.  No shortness a breath throughout her stay.  She did have notable anemia requiring transfusion.  As well as a shot of Procrit.  She has not have heme-positive stools in his was attributable to her renal insufficiency.  Initially her she was placed on vancomycin with an increase in her creatinine thus necessitating the switch to linezolid.      Patient had a fractured humerus prior to admission orthopedics was consulted and recommended expectant management with a sling patient did develop swelling to the arm ultrasound was performed did not show a DVT the patient is wearing compression sleeve at this time to aid with edema to the arm.  Physical therapy was consulted during the stay and she will continue physical therapy after discharge.  She is being discharged today to skilled nursing for continued physical therapy because of her weakened state and continued p.o. antibiotic therapy.  Discharge delayed due to acceptance by nursing home  Decreased h/h received 2 unit blood  egd with gastritis and erosive esophagitis, no active bleed, has colonoscopy planned today  More alert today, cxr with atelectasis, ct no acute changes. Blood cx no growth, urine with gram neg rods, current ly on rocephin pending sensitivities  She is  alert today, seems at her baseline.  No complaints  Poor appetite  Continued delay in placement      Past Medical History:   Diagnosis Date    Cancer, colon     Chronic pain     GERD (gastroesophageal reflux disease)     Renal disorder     Thyroiditis, unspecified        Past Surgical History:   Procedure Laterality Date    BACK SURGERY       SECTION      CHOLECYSTECTOMY      COLON SURGERY      EGD, WITH CLOSED BIOPSY N/A 2024     Procedure: EGD, WITH CLOSED BIOPSY;  Surgeon: Robson Tripp MD;  Location: [facility];  Service: Endoscopy;  Laterality: N/A;  A) BX DUODENUM, B) BX ANTRUM FOR H.PYLORI, C) BX GE JUNCTION    ESOPHAGOGASTRODUODENOSCOPY N/A 2/22/2024    Procedure: EGD (ESOPHAGOGASTRODUODENOSCOPY);  Surgeon: Robson Tripp MD;  Location: [facility];  Service: Endoscopy;  Laterality: N/A;  A) DIFFUSE GASTRITIS OF ANTRUM & STOMACH    HYSTERECTOMY      KIDNEY SURGERY         Review of patient's allergies indicates:   Allergen Reactions    Oxaprozin Nausea And Vomiting and Swelling     Facial swelling      Sulfamethoxazole-trimethoprim Nausea And Vomiting     Severe nausea and vomiting    Doxycycline     Nsaids (non-steroidal anti-inflammatory drug) Rash    Sulfa (sulfonamide antibiotics) Nausea And Vomiting       No current facility-administered medications on file prior to encounter.     Current Outpatient Medications on File Prior to Encounter   Medication Sig    ascorbic acid, vitamin C, (VITAMIN C) 100 MG tablet Take 100 mg by mouth once daily.    calcitRIOL (ROCALTROL) 0.5 MCG Cap Take 0.5 mcg by mouth once daily.    clopidogreL (PLAVIX) 75 mg tablet Take 75 mg by mouth once daily.    DULoxetine (CYMBALTA) 30 MG capsule Take 30 mg by mouth once daily.    ferrous sulfate 325 (65 FE) MG EC tablet Take 325 mg by mouth 3 (three) times daily with meals.    HYDROcodone-acetaminophen (NORCO)  mg per tablet Take 1 tablet by mouth.    levothyroxine (SYNTHROID) 100 MCG tablet Take 137 mcg by mouth before breakfast.    omeprazole (PRILOSEC) 40 MG capsule Take 40 mg by mouth once daily.    QUEtiapine (SEROQUEL) 25 MG Tab Take by mouth.    sodium bicarbonate 650 MG tablet Take 650 mg by mouth 4 (four) times daily.     Family History       Problem Relation (Age of Onset)    Hypertension Mother          Tobacco Use    Smoking status: Never    Smokeless tobacco: Never   Substance and Sexual Activity    Alcohol use: Never    Drug use:  Never    Sexual activity: Not Currently     Review of Systems   Constitutional:  Positive for activity change and fever.   HENT: Negative.     Eyes: Negative.    Respiratory: Negative.     Cardiovascular: Negative.    Gastrointestinal:  Positive for abdominal pain and nausea.   Endocrine: Negative.    Genitourinary: Negative.    Musculoskeletal:  Positive for arthralgias and joint swelling.   Skin: Negative.    Allergic/Immunologic: Negative.    Neurological:  Positive for weakness.   Hematological: Negative.    Psychiatric/Behavioral: Negative.       Objective:     Vital Signs (Most Recent):  Temp: 98.6 °F (37 °C) (02/26/24 0601)  Pulse: 85 (02/26/24 0601)  Resp: 20 (02/25/24 2200)  BP: 123/64 (02/26/24 0601)  SpO2: 98 % (02/26/24 0601) Vital Signs (24h Range):  Temp:  [98.3 °F (36.8 °C)-98.7 °F (37.1 °C)] 98.6 °F (37 °C)  Pulse:  [80-92] 85  Resp:  [20] 20  SpO2:  [97 %-99 %] 98 %  BP: (111-144)/(60-78) 123/64     Weight: 72.8 kg (160 lb 8 oz)  Body mass index is 30.33 kg/m².     Physical Exam  Constitutional:       General: She is not in acute distress.     Appearance: She is obese.   HENT:      Head: Normocephalic and atraumatic.      Nose: Nose normal.      Mouth/Throat:      Mouth: Mucous membranes are moist.      Pharynx: Oropharynx is clear. No oropharyngeal exudate.   Eyes:      Conjunctiva/sclera: Conjunctivae normal.   Cardiovascular:      Rate and Rhythm: Normal rate and regular rhythm.      Pulses: Normal pulses.      Heart sounds: Normal heart sounds.      Comments: 1 plus edema to the left upper extremity  Pulmonary:      Effort: Pulmonary effort is normal.      Breath sounds: Normal breath sounds.   Abdominal:      General: Abdomen is flat.      Palpations: Abdomen is soft.   Musculoskeletal:         General: No swelling or deformity. Normal range of motion.      Cervical back: Normal range of motion and neck supple. No rigidity.   Skin:     General: Skin is warm and dry.      Capillary Refill:  Capillary refill takes less than 2 seconds.   Neurological:      General: No focal deficit present.      Mental Status: She is alert and oriented to person, place, and time.   Psychiatric:         Mood and Affect: Mood normal.         Behavior: Behavior normal.         Thought Content: Thought content normal.         Judgment: Judgment normal.                Significant Labs: All pertinent labs within the past 24 hours have been reviewed.  CBC:   Recent Labs   Lab 02/25/24  0313   WBC 3.84*   HGB 10.1*   HCT 30.2*   PLT 69*       CMP:   Recent Labs   Lab 02/25/24  0313      K 3.7   CO2 22*   BUN 15.0   CREATININE 2.35*   CALCIUM 9.3   ALBUMIN 2.4*   BILITOT 0.5   ALKPHOS 104   AST 13   ALT 9         Significant Imaging: I have reviewed all pertinent imaging results/findings within the past 24 hours.    Assessment/Plan:      * Urinary tract infection  Finished course of abx  l repeat ua due to somnolance po with gram neg rods on culture  start rocephin and await sensitivity    Moderate protein-calorie malnutrition  Nutrition consulted. Most recent weight and BMI monitored-     Measurements:  Wt Readings from Last 1 Encounters:   02/22/24 72.8 kg (160 lb 8 oz)   Body mass index is 30.33 kg/m².    Patient has been screened and assessed by RD.    Malnutrition Type:  Context:    Level:      Malnutrition Characteristic Summary:       Interventions/Recommendations (treatment strategy):boost bid,  start megace   Single stress dose of solu cortef         Altered awareness, transient  Had somnolence over the last 2 days.  Has  improved after blood.   CT scan of her head which was within normal limits.  UA at this time with leukocytyes I will start rocephin until sensitivity.   Improved to baseline today Likely renal relarted and poor po. Continuing to seek placement we will try for LTAC since skilled nursing has been delayed      Gastrointestinal hemorrhage with melena  S/p 2 units prbc,   Contiue iv protonix,  colonoscopy today  Egd with gastritis see note per dr gordillo    Edema of left upper extremity    No apparent DVT noted , edema resolved    Hypertensive chronic kidney disease w stg 1-4/unsp chr kdny  Creatine stable for now. BMP reviewed- noted Estimated Creatinine Clearance: 19.5 mL/min (A) (based on SCr of 2.35 mg/dL (H)). according to latest data. Based on current GFR, CKD stage is stage 4 - GFR 15-29.  Monitor UOP and serial BMP and adjust therapy as needed. Renally dose meds. Avoid nephrotoxic medications and procedures.    Fracture of surgical neck of humerus    Dr. Machado is following      Vesicovaginal fistula    Dr krishnamurthy  to do egd today after blood    Hypertension  Norvasc 10 mg po qhs,  hydralazine prn    Anxiety disorder        Hypothyroid  Continue Synthroid      Personal history of other malignant neoplasm of large intestine  We had been with holding Lovenox because of her anemia and renal insufficiency.  However now in the arm with fracture she has developed a 1+ edema so we will start Lovenox 30 no dvt on us    Recurrent major depression  Continue her Cymbalta as her allergy duloxetine was put in an error  Stable for several years    Ileostomy status  Continue care and maintenance      GERD (gastroesophageal reflux disease)  Continue iv Protonix      Anemia of chronic kidney failure, stage 4 (severe)  Creatine stable for now. BMP reviewed- noted Estimated Creatinine Clearance: 19.5 mL/min (A) (based on SCr of 2.35 mg/dL (H)). according to latest data. Based on current GFR, CKD stage is stage 4 - GFR 15-29.  Monitor UOP and serial BMP and adjust therapy as needed. Renally dose meds. Avoid nephrotoxic medications and procedures.  we will continue the linesolid per id rec.  She is gettingProcrit q week and is status post 2 units packed red blood cell   creatinine has improved slightly currently her hemoglobin is stable     S/p egd with gastritis and esophagitis    Dyspnea  Now resolved        VTE  Risk Mitigation (From admission, onward)           Ordered     IP VTE HIGH RISK PATIENT  Once         02/05/24 1537     Place sequential compression device  Until discontinued         02/05/24 1537                    Discharge Planning   JOSHUA: 2/13/2024     Code Status: Full Code   Is the patient medically ready for discharge?:     Reason for patient still in hospital (select all that apply): Patient trending condition  Discharge Plan A: Skilled Nursing Facility   Discharge Delays: (!) Post-Acute Set-up              Gregor Heaton MD  Department of Hospital Medicine   [facility] - [facility] Surgical Unit

## 2024-02-26 NOTE — PT/OT/SLP PROGRESS
Physical Therapy Treatment    Patient Name:  Kam Reyes   MRN:  [id number]    Recommendations:     Discharge Recommendations:    Discharge Equipment Recommendations:    Barriers to discharge: Decreased caregiver support    Assessment:     Kam Reyes is a 73 y.o. female admitted with a medical diagnosis of Urinary tract infection.  She presents with the following impairments/functional limitations:   weakness, left arm orthopedic precautions.    Rehab Prognosis: Good; patient would benefit from acute skilled PT services to address these deficits and reach maximum level of function.    Recent Surgery: Procedure(s) (LRB):  EGD (ESOPHAGOGASTRODUODENOSCOPY) (N/A)  EGD, WITH CLOSED BIOPSY (N/A) 4 Days Post-Op    Plan:     During this hospitalization, patient to be seen   to address the identified rehab impairments via   and progress toward the following goals:    Plan of Care Expires:       Subjective     Chief Complaint: weakness  Patient/Family Comments/goals: to get stronger and return home  Pain/Comfort:         Objective:     Communicated with pt prior to session.  Patient found up in chair with   upon PT entry to room.     General Precautions: Standard,    Orthopedic Precautions:    Braces:    Respiratory Status: Room air     Functional Mobility:  Tf chair to bed modA NWB LUE  Sit to sup maxA      AM-PAC 6 CLICK MOBILITY          Treatment & Education:  Pt awaiting NH SNF tf.    Patient left with head of bed up in chair with all lines intact, call bell in reach, bed alarm on.    GOALS:   Multidisciplinary Problems       Physical Therapy Goals          Problem: Physical Therapy    Goal Priority Disciplines Outcome Goal Variances Interventions   Physical Therapy Goal     PT, PT/OT Ongoing, Progressing     Description: 1.  Pt to receive gentle PROM L shoulder and AAROM L elbow, wrist and hand  2. Pt to improve bed mob to modA with sling on L UE  3. Pt to improve tfs to chair modA NWB LUE with sling on                        Time Tracking:     PT Received On: 02/26/24  PT Start Time: 1130     PT Stop Time: 1145  PT Total Time (min): 15 min     Billable Minutes: Therapeutic Activity 15 and Therapeutic Exercise 0    Treatment Type: Treatment  PT/PTA: PT           02/26/2024

## 2024-02-26 NOTE — PLAN OF CARE
02/26/24 1020   Final Note   Assessment Type Final Discharge Note   Anticipated Discharge Disposition SNF   Post-Acute Status   Post-Acute Authorization Placement   Post-Acute Placement Status Set-up Complete/Auth obtained   Discharge Delays None known at this time     D/C orders noted.  Pt accepted and insurance has approved SNF at [facility].  Messaged nurse to review AVS so I can send d/c.   Patient will need to go by [facility].

## 2024-02-26 NOTE — PT/OT/SLP PROGRESS
Physical Therapy Treatment    Patient Name:  Kam Reyes   MRN:  [id number]    Recommendations:     Discharge Recommendations:    Discharge Equipment Recommendations:    Barriers to discharge: Decreased caregiver support    Assessment:     Kam Reyes is a 73 y.o. female admitted with a medical diagnosis of Urinary tract infection.  She presents with the following impairments/functional limitations:   weakness, left arm orthopedic precautions.    Rehab Prognosis: Good; patient would benefit from acute skilled PT services to address these deficits and reach maximum level of function.    Recent Surgery: Procedure(s) (LRB):  EGD (ESOPHAGOGASTRODUODENOSCOPY) (N/A)  EGD, WITH CLOSED BIOPSY (N/A) 4 Days Post-Op    Plan:     During this hospitalization, patient to be seen   to address the identified rehab impairments via   and progress toward the following goals:    Plan of Care Expires:       Subjective     Chief Complaint: weakness  Patient/Family Comments/goals: to get stronger and return home  Pain/Comfort:         Objective:     Communicated with pt prior to session.  Patient found HOB elevated with   upon PT entry to room.     General Precautions: Standard,    Orthopedic Precautions:    Braces:    Respiratory Status: Room air     Functional Mobility:  Supine to sit modA  Scooting to EOB modA  Tf to chair modA      AM-PAC 6 CLICK MOBILITY          Treatment & Education:  Sitting EOB with fair- balance, leans backward and right, pt worked AROM left elbow wrist and hand with PT instruction including demo and PROM gentle to L shoulder.    Patient left up in chair with all lines intact, call bell in reach, chair alarm on.    GOALS:   Multidisciplinary Problems       Physical Therapy Goals          Problem: Physical Therapy    Goal Priority Disciplines Outcome Goal Variances Interventions   Physical Therapy Goal     PT, PT/OT Ongoing, Progressing     Description: 1.  Pt to receive gentle PROM L shoulder and  AAROM L elbow, wrist and hand  2. Pt to improve bed mob to modA with sling on L UE  3. Pt to improve tfs to chair modA NWB LUE with sling on                       Time Tracking:     PT Received On: 02/26/24  PT Start Time: 0845     PT Stop Time: 0915  PT Total Time (min): 30 min     Billable Minutes: Therapeutic Activity 25 and Therapeutic Exercise 5    Treatment Type: Treatment  PT/PTA: PT           02/26/2024

## 2024-02-26 NOTE — SUBJECTIVE & OBJECTIVE
Past Medical History:   Diagnosis Date    Cancer, colon     Chronic pain     GERD (gastroesophageal reflux disease)     Renal disorder     Thyroiditis, unspecified        Past Surgical History:   Procedure Laterality Date    BACK SURGERY       SECTION      CHOLECYSTECTOMY      COLON SURGERY      EGD, WITH CLOSED BIOPSY N/A 2024    Procedure: EGD, WITH CLOSED BIOPSY;  Surgeon: Robson Tripp MD;  Location: [facility];  Service: Endoscopy;  Laterality: N/A;  A) BX DUODENUM, B) BX ANTRUM FOR H.PYLORI, C) BX GE JUNCTION    ESOPHAGOGASTRODUODENOSCOPY N/A 2024    Procedure: EGD (ESOPHAGOGASTRODUODENOSCOPY);  Surgeon: Robson Tripp MD;  Location: [facility] ENDO;  Service: Endoscopy;  Laterality: N/A;  A) DIFFUSE GASTRITIS OF ANTRUM & STOMACH    HYSTERECTOMY      KIDNEY SURGERY         Review of patient's allergies indicates:   Allergen Reactions    Oxaprozin Nausea And Vomiting and Swelling     Facial swelling      Sulfamethoxazole-trimethoprim Nausea And Vomiting     Severe nausea and vomiting    Doxycycline     Nsaids (non-steroidal anti-inflammatory drug) Rash    Sulfa (sulfonamide antibiotics) Nausea And Vomiting       No current facility-administered medications on file prior to encounter.     Current Outpatient Medications on File Prior to Encounter   Medication Sig    ascorbic acid, vitamin C, (VITAMIN C) 100 MG tablet Take 100 mg by mouth once daily.    calcitRIOL (ROCALTROL) 0.5 MCG Cap Take 0.5 mcg by mouth once daily.    clopidogreL (PLAVIX) 75 mg tablet Take 75 mg by mouth once daily.    DULoxetine (CYMBALTA) 30 MG capsule Take 30 mg by mouth once daily.    ferrous sulfate 325 (65 FE) MG EC tablet Take 325 mg by mouth 3 (three) times daily with meals.    HYDROcodone-acetaminophen (NORCO)  mg per tablet Take 1 tablet by mouth.    levothyroxine (SYNTHROID) 100 MCG tablet Take 137 mcg by mouth before breakfast.    omeprazole (PRILOSEC) 40 MG capsule Take 40 mg by mouth once daily.     QUEtiapine (SEROQUEL) 25 MG Tab Take by mouth.    sodium bicarbonate 650 MG tablet Take 650 mg by mouth 4 (four) times daily.     Family History       Problem Relation (Age of Onset)    Hypertension Mother          Tobacco Use    Smoking status: Never    Smokeless tobacco: Never   Substance and Sexual Activity    Alcohol use: Never    Drug use: Never    Sexual activity: Not Currently     Review of Systems   Constitutional:  Positive for activity change and fever.   HENT: Negative.     Eyes: Negative.    Respiratory: Negative.     Cardiovascular: Negative.    Gastrointestinal:  Positive for abdominal pain and nausea.   Endocrine: Negative.    Genitourinary: Negative.    Musculoskeletal:  Positive for arthralgias and joint swelling.   Skin: Negative.    Allergic/Immunologic: Negative.    Neurological:  Positive for weakness.   Hematological: Negative.    Psychiatric/Behavioral: Negative.       Objective:     Vital Signs (Most Recent):  Temp: 98.6 °F (37 °C) (02/26/24 0601)  Pulse: 85 (02/26/24 0601)  Resp: 20 (02/25/24 2200)  BP: 123/64 (02/26/24 0601)  SpO2: 98 % (02/26/24 0601) Vital Signs (24h Range):  Temp:  [98.3 °F (36.8 °C)-98.7 °F (37.1 °C)] 98.6 °F (37 °C)  Pulse:  [80-92] 85  Resp:  [20] 20  SpO2:  [97 %-99 %] 98 %  BP: (111-144)/(60-78) 123/64     Weight: 72.8 kg (160 lb 8 oz)  Body mass index is 30.33 kg/m².     Physical Exam  Constitutional:       General: She is not in acute distress.     Appearance: She is obese.   HENT:      Head: Normocephalic and atraumatic.      Nose: Nose normal.      Mouth/Throat:      Mouth: Mucous membranes are moist.      Pharynx: Oropharynx is clear. No oropharyngeal exudate.   Eyes:      Conjunctiva/sclera: Conjunctivae normal.   Cardiovascular:      Rate and Rhythm: Normal rate and regular rhythm.      Pulses: Normal pulses.      Heart sounds: Normal heart sounds.      Comments: 1 plus edema to the left upper extremity  Pulmonary:      Effort: Pulmonary effort is normal.       Breath sounds: Normal breath sounds.   Abdominal:      General: Abdomen is flat.      Palpations: Abdomen is soft.   Musculoskeletal:         General: No swelling or deformity. Normal range of motion.      Cervical back: Normal range of motion and neck supple. No rigidity.   Skin:     General: Skin is warm and dry.      Capillary Refill: Capillary refill takes less than 2 seconds.   Neurological:      General: No focal deficit present.      Mental Status: She is alert and oriented to person, place, and time.   Psychiatric:         Mood and Affect: Mood normal.         Behavior: Behavior normal.         Thought Content: Thought content normal.         Judgment: Judgment normal.                Significant Labs: All pertinent labs within the past 24 hours have been reviewed.  CBC:   Recent Labs   Lab 02/25/24  0313   WBC 3.84*   HGB 10.1*   HCT 30.2*   PLT 69*       CMP:   Recent Labs   Lab 02/25/24 0313      K 3.7   CO2 22*   BUN 15.0   CREATININE 2.35*   CALCIUM 9.3   ALBUMIN 2.4*   BILITOT 0.5   ALKPHOS 104   AST 13   ALT 9         Significant Imaging: I have reviewed all pertinent imaging results/findings within the past 24 hours.

## 2024-02-26 NOTE — ASSESSMENT & PLAN NOTE
Creatine stable for now. BMP reviewed- noted Estimated Creatinine Clearance: 19.5 mL/min (A) (based on SCr of 2.35 mg/dL (H)). according to latest data. Based on current GFR, CKD stage is stage 4 - GFR 15-29.  Monitor UOP and serial BMP and adjust therapy as needed. Renally dose meds. Avoid nephrotoxic medications and procedures.  we will continue the linesolid per id rec.  She is gettingProcrit q week and is status post 2 units packed red blood cell   creatinine has improved slightly currently her hemoglobin is stable     S/p egd with gastritis and esophagitis

## 2024-02-26 NOTE — ASSESSMENT & PLAN NOTE
Creatine stable for now. BMP reviewed- noted Estimated Creatinine Clearance: 19.5 mL/min (A) (based on SCr of 2.35 mg/dL (H)). according to latest data. Based on current GFR, CKD stage is stage 4 - GFR 15-29.  Monitor UOP and serial BMP and adjust therapy as needed. Renally dose meds. Avoid nephrotoxic medications and procedures.

## 2024-02-27 NOTE — PHYSICIAN QUERY
PT Name: Kam Reyes  MR #: [id number]     DOCUMENTATION CLARIFICATION      CDS/: Justyna Serrano RN            Contact information: [email or web address]  This form is a permanent document in the medical record.     Query Date: February 27, 2024    By submitting this query, we are merely seeking further clarification of documentation.  Please utilize your independent clinical judgment when addressing the question(s) below.     The Medical Record contains the following:    Clinical Information Location in Medical Record   Patient had a fractured humerus prior to admission orthopedics was consulted and recommended expectant management with a sling patient did develop swelling to the arm ultrasound was performed did not show a DVT the patient is wearing compression sleeve at this time to aid with edema to the arm.  Physical therapy was consulted during the stay and she will continue physical therapy after discharge.  She is being discharged today to skilled nursing for continued physical therapy because of her weakened state and continued p.o. antibiotic therapy.  Discharge delayed due to acceptance by nursing home  Decreased h/h received 2 unit blood  egd with gastritis and erosive esophagitis, no active bleed. 2/26  PN    Procedure - Esophagogastroduodenoscopy with closed biopsy    Duodenum was normal in all 4 portions and into the jejunum I took a biopsy of the 1st portion for histo path and H pylori   Antrum fundus and cardia of the stomach with diffuse gastritis in the antrum and streaky gastritis in the fundus and cardia of the stomach biopsy taken x2 for histo path   Z-line at 40 cm with a biopsy taken of the GE junction for reflux esophagitis   Normal esophagus cricopharyngeus muscle vocal cords      Post-Op Diagnosis Codes:     * Gastrointestinal hemorrhage, unspecified gastrointestinal hemorrhage type [K92.2]  1. Normal duodenal in all 4 portions and into the jejunum biopsy taken with  the cold biopsy forceps in the 1st portion   2. Gastritis of the antrum with biopsy taken x2 with the cold biopsy forceps  3. Streaks of gastritis in the fundus and cardia of the stomach  4. GE junction at 40 cm with no hiatal hernia   5. Reflux esophagitis biopsy taken with the cold biopsy forceps of the GE junction at 40 cm   6. Normal esophagus cricopharyngeus muscle vocal cords       2/22 Op note     Please document your best medical opinion regarding the etiology of GI Bleed?       [  X ]  Gastritis   [   ]  Esophagitis   [   ] Other etiology (please specify):___________________         Please document in your progress notes daily for the duration of treatment, until resolved, and include in your discharge summary.

## 2024-03-15 ENCOUNTER — LAB REQUISITION (OUTPATIENT)
Dept: LAB | Facility: HOSPITAL | Age: 74
End: 2024-03-15
Payer: MEDICARE

## 2024-03-15 DIAGNOSIS — N39.0 URINARY TRACT INFECTION, SITE NOT SPECIFIED: ICD-10-CM

## 2024-03-15 LAB
APPEARANCE UR: ABNORMAL
BACTERIA #/AREA URNS AUTO: ABNORMAL /HPF
BILIRUB UR QL STRIP.AUTO: NEGATIVE
COLOR UR AUTO: ABNORMAL
GLUCOSE UR QL STRIP.AUTO: NEGATIVE
KETONES UR QL STRIP.AUTO: NEGATIVE
LEUKOCYTE ESTERASE UR QL STRIP.AUTO: ABNORMAL
NITRITE UR QL STRIP.AUTO: NEGATIVE
PH UR STRIP.AUTO: 5.5 [PH]
PROT UR QL STRIP.AUTO: ABNORMAL
RBC #/AREA URNS AUTO: ABNORMAL /HPF
RBC UR QL AUTO: ABNORMAL
SP GR UR STRIP.AUTO: 1.02 (ref 1–1.03)
SQUAMOUS #/AREA URNS AUTO: ABNORMAL /HPF
UROBILINOGEN UR STRIP-ACNC: 0.2
WBC #/AREA URNS AUTO: ABNORMAL /HPF

## 2024-03-15 PROCEDURE — 81003 URINALYSIS AUTO W/O SCOPE: CPT | Performed by: FAMILY MEDICINE

## 2024-03-15 PROCEDURE — 87086 URINE CULTURE/COLONY COUNT: CPT | Performed by: FAMILY MEDICINE

## 2024-03-19 LAB
BACTERIA UR CULT: ABNORMAL
BACTERIA UR CULT: ABNORMAL

## 2024-04-08 ENCOUNTER — LAB REQUISITION (OUTPATIENT)
Dept: LAB | Facility: HOSPITAL | Age: 74
End: 2024-04-08
Payer: MEDICARE

## 2024-04-08 DIAGNOSIS — N39.0 URINARY TRACT INFECTION, SITE NOT SPECIFIED: ICD-10-CM

## 2024-04-08 LAB
AMORPH URATE CRY URNS QL MICRO: ABNORMAL /HPF
APPEARANCE UR: ABNORMAL
BACTERIA #/AREA URNS AUTO: ABNORMAL /HPF
BILIRUB UR QL STRIP.AUTO: NEGATIVE
COLOR UR AUTO: YELLOW
GLUCOSE UR QL STRIP.AUTO: NEGATIVE
KETONES UR QL STRIP.AUTO: NEGATIVE
LEUKOCYTE ESTERASE UR QL STRIP.AUTO: ABNORMAL
NITRITE UR QL STRIP.AUTO: NEGATIVE
PH UR STRIP.AUTO: 5 [PH]
PROT UR QL STRIP.AUTO: ABNORMAL
RBC #/AREA URNS AUTO: ABNORMAL /HPF
RBC UR QL AUTO: ABNORMAL
SP GR UR STRIP.AUTO: 1.02 (ref 1–1.03)
SQUAMOUS #/AREA URNS AUTO: ABNORMAL /HPF
UROBILINOGEN UR STRIP-ACNC: 0.2
WBC #/AREA URNS AUTO: ABNORMAL /HPF

## 2024-04-08 PROCEDURE — 87077 CULTURE AEROBIC IDENTIFY: CPT | Mod: 91 | Performed by: FAMILY MEDICINE

## 2024-04-08 PROCEDURE — 81003 URINALYSIS AUTO W/O SCOPE: CPT | Performed by: FAMILY MEDICINE

## 2024-04-13 LAB
BACTERIA UR CULT: ABNORMAL
BACTERIA UR CULT: ABNORMAL

## 2024-04-17 ENCOUNTER — HOSPITAL ENCOUNTER (INPATIENT)
Facility: HOSPITAL | Age: 74
LOS: 2 days | Discharge: HOME | DRG: 683 | End: 2024-04-19
Attending: EMERGENCY MEDICINE | Admitting: FAMILY MEDICINE
Payer: MEDICARE

## 2024-04-17 DIAGNOSIS — N18.9 ACUTE KIDNEY INJURY SUPERIMPOSED ON CHRONIC KIDNEY DISEASE: Primary | ICD-10-CM

## 2024-04-17 DIAGNOSIS — Z16.24 MULTIPLE DRUG RESISTANT ORGANISM (MDRO) CULTURE POSITIVE: ICD-10-CM

## 2024-04-17 DIAGNOSIS — N17.9 ACUTE KIDNEY INJURY: ICD-10-CM

## 2024-04-17 DIAGNOSIS — N17.9 ACUTE KIDNEY INJURY SUPERIMPOSED ON CHRONIC KIDNEY DISEASE: Primary | ICD-10-CM

## 2024-04-17 DIAGNOSIS — N39.0 ACUTE UTI: ICD-10-CM

## 2024-04-17 PROBLEM — E44.0 MODERATE PROTEIN-CALORIE MALNUTRITION: Status: RESOLVED | Noted: 2024-02-22 | Resolved: 2024-04-17

## 2024-04-17 PROBLEM — E87.20 METABOLIC ACIDOSIS: Status: ACTIVE | Noted: 2024-04-17

## 2024-04-17 PROBLEM — F41.9 ANXIETY DISORDER: Status: RESOLVED | Noted: 2021-08-12 | Resolved: 2024-04-17

## 2024-04-17 PROBLEM — R06.00 DYSPNEA: Status: RESOLVED | Noted: 2022-08-30 | Resolved: 2024-04-17

## 2024-04-17 PROBLEM — U07.1 COVID-19: Status: RESOLVED | Noted: 2024-01-26 | Resolved: 2024-04-17

## 2024-04-17 PROBLEM — R60.0 EDEMA OF LEFT UPPER EXTREMITY: Status: RESOLVED | Noted: 2024-02-12 | Resolved: 2024-04-17

## 2024-04-17 PROBLEM — I12.9 HYPERTENSIVE CHRONIC KIDNEY DISEASE W STG 1-4/UNSP CHR KDNY: Status: RESOLVED | Noted: 2023-01-01 | Resolved: 2024-04-17

## 2024-04-17 PROBLEM — K92.1 GASTROINTESTINAL HEMORRHAGE WITH MELENA: Status: RESOLVED | Noted: 2024-02-20 | Resolved: 2024-04-17

## 2024-04-17 PROBLEM — R40.4 ALTERED AWARENESS, TRANSIENT: Status: RESOLVED | Noted: 2024-02-21 | Resolved: 2024-04-17

## 2024-04-17 PROBLEM — S42.213A FRACTURE OF SURGICAL NECK OF HUMERUS: Status: RESOLVED | Noted: 2024-01-30 | Resolved: 2024-04-17

## 2024-04-17 LAB
ALBUMIN SERPL-MCNC: 2.9 G/DL (ref 3.4–4.8)
ALBUMIN/GLOB SERPL: 0.5 RATIO (ref 1.1–2)
ALP SERPL-CCNC: 125 UNIT/L (ref 40–150)
ALT SERPL-CCNC: 6 UNIT/L (ref 0–55)
APPEARANCE UR: ABNORMAL
AST SERPL-CCNC: 12 UNIT/L (ref 5–34)
BACTERIA #/AREA URNS AUTO: ABNORMAL /HPF
BASOPHILS # BLD AUTO: 0.03 X10(3)/MCL
BASOPHILS NFR BLD AUTO: 0.3 %
BILIRUB SERPL-MCNC: 0.3 MG/DL
BILIRUB UR QL STRIP.AUTO: NEGATIVE
BUN SERPL-MCNC: 42 MG/DL (ref 9.8–20.1)
CALCIUM SERPL-MCNC: 11.7 MG/DL (ref 8.4–10.2)
CHLORIDE SERPL-SCNC: 114 MMOL/L (ref 98–107)
CO2 SERPL-SCNC: 14 MMOL/L (ref 23–31)
COLOR UR AUTO: YELLOW
CREAT SERPL-MCNC: 4.92 MG/DL (ref 0.55–1.02)
EOSINOPHIL # BLD AUTO: 0.34 X10(3)/MCL (ref 0–0.9)
EOSINOPHIL NFR BLD AUTO: 3.6 %
ERYTHROCYTE [DISTWIDTH] IN BLOOD BY AUTOMATED COUNT: 14.2 % (ref 11.5–17)
GFR SERPLBLD CREATININE-BSD FMLA CKD-EPI: 9 MLS/MIN/1.73/M2
GLOBULIN SER-MCNC: 5.7 GM/DL (ref 2.4–3.5)
GLUCOSE SERPL-MCNC: 101 MG/DL (ref 82–115)
GLUCOSE UR QL STRIP.AUTO: NEGATIVE
HCT VFR BLD AUTO: 26.8 % (ref 37–47)
HGB BLD-MCNC: 8.5 G/DL (ref 12–16)
IMM GRANULOCYTES # BLD AUTO: 0.05 X10(3)/MCL (ref 0–0.04)
IMM GRANULOCYTES NFR BLD AUTO: 0.5 %
KETONES UR QL STRIP.AUTO: NEGATIVE
LEUKOCYTE ESTERASE UR QL STRIP.AUTO: ABNORMAL
LYMPHOCYTES # BLD AUTO: 1.37 X10(3)/MCL (ref 0.6–4.6)
LYMPHOCYTES NFR BLD AUTO: 14.6 %
MCH RBC QN AUTO: 32.2 PG (ref 27–31)
MCHC RBC AUTO-ENTMCNC: 31.7 G/DL (ref 33–36)
MCV RBC AUTO: 101.5 FL (ref 80–94)
MONOCYTES # BLD AUTO: 0.56 X10(3)/MCL (ref 0.1–1.3)
MONOCYTES NFR BLD AUTO: 6 %
NEUTROPHILS # BLD AUTO: 7.02 X10(3)/MCL (ref 2.1–9.2)
NEUTROPHILS NFR BLD AUTO: 75 %
NITRITE UR QL STRIP.AUTO: NEGATIVE
PH UR STRIP.AUTO: 6 [PH]
PLATELET # BLD AUTO: 243 X10(3)/MCL (ref 130–400)
PMV BLD AUTO: 10.4 FL (ref 7.4–10.4)
POTASSIUM SERPL-SCNC: 4.5 MMOL/L (ref 3.5–5.1)
PROT SERPL-MCNC: 8.6 GM/DL (ref 5.8–7.6)
PROT UR QL STRIP.AUTO: ABNORMAL
RBC # BLD AUTO: 2.64 X10(6)/MCL (ref 4.2–5.4)
RBC #/AREA URNS AUTO: ABNORMAL /HPF
RBC UR QL AUTO: ABNORMAL
SODIUM SERPL-SCNC: 133 MMOL/L (ref 136–145)
SP GR UR STRIP.AUTO: 1.01 (ref 1–1.03)
SQUAMOUS #/AREA URNS AUTO: ABNORMAL /HPF
UROBILINOGEN UR STRIP-ACNC: 0.2
WBC # SPEC AUTO: 9.37 X10(3)/MCL (ref 4.5–11.5)
WBC #/AREA URNS AUTO: >100 /HPF

## 2024-04-17 PROCEDURE — 85025 COMPLETE CBC W/AUTO DIFF WBC: CPT | Performed by: EMERGENCY MEDICINE

## 2024-04-17 PROCEDURE — 87086 URINE CULTURE/COLONY COUNT: CPT | Performed by: EMERGENCY MEDICINE

## 2024-04-17 PROCEDURE — 63600175 PHARM REV CODE 636 W HCPCS: Performed by: FAMILY MEDICINE

## 2024-04-17 PROCEDURE — 94761 N-INVAS EAR/PLS OXIMETRY MLT: CPT

## 2024-04-17 PROCEDURE — 25000003 PHARM REV CODE 250: Performed by: FAMILY MEDICINE

## 2024-04-17 PROCEDURE — 11000001 HC ACUTE MED/SURG PRIVATE ROOM

## 2024-04-17 PROCEDURE — 81003 URINALYSIS AUTO W/O SCOPE: CPT | Performed by: EMERGENCY MEDICINE

## 2024-04-17 PROCEDURE — 80053 COMPREHEN METABOLIC PANEL: CPT | Performed by: EMERGENCY MEDICINE

## 2024-04-17 PROCEDURE — 99285 EMERGENCY DEPT VISIT HI MDM: CPT

## 2024-04-17 PROCEDURE — 87040 BLOOD CULTURE FOR BACTERIA: CPT | Performed by: EMERGENCY MEDICINE

## 2024-04-17 RX ORDER — ASCORBIC ACID 250 MG
250 TABLET ORAL DAILY
Status: DISCONTINUED | OUTPATIENT
Start: 2024-04-18 | End: 2024-04-19 | Stop reason: HOSPADM

## 2024-04-17 RX ORDER — CALCITRIOL 0.25 UG/1
0.5 CAPSULE ORAL DAILY
Status: DISCONTINUED | OUTPATIENT
Start: 2024-04-18 | End: 2024-04-17

## 2024-04-17 RX ORDER — QUETIAPINE FUMARATE 25 MG/1
25 TABLET, FILM COATED ORAL NIGHTLY
Status: DISCONTINUED | OUTPATIENT
Start: 2024-04-17 | End: 2024-04-19 | Stop reason: HOSPADM

## 2024-04-17 RX ORDER — SODIUM BICARBONATE 650 MG/1
650 TABLET ORAL 4 TIMES DAILY
Status: DISCONTINUED | OUTPATIENT
Start: 2024-04-17 | End: 2024-04-19 | Stop reason: HOSPADM

## 2024-04-17 RX ORDER — DEXTROSE, SODIUM CHLORIDE, SODIUM LACTATE, POTASSIUM CHLORIDE, AND CALCIUM CHLORIDE 5; .6; .31; .03; .02 G/100ML; G/100ML; G/100ML; G/100ML; G/100ML
INJECTION, SOLUTION INTRAVENOUS CONTINUOUS
Status: DISCONTINUED | OUTPATIENT
Start: 2024-04-17 | End: 2024-04-19 | Stop reason: HOSPADM

## 2024-04-17 RX ORDER — HYDROCODONE BITARTRATE AND ACETAMINOPHEN 10; 325 MG/1; MG/1
1 TABLET ORAL EVERY 8 HOURS PRN
Status: DISCONTINUED | OUTPATIENT
Start: 2024-04-17 | End: 2024-04-19 | Stop reason: HOSPADM

## 2024-04-17 RX ORDER — DULOXETIN HYDROCHLORIDE 30 MG/1
30 CAPSULE, DELAYED RELEASE ORAL DAILY
Status: DISCONTINUED | OUTPATIENT
Start: 2024-04-18 | End: 2024-04-19 | Stop reason: HOSPADM

## 2024-04-17 RX ORDER — LANOLIN ALCOHOL/MO/W.PET/CERES
1 CREAM (GRAM) TOPICAL DAILY
Status: DISCONTINUED | OUTPATIENT
Start: 2024-04-18 | End: 2024-04-19 | Stop reason: HOSPADM

## 2024-04-17 RX ORDER — ENOXAPARIN SODIUM 100 MG/ML
30 INJECTION SUBCUTANEOUS EVERY 24 HOURS
Status: DISCONTINUED | OUTPATIENT
Start: 2024-04-17 | End: 2024-04-19 | Stop reason: HOSPADM

## 2024-04-17 RX ORDER — LINEZOLID 2 MG/ML
600 INJECTION, SOLUTION INTRAVENOUS
Status: DISCONTINUED | OUTPATIENT
Start: 2024-04-17 | End: 2024-04-19 | Stop reason: HOSPADM

## 2024-04-17 RX ORDER — TALC
6 POWDER (GRAM) TOPICAL NIGHTLY PRN
Status: DISCONTINUED | OUTPATIENT
Start: 2024-04-17 | End: 2024-04-19 | Stop reason: HOSPADM

## 2024-04-17 RX ORDER — AMLODIPINE BESYLATE 10 MG/1
10 TABLET ORAL DAILY
Status: DISCONTINUED | OUTPATIENT
Start: 2024-04-18 | End: 2024-04-19 | Stop reason: HOSPADM

## 2024-04-17 RX ORDER — MEGESTROL ACETATE 20 MG/1
40 TABLET ORAL DAILY
Status: DISCONTINUED | OUTPATIENT
Start: 2024-04-18 | End: 2024-04-19 | Stop reason: HOSPADM

## 2024-04-17 RX ORDER — SODIUM CHLORIDE 0.9 % (FLUSH) 0.9 %
10 SYRINGE (ML) INJECTION
Status: DISCONTINUED | OUTPATIENT
Start: 2024-04-17 | End: 2024-04-19 | Stop reason: HOSPADM

## 2024-04-17 RX ORDER — ONDANSETRON HYDROCHLORIDE 2 MG/ML
4 INJECTION, SOLUTION INTRAVENOUS EVERY 8 HOURS PRN
Status: DISCONTINUED | OUTPATIENT
Start: 2024-04-17 | End: 2024-04-19 | Stop reason: HOSPADM

## 2024-04-17 RX ADMIN — SODIUM CHLORIDE, SODIUM LACTATE, POTASSIUM CHLORIDE, CALCIUM CHLORIDE AND DEXTROSE MONOHYDRATE: 5; 600; 310; 30; 20 INJECTION, SOLUTION INTRAVENOUS at 07:04

## 2024-04-17 RX ADMIN — QUETIAPINE FUMARATE 25 MG: 25 TABLET ORAL at 09:04

## 2024-04-17 RX ADMIN — LINEZOLID 600 MG: 600 INJECTION, SOLUTION INTRAVENOUS at 07:04

## 2024-04-17 RX ADMIN — ENOXAPARIN SODIUM 30 MG: 100 INJECTION SUBCUTANEOUS at 07:04

## 2024-04-17 RX ADMIN — SODIUM BICARBONATE 650 MG: 650 TABLET ORAL at 09:04

## 2024-04-17 NOTE — ED PROVIDER NOTES
Encounter Date: 2024       History     Chief Complaint   Patient presents with    Abnormal Lab     Pt from [facility] [place] reports pt Bun 44 creat 4.9. Pt states she has a UTI.      The history is provided by the patient.   Illness   The current episode started two days ago. The problem occurs occasionally. The problem has been unchanged. Nothing relieves the symptoms. Nothing aggravates the symptoms. Pertinent negatives include no fever, no nausea, no sore throat, no shortness of breath and no rash.   Patient denies complaints.  States she was contacted by Dr. Heaton's office and told to come to ED for urinary tract infection.  Patient has long history of MDRO UTI's.    Review of patient's allergies indicates:   Allergen Reactions    Oxaprozin Nausea And Vomiting and Swelling     Facial swelling      Sulfamethoxazole-trimethoprim Nausea And Vomiting     Severe nausea and vomiting    Doxycycline     Nsaids (non-steroidal anti-inflammatory drug) Rash    Sulfa (sulfonamide antibiotics) Nausea And Vomiting     Past Medical History:   Diagnosis Date    Cancer, colon     Chronic pain     GERD (gastroesophageal reflux disease)     Renal disorder     Thyroiditis, unspecified      Past Surgical History:   Procedure Laterality Date    BACK SURGERY       SECTION      CHOLECYSTECTOMY      COLON SURGERY      EGD, WITH CLOSED BIOPSY N/A 2024    Procedure: EGD, WITH CLOSED BIOPSY;  Surgeon: Robson Tripp MD;  Location: [facility];  Service: Endoscopy;  Laterality: N/A;  A) BX DUODENUM, B) BX ANTRUM FOR H.PYLORI, C) BX GE JUNCTION    ESOPHAGOGASTRODUODENOSCOPY N/A 2024    Procedure: EGD (ESOPHAGOGASTRODUODENOSCOPY);  Surgeon: Robson Tripp MD;  Location: [facility];  Service: Endoscopy;  Laterality: N/A;  A) DIFFUSE GASTRITIS OF ANTRUM & STOMACH    HYSTERECTOMY      KIDNEY SURGERY       Family History   Problem Relation Name Age of Onset    Hypertension Mother       Social History     Tobacco Use     Smoking status: Never    Smokeless tobacco: Never   Substance Use Topics    Alcohol use: Never    Drug use: Never     Review of Systems   Constitutional:  Negative for fever.   HENT:  Negative for sore throat.    Respiratory:  Negative for shortness of breath.    Cardiovascular:  Negative for chest pain.   Gastrointestinal:  Negative for nausea.   Genitourinary:  Negative for dysuria.   Musculoskeletal:  Negative for back pain.   Skin:  Negative for rash.   Neurological:  Negative for weakness.   Hematological:  Does not bruise/bleed easily.       Physical Exam     Initial Vitals [04/17/24 1212]   BP Pulse Resp Temp SpO2   (!) 152/58 89 18 97.6 °F (36.4 °C) 100 %      MAP       --         Physical Exam    Nursing note and vitals reviewed.  Constitutional: She appears well-developed and well-nourished.   HENT:   Head: Normocephalic and atraumatic.   Right Ear: External ear normal.   Left Ear: External ear normal.   Eyes: Conjunctivae and EOM are normal. Pupils are equal, round, and reactive to light.   Neck: Neck supple.   Normal range of motion.  Cardiovascular:  Normal rate, regular rhythm, normal heart sounds and intact distal pulses.           Pulmonary/Chest: Breath sounds normal.   Abdominal: Abdomen is soft. Bowel sounds are normal.   Musculoskeletal:         General: Normal range of motion.      Cervical back: Normal range of motion and neck supple.     Neurological: She is alert and oriented to person, place, and time. GCS score is 15. GCS eye subscore is 4. GCS verbal subscore is 5. GCS motor subscore is 6.   Skin: Skin is warm and dry. Capillary refill takes less than 2 seconds.   Psychiatric: She has a normal mood and affect. Her behavior is normal. Judgment and thought content normal.         ED Course   Critical Care    Date/Time: 4/26/2024 1:17 PM    Performed by: Antonio Goodson MD  Authorized by: Gregor Heaton MD  Direct patient critical care time: 14 minutes  Additional history  critical care time: 2 minutes  Ordering / reviewing critical care time: 5 minutes  Documentation critical care time: 12 minutes  Consulting other physicians critical care time: 3 minutes  Total critical care time (exclusive of procedural time) : 36 minutes  Critical care was necessary to treat or prevent imminent or life-threatening deterioration of the following conditions: renal failure and dehydration.  Critical care was time spent personally by me on the following activities: discussions with primary provider, interpretation of cardiac output measurements, evaluation of patient's response to treatment, examination of patient, obtaining history from patient or surrogate, ordering and performing treatments and interventions, ordering and review of laboratory studies, ordering and review of radiographic studies, pulse oximetry, re-evaluation of patient's condition and review of old charts.        Labs Reviewed   URINALYSIS, REFLEX TO URINE CULTURE - Abnormal; Notable for the following components:       Result Value    Appearance, UA Cloudy (*)     Protein, UA 3+ (*)     Blood, UA 3+ (*)     Leukocyte Esterase, UA 3+ (*)     All other components within normal limits   COMPREHENSIVE METABOLIC PANEL - Abnormal; Notable for the following components:    Sodium Level 133 (*)     Chloride 114 (*)     Carbon Dioxide 14 (*)     Blood Urea Nitrogen 42.0 (*)     Creatinine 4.92 (*)     Calcium Level Total 11.7 (*)     Protein Total 8.6 (*)     Albumin Level 2.9 (*)     Globulin 5.7 (*)     Albumin/Globulin Ratio 0.5 (*)     All other components within normal limits   CBC WITH DIFFERENTIAL - Abnormal; Notable for the following components:    RBC 2.64 (*)     Hgb 8.5 (*)     Hct 26.8 (*)     .5 (*)     MCH 32.2 (*)     MCHC 31.7 (*)     IG# 0.05 (*)     All other components within normal limits   URINALYSIS, MICROSCOPIC - Abnormal; Notable for the following components:    Bacteria, UA Few (*)     RBC, UA 21-50 (*)      WBC, UA >100 (*)     Squamous Epithelial Cells, UA Few (*)     All other components within normal limits   CBC W/ AUTO DIFFERENTIAL    Narrative:     The following orders were created for panel order CBC auto differential.  Procedure                               Abnormality         Status                     ---------                               -----------         ------                     CBC with Differential[[id number]]       Abnormal            Final result                 Please view results for these tests on the individual orders.          Imaging Results    None          Medications   epoetin louis injection 40,000 Units (40,000 Units Subcutaneous Given 4/18/24 1015)     Medical Decision Making  Amount and/or Complexity of Data Reviewed  External Data Reviewed: labs and notes.     Details: Numerous multi-drug resistant UTI's with various species of bacteria over the last few months  Labs: ordered. Decision-making details documented in ED Course.    Risk  Prescription drug management.  Decision regarding hospitalization.    Differential includes:  UTI, dehydration, GHADA, electrolyte disturbance, anemia, colonization of urinary tract.  Will obtain CBC, CMP, UA, blood culture and discuss with Dr. Heaton once resulted.           ED Course as of 04/26/24 1317   Wed Apr 17, 2024   2867 I spoke with Dr. Heaton - states admit on IV fluids and abx and consult Dr. Max (nephrology). [CL]      ED Course User Index  [CL] Antonio Goodson MD                           Clinical Impression:  Final diagnoses:  [N17.9] Acute kidney injury  [N39.0] Acute UTI  [Z16.24] Multiple drug resistant organism (MDRO) culture positive          ED Disposition Condition    Admit Stable                Antonio Goodson MD  04/17/24 0718       Antonio Goodson MD  04/26/24 8587

## 2024-04-17 NOTE — ASSESSMENT & PLAN NOTE
Chronic, controlled. Latest blood pressure and vitals reviewed-     Temp:  [97.6 °F (36.4 °C)-97.8 °F (36.6 °C)]   Pulse:  [81-89]   Resp:  [15-20]   BP: (120-152)/(46-69)   SpO2:  [95 %-100 %] .  Stable on home medications  Home meds for hypertension were reviewed and noted below.   Hypertension Medications               amLODIPine (NORVASC) 10 MG tablet Take 1 tablet (10 mg total) by mouth once daily.            While in the hospital, will manage blood pressure as follows; Continue home antihypertensive regimen    Will utilize p.r.n. blood pressure medication only if patient's blood pressure greater than 160/100 and she develops symptoms such as worsening chest pain or shortness of breath.

## 2024-04-17 NOTE — HPI
The patient is a 73-year-old  female known to me from clinic.  He has a known history of chronic renal insufficiency for which she has not on dialysis and has thus far refused dialysis.  Generally keeps a creatinine somewhere in the 3 or 4 range but today on check her creatinine had increased to over 7.  More importantly her CO2 level had dropped significantly to 14.  Despite being on bicarb.  She has a known infection with ESBL for which she was getting linezolid p.o. at the nursing home.  This infection continues at this time as evident by her urinalysis and cultures.  She states her urine has been clearing slightly and has improved.  He has not complain of any fever any diarrhea any abdominal pain at this point in time.  But she does have a decrease in her platelet count in his slightly anemic.  Patient has remained afebrile and has had no change in her mental status.

## 2024-04-17 NOTE — H&P
[facility] Medicine  History & Physical    Patient Name: Kam Reyes  MRN: [id number]  Patient Class: IP- Inpatient  Admission Date: 2024  Attending Physician: Gregor Heaton MD   Primary Care Provider: Gregor Heaton MD         Patient information was obtained from patient and ER records.     Subjective:     Principal Problem:<principal problem not specified>    Chief Complaint:   Chief Complaint   Patient presents with    Abnormal Lab     Pt from [facility] [place] reports pt Bun 44 creat 4.9. Pt states she has a UTI.         HPI: The patient is a 73-year-old  female known to me from clinic.  He has a known history of chronic renal insufficiency for which she has not on dialysis and has thus far refused dialysis.  Generally keeps a creatinine somewhere in the 3 or 4 range but today on check her creatinine had increased to over 7.  More importantly her CO2 level had dropped significantly to 14.  Despite being on bicarb.  She has a known infection with ESBL for which she was getting linezolid p.o. at the nursing home.  This infection continues at this time as evident by her urinalysis and cultures.  She states her urine has been clearing slightly and has improved.  He has not complain of any fever any diarrhea any abdominal pain at this point in time.  But she does have a decrease in her platelet count in his slightly anemic.  Patient has remained afebrile and has had no change in her mental status.    Past Medical History:   Diagnosis Date    Cancer, colon     Chronic pain     GERD (gastroesophageal reflux disease)     Renal disorder     Thyroiditis, unspecified        Past Surgical History:   Procedure Laterality Date    BACK SURGERY       SECTION      CHOLECYSTECTOMY      COLON SURGERY      EGD, WITH CLOSED BIOPSY N/A 2024    Procedure: EGD, WITH CLOSED BIOPSY;  Surgeon: Robson Tripp MD;  Location: [facility];  Service:  Endoscopy;  Laterality: N/A;  A) BX DUODENUM, B) BX ANTRUM FOR H.PYLORI, C) BX GE JUNCTION    ESOPHAGOGASTRODUODENOSCOPY N/A 2/22/2024    Procedure: EGD (ESOPHAGOGASTRODUODENOSCOPY);  Surgeon: Robson Tripp MD;  Location: [facility];  Service: Endoscopy;  Laterality: N/A;  A) DIFFUSE GASTRITIS OF ANTRUM & STOMACH    HYSTERECTOMY      KIDNEY SURGERY         Review of patient's allergies indicates:   Allergen Reactions    Oxaprozin Nausea And Vomiting and Swelling     Facial swelling      Sulfamethoxazole-trimethoprim Nausea And Vomiting     Severe nausea and vomiting    Doxycycline     Nsaids (non-steroidal anti-inflammatory drug) Rash    Sulfa (sulfonamide antibiotics) Nausea And Vomiting       Current Facility-Administered Medications   Medication Dose Route Frequency Provider Last Rate Last Admin    [START ON 4/18/2024] amLODIPine tablet 10 mg  10 mg Oral Daily Gregor Heaton MD        [START ON 4/18/2024] ascorbic acid (vitamin C) tablet 250 mg  250 mg Oral Daily Gregor Heaton MD        [START ON 4/18/2024] calcitRIOL capsule 0.5 mcg  0.5 mcg Oral Daily Gregor Heaton MD        dextrose 5 % in lactated ringers infusion   Intravenous Continuous Gregor Heaton MD        [START ON 4/18/2024] DULoxetine DR capsule 30 mg  30 mg Oral Daily Gregor Heaton MD        enoxaparin injection 30 mg  30 mg Subcutaneous Daily Gregor Heaton MD        [START ON 4/18/2024] ferrous sulfate tablet 1 each  1 tablet Oral Daily Gregor Heaton MD        HYDROcodone-acetaminophen  mg per tablet 1 tablet  1 tablet Oral Q8H PRN Gregor Heaton MD        [START ON 4/18/2024] levothyroxine tablet 137 mcg  137 mcg Oral Before breakfast Gregor Heaton MD        linezolid 600 mg/300 mL IVPB 600 mg  600 mg Intravenous Q12H Gregor Heaton MD        [START ON 4/18/2024] megestroL tablet 40 mg  40 mg Oral Daily Gregor Heaton MD        melatonin tablet 6 mg  6 mg Oral Nightly PRN  Gregor Heaton MD        ondansetron injection 4 mg  4 mg Intravenous Q8H PRN Gregor Heaton MD        QUEtiapine tablet 25 mg  25 mg Oral QHS Gregor Heaton MD        sodium bicarbonate tablet 650 mg  650 mg Oral QID Gregor Heaton MD        sodium chloride 0.9% flush 10 mL  10 mL Intravenous PRN Gregor Heaton MD         Family History       Problem Relation (Age of Onset)    Hypertension Mother          Tobacco Use    Smoking status: Never    Smokeless tobacco: Never   Substance and Sexual Activity    Alcohol use: Never    Drug use: Never    Sexual activity: Not Currently     Review of Systems   Constitutional:  Positive for activity change. Negative for fatigue and fever.   HENT: Negative.     Eyes: Negative.    Respiratory: Negative.     Cardiovascular: Negative.    Gastrointestinal: Negative.    Endocrine: Negative.    Genitourinary: Negative.    Musculoskeletal: Negative.    Skin: Negative.    Allergic/Immunologic: Negative.    Neurological: Negative.    Hematological: Negative.    Psychiatric/Behavioral: Negative.       Objective:     Vital Signs (Most Recent):  Temp: 97.8 °F (36.6 °C) (04/17/24 1658)  Pulse: 89 (04/17/24 1658)  Resp: 18 (04/17/24 1658)  BP: (!) 120/46 (04/17/24 1658)  SpO2: 100 % (04/17/24 1658) Vital Signs (24h Range):  Temp:  [97.6 °F (36.4 °C)-97.8 °F (36.6 °C)] 97.8 °F (36.6 °C)  Pulse:  [81-89] 89  Resp:  [15-20] 18  SpO2:  [95 %-100 %] 100 %  BP: (120-152)/(46-69) 120/46     Weight: 74.8 kg (165 lb)  Body mass index is 31.18 kg/m².     Physical Exam  Constitutional:       General: She is not in acute distress.     Appearance: Normal appearance. She is obese.   HENT:      Head: Normocephalic and atraumatic.      Right Ear: Tympanic membrane and ear canal normal.      Left Ear: Tympanic membrane and ear canal normal.      Nose: Nose normal.      Mouth/Throat:      Mouth: Mucous membranes are moist.      Pharynx: Oropharynx is clear.   Eyes:      Extraocular  "Movements: Extraocular movements intact.      Pupils: Pupils are equal, round, and reactive to light.   Cardiovascular:      Rate and Rhythm: Normal rate and regular rhythm.      Pulses: Normal pulses.      Heart sounds: Normal heart sounds. No murmur heard.     No gallop.   Pulmonary:      Effort: Pulmonary effort is normal.      Breath sounds: Normal breath sounds.   Abdominal:      General: Abdomen is flat. Bowel sounds are normal.      Palpations: Abdomen is soft.      Comments: Ostomy sites are clean with no discharge good stool output through colostomy   Musculoskeletal:         General: Normal range of motion.      Cervical back: Normal range of motion.   Skin:     General: Skin is warm.      Capillary Refill: Capillary refill takes less than 2 seconds.   Neurological:      General: No focal deficit present.      Mental Status: She is alert and oriented to person, place, and time.   Psychiatric:         Mood and Affect: Mood normal.         Behavior: Behavior normal.         Thought Content: Thought content normal.         Judgment: Judgment normal.              CRANIAL NERVES     CN III, IV, VI   Pupils are equal, round, and reactive to light.       Significant Labs: All pertinent labs within the past 24 hours have been reviewed.  CBC:   Recent Labs   Lab 04/17/24  1247   WBC 9.37   HGB 8.5*   HCT 26.8*        CMP:   Recent Labs   Lab 04/17/24  1247   *   K 4.5   CO2 14*   BUN 42.0*   CREATININE 4.92*   CALCIUM 11.7*   ALBUMIN 2.9*   BILITOT 0.3   ALKPHOS 125   AST 12   ALT 6     Urine Culture: No results for input(s): "LABURIN" in the last 48 hours.  Urine Studies:   Recent Labs   Lab 04/17/24  1226   APPEARANCEUA Cloudy*   PROTEINUA 3+*   BILIRUBINUA Negative   UROBILINOGEN 0.2   LEUKOCYTESUR 3+*   RBCUA 21-50*   WBCUA >100*   BACTERIA Few*       Significant Imaging: I have reviewed all pertinent imaging results/findings within the past 24 hours.  Assessment/Plan:     Metabolic " acidosis  Continue p.o. bicarb 3 times a day      Acute kidney injury superimposed on chronic kidney disease  Patient with acute kidney injury/acute renal failure likely due to pre-renal azotemia due to IVVD GHADA is currently worsening. Baseline creatinine  3-4  - Labs reviewed- Renal function/electrolytes with Estimated Creatinine Clearance: 9.4 mL/min (A) (based on SCr of 4.92 mg/dL (H)). according to latest data. Monitor urine output and serial BMP and adjust therapy as needed. Avoid nephrotoxins and renally dose meds for GFR listed above.  I have consulted Dr. Ventura of due to her metabolic acidosis as well as her slightly worsening creatinine it was reported as 7 today at the nursing home though it is back down in the 4s today at the hospital    Multiple drug resistant organism (MDRO) culture positive  ESBL urinary tract infection sensitive to vancomycin however with her quit kidney issues and rapidly deteriorating kidney function we will keep her on linezolid twice a day      Acute UTI  ESBL we will continue antibiotic therapy with linezolid.      Vesicovaginal fistula  To source of her repeated urinary tract infections      Hypertension  Chronic, controlled. Latest blood pressure and vitals reviewed-     Temp:  [97.6 °F (36.4 °C)-97.8 °F (36.6 °C)]   Pulse:  [81-89]   Resp:  [15-20]   BP: (120-152)/(46-69)   SpO2:  [95 %-100 %] .  Stable on home medications  Home meds for hypertension were reviewed and noted below.   Hypertension Medications               amLODIPine (NORVASC) 10 MG tablet Take 1 tablet (10 mg total) by mouth once daily.            While in the hospital, will manage blood pressure as follows; Continue home antihypertensive regimen    Will utilize p.r.n. blood pressure medication only if patient's blood pressure greater than 160/100 and she develops symptoms such as worsening chest pain or shortness of breath.    Hypothyroid  Continue Synthroid at current dose      Personal history of other  malignant neoplasm of large intestine  Distant history no recent treatment      Recurrent major depression  Controlled currently.  We will continue duloxetine    Ileostomy status  Continue supportive care      GERD (gastroesophageal reflux disease)  Pepcid twice a day      Anemia of chronic kidney failure, stage 4 (severe)  Patient's anemia is currently controlled. Has not received any PRBCs to date. Etiology likely d/t chronic disease due to Chronic Kidney Disease  Current CBC reviewed-   Lab Results   Component Value Date    HGB 8.5 (L) 04/17/2024    HCT 26.8 (L) 04/17/2024     Monitor serial CBC and transfuse if patient becomes hemodynamically unstable, symptomatic or H/H drops below 7/21.      VTE Risk Mitigation (From admission, onward)           Ordered     enoxaparin injection 30 mg  Daily         04/17/24 1632     IP VTE HIGH RISK PATIENT  Once         04/17/24 1632     Place sequential compression device  Until discontinued         04/17/24 1632                                    Gregor Heaton MD  Department of Hospital Medicine  [facility]

## 2024-04-17 NOTE — ASSESSMENT & PLAN NOTE
ESBL urinary tract infection sensitive to vancomycin however with her quit kidney issues and rapidly deteriorating kidney function we will keep her on linezolid twice a day

## 2024-04-17 NOTE — ASSESSMENT & PLAN NOTE
Patient's anemia is currently controlled. Has not received any PRBCs to date. Etiology likely d/t chronic disease due to Chronic Kidney Disease  Current CBC reviewed-   Lab Results   Component Value Date    HGB 8.5 (L) 04/17/2024    HCT 26.8 (L) 04/17/2024     Monitor serial CBC and transfuse if patient becomes hemodynamically unstable, symptomatic or H/H drops below 7/21.

## 2024-04-17 NOTE — NURSING
RECEIVED ADMIT REPORT FROM NURSE SANAM RN CHARGE. THOROUGH  BEDSIDE AIDET PERFORMED AND INFORMATION BOARED REVIEWED WITH DISCUSSION OF CONTINUED PLAN OF CARE. PATIENT DENIED C/O OF AND PAIN OR DISCOMFORT ON ADMIT.

## 2024-04-17 NOTE — SUBJECTIVE & OBJECTIVE
Past Medical History:   Diagnosis Date    Cancer, colon     Chronic pain     GERD (gastroesophageal reflux disease)     Renal disorder     Thyroiditis, unspecified        Past Surgical History:   Procedure Laterality Date    BACK SURGERY       SECTION      CHOLECYSTECTOMY      COLON SURGERY      EGD, WITH CLOSED BIOPSY N/A 2024    Procedure: EGD, WITH CLOSED BIOPSY;  Surgeon: Robson Tripp MD;  Location: [facility];  Service: Endoscopy;  Laterality: N/A;  A) BX DUODENUM, B) BX ANTRUM FOR H.PYLORI, C) BX GE JUNCTION    ESOPHAGOGASTRODUODENOSCOPY N/A 2024    Procedure: EGD (ESOPHAGOGASTRODUODENOSCOPY);  Surgeon: Robson Tripp MD;  Location: [facility];  Service: Endoscopy;  Laterality: N/A;  A) DIFFUSE GASTRITIS OF ANTRUM & STOMACH    HYSTERECTOMY      KIDNEY SURGERY         Review of patient's allergies indicates:   Allergen Reactions    Oxaprozin Nausea And Vomiting and Swelling     Facial swelling      Sulfamethoxazole-trimethoprim Nausea And Vomiting     Severe nausea and vomiting    Doxycycline     Nsaids (non-steroidal anti-inflammatory drug) Rash    Sulfa (sulfonamide antibiotics) Nausea And Vomiting       Current Facility-Administered Medications   Medication Dose Route Frequency Provider Last Rate Last Admin    [START ON 2024] amLODIPine tablet 10 mg  10 mg Oral Daily Gregor Heaton MD        [START ON 2024] ascorbic acid (vitamin C) tablet 250 mg  250 mg Oral Daily Gregor Heaton MD        [START ON 2024] calcitRIOL capsule 0.5 mcg  0.5 mcg Oral Daily Gregor Heaton MD        dextrose 5 % in lactated ringers infusion   Intravenous Continuous Gregor Heaton MD        [START ON 2024] DULoxetine DR capsule 30 mg  30 mg Oral Daily Gregor Heaton MD        enoxaparin injection 30 mg  30 mg Subcutaneous Daily Gregor Heaton MD        [START ON 2024] ferrous sulfate tablet 1 each  1 tablet Oral Daily Gregor Heaton MD         HYDROcodone-acetaminophen  mg per tablet 1 tablet  1 tablet Oral Q8H PRN Gregor Heaton MD        [START ON 4/18/2024] levothyroxine tablet 137 mcg  137 mcg Oral Before breakfast Gregor Heaton MD        linezolid 600 mg/300 mL IVPB 600 mg  600 mg Intravenous Q12H Gregor Heaton MD        [START ON 4/18/2024] megestroL tablet 40 mg  40 mg Oral Daily Gregor Heaton MD        melatonin tablet 6 mg  6 mg Oral Nightly PRN Gregor Heaton MD        ondansetron injection 4 mg  4 mg Intravenous Q8H PRN Gregor Heaton MD        QUEtiapine tablet 25 mg  25 mg Oral QHS Gregor Heaton MD        sodium bicarbonate tablet 650 mg  650 mg Oral QID Gregor Heaton MD        sodium chloride 0.9% flush 10 mL  10 mL Intravenous PRN Gregor Heaton MD         Family History       Problem Relation (Age of Onset)    Hypertension Mother          Tobacco Use    Smoking status: Never    Smokeless tobacco: Never   Substance and Sexual Activity    Alcohol use: Never    Drug use: Never    Sexual activity: Not Currently     Review of Systems   Constitutional:  Positive for activity change. Negative for fatigue and fever.   HENT: Negative.     Eyes: Negative.    Respiratory: Negative.     Cardiovascular: Negative.    Gastrointestinal: Negative.    Endocrine: Negative.    Genitourinary: Negative.    Musculoskeletal: Negative.    Skin: Negative.    Allergic/Immunologic: Negative.    Neurological: Negative.    Hematological: Negative.    Psychiatric/Behavioral: Negative.       Objective:     Vital Signs (Most Recent):  Temp: 97.8 °F (36.6 °C) (04/17/24 1658)  Pulse: 89 (04/17/24 1658)  Resp: 18 (04/17/24 1658)  BP: (!) 120/46 (04/17/24 1658)  SpO2: 100 % (04/17/24 1658) Vital Signs (24h Range):  Temp:  [97.6 °F (36.4 °C)-97.8 °F (36.6 °C)] 97.8 °F (36.6 °C)  Pulse:  [81-89] 89  Resp:  [15-20] 18  SpO2:  [95 %-100 %] 100 %  BP: (120-152)/(46-69) 120/46     Weight: 74.8 kg (165 lb)  Body mass index is  31.18 kg/m².     Physical Exam  Constitutional:       General: She is not in acute distress.     Appearance: Normal appearance. She is obese.   HENT:      Head: Normocephalic and atraumatic.      Right Ear: Tympanic membrane and ear canal normal.      Left Ear: Tympanic membrane and ear canal normal.      Nose: Nose normal.      Mouth/Throat:      Mouth: Mucous membranes are moist.      Pharynx: Oropharynx is clear.   Eyes:      Extraocular Movements: Extraocular movements intact.      Pupils: Pupils are equal, round, and reactive to light.   Cardiovascular:      Rate and Rhythm: Normal rate and regular rhythm.      Pulses: Normal pulses.      Heart sounds: Normal heart sounds. No murmur heard.     No gallop.   Pulmonary:      Effort: Pulmonary effort is normal.      Breath sounds: Normal breath sounds.   Abdominal:      General: Abdomen is flat. Bowel sounds are normal.      Palpations: Abdomen is soft.      Comments: Ostomy sites are clean with no discharge good stool output through colostomy   Musculoskeletal:         General: Normal range of motion.      Cervical back: Normal range of motion.   Skin:     General: Skin is warm.      Capillary Refill: Capillary refill takes less than 2 seconds.   Neurological:      General: No focal deficit present.      Mental Status: She is alert and oriented to person, place, and time.   Psychiatric:         Mood and Affect: Mood normal.         Behavior: Behavior normal.         Thought Content: Thought content normal.         Judgment: Judgment normal.              CRANIAL NERVES     CN III, IV, VI   Pupils are equal, round, and reactive to light.       Significant Labs: All pertinent labs within the past 24 hours have been reviewed.  CBC:   Recent Labs   Lab 04/17/24  1247   WBC 9.37   HGB 8.5*   HCT 26.8*        CMP:   Recent Labs   Lab 04/17/24  1247   *   K 4.5   CO2 14*   BUN 42.0*   CREATININE 4.92*   CALCIUM 11.7*   ALBUMIN 2.9*   BILITOT 0.3   ALKPHOS 125  "  AST 12   ALT 6     Urine Culture: No results for input(s): "LABURIN" in the last 48 hours.  Urine Studies:   Recent Labs   Lab 04/17/24  1226   APPEARANCEUA Cloudy*   PROTEINUA 3+*   BILIRUBINUA Negative   UROBILINOGEN 0.2   LEUKOCYTESUR 3+*   RBCUA 21-50*   WBCUA >100*   BACTERIA Few*       Significant Imaging: I have reviewed all pertinent imaging results/findings within the past 24 hours.  "

## 2024-04-17 NOTE — ASSESSMENT & PLAN NOTE
Patient with acute kidney injury/acute renal failure likely due to pre-renal azotemia due to IVVD GHADA is currently worsening. Baseline creatinine  3-4  - Labs reviewed- Renal function/electrolytes with Estimated Creatinine Clearance: 9.4 mL/min (A) (based on SCr of 4.92 mg/dL (H)). according to latest data. Monitor urine output and serial BMP and adjust therapy as needed. Avoid nephrotoxins and renally dose meds for GFR listed above.  I have consulted Dr. Ventura of due to her metabolic acidosis as well as her slightly worsening creatinine it was reported as 7 today at the nursing home though it is back down in the 4s today at the hospital

## 2024-04-18 LAB
ANION GAP SERPL CALC-SCNC: 6 MEQ/L
BASOPHILS # BLD AUTO: 0.02 X10(3)/MCL
BASOPHILS NFR BLD AUTO: 0.2 %
BUN SERPL-MCNC: 42 MG/DL (ref 9.8–20.1)
CALCIUM SERPL-MCNC: 10.7 MG/DL (ref 8.4–10.2)
CHLORIDE SERPL-SCNC: 113 MMOL/L (ref 98–107)
CO2 SERPL-SCNC: 13 MMOL/L (ref 23–31)
CREAT SERPL-MCNC: 4.74 MG/DL (ref 0.55–1.02)
CREAT/UREA NIT SERPL: 9
EOSINOPHIL # BLD AUTO: 0.16 X10(3)/MCL (ref 0–0.9)
EOSINOPHIL NFR BLD AUTO: 1.8 %
ERYTHROCYTE [DISTWIDTH] IN BLOOD BY AUTOMATED COUNT: 13.7 % (ref 11.5–17)
GFR SERPLBLD CREATININE-BSD FMLA CKD-EPI: 9 MLS/MIN/1.73/M2
GLUCOSE SERPL-MCNC: 153 MG/DL (ref 82–115)
HCT VFR BLD AUTO: 25.3 % (ref 37–47)
HGB BLD-MCNC: 8.2 G/DL (ref 12–16)
IMM GRANULOCYTES # BLD AUTO: 0.04 X10(3)/MCL (ref 0–0.04)
IMM GRANULOCYTES NFR BLD AUTO: 0.5 %
LYMPHOCYTES # BLD AUTO: 0.81 X10(3)/MCL (ref 0.6–4.6)
LYMPHOCYTES NFR BLD AUTO: 9.2 %
MAGNESIUM SERPL-MCNC: 1.9 MG/DL (ref 1.6–2.6)
MCH RBC QN AUTO: 32 PG (ref 27–31)
MCHC RBC AUTO-ENTMCNC: 32.4 G/DL (ref 33–36)
MCV RBC AUTO: 98.8 FL (ref 80–94)
MONOCYTES # BLD AUTO: 0.41 X10(3)/MCL (ref 0.1–1.3)
MONOCYTES NFR BLD AUTO: 4.6 %
NEUTROPHILS # BLD AUTO: 7.39 X10(3)/MCL (ref 2.1–9.2)
NEUTROPHILS NFR BLD AUTO: 83.7 %
PHOSPHATE SERPL-MCNC: 4.1 MG/DL (ref 2.3–4.7)
PLATELET # BLD AUTO: 197 X10(3)/MCL (ref 130–400)
PMV BLD AUTO: 10.2 FL (ref 7.4–10.4)
POTASSIUM SERPL-SCNC: 4.2 MMOL/L (ref 3.5–5.1)
RBC # BLD AUTO: 2.56 X10(6)/MCL (ref 4.2–5.4)
SODIUM SERPL-SCNC: 132 MMOL/L (ref 136–145)
WBC # SPEC AUTO: 8.83 X10(3)/MCL (ref 4.5–11.5)

## 2024-04-18 PROCEDURE — 25000003 PHARM REV CODE 250: Performed by: FAMILY MEDICINE

## 2024-04-18 PROCEDURE — 11000001 HC ACUTE MED/SURG PRIVATE ROOM

## 2024-04-18 PROCEDURE — 94761 N-INVAS EAR/PLS OXIMETRY MLT: CPT

## 2024-04-18 PROCEDURE — 63600175 PHARM REV CODE 636 W HCPCS: Performed by: FAMILY MEDICINE

## 2024-04-18 PROCEDURE — 83735 ASSAY OF MAGNESIUM: CPT | Performed by: FAMILY MEDICINE

## 2024-04-18 PROCEDURE — 84100 ASSAY OF PHOSPHORUS: CPT | Performed by: FAMILY MEDICINE

## 2024-04-18 PROCEDURE — 27000207 HC ISOLATION

## 2024-04-18 PROCEDURE — 80048 BASIC METABOLIC PNL TOTAL CA: CPT | Performed by: FAMILY MEDICINE

## 2024-04-18 PROCEDURE — 85025 COMPLETE CBC W/AUTO DIFF WBC: CPT | Performed by: FAMILY MEDICINE

## 2024-04-18 RX ORDER — FAMOTIDINE 20 MG/1
20 TABLET, FILM COATED ORAL 2 TIMES DAILY
Status: DISCONTINUED | OUTPATIENT
Start: 2024-04-18 | End: 2024-04-19 | Stop reason: HOSPADM

## 2024-04-18 RX ADMIN — SODIUM BICARBONATE 650 MG: 650 TABLET ORAL at 06:04

## 2024-04-18 RX ADMIN — SODIUM BICARBONATE 650 MG: 650 TABLET ORAL at 08:04

## 2024-04-18 RX ADMIN — LINEZOLID 600 MG: 600 INJECTION, SOLUTION INTRAVENOUS at 04:04

## 2024-04-18 RX ADMIN — DULOXETINE HYDROCHLORIDE 30 MG: 30 CAPSULE, DELAYED RELEASE ORAL at 10:04

## 2024-04-18 RX ADMIN — SODIUM BICARBONATE 650 MG: 650 TABLET ORAL at 10:04

## 2024-04-18 RX ADMIN — LINEZOLID 600 MG: 600 INJECTION, SOLUTION INTRAVENOUS at 06:04

## 2024-04-18 RX ADMIN — FAMOTIDINE 20 MG: 20 TABLET ORAL at 08:04

## 2024-04-18 RX ADMIN — FERROUS SULFATE TAB 325 MG (65 MG ELEMENTAL FE) 1 EACH: 325 (65 FE) TAB at 10:04

## 2024-04-18 RX ADMIN — ENOXAPARIN SODIUM 30 MG: 100 INJECTION SUBCUTANEOUS at 06:04

## 2024-04-18 RX ADMIN — SODIUM CHLORIDE, SODIUM LACTATE, POTASSIUM CHLORIDE, CALCIUM CHLORIDE AND DEXTROSE MONOHYDRATE: 5; 600; 310; 30; 20 INJECTION, SOLUTION INTRAVENOUS at 04:04

## 2024-04-18 RX ADMIN — ERYTHROPOIETIN 40000 UNITS: 20000 INJECTION, SOLUTION INTRAVENOUS; SUBCUTANEOUS at 10:04

## 2024-04-18 RX ADMIN — AMLODIPINE BESYLATE 10 MG: 10 TABLET ORAL at 10:04

## 2024-04-18 RX ADMIN — SODIUM BICARBONATE 650 MG: 650 TABLET ORAL at 02:04

## 2024-04-18 RX ADMIN — Medication 250 MG: at 10:04

## 2024-04-18 RX ADMIN — QUETIAPINE FUMARATE 25 MG: 25 TABLET ORAL at 08:04

## 2024-04-18 RX ADMIN — SODIUM CHLORIDE, SODIUM LACTATE, POTASSIUM CHLORIDE, CALCIUM CHLORIDE AND DEXTROSE MONOHYDRATE: 5; 600; 310; 30; 20 INJECTION, SOLUTION INTRAVENOUS at 02:04

## 2024-04-18 RX ADMIN — FAMOTIDINE 20 MG: 20 TABLET ORAL at 10:04

## 2024-04-18 RX ADMIN — MEGESTROL ACETATE 40 MG: 20 TABLET ORAL at 10:04

## 2024-04-18 RX ADMIN — LEVOTHYROXINE SODIUM 137 MCG: 25 TABLET ORAL at 05:04

## 2024-04-18 NOTE — ASSESSMENT & PLAN NOTE
Patient with acute kidney injury/acute renal failure likely due to pre-renal azotemia due to IVVD GHADA is currently worsening. Baseline creatinine  3-4  - Labs reviewed- Renal function/electrolytes with Estimated Creatinine Clearance: 9.8 mL/min (A) (based on SCr of 4.74 mg/dL (H)). according to latest data. Monitor urine output and serial BMP and adjust therapy as needed. Avoid nephrotoxins and renally dose meds for GFR listed above.  I have consulted Dr. Marie due to her metabolic acidosis as well as her slightly worsening creatinine it was reported as 7 today at the nursing home though it is back down in the 4s today at the hospital

## 2024-04-18 NOTE — PROGRESS NOTES
[facility] Unit  [facility] Medicine  Progress Note    Patient Name: Kam Reyes  MRN: [id number]  Patient Class: IP- Inpatient   Admission Date: 2024  Length of Stay: 1 days  Attending Physician: Gregor Heaton MD  Primary Care Provider: Gregor Heaton MD        Subjective:     Principal Problem:<principal problem not specified>        HPI:  The patient is a 73-year-old  female known to me from clinic.  He has a known history of chronic renal insufficiency for which she has not on dialysis and has thus far refused dialysis.  Generally keeps a creatinine somewhere in the 3 or 4 range but today on check her creatinine had increased to over 7.  More importantly her CO2 level had dropped significantly to 14.  Despite being on bicarb.  She has a known infection with ESBL for which she was getting linezolid p.o. at the nursing home.  This infection continues at this time as evident by her urinalysis and cultures.  She states her urine has been clearing slightly and has improved.  He has not complain of any fever any diarrhea any abdominal pain at this point in time.  But she does have a decrease in her platelet count in his slightly anemic.  Patient has remained afebrile and has had no change in her mental status.    Overview/Hospital Course:  Feeling stronger today  No sob, no n/v, no cp  Still with low c02,  creatinine slightly decreased  H/h low    Past Medical History:   Diagnosis Date    Cancer, colon     Chronic pain     GERD (gastroesophageal reflux disease)     Renal disorder     Thyroiditis, unspecified        Past Surgical History:   Procedure Laterality Date    BACK SURGERY       SECTION      CHOLECYSTECTOMY      COLON SURGERY      EGD, WITH CLOSED BIOPSY N/A 2024    Procedure: EGD, WITH CLOSED BIOPSY;  Surgeon: Robson Tripp MD;  Location: [facility];  Service: Endoscopy;  Laterality: N/A;  A) BX DUODENUM, B) BX ANTRUM FOR H.PYLORI, C)  BX GE JUNCTION    ESOPHAGOGASTRODUODENOSCOPY N/A 2/22/2024    Procedure: EGD (ESOPHAGOGASTRODUODENOSCOPY);  Surgeon: Robson Tripp MD;  Location: [facility];  Service: Endoscopy;  Laterality: N/A;  A) DIFFUSE GASTRITIS OF ANTRUM & STOMACH    HYSTERECTOMY      KIDNEY SURGERY         Review of patient's allergies indicates:   Allergen Reactions    Oxaprozin Nausea And Vomiting and Swelling     Facial swelling      Sulfamethoxazole-trimethoprim Nausea And Vomiting     Severe nausea and vomiting    Doxycycline     Nsaids (non-steroidal anti-inflammatory drug) Rash    Sulfa (sulfonamide antibiotics) Nausea And Vomiting       Current Facility-Administered Medications   Medication Dose Route Frequency Provider Last Rate Last Admin    amLODIPine tablet 10 mg  10 mg Oral Daily Gregor Heaton MD        ascorbic acid (vitamin C) tablet 250 mg  250 mg Oral Daily Gregor Heaton MD        dextrose 5 % in lactated ringers infusion   Intravenous Continuous Gregor Heaton  mL/hr at 04/18/24 0423 New Bag at 04/18/24 0423    DULoxetine DR capsule 30 mg  30 mg Oral Daily Gregor Heaton MD        enoxaparin injection 30 mg  30 mg Subcutaneous Daily Gregor Heaton MD   30 mg at 04/17/24 1910    epoetin louis injection 40,000 Units  40,000 Units Subcutaneous Once Gregor Heaton MD        ferrous sulfate tablet 1 each  1 tablet Oral Daily Gregor Heaton MD        HYDROcodone-acetaminophen  mg per tablet 1 tablet  1 tablet Oral Q8H PRN Gregor Heaton MD        levothyroxine tablet 137 mcg  137 mcg Oral Before breakfast Gregor Heaton MD   137 mcg at 04/18/24 0512    linezolid 600 mg/300 mL IVPB 600 mg  600 mg Intravenous Q12H Gregor Heaton MD   Stopped at 04/18/24 0523    megestroL tablet 40 mg  40 mg Oral Daily Gregor Heaton MD        melatonin tablet 6 mg  6 mg Oral Nightly PRN Gregor Heaton MD        ondansetron injection 4 mg  4 mg Intravenous Q8H PRN Sudarshan  Gregor SANTORO MD        QUEtiapine tablet 25 mg  25 mg Oral QHS Gregor Heaton MD   25 mg at 04/17/24 2132    sodium bicarbonate tablet 650 mg  650 mg Oral QID Gregor Heaton MD   650 mg at 04/17/24 2132    sodium chloride 0.9% flush 10 mL  10 mL Intravenous PRN Gregor Heaton MD         Family History       Problem Relation (Age of Onset)    Hypertension Mother          Tobacco Use    Smoking status: Never    Smokeless tobacco: Never   Substance and Sexual Activity    Alcohol use: Never    Drug use: Never    Sexual activity: Not Currently     Review of Systems   Constitutional:  Positive for activity change. Negative for fatigue and fever.   HENT: Negative.     Eyes: Negative.    Respiratory: Negative.     Cardiovascular: Negative.    Gastrointestinal: Negative.    Endocrine: Negative.    Genitourinary: Negative.    Musculoskeletal: Negative.    Skin: Negative.    Allergic/Immunologic: Negative.    Neurological: Negative.    Hematological: Negative.    Psychiatric/Behavioral: Negative.       Objective:     Vital Signs (Most Recent):  Temp: 98.4 °F (36.9 °C) (04/18/24 0430)  Pulse: 86 (04/18/24 0430)  Resp: 18 (04/17/24 1658)  BP: 116/66 (04/18/24 0430)  SpO2: 100 % (04/18/24 0430) Vital Signs (24h Range):  Temp:  [97.6 °F (36.4 °C)-98.4 °F (36.9 °C)] 98.4 °F (36.9 °C)  Pulse:  [81-89] 86  Resp:  [15-20] 18  SpO2:  [95 %-100 %] 100 %  BP: ()/(46-69) 116/66     Weight: 74.8 kg (164 lb 14.5 oz)  Body mass index is 31.16 kg/m².     Physical Exam  Constitutional:       General: She is not in acute distress.     Appearance: Normal appearance. She is obese.   HENT:      Head: Normocephalic and atraumatic.      Right Ear: Tympanic membrane and ear canal normal.      Left Ear: Tympanic membrane and ear canal normal.      Nose: Nose normal.      Mouth/Throat:      Mouth: Mucous membranes are moist.      Pharynx: Oropharynx is clear.   Eyes:      Extraocular Movements: Extraocular movements intact.       "Pupils: Pupils are equal, round, and reactive to light.   Cardiovascular:      Rate and Rhythm: Normal rate and regular rhythm.      Pulses: Normal pulses.      Heart sounds: Normal heart sounds. No murmur heard.     No gallop.   Pulmonary:      Effort: Pulmonary effort is normal.      Breath sounds: Normal breath sounds.   Abdominal:      General: Abdomen is flat. Bowel sounds are normal.      Palpations: Abdomen is soft.      Comments: Ostomy sites are clean with no discharge good stool output through colostomy   Musculoskeletal:         General: Normal range of motion.      Cervical back: Normal range of motion.   Skin:     General: Skin is warm.      Capillary Refill: Capillary refill takes less than 2 seconds.   Neurological:      General: No focal deficit present.      Mental Status: She is alert and oriented to person, place, and time.   Psychiatric:         Mood and Affect: Mood normal.         Behavior: Behavior normal.         Thought Content: Thought content normal.         Judgment: Judgment normal.              CRANIAL NERVES     CN III, IV, VI   Pupils are equal, round, and reactive to light.       Significant Labs: All pertinent labs within the past 24 hours have been reviewed.  CBC:   Recent Labs   Lab 04/17/24  1247 04/18/24  0509   WBC 9.37 8.83   HGB 8.5* 8.2*   HCT 26.8* 25.3*    197     CMP:   Recent Labs   Lab 04/17/24  1247 04/18/24  0509   * 132*   K 4.5 4.2   CO2 14* 13*   BUN 42.0* 42.0*   CREATININE 4.92* 4.74*   CALCIUM 11.7* 10.7*   ALBUMIN 2.9*  --    BILITOT 0.3  --    ALKPHOS 125  --    AST 12  --    ALT 6  --      Urine Culture: No results for input(s): "LABURIN" in the last 48 hours.  Urine Studies:   Recent Labs   Lab 04/17/24  1226   APPEARANCEUA Cloudy*   PROTEINUA 3+*   BILIRUBINUA Negative   UROBILINOGEN 0.2   LEUKOCYTESUR 3+*   RBCUA 21-50*   WBCUA >100*   BACTERIA Few*       Significant Imaging: I have reviewed all pertinent imaging results/findings within the " past 24 hours.    Assessment/Plan:      Metabolic acidosis  Continue p.o. bicarb 3 times a day      Acute kidney injury superimposed on chronic kidney disease  Patient with acute kidney injury/acute renal failure likely due to pre-renal azotemia due to IVVD GHADA is currently worsening. Baseline creatinine  3-4  - Labs reviewed- Renal function/electrolytes with Estimated Creatinine Clearance: 9.8 mL/min (A) (based on SCr of 4.74 mg/dL (H)). according to latest data. Monitor urine output and serial BMP and adjust therapy as needed. Avoid nephrotoxins and renally dose meds for GFR listed above.  I have consulted Dr. Marie due to her metabolic acidosis as well as her slightly worsening creatinine it was reported as 7 today at the nursing home though it is back down in the 4s today at the hospital    Multiple drug resistant organism (MDRO) culture positive  ESBL urinary tract infection sensitive to vancomycin however with her quit kidney issues and rapidly deteriorating kidney function we will keep her on linezolid twice a day      Acute UTI  ESBL we will continue antibiotic therapy with linezolid.      Vesicovaginal fistula  To source of her repeated urinary tract infections      Hypertension  Chronic, controlled. Latest blood pressure and vitals reviewed-     Temp:  [97.6 °F (36.4 °C)-97.8 °F (36.6 °C)]   Pulse:  [81-89]   Resp:  [15-20]   BP: (120-152)/(46-69)   SpO2:  [95 %-100 %] .  Stable on home medications  Home meds for hypertension were reviewed and noted below.   Hypertension Medications               amLODIPine (NORVASC) 10 MG tablet Take 1 tablet (10 mg total) by mouth once daily.            While in the hospital, will manage blood pressure as follows; Continue home antihypertensive regimen    Will utilize p.r.n. blood pressure medication only if patient's blood pressure greater than 160/100 and she develops symptoms such as worsening chest pain or shortness of breath.    Hypothyroid  Continue Synthroid at  current dose      Personal history of other malignant neoplasm of large intestine  Distant history no recent treatment      Recurrent major depression  Controlled currently.  We will continue duloxetine    Ileostomy status  Continue supportive care      GERD (gastroesophageal reflux disease)  Pepcid twice a day      Anemia of chronic kidney failure, stage 4 (severe)  Patient's anemia is currently controlled. Has not received any PRBCs to date. Etiology likely d/t chronic disease due to Chronic Kidney Disease  Current CBC reviewed-   Lab Results   Component Value Date    HGB 8.2 (L) 04/18/2024    HCT 25.3 (L) 04/18/2024     Monitor serial CBC and transfuse if patient becomes hemodynamically unstable, symptomatic or H/H drops below 7/21.  Procrit 40 k units now      VTE Risk Mitigation (From admission, onward)           Ordered     enoxaparin injection 30 mg  Daily         04/17/24 1632     IP VTE HIGH RISK PATIENT  Once         04/17/24 1632     Place sequential compression device  Until discontinued         04/17/24 1632                    Discharge Planning   JOSHUA:      Code Status: Full Code   Is the patient medically ready for discharge?:     Reason for patient still in hospital (select all that apply): Patient trending condition                     Gregor Heaton MD  Department of Hospital Medicine   [facility]

## 2024-04-18 NOTE — PLAN OF CARE
Problem: Adult Inpatient Plan of Care  Goal: Plan of Care Review  Outcome: Ongoing, Progressing  Goal: Patient-Specific Goal (Individualized)  Outcome: Ongoing, Progressing  Goal: Absence of Hospital-Acquired Illness or Injury  Outcome: Ongoing, Progressing  Goal: Optimal Comfort and Wellbeing  Outcome: Ongoing, Progressing  Goal: Readiness for Transition of Care  Outcome: Ongoing, Progressing     Problem: Fluid and Electrolyte Imbalance (Acute Kidney Injury/Impairment)  Goal: Fluid and Electrolyte Balance  Outcome: Ongoing, Progressing     Problem: Oral Intake Inadequate (Acute Kidney Injury/Impairment)  Goal: Optimal Nutrition Intake  Outcome: Ongoing, Progressing     Problem: Renal Function Impairment (Acute Kidney Injury/Impairment)  Goal: Effective Renal Function  Outcome: Ongoing, Progressing     Problem: Skin Injury Risk Increased  Goal: Skin Health and Integrity  Outcome: Ongoing, Progressing     Problem: Pain Chronic (Persistent)  Goal: Acceptable Pain Control and Functional Ability  Outcome: Ongoing, Progressing

## 2024-04-18 NOTE — CONSULTS
[facility]  [address] [place] [address]  Phone: [phone]    Department of Nephrology  Consult Note      PATIENT NAME: Kam Reyes    MRN: [id number]  TODAY'S DATE: 2024  ADMIT DATE: 2024                          CONSULT REQUESTED BY: Gregor Heaton MD    SUBJECTIVE     PRINCIPAL PROBLEM: <principal problem not specified>      REASON FOR CONSULT:   acute kidney injury on chronic kidney disease      HPI:   Patient is very well known to me 73-year-old  female history of urostomy ileostomy.  Apparently she had an increase in ostomy output and had lab work done and was noted to have a very elevated creatinine as well as  a non gap metabolic acidosis she was therefore admitted to the hospital.  She had  no overt symptoms or fevers or chills at the time of being admitted.        Review of patient's allergies indicates:   Allergen Reactions    Oxaprozin Nausea And Vomiting and Swelling     Facial swelling      Sulfamethoxazole-trimethoprim Nausea And Vomiting     Severe nausea and vomiting    Doxycycline     Nsaids (non-steroidal anti-inflammatory drug) Rash    Sulfa (sulfonamide antibiotics) Nausea And Vomiting       Past Medical History:   Diagnosis Date    Cancer, colon     Chronic pain     GERD (gastroesophageal reflux disease)     Renal disorder     Thyroiditis, unspecified      Past Surgical History:   Procedure Laterality Date    BACK SURGERY       SECTION      CHOLECYSTECTOMY      COLON SURGERY      EGD, WITH CLOSED BIOPSY N/A 2024    Procedure: EGD, WITH CLOSED BIOPSY;  Surgeon: Robson Tripp MD;  Location: [facility];  Service: Endoscopy;  Laterality: N/A;  A) BX DUODENUM, B) BX ANTRUM FOR H.PYLORI, C) BX GE JUNCTION    ESOPHAGOGASTRODUODENOSCOPY N/A 2024    Procedure: EGD (ESOPHAGOGASTRODUODENOSCOPY);  Surgeon: Robson Tripp MD;  Location: [facility];  Service: Endoscopy;  Laterality: N/A;  A) DIFFUSE GASTRITIS OF  ANTRUM & STOMACH    HYSTERECTOMY      KIDNEY SURGERY       Social History     Tobacco Use    Smoking status: Never    Smokeless tobacco: Never   Substance Use Topics    Alcohol use: Never    Drug use: Never        Review of Systems   Respiratory:  Negative for cough, shortness of breath and wheezing.    Cardiovascular:  Negative for leg swelling.   Gastrointestinal:  Negative for blood in stool and vomiting.   Neurological:  Positive for light-headedness.   All other systems reviewed and are negative.      OBJECTIVE     VITAL SIGNS (Most Recent)  Temp: 98.3 °F (36.8 °C) (04/18/24 1325)  Pulse: 85 (04/18/24 1325)  Resp: 14 (04/18/24 1224)  BP: 107/63 (04/18/24 1325)  SpO2: 100 % (04/18/24 1325)    VENTILATION STATUS  Resp: 14 (04/18/24 1224)  SpO2: 100 % (04/18/24 1325)           I & O (Last 24H):  Intake/Output Summary (Last 24 hours) at 4/18/2024 1516  Last data filed at 4/18/2024 1200  Gross per 24 hour   Intake 420 ml   Output 1175 ml   Net -755 ml       WEIGHTS  Wt Readings from Last 3 Encounters:   04/17/24 1658 74.8 kg (164 lb 14.5 oz)   04/17/24 1212 74.8 kg (165 lb)   02/22/24 0500 72.8 kg (160 lb 8 oz)   02/05/24 1118 79.8 kg (176 lb)   01/26/24 1841 79.8 kg (176 lb)   01/26/24 1635 79.8 kg (176 lb)       Physical Exam  Constitutional:       General: She is not in acute distress.     Appearance: She is not ill-appearing.      Comments:   Patient awake alert and oriented x3   HENT:      Head: Normocephalic and atraumatic.   Eyes:      Extraocular Movements: Extraocular movements intact.      Pupils: Pupils are equal, round, and reactive to light.   Neck:      Comments:  No JVD  Cardiovascular:      Rate and Rhythm: Normal rate and regular rhythm.      Heart sounds: No murmur heard.     No gallop.   Pulmonary:      Effort: Pulmonary effort is normal.      Breath sounds: Normal breath sounds.   Abdominal:      General: Abdomen is flat.      Tenderness: There is no abdominal tenderness. There is no right CVA  tenderness, left CVA tenderness, guarding or rebound.   Musculoskeletal:      Cervical back: No tenderness.      Right lower leg: No edema.      Comments:  Patient has a thrill over her left upper arm fistula but a very short systolic bruit with no diastolic bruit present.   Skin:     Capillary Refill: Capillary refill takes less than 2 seconds.      Findings: No rash.   Neurological:      General: No focal deficit present.   Psychiatric:         Mood and Affect: Mood normal.         HOME MEDICATIONS:  No current facility-administered medications on file prior to encounter.     Current Outpatient Medications on File Prior to Encounter   Medication Sig Dispense Refill    amLODIPine (NORVASC) 10 MG tablet Take 1 tablet (10 mg total) by mouth once daily. 30 tablet 11    ascorbic acid, vitamin C, (VITAMIN C) 100 MG tablet Take 100 mg by mouth once daily.      calcitRIOL (ROCALTROL) 0.5 MCG Cap Take 0.5 mcg by mouth once daily.      clopidogreL (PLAVIX) 75 mg tablet Take 75 mg by mouth once daily.      ferrous sulfate 325 (65 FE) MG EC tablet Take 325 mg by mouth 3 (three) times daily with meals.      HYDROcodone-acetaminophen (NORCO)  mg per tablet Take 1 tablet by mouth.      levothyroxine (SYNTHROID) 100 MCG tablet Take 137 mcg by mouth before breakfast.      linezolid (ZYVOX) 600 mg Tab Take 1 tablet (600 mg total) by mouth every 12 (twelve) hours. 10 tablet 0    omeprazole (PRILOSEC) 40 MG capsule Take 40 mg by mouth once daily.      QUEtiapine (SEROQUEL) 25 MG Tab Take by mouth.      sodium bicarbonate 650 MG tablet Take 1 tablet (650 mg total) by mouth 2 (two) times daily. 60 tablet 11    DULoxetine (CYMBALTA) 30 MG capsule Take 30 mg by mouth once daily.      megestroL (MEGACE) 40 MG Tab Take 1 tablet (40 mg total) by mouth once daily. 30 tablet 11    sodium bicarbonate 650 MG tablet Take 650 mg by mouth 4 (four) times daily.         SCHEDULED MEDS:  Current Facility-Administered Medications   Medication  "Dose Route Frequency    amLODIPine  10 mg Oral Daily    ascorbic acid (vitamin C)  250 mg Oral Daily    DULoxetine  30 mg Oral Daily    enoxparin  30 mg Subcutaneous Daily    famotidine  20 mg Oral BID    ferrous sulfate  1 tablet Oral Daily    levothyroxine  137 mcg Oral Before breakfast    linezolid  600 mg Intravenous Q12H    megestroL  40 mg Oral Daily    QUEtiapine  25 mg Oral QHS    sodium bicarbonate  650 mg Oral QID       CONTINUOUS INFUSIONS:  Current Facility-Administered Medications   Medication Dose Route Frequency Last Rate Last Admin    dextrose 5% lactated ringers   Intravenous Continuous 125 mL/hr at 04/18/24 1427 New Bag at 04/18/24 1427       PRN MEDS:  Current Facility-Administered Medications:     HYDROcodone-acetaminophen, 1 tablet, Oral, Q8H PRN    melatonin, 6 mg, Oral, Nightly PRN    ondansetron, 4 mg, Intravenous, Q8H PRN    sodium chloride 0.9%, 10 mL, Intravenous, PRN    LABS AND DIAGNOSTICS     CBC LAST 3 DAYS  Recent Labs   Lab 04/17/24  1247 04/18/24  0509   WBC 9.37 8.83   RBC 2.64* 2.56*   HGB 8.5* 8.2*   HCT 26.8* 25.3*   .5* 98.8*   MCH 32.2* 32.0*   MCHC 31.7* 32.4*   RDW 14.2 13.7    197   MPV 10.4 10.2       COAGULATION LAST 3 DAYS  No results for input(s): "LABPT", "INR", "APTT" in the last 168 hours.    CHEMISTRY LAST 3 DAYS  Recent Labs   Lab 04/17/24  1247 04/18/24  0509   * 132*   K 4.5 4.2   CO2 14* 13*   BUN 42.0* 42.0*   CREATININE 4.92* 4.74*   CALCIUM 11.7* 10.7*   MG  --  1.90   ALBUMIN 2.9*  --    ALKPHOS 125  --    ALT 6  --    AST 12  --    BILITOT 0.3  --        CARDIAC PROFILE LAST 3 DAYS  No results for input(s): "BNP", "CPK", "CPKMB", "LDH", "TROPONINI" in the last 168 hours.    ENDOCRINE LAST 3 DAYS  No results for input(s): "TSH", "PROCAL" in the last 168 hours.    LAST ARTERIAL BLOOD GAS  ABG  No results for input(s): "PH", "PO2", "PCO2", "HCO3", "BE" in the last 168 hours.    LAST 7 DAYS MICROBIOLOGY   Microbiology Results (last 7 days)  "      Procedure Component Value Units Date/Time    Urine culture [[id number]]  (Abnormal) Collected: 04/17/24 1226    Order Status: Completed Specimen: Urine Updated: 04/18/24 0856     Urine Culture >/= 100,000 colonies/ml Gram-negative Rods    Blood culture #1 **CANNOT BE ORDERED STAT** [[id number]] Collected: 04/17/24 1247    Order Status: Resulted Specimen: Blood from Arm, Right Updated: 04/17/24 1251            MOST RECENT IMAGING  X-Ray Shoulder 2 or More Views Left  Narrative: EXAMINATION:  XR SHOULDER COMPLETE 2 OR MORE VIEWS LEFT    CLINICAL HISTORY:  left humerus fracture;    TECHNIQUE:  Three views of the left shoulder were performed.    COMPARISON  02/06/2024    FINDINGS:  BONES: Proximal humerus fracture is unchanged in alignment from previous exam.  There is some remodeling and early callus formation.  Fracture remains ununited at this time.  No dislocation.    SOFT TISSUES:  Partially imaged enlarged cardiac silhouette.  Stent at the left axilla.  Impression: Healing fracture of the proximal left humerus in unchanged alignment.  Fracture remains ununited at this time.    Electronically signed by: Alayna Aleman  Date:    02/23/2024  Time:    09:16      ECHOCARDIOGRAM RESULTS (last 5)  No results found for this or any previous visit.      CURRENT/PREVIOUS VISIT EKG  No results found for this or any previous visit.        ASSESSMENT/PLAN:     Active Hospital Problems    Diagnosis    Multiple drug resistant organism (MDRO) culture positive    Acute kidney injury superimposed on chronic kidney disease    Metabolic acidosis    Hypertension    Acute UTI    Anemia of chronic kidney failure, stage 4 (severe)    Ileostomy status    Recurrent major depression    Personal history of other malignant neoplasm of large intestine    GERD (gastroesophageal reflux disease)    Hypothyroid    Vesicovaginal fistula       ASSESSMENT & PLAN:     73-year-old  female with noted past medical history admitted for  acute kidney injury on chronic kidney disease receiving   Only Zyvox at this point time.    RECOMMENDATIONS:    Gentle fluid rehydration continue bicarbonate avoidance of nephrotoxins.  Patient did test positive for Klebsiella pneumoniae in the urine which is ESBL positive; however she is not running a fever nor has she had a white blood cell count elevated.  Given the physical exam of her AV fistula she probably should be evaluated by Dr. Rossi a vascular surgeon at discharge.        Lester Max MD  Department of Nephrology  Date of Service: 04/18/2024  3:16 PM

## 2024-04-18 NOTE — HOSPITAL COURSE
The patient is a 73-year-old  female known to me from clinic.  He has a known history of chronic renal insufficiency for which she has not on dialysis and has thus far refused dialysis.  Generally keeps a creatinine somewhere in the 3 or 4 range but today on check her creatinine had increased to over 7.  More importantly her CO2 level had dropped significantly to 14.  Despite being on bicarb.  She has a known infection with ESBL for which she was getting linezolid p.o. at the nursing home.  This infection continues at this time as evident by her urinalysis and cultures.  She states her urine has been clearing slightly and has improved.  He has not complain of any fever any diarrhea any abdominal pain at this point in time.  But she does have a decrease in her platelet count in his slightly anemic.  Patient  remained afebrile and had no change in her mental status.    Dr. Santana was consulted feels like decrease in CO2 does not necessarily for fluid true acidosis at gap.  Moreover her kidney function stabilized during stay by outpatient 7 down baseline poor.  She tolerated her antibiotic therapy with clearing of urine no nausea or vomiting.  No fever throughout her stay. she was back her baseline .

## 2024-04-18 NOTE — PLAN OF CARE
Problem: Adult Inpatient Plan of Care  Goal: Plan of Care Review  Outcome: Ongoing, Progressing  Goal: Patient-Specific Goal (Individualized)  Outcome: Ongoing, Progressing  Goal: Absence of Hospital-Acquired Illness or Injury  Outcome: Ongoing, Progressing  Goal: Optimal Comfort and Wellbeing  Outcome: Ongoing, Progressing  Goal: Readiness for Transition of Care  Outcome: Ongoing, Progressing     Problem: Fluid and Electrolyte Imbalance (Acute Kidney Injury/Impairment)  Goal: Fluid and Electrolyte Balance  Outcome: Ongoing, Progressing     Problem: Oral Intake Inadequate (Acute Kidney Injury/Impairment)  Goal: Optimal Nutrition Intake  Outcome: Ongoing, Progressing     Problem: Renal Function Impairment (Acute Kidney Injury/Impairment)  Goal: Effective Renal Function  Outcome: Ongoing, Progressing     Problem: Skin Injury Risk Increased  Goal: Skin Health and Integrity  Outcome: Ongoing, Progressing     Problem: Pain Chronic (Persistent)  Goal: Acceptable Pain Control and Functional Ability  Outcome: Ongoing, Progressing     Problem: UTI (Urinary Tract Infection)  Goal: Improved Infection Symptoms  Outcome: Ongoing, Progressing     Problem: Anemia  Goal: Anemia Symptom Improvement  Outcome: Ongoing, Progressing     Problem: Fluid, Electrolyte and Nutrition Imbalance (Ileostomy)  Goal: Fluid, Electrolyte and Nutrition Balance  Outcome: Ongoing, Progressing     Problem: Postoperative Stoma Care (Ileostomy)  Goal: Optimal Stoma Healing  Outcome: Ongoing, Progressing

## 2024-04-18 NOTE — PLAN OF CARE
04/18/24 1453   Discharge Assessment   Assessment Type Discharge Planning Assessment   Source of Information patient   People in Home child(willam), adult   Do you expect to return to your current living situation? Yes   Do you have help at home or someone to help you manage your care at home? Yes   Who are your caregiver(s) and their phone number(s)? Lives with her son.   Prior to hospitilization cognitive status: Alert/Oriented;No Deficits   Current cognitive status: Alert/Oriented;No Deficits   Equipment Currently Used at Home walker, standard;cane, straight;wheelchair   Do you currently have service(s) that help you manage your care at home? No   Do you take prescription medications? Yes   Do you have prescription coverage? Yes   Do you have any problems affording any of your prescribed medications? No   Is the patient taking medications as prescribed? yes   Who is going to help you get home at discharge? son   How do you get to doctors appointments? family or friend will provide   Are you on dialysis? No   Do you take coumadin? No   Discharge Plan A Home with family   Discharge Plan B Home with family   DME Needed Upon Discharge  none   Discharge Plan discussed with: Patient   Transition of Care Barriers None   Physical Activity   On average, how many days per week do you engage in moderate to strenuous exercise (like a brisk walk)? Pt Declined   On average, how many minutes do you engage in exercise at this level? Pt Declined   Financial Resource Strain   How hard is it for you to pay for the very basics like food, housing, medical care, and heating? Pt Declined   Housing Stability   In the last 12 months, was there a time when you were not able to pay the mortgage or rent on time? Pt Declined   At any time in the past 12 months, were you homeless or living in a shelter (including now)? Pt Declined   Transportation Needs   In the past 12 months, has lack of transportation kept you from medical appointments or  from getting medications? Pt Declined   In the past 12 months, has lack of transportation kept you from meetings, work, or from getting things needed for daily living? Pt Declined   Food Insecurity   Within the past 12 months, you worried that your food would run out before you got the money to buy more. Pt Declined   Within the past 12 months, the food you bought just didn't last and you didn't have money to get more. Pt Declined   Stress   Do you feel stress - tense, restless, nervous, or anxious, or unable to sleep at night because your mind is troubled all the time - these days? Pt Declined   Social Connections   In a typical week, how many times do you talk on the phone with family, friends, or neighbors? Pt Declined   How often do you get together with friends or relatives? Pt Declined   How often do you attend [employer] or Jainism services? Pt Declined   Do you belong to any clubs or organizations such as [employer] groups, unions, fraternal or athletic groups, or school groups? Pt Declined   How often do you attend meetings of the clubs or organizations you belong to? Pt Declined   Are you , , , , never , or living with a partner? Pt Declined   Alcohol Use   Q1: How often do you have a drink containing alcohol? Pt Declined   Q2: How many drinks containing alcohol do you have on a typical day when you are drinking? Pt Declined   Q3: How often do you have six or more drinks on one occasion? Pt Declined     No needs.

## 2024-04-18 NOTE — SUBJECTIVE & OBJECTIVE
Past Medical History:   Diagnosis Date    Cancer, colon     Chronic pain     GERD (gastroesophageal reflux disease)     Renal disorder     Thyroiditis, unspecified        Past Surgical History:   Procedure Laterality Date    BACK SURGERY       SECTION      CHOLECYSTECTOMY      COLON SURGERY      EGD, WITH CLOSED BIOPSY N/A 2024    Procedure: EGD, WITH CLOSED BIOPSY;  Surgeon: Robson Tripp MD;  Location: [facility];  Service: Endoscopy;  Laterality: N/A;  A) BX DUODENUM, B) BX ANTRUM FOR H.PYLORI, C) BX GE JUNCTION    ESOPHAGOGASTRODUODENOSCOPY N/A 2024    Procedure: EGD (ESOPHAGOGASTRODUODENOSCOPY);  Surgeon: Robson Tripp MD;  Location: [facility];  Service: Endoscopy;  Laterality: N/A;  A) DIFFUSE GASTRITIS OF ANTRUM & STOMACH    HYSTERECTOMY      KIDNEY SURGERY         Review of patient's allergies indicates:   Allergen Reactions    Oxaprozin Nausea And Vomiting and Swelling     Facial swelling      Sulfamethoxazole-trimethoprim Nausea And Vomiting     Severe nausea and vomiting    Doxycycline     Nsaids (non-steroidal anti-inflammatory drug) Rash    Sulfa (sulfonamide antibiotics) Nausea And Vomiting       Current Facility-Administered Medications   Medication Dose Route Frequency Provider Last Rate Last Admin    amLODIPine tablet 10 mg  10 mg Oral Daily Gregor Heaton MD        ascorbic acid (vitamin C) tablet 250 mg  250 mg Oral Daily Gregor Heaton MD        dextrose 5 % in lactated ringers infusion   Intravenous Continuous Gregor Heaton  mL/hr at 24 0423 New Bag at 24 0423    DULoxetine DR capsule 30 mg  30 mg Oral Daily Gregor Heaton MD        enoxaparin injection 30 mg  30 mg Subcutaneous Daily Gregor Heaton MD   30 mg at 24 1910    epoetin louis injection 40,000 Units  40,000 Units Subcutaneous Once Gregor Heaton MD        ferrous sulfate tablet 1 each  1 tablet Oral Daily Gregor Heaton MD         HYDROcodone-acetaminophen  mg per tablet 1 tablet  1 tablet Oral Q8H PRN Gregor Heaton MD        levothyroxine tablet 137 mcg  137 mcg Oral Before breakfast Gregor Heaton MD   137 mcg at 04/18/24 0512    linezolid 600 mg/300 mL IVPB 600 mg  600 mg Intravenous Q12H Gregor Heaton MD   Stopped at 04/18/24 0523    megestroL tablet 40 mg  40 mg Oral Daily Gregor Heaton MD        melatonin tablet 6 mg  6 mg Oral Nightly PRN Gregor Heaton MD        ondansetron injection 4 mg  4 mg Intravenous Q8H PRN Gregor Heaton MD        QUEtiapine tablet 25 mg  25 mg Oral QHS Gregor Heaton MD   25 mg at 04/17/24 2132    sodium bicarbonate tablet 650 mg  650 mg Oral QID Gregor Heaton MD   650 mg at 04/17/24 2132    sodium chloride 0.9% flush 10 mL  10 mL Intravenous PRN Gregor Heaton MD         Family History       Problem Relation (Age of Onset)    Hypertension Mother          Tobacco Use    Smoking status: Never    Smokeless tobacco: Never   Substance and Sexual Activity    Alcohol use: Never    Drug use: Never    Sexual activity: Not Currently     Review of Systems   Constitutional:  Positive for activity change. Negative for fatigue and fever.   HENT: Negative.     Eyes: Negative.    Respiratory: Negative.     Cardiovascular: Negative.    Gastrointestinal: Negative.    Endocrine: Negative.    Genitourinary: Negative.    Musculoskeletal: Negative.    Skin: Negative.    Allergic/Immunologic: Negative.    Neurological: Negative.    Hematological: Negative.    Psychiatric/Behavioral: Negative.       Objective:     Vital Signs (Most Recent):  Temp: 98.4 °F (36.9 °C) (04/18/24 0430)  Pulse: 86 (04/18/24 0430)  Resp: 18 (04/17/24 1658)  BP: 116/66 (04/18/24 0430)  SpO2: 100 % (04/18/24 0430) Vital Signs (24h Range):  Temp:  [97.6 °F (36.4 °C)-98.4 °F (36.9 °C)] 98.4 °F (36.9 °C)  Pulse:  [81-89] 86  Resp:  [15-20] 18  SpO2:  [95 %-100 %] 100 %  BP: ()/(46-69) 116/66      Weight: 74.8 kg (164 lb 14.5 oz)  Body mass index is 31.16 kg/m².     Physical Exam  Constitutional:       General: She is not in acute distress.     Appearance: Normal appearance. She is obese.   HENT:      Head: Normocephalic and atraumatic.      Right Ear: Tympanic membrane and ear canal normal.      Left Ear: Tympanic membrane and ear canal normal.      Nose: Nose normal.      Mouth/Throat:      Mouth: Mucous membranes are moist.      Pharynx: Oropharynx is clear.   Eyes:      Extraocular Movements: Extraocular movements intact.      Pupils: Pupils are equal, round, and reactive to light.   Cardiovascular:      Rate and Rhythm: Normal rate and regular rhythm.      Pulses: Normal pulses.      Heart sounds: Normal heart sounds. No murmur heard.     No gallop.   Pulmonary:      Effort: Pulmonary effort is normal.      Breath sounds: Normal breath sounds.   Abdominal:      General: Abdomen is flat. Bowel sounds are normal.      Palpations: Abdomen is soft.      Comments: Ostomy sites are clean with no discharge good stool output through colostomy   Musculoskeletal:         General: Normal range of motion.      Cervical back: Normal range of motion.   Skin:     General: Skin is warm.      Capillary Refill: Capillary refill takes less than 2 seconds.   Neurological:      General: No focal deficit present.      Mental Status: She is alert and oriented to person, place, and time.   Psychiatric:         Mood and Affect: Mood normal.         Behavior: Behavior normal.         Thought Content: Thought content normal.         Judgment: Judgment normal.              CRANIAL NERVES     CN III, IV, VI   Pupils are equal, round, and reactive to light.       Significant Labs: All pertinent labs within the past 24 hours have been reviewed.  CBC:   Recent Labs   Lab 04/17/24  1247 04/18/24  0509   WBC 9.37 8.83   HGB 8.5* 8.2*   HCT 26.8* 25.3*    197     CMP:   Recent Labs   Lab 04/17/24  1247 04/18/24  0509   *  "132*   K 4.5 4.2   CO2 14* 13*   BUN 42.0* 42.0*   CREATININE 4.92* 4.74*   CALCIUM 11.7* 10.7*   ALBUMIN 2.9*  --    BILITOT 0.3  --    ALKPHOS 125  --    AST 12  --    ALT 6  --      Urine Culture: No results for input(s): "LABURIN" in the last 48 hours.  Urine Studies:   Recent Labs   Lab 04/17/24  1226   APPEARANCEUA Cloudy*   PROTEINUA 3+*   BILIRUBINUA Negative   UROBILINOGEN 0.2   LEUKOCYTESUR 3+*   RBCUA 21-50*   WBCUA >100*   BACTERIA Few*       Significant Imaging: I have reviewed all pertinent imaging results/findings within the past 24 hours.  "

## 2024-04-18 NOTE — ASSESSMENT & PLAN NOTE
Patient's anemia is currently controlled. Has not received any PRBCs to date. Etiology likely d/t chronic disease due to Chronic Kidney Disease  Current CBC reviewed-   Lab Results   Component Value Date    HGB 8.2 (L) 04/18/2024    HCT 25.3 (L) 04/18/2024     Monitor serial CBC and transfuse if patient becomes hemodynamically unstable, symptomatic or H/H drops below 7/21.  Procrit 40 k units now

## 2024-04-19 VITALS
RESPIRATION RATE: 20 BRPM | HEART RATE: 89 BPM | SYSTOLIC BLOOD PRESSURE: 105 MMHG | DIASTOLIC BLOOD PRESSURE: 57 MMHG | OXYGEN SATURATION: 100 % | BODY MASS INDEX: 31.13 KG/M2 | TEMPERATURE: 98 F | HEIGHT: 61 IN | WEIGHT: 164.88 LBS

## 2024-04-19 PROCEDURE — 25000003 PHARM REV CODE 250: Performed by: FAMILY MEDICINE

## 2024-04-19 PROCEDURE — 63600175 PHARM REV CODE 636 W HCPCS: Performed by: FAMILY MEDICINE

## 2024-04-19 PROCEDURE — 94761 N-INVAS EAR/PLS OXIMETRY MLT: CPT

## 2024-04-19 RX ORDER — CIPROFLOXACIN 500 MG/1
500 TABLET ORAL EVERY 12 HOURS
Qty: 14 TABLET | Refills: 0 | Status: SHIPPED | OUTPATIENT
Start: 2024-04-19

## 2024-04-19 RX ORDER — LINEZOLID 600 MG/1
600 TABLET, FILM COATED ORAL EVERY 12 HOURS
Qty: 10 TABLET | Refills: 0 | Status: SHIPPED | OUTPATIENT
Start: 2024-04-19

## 2024-04-19 RX ORDER — CIPROFLOXACIN 500 MG/1
500 TABLET ORAL EVERY 12 HOURS
Status: DISCONTINUED | OUTPATIENT
Start: 2024-04-19 | End: 2024-04-19 | Stop reason: HOSPADM

## 2024-04-19 RX ADMIN — SODIUM CHLORIDE, SODIUM LACTATE, POTASSIUM CHLORIDE, CALCIUM CHLORIDE AND DEXTROSE MONOHYDRATE: 5; 600; 310; 30; 20 INJECTION, SOLUTION INTRAVENOUS at 10:04

## 2024-04-19 RX ADMIN — Medication 250 MG: at 10:04

## 2024-04-19 RX ADMIN — CIPROFLOXACIN 500 MG: 500 TABLET ORAL at 10:04

## 2024-04-19 RX ADMIN — SODIUM BICARBONATE 650 MG: 650 TABLET ORAL at 10:04

## 2024-04-19 RX ADMIN — LINEZOLID 600 MG: 600 INJECTION, SOLUTION INTRAVENOUS at 04:04

## 2024-04-19 RX ADMIN — MEGESTROL ACETATE 40 MG: 20 TABLET ORAL at 10:04

## 2024-04-19 RX ADMIN — FAMOTIDINE 20 MG: 20 TABLET ORAL at 10:04

## 2024-04-19 RX ADMIN — LEVOTHYROXINE SODIUM 137 MCG: 25 TABLET ORAL at 05:04

## 2024-04-19 RX ADMIN — SODIUM CHLORIDE, SODIUM LACTATE, POTASSIUM CHLORIDE, CALCIUM CHLORIDE AND DEXTROSE MONOHYDRATE: 5; 600; 310; 30; 20 INJECTION, SOLUTION INTRAVENOUS at 12:04

## 2024-04-19 RX ADMIN — AMLODIPINE BESYLATE 10 MG: 10 TABLET ORAL at 10:04

## 2024-04-19 RX ADMIN — FERROUS SULFATE TAB 325 MG (65 MG ELEMENTAL FE) 1 EACH: 325 (65 FE) TAB at 10:04

## 2024-04-19 RX ADMIN — DULOXETINE HYDROCHLORIDE 30 MG: 30 CAPSULE, DELAYED RELEASE ORAL at 10:04

## 2024-04-19 NOTE — PLAN OF CARE
Called [facility] and pt was supposed to d/c from SNF next week.  Pt does not have to return to NH.  Per nurse, pt wants to d/c home.  Informed nurse that she can d/c the patient home.

## 2024-04-19 NOTE — PLAN OF CARE
04/19/24 0929   Final Note   Assessment Type Final Discharge Note   Anticipated Discharge Disposition Home   Post-Acute Status   Discharge Delays None known at this time     Dc home, no needs.

## 2024-04-19 NOTE — PLAN OF CARE
Problem: Adult Inpatient Plan of Care  Goal: Plan of Care Review  Outcome: Ongoing, Progressing  Goal: Patient-Specific Goal (Individualized)  Outcome: Ongoing, Progressing  Goal: Absence of Hospital-Acquired Illness or Injury  Outcome: Ongoing, Progressing  Goal: Optimal Comfort and Wellbeing  Outcome: Ongoing, Progressing  Goal: Readiness for Transition of Care  Outcome: Ongoing, Progressing     Problem: Fluid and Electrolyte Imbalance (Acute Kidney Injury/Impairment)  Goal: Fluid and Electrolyte Balance  Outcome: Ongoing, Progressing     Problem: Oral Intake Inadequate (Acute Kidney Injury/Impairment)  Goal: Optimal Nutrition Intake  Outcome: Ongoing, Progressing     Problem: Renal Function Impairment (Acute Kidney Injury/Impairment)  Goal: Effective Renal Function  Outcome: Ongoing, Progressing     Problem: Skin Injury Risk Increased  Goal: Skin Health and Integrity  Outcome: Ongoing, Progressing     Problem: Pain Chronic (Persistent)  Goal: Acceptable Pain Control and Functional Ability  Outcome: Ongoing, Progressing     Problem: UTI (Urinary Tract Infection)  Goal: Improved Infection Symptoms  Outcome: Ongoing, Progressing     Problem: Anemia  Goal: Anemia Symptom Improvement  Outcome: Ongoing, Progressing     Problem: Fluid, Electrolyte and Nutrition Imbalance (Ileostomy)  Goal: Fluid, Electrolyte and Nutrition Balance  Outcome: Ongoing, Progressing     Problem: Postoperative Stoma Care (Ileostomy)  Goal: Optimal Stoma Healing  Outcome: Ongoing, Progressing     Problem: Infection  Goal: Absence of Infection Signs and Symptoms  Outcome: Ongoing, Progressing

## 2024-04-19 NOTE — PLAN OF CARE
Problem: Adult Inpatient Plan of Care  Goal: Plan of Care Review  Outcome: Met  Goal: Patient-Specific Goal (Individualized)  Outcome: Met  Goal: Absence of Hospital-Acquired Illness or Injury  Outcome: Met  Goal: Optimal Comfort and Wellbeing  Outcome: Met  Goal: Readiness for Transition of Care  Outcome: Met     Problem: Fluid and Electrolyte Imbalance (Acute Kidney Injury/Impairment)  Goal: Fluid and Electrolyte Balance  Outcome: Met     Problem: Oral Intake Inadequate (Acute Kidney Injury/Impairment)  Goal: Optimal Nutrition Intake  Outcome: Met     Problem: Fluid and Electrolyte Imbalance (Acute Kidney Injury/Impairment)  Goal: Fluid and Electrolyte Balance  Outcome: Met     Problem: Oral Intake Inadequate (Acute Kidney Injury/Impairment)  Goal: Optimal Nutrition Intake  Outcome: Met     Problem: Renal Function Impairment (Acute Kidney Injury/Impairment)  Goal: Effective Renal Function  Outcome: Met     Problem: Skin Injury Risk Increased  Goal: Skin Health and Integrity  Outcome: Met     Problem: Pain Chronic (Persistent)  Goal: Acceptable Pain Control and Functional Ability  Outcome: Met     Problem: UTI (Urinary Tract Infection)  Goal: Improved Infection Symptoms  Outcome: Met     Problem: Anemia  Goal: Anemia Symptom Improvement  Outcome: Met     Problem: Fluid, Electrolyte and Nutrition Imbalance (Ileostomy)  Goal: Fluid, Electrolyte and Nutrition Balance  Outcome: Met     Problem: Postoperative Stoma Care (Ileostomy)  Goal: Optimal Stoma Healing  Outcome: Met     Problem: Infection  Goal: Absence of Infection Signs and Symptoms  Outcome: Met

## 2024-04-20 LAB
BACTERIA UR CULT: ABNORMAL
BACTERIA UR CULT: ABNORMAL

## 2024-04-22 LAB — BACTERIA BLD CULT: NORMAL

## 2024-04-22 NOTE — DISCHARGE SUMMARY
[facility] - Medical Surgical Unit  Hospital Medicine  Discharge Summary      Patient Name: Kam Reyes  MRN: [id number]  [facility]: [id number]  Patient Class: IP- Inpatient  Admission Date: 4/17/2024  Hospital Length of Stay: 2 days  Discharge Date and Time: 4/19/2024  3:02 PM  Attending Physician: Dipti att. providers found   Discharging Provider: Gregor Heaton MD  Primary Care Provider: Gregor Heaton MD    Primary Care Team: Networked reference to record PCT     HPI:   The patient is a 73-year-old  female known to me from clinic.  He has a known history of chronic renal insufficiency for which she has not on dialysis and has thus far refused dialysis.  Generally keeps a creatinine somewhere in the 3 or 4 range but today on check her creatinine had increased to over 7.  More importantly her CO2 level had dropped significantly to 14.  Despite being on bicarb.  She has a known infection with ESBL for which she was getting linezolid p.o. at the nursing home.  This infection continues at this time as evident by her urinalysis and cultures.  She states her urine has been clearing slightly and has improved.  He has not complain of any fever any diarrhea any abdominal pain at this point in time.  But she does have a decrease in her platelet count in his slightly anemic.  Patient has remained afebrile and has had no change in her mental status.    * No surgery found *      Hospital Course:   The patient is a 73-year-old  female known to me from clinic.  He has a known history of chronic renal insufficiency for which she has not on dialysis and has thus far refused dialysis.  Generally keeps a creatinine somewhere in the 3 or 4 range but today on check her creatinine had increased to over 7.  More importantly her CO2 level had dropped significantly to 14.  Despite being on bicarb.  She has a known infection with ESBL for which she was getting linezolid p.o. at the nursing home.  This  infection continues at this time as evident by her urinalysis and cultures.  She states her urine has been clearing slightly and has improved.  He has not complain of any fever any diarrhea any abdominal pain at this point in time.  But she does have a decrease in her platelet count in his slightly anemic.  Patient  remained afebrile and had no change in her mental status.    Dr. Santana was consulted feels like decrease in CO2 does not necessarily for fluid true acidosis at gap.  Moreover her kidney function stabilized during stay by outpatient 7 down baseline poor.  She tolerated her antibiotic therapy with clearing of urine no nausea or vomiting.  No fever throughout her stay. she was back her baseline .     Goals of Care Treatment Preferences:  Code Status: Full Code      Consults:   Consults (From admission, onward)          Status Ordering Provider     Inpatient consult to Nephrology  Once        Provider:  Lester Max MD    Completed JUAN PABLO IBANEZ            Psychiatric  Recurrent major depression  Controlled currently.  We will continue duloxetine    Cardiac/Vascular  Hypertension  Chronic, controlled. Latest blood pressure and vitals reviewed-      .  Stable on home medications  Home meds for hypertension were reviewed and noted below.   Hypertension Medications               amLODIPine (NORVASC) 10 MG tablet Take 1 tablet (10 mg total) by mouth once daily.            While in the hospital, will manage blood pressure as follows; Continue home antihypertensive regimen    Will utilize p.r.n. blood pressure medication only if patient's blood pressure greater than 160/100 and she develops symptoms such as worsening chest pain or shortness of breath.    Renal/  Metabolic acidosis  Continue p.o. bicarb 3 times a day      Acute kidney injury superimposed on chronic kidney disease  Patient with acute kidney injury/acute renal failure likely due to pre-renal azotemia due to IVVD GHADA is currently  worsening. Baseline creatinine  3-4  - Labs reviewed- Renal function/electrolytes with Estimated Creatinine Clearance: 9.8 mL/min (A) (based on SCr of 4.74 mg/dL (H)). according to latest data. Monitor urine output and serial BMP and adjust therapy as needed. Avoid nephrotoxins and renally dose meds for GFR listed above.  I have consulted Dr. Marie due to her metabolic acidosis as well as her slightly worsening creatinine it was reported as 7 today at the nursing home though it is back down in the 4s today at the hospital    Acute UTI  ESBL we will continue antibiotic therapy with linezolid by mouth 4 more days.  Add Cipro 500 mg twice a day 5 days.      Vesicovaginal fistula  To source of her repeated urinary tract infections      ID  * Multiple drug resistant organism (MDRO) culture positive  ESBL urinary tract infection sensitive to vancomycin however with her quit kidney issues and rapidly deteriorating kidney function we will keep her on linezolid twice a day      Oncology  Personal history of other malignant neoplasm of large intestine  Distant history no recent treatment      Anemia of chronic kidney failure, stage 4 (severe)  Patient's anemia is currently controlled. Has not received any PRBCs to date. Etiology likely d/t chronic disease due to Chronic Kidney Disease  Current CBC reviewed-   Lab Results   Component Value Date    HGB 8.2 (L) 04/18/2024    HCT 25.3 (L) 04/18/2024     Monitor serial CBC and transfuse if patient becomes hemodynamically unstable, symptomatic or H/H drops below 7/21.  Procrit 40 k units now    Endocrine  Hypothyroid  Continue Synthroid at current dose      GI  Ileostomy status  Continue supportive care      GERD (gastroesophageal reflux disease)  Pepcid twice a day        Final Active Diagnoses:    Diagnosis Date Noted POA    PRINCIPAL PROBLEM:  Multiple drug resistant organism (MDRO) culture positive [Z16.24] 04/17/2024 Yes    Acute kidney injury superimposed on chronic kidney  disease [N17.9, N18.9] 04/17/2024 Yes    Metabolic acidosis [E87.20] 04/17/2024 Yes    Hypertension [I10] 01/26/2024 Yes    Acute UTI [N39.0] 01/26/2024 Yes    Anemia of chronic kidney failure, stage 4 (severe) [N18.4, D63.1] 01/01/2023 Yes    Ileostomy status [Z93.2] 01/01/2023 Not Applicable    Recurrent major depression [F33.9] 01/01/2023 Yes     Chronic    Personal history of other malignant neoplasm of large intestine [Z85.038] 01/01/2023 Yes    GERD (gastroesophageal reflux disease) [K21.9] 08/12/2021 Yes    Hypothyroid [E03.9] 08/12/2021 Yes    Vesicovaginal fistula [N82.0] 03/01/2021 Yes      Problems Resolved During this Admission:    Diagnosis Date Noted Date Resolved POA    Dyspnea [R06.00] 08/30/2022 04/17/2024 Yes       Discharged Condition: good    Disposition: Home or Self Care    Follow Up:   Follow-up Information       Gregor Heaton MD Follow up on 4/23/2024.    Specialty: Family Medicine  Why: F/U 04/23/2024 @1:15  Contact information:  [address] [place] [address] Amanda  [facility] [address] [phone]                           Patient Instructions:      Diet renal     Activity as tolerated       Significant Diagnostic Studies: N/A    Pending Diagnostic Studies:       None           Medications:  Reconciled Home Medications:      Medication List        START taking these medications      ciprofloxacin HCl 500 MG tablet  Commonly known as: CIPRO  Take 1 tablet (500 mg total) by mouth every 12 (twelve) hours.            CHANGE how you take these medications      sodium bicarbonate 650 MG tablet  Take 650 mg by mouth 4 (four) times daily.  What changed: Another medication with the same name was removed. Continue taking this medication, and follow the directions you see here.            CONTINUE taking these medications      amLODIPine 10 MG tablet  Commonly known as: NORVASC  Take 1 tablet (10 mg total) by mouth once daily.     calcitRIOL 0.5 MCG Cap  Commonly known as: ROCALTROL  Take 0.5 mcg by mouth  once daily.     clopidogreL 75 mg tablet  Commonly known as: PLAVIX  Take 75 mg by mouth once daily.     DULoxetine 30 MG capsule  Commonly known as: CYMBALTA  Take 30 mg by mouth once daily.     ferrous sulfate 325 (65 FE) MG EC tablet  Take 325 mg by mouth 3 (three) times daily with meals.     HYDROcodone-acetaminophen  mg per tablet  Commonly known as: NORCO  Take 1 tablet by mouth.     levothyroxine 100 MCG tablet  Commonly known as: SYNTHROID  Take 137 mcg by mouth before breakfast.     linezolid 600 mg Tab  Commonly known as: ZYVOX  Take 1 tablet (600 mg total) by mouth every 12 (twelve) hours.     megestroL 40 MG Tab  Commonly known as: MEGACE  Take 1 tablet (40 mg total) by mouth once daily.     omeprazole 40 MG capsule  Commonly known as: PRILOSEC  Take 40 mg by mouth once daily.     QUEtiapine 25 MG Tab  Commonly known as: SEROQUEL  Take by mouth.     VITAMIN C 100 MG tablet  Generic drug: ascorbic acid (vitamin C)  Take 100 mg by mouth once daily.              Indwelling Lines/Drains at time of discharge:   Lines/Drains/Airways       Drain  Duration                  Hemodialysis AV Fistula Left upper arm -- days         Ileostomy RUQ -- days         Suprapubic Catheter 02/05/24 1355 16 Fr. 76 days                    Time spent on the discharge of patient: 35 minutes         Gregor Heaton MD  Department of Hospital Medicine  [facility] - [facility] Surgical Unit

## 2024-04-22 NOTE — ASSESSMENT & PLAN NOTE
Chronic, controlled. Latest blood pressure and vitals reviewed-      .  Stable on home medications  Home meds for hypertension were reviewed and noted below.   Hypertension Medications               amLODIPine (NORVASC) 10 MG tablet Take 1 tablet (10 mg total) by mouth once daily.            While in the hospital, will manage blood pressure as follows; Continue home antihypertensive regimen    Will utilize p.r.n. blood pressure medication only if patient's blood pressure greater than 160/100 and she develops symptoms such as worsening chest pain or shortness of breath.

## 2024-04-22 NOTE — ASSESSMENT & PLAN NOTE
ESBL we will continue antibiotic therapy with linezolid by mouth 4 more days.  Add Cipro 500 mg twice a day 5 days.

## 2024-05-17 DIAGNOSIS — Z78.0 ASYMPTOMATIC MENOPAUSAL STATE: Primary | ICD-10-CM

## 2024-05-23 ENCOUNTER — HOSPITAL ENCOUNTER (OUTPATIENT)
Dept: RADIOLOGY | Facility: HOSPITAL | Age: 74
Discharge: HOME | End: 2024-05-23
Attending: FAMILY MEDICINE
Payer: MEDICARE

## 2024-05-23 DIAGNOSIS — Z78.0 ASYMPTOMATIC MENOPAUSAL STATE: ICD-10-CM

## 2024-05-23 PROCEDURE — 77080 DXA BONE DENSITY AXIAL: CPT | Mod: TC

## 2024-06-04 NOTE — H&P
Patient:   Kam Reyes            MRN: [id number]            FIN: [id number]               Age:   69 years     Sex:  Female     :  1950   Associated Diagnoses:   Anemia; GERD - Gastro-esophageal reflux disease; Hypothyroidism; Sepsis due to urinary tract infection; Urinary tract infection; vesicular enteric fistula; vesicularvaginal fistula   Author:   Gregor Heaton MD      History of Present Illness   9-year-old  female known to me from clinic.  Transferred from [facility] for multidrug-resistant urinary tract infection with E. coli.  Infection is sensitive to Zosyn.  The patient is on Zosyn IV every 6 as well as coverage for yeast with Diflucan.  She is tolerating this medication well with no nausea and vomiting.  She has had some cloudy urine but this is cleared some.  She is still feeling some myalgia and weakness.  She is admitted primarily for prolonged IV therapy for 2 weeks.      Review of Systems   Constitutional:  Weakness, No fever, No chills, No sweats, No fatigue, No decreased activity.    Eye:  No recent visual problem, No icterus, No discharge, No blurring, No double vision, No visual disturbances.    Ear/Nose/Mouth/Throat:  No decreased hearing, No ear pain, No nasal congestion, No sore throat.    Respiratory:  No shortness of breath, No cough, No sputum production, No hemoptysis, No wheezing, No cyanosis, No apnea.    Cardiovascular:  No chest pain, No palpitations, No bradycardia, No tachycardia, No peripheral edema, No syncope.    Gastrointestinal:  Nausea, No vomiting, No diarrhea, No constipation, No heartburn, No hematemesis.         Abdominal pain: All quadrants.    Genitourinary:  No dysuria, No hematuria, No change in urine stream, No urethral discharge.    Hematology/Lymphatics:  No bruising tendency, No bleeding tendency, No swollen lymph glands.    Endocrine:  No excessive thirst, No polyuria, No cold intolerance, No heat intolerance, No  Detail Level: Detailed Depth Of Biopsy: dermis excessive hunger.    Immunologic:  Not immunocompromised, No recurrent fevers, No recurrent infections.    Musculoskeletal:  No back pain, No neck pain, No joint pain, No muscle pain, No claudication, No decreased range of motion, No trauma.    Integumentary:  No rash, No pruritus, No abrasions, No breakdown, No burns, No dryness, No petechiae, No skin lesion, No hypertrophic scar(s), No keloid(s).    Neurologic:  Not alert and oriented X4, No abnormal balance, No confusion, No numbness, No tingling, No headache.    Psychiatric:  No anxiety, No depression, No seema, Not suicidal, Not delusional, No hallucinations.       Health Status   Allergies:    Allergic Reactions (Selected)  Severity Not Documented  Bactrim- Unknown.  Daypro- No reactions were documented.  Doxycycline- No reactions were documented.  NSAIDs- Rash.,    Allergies (4) Active Reaction  Bactrim unknown  Daypro None Documented  doxycycline None Documented  NSAIDs Rash     Current medications:  (Selected)   Inpatient Medications  Ordered  Dulcolax Laxative 10 mg RECTAL suppository: 10 mg, form: Supp, MI (rectal), q24hr PRN for constipation, first dose 05/28/20 15:25:00 CDT  Normal Saline (0.9% NS) IV 1,000 mL: 1,000 mL, 1,000 mL, IV, 150 mL/hr, start date 05/28/20 17:03:00 CDT, 1.73, m2  Seroquel 25 mg oral tablet: 25 mg, form: Tab, Oral, At Bedtime, first dose 05/28/20 21:00:00 CDT  Sodium Chloride 0.9% Normal Saline Flush: 10 mL, form: Injection, IV Push, BID PRN for not specified, first dose 05/28/20 15:25:00 CDT  Sodium Chloride 0.9% Normal Saline Flush: 10 mL, form: Injection, IV Push, q12hr, first dose 05/28/20 16:00:00 CDT  Synthroid 137 mcg (0.137 mg) oral tablet: 112 mcg, form: Tab, Oral, Daily, first dose 05/29/20 6:30:00 CDT  Zofran 2 mg/mL injectable solution: 8 mg, form: Injection, IV Push, q8hr PRN for nausea, first dose 05/28/20 16:49:00 CDT  acetaminophen 325 mg oral tablet: 650 mg, form: Tab, Oral, q4hr PRN for fever, first dose  05/28/20 15:25:00 CDT, Notify MD for temp > 101 F°  acetaminophen 325 mg oral tablet: 650 mg, form: Tab, Oral, q4hr PRN for pain, first dose 05/28/20 15:25:00 CDT  acetaminophen-hydrocodone 325 mg-10 mg oral tablet: 1 tab(s), form: Tab, Oral, q6hr PRN for pain, first dose 05/28/20 16:49:00 CDT  citalopram 20 mg oral tablet: 20 mg, form: Tab, Oral, Daily, first dose 05/29/20 9:00:00 CDT  enoxaparin: 30 mg, form: Injection, Subcutaneous, Daily, first dose 05/29/20 9:00:00 CDT  fluconazole 100 mg oral tablet: 100 mg, form: Tab, Oral, Daily, first dose 05/29/20 9:00:00 CDT  levothyroxine 25 mcg (0.025 mg) oral tablet: 25 mcg, form: Tab, Oral, Daily, first dose 05/29/20 6:30:00 CDT  nystatin 100,000 units/g topical cream: 1 jonelle, form: Cream, TOP, QID, first dose 05/28/20 16:49:00 CDT  pantoprazole: 40 mg, form: Injection, IV Slow, Daily, first dose 05/29/20 6:00:00 CDT  piperacillin-tazobactam: 2.25 gm, IV Piggyback, q8hr, Infuse over: 30 minute(s), first dose 05/28/20 17:00:00 CDT  Pending Complete  nitroglycerin 0.4 mg sublingual spray: 0.4 mg, form: Tab-SL, SL, q5min, Order duration: 3 dose(s), first dose 05/24/18 16:47:00 CDT, stop date 05/24/18 17:01:00 CDT, Waste Code BK  Documented Medications  Documented  HYDROCODONE-ACETAMIN  MG: Oral, q4-6hr, PRN PRN as needed for pain, 0 Refill(s)  Lovenox: 40 mg = 0.4 mL, Subcutaneous, Daily, 0 Refill(s)  Seroquel 25 mg oral tablet: 25 mg = 1 tab(s), Oral, Daily, # 30 tab(s), 0 Refill(s)  Synthroid 137 mcg (0.137 mg) oral tablet: 0.137 mg = 1 tab(s), Oral, Daily, 0 Refill(s)  Tylenol 325 mg oral tablet: 650 mg = 2 tab(s), Oral, q6hr, PRN PRN fever, 0 Refill(s)  Zofran 2 mg/mL injectable solution: 8 mg = 4 mL, IV Push, q8hr, PRN PRN nausea, 0 Refill(s)  citalopram 20 mg oral tablet: 20 mg = 1 tab(s), Oral, Daily, # 30 tab(s), 0 Refill(s)  fluconazole 100 mg oral tablet: 100 mg = 1 tab(s), Oral, Daily  nystatin 100,000 units/g topical cream: 1 jonelle, TOP, QID, 0  Was A Bandage Applied: Yes Refill(s)  pantoprazole: 40 mg, IV Slow, Daily, 0 Refill(s)  piperacillin-tazobactam: 2.25 gm = 1 EA, IV Piggyback, q8hr, 0 Refill(s),    Medications (17) Active  Scheduled: (10)  citalopram 20 mg Tab UD  20 mg 1 tab(s), Oral, Daily  enoxaparin 30mg/0.3ml Inj  30 mg 0.3 mL, Subcutaneous, Daily  fluconazole 100 mg Tab UD  100 mg 1 tab(s), Oral, Daily  levothyroxine 0.025 mg Tab UD  25 mcg 1 tab(s), Oral, Daily  levothyroxine 0.112 mg Tab  112 mcg 1 tab(s), Oral, Daily  nystatin topical 026835 u/gm Cre  1 jonelle, TOP, QID  pantoprazole 40 mg Inj  40 mg 1 EA, IV Slow, Daily  piperacillin-tazobactam  2.25 gm 1 EA, IV Piggyback, q8hr  QUEtiapine 25 mg Tab UD  25 mg 1 tab(s), Oral, At Bedtime  sodium chloride 0.9% Inj-10ml  10 mL, IV Push, q12hr  Continuous: (1)  sodium chloride 0.9% 1,000 mL  1,000 mL, IV, 150 mL/hr  PRN: (6)  acetaminophen 325 mg Tab  650 mg 2 tab(s), Oral, q4hr  acetaminophen 325 mg Tab  650 mg 2 tab(s), Oral, q4hr  bisacodyl 10 mg Sup  10 mg 1 supp, UT (rectal), q24hr  hydrocodone 10mg/APAP 325mg  1 tab(s), Oral, q6hr  ondansetron 2 mg/mL inj - 2mL  8 mg 4 mL, IV Push, q8hr  sodium chloride 0.9% Inj-10ml  10 mL, IV Push, BID     Problem list:    All Problems  vesicularvaginal fistula / SNOMED CT 9523658501 / Confirmed  vesicular enteric fistula / SNOMED CT 9400998147 / Confirmed  Urinary tract infection / SNOMED CT 774686889 / Confirmed  Sepsis due to urinary tract infection / SNOMED CT 2031302312 / Confirmed  Radiation cystitis / SNOMED CT 44951818 / Confirmed  Malnutrition / SNOMED CT 413371626 / Confirmed  Impaired mobility / SNOMED CT 544213026 / Confirmed  Hypothyroidism / SNOMED CT 83718008 / Confirmed  Hydronephrosis / SNOMED CT 33418705 / Confirmed  GERD - Gastro-esophageal reflux disease / SNOMED CT 7772062089 / Confirmed  Colon cancer / SNOMED CT [id number] / Confirmed  x2  Anemia / SNOMED CT 611041994 / Confirmed,    Active Problems (12)  Anemia   Colon cancer   GERD  Size Of Lesion In Cm: 0 - Gastro-esophageal reflux disease   Hydronephrosis   Hypothyroidism   Impaired mobility   Malnutrition   Radiation cystitis   Sepsis due to urinary tract infection   Urinary tract infection   vesicular enteric fistula   vesicularvaginal fistula         Histories   Past Medical History:    Active  Hypothyroidism (66860134)  vesicularvaginal fistula (0733455455)  vesicular enteric fistula (1859213154)  Radiation cystitis (86176626)  Colon cancer ([id number])  Comments:  2013 CST 15:38 CST - Jeansonne RN, Arvind L.  x2  GERD - Gastro-esophageal reflux disease (7948774638)  Resolved  Uterine cancer ([id number]):  Resolved.   Family History:    Primary malignant neoplasm of colon  Father ()  Gastric cancer  Mother ()  Cancer  Father ()  Mother ()  Tobacco use.  Mother ()  Father ()     Procedure history:    Cystoscopy (None) on 2019 at 68 Years.  Comments:  2019 15:53 Miranda Byrnes RN  auto-populated from documented surgical case  Suprapubic Catheter Placement on 2019 at 68 Years.  Comments:  2019 15:53 Miranda Byrnes RN  auto-populated from documented surgical case  Cystoscopy w/ Ureteral Stent (Bilateral) on 2018 at 67 Years.  Comments:  2018 22:11 Diana Moses RN  auto-populated from documented surgical case  Colonoscopy on 2017 at 66 Years.  Comments:  2017 9:45 Roula Sharp RN  auto-populated from documented surgical case  Biopsy Gastrointestinal on 2017 at 66 Years.  Comments:  2017 9:45 Roula Sharp RN  auto-populated from documented surgical case  fistula packing on 2015 at 64 Years.  Cataract Extraction Phacoemulsification (Right) on 2015 at 64 Years.  Comments:  2015 13:49 Mansi Abrams RN  auto-populated from documented surgical case  Cystectomy (.) on 2013 at 63  Biopsy Type: H and E Biopsy Method: Dermablade Years.  Comments:  2013 15:19 Mallika Johnson RN  auto-populated from documented surgical case  Cystoscopy w/ Ureteral Stent (Bilateral) on 2013 at 63 Years.  Comments:  2013 15:19 Mallika Johnson RN  auto-populated from documented surgical case  Small Bowel Resection on 2013 at 63 Years.  Comments:  2013 15:19 Mallika Johnson RN  auto-populated from documented surgical case  Exploration Laparotomy on 2013 at 63 Years.  Comments:  2013 15:19 Mallika Johnson RN  auto-populated from documented surgical case   section ([id number]).  Comments:  2013 15:36 CST - Jeansonne RN, Jamie L.  x2  colon resection.  Hernia repair (32529504).  Ileostomy (216192354).  Urostomy - stoma (8835698794).  Partial thyroidectomy (22627749).  Cholecystectomy (88294608).  Hysterectomy (064521282).  mediport.  Comments:  2013 15:38 CST - Jeansonne RN, Jamie L.  placed and taken out  Appendectomy ([id number]).   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    Home/Environment  2016  Lives with: Children    Living situation: Home/Independent    Nutrition/Health  2017  Type of diet: Regular    Sexual  2020  Do you think of your sexual orientation as: Straight or heterosexual    Substance Use  2013 Risk Assessment: Denies Substance Abuse    2016  Use: Never    Tobacco  2013 Risk Assessment: Denies Tobacco Use    2020  Use: Never (less than 100 in l    Patient Wants Consult For Cessation Counseling No    2020  Use: Never (less than 100 in l    Patient Wants Consult For Cessation Counseling N/A    2020  Use: Never (less than 100 in l    Patient Wants Consult For Cessation Counseling No    2020  Use: Never (less than 100 in  Anesthesia Type: 1% lidocaine with epinephrine l    Patient Wants Consult For Cessation Counseling No    05/27/2020  Use: Never (less than 100 in l    Patient Wants Consult For Cessation Counseling N/A    05/28/2020  Use: Never (less than 100 in l    Patient Wants Consult For Cessation Counseling No    Abuse/Neglect  02/03/2020  SHX Any signs of abuse or neglect No    02/07/2020  SHX Any signs of abuse or neglect No    04/30/2020  SHX Any signs of abuse or neglect No    05/01/2020  SHX Any signs of abuse or neglect No    05/27/2020  SHX Any signs of abuse or neglect No    05/28/2020  SHX Any signs of abuse or neglect No    Spiritual/Cultural  02/03/2020  [employer] Preference [employer]  .        Physical Examination   General:  Alert and oriented, No acute distress.    Eye:  Pupils are equal, round and reactive to light, Extraocular movements are intact, Normal conjunctiva, Vision unchanged.    HENT:  Normocephalic, Tympanic membranes are clear, Normal hearing, Oral mucosa is moist, No pharyngeal erythema.    Neck:  Supple, Non-tender, No carotid bruit, No jugular venous distention, No lymphadenopathy, No thyromegaly.    Respiratory:  Lungs are clear to auscultation, Respirations are non-labored, Breath sounds are equal, Symmetrical chest wall expansion, No chest wall tenderness.    Cardiovascular:  Normal rate, Regular rhythm, No murmur, No gallop, Good pulses equal in all extremities, Normal peripheral perfusion, No edema.    Gastrointestinal:  Soft, Non-tender, Non-distended, Normal bowel sounds, No organomegaly, ostomy sites are clean no d/c.    Genitourinary:  No costovertebral angle tenderness, No scrotal tenderness, No inguinal tenderness, No urethral discharge.    Vital Signs   5/28/2020 20:59 CDT      24 HR Intake Totals       60 mL                             24 HR Output Totals       0 mL                             24 HR I&O Balance         60 mL    5/28/2020 20:00 CDT      Temperature Oral          36.4 DegC                             Temperature  Anesthesia Volume In Cc: 0.5 Oral (calculated)             97.52 DegF                             Heart Rate Monitored      67 bpm                             Respiratory Rate          20 br/min                             SpO2                      100 %                             Oxygen Therapy            Room air                             Systolic Blood Pressure   99 mmHg                             Diastolic Blood Pressure  64 mmHg                             Mean Arterial Pressure, Cuff              76 mmHg    5/28/2020 19:59 CDT      24 HR Intake Totals       60 mL                             24 HR Output Totals       0 mL                             24 HR I&O Balance         60 mL    5/28/2020 15:18 CDT      Temperature Oral          36.6 DegC                             Temperature Oral (calculated)             97.88 DegF                             Heart Rate Monitored      77 bpm                             Respiratory Rate          20 br/min                             SpO2                      100 %                             Oxygen Therapy            Room air                             Systolic Blood Pressure   109 mmHg                             Diastolic Blood Pressure  70 mmHg                             Mean Arterial Pressure, Cuff              83 mmHg                             Blood Pressure Location   Left arm    5/28/2020 14:32 CDT      Temperature Oral          36.4 DegC                             Temperature Oral (calculated)             97.52 DegF                             Peripheral Pulse Rate     78 bpm                             Respiratory Rate          20 br/min                             SpO2                      100 %                             Systolic Blood Pressure   90 mmHg                             Diastolic Blood Pressure  50 mmHg  LOW                             Mean Arterial Pressure, Cuff              63 mmHg                             Blood Pressure Location   Right arm    5/28/2020 11:57  CDT      Temperature Oral          36.4 DegC                             Temperature Oral (calculated)             97.52 DegF                             Peripheral Pulse Rate     78 bpm                             SpO2                      100 %                             Systolic Blood Pressure   90 mmHg                             Diastolic Blood Pressure  50 mmHg  LOW                             Mean Arterial Pressure, Cuff              63 mmHg    5/28/2020 9:56 CDT       Temperature Oral          36.5 DegC                             Temperature Oral (calculated)             97.70 DegF                             Peripheral Pulse Rate     87 bpm                             SpO2                      98 %                             Systolic Blood Pressure   106 mmHg                             Diastolic Blood Pressure  70 mmHg                             Mean Arterial Pressure, Cuff              82 mmHg    5/28/2020 8:59 CDT       24 HR Intake Totals       53.33 mL                             24 HR Output Totals       0 mL                             24 HR I&O Balance         53.33 mL    5/28/2020 4:00 CDT       Temperature Oral          36.4 DegC                             Temperature Oral (calculated)             97.52 DegF                             Peripheral Pulse Rate     83 bpm                             Respiratory Rate          20 br/min                             SpO2                      98 %                             Systolic Blood Pressure   110 mmHg                             Diastolic Blood Pressure  73 mmHg    5/28/2020 1:00 CDT       Temperature Oral          36.5 DegC                             Temperature Oral (calculated)             97.70 DegF                             Peripheral Pulse Rate     82 bpm                             Respiratory Rate          18 br/min                             SpO2                      98 %                             Systolic Blood Pressure   87  Hemostasis: Drysol Wound Care: Petrolatum mmHg  LOW                             Systolic Blood Pressure   91 mmHg                             Diastolic Blood Pressure  58 mmHg  LOW                             Diastolic Blood Pressure  61 mmHg                             Blood Pressure Location   Left arm                             Blood Pressure Location   Right arm        Vital Signs (last 24 hrs)_____  Last Charted___________  Temp Oral     36.4 DegC  (MAY 29 08:00)  Resp Rate         18 br/min  (MAY 29 08:00)  SBP      122 mmHg  (MAY 29 08:00)  DBP      68 mmHg  (MAY 29 08:00)  SpO2      97 %  (MAY 29 08:00)  Weight      73.8 kg  (MAY 28 15:18)     Measurements from flowsheet : Measurements   5/28/2020 16:00 CDT      Weight                    Not Done  (Not Done)   5/28/2020 15:18 CDT      Weight Dosing             73.8 kg                             Weight Measured           73.8 kg                             Weight Measured and Calculated in Lbs     162.70 lb                             Weighing Method           Standing                             Height/Length Dosing      154.94 cm     Lymphatics:  No lymphadenopathy neck, axilla, groin.    Musculoskeletal:  Normal range of motion, Normal strength, No tenderness, No swelling, No deformity, Normal gait.    Integumentary:  Warm, Dry, Pink, Intact, No pallor, No rash.    Neurologic:  Alert, Oriented, Normal sensory, Normal motor function, No focal deficits, Cranial Nerves II-XII are grossly intact, Gag reflex normal, Normal deep tendon reflexes.    Psychiatric:  Cooperative, Appropriate mood & affect, Normal judgment, Non-suicidal.       Review / Management   Results review:     Labs (Last four charted values)  WBC                  7.1 (MAY 29)   Hgb                  L 10.5 (MAY 29)   Hct                  L 34.4 (MAY 29)   Plt                  234 (MAY 29) .       Impression and Plan   Diagnosis     Anemia (VDW81-JQ D64.9).     GERD - Gastro-esophageal reflux disease (RXK32-JH K21.9).      Hypothyroidism (ZYL02-GT E03.9).     Sepsis due to urinary tract infection (OXN15-FW A41.9).     Urinary tract infection (DPD33-II N39.0).     Vesicular enteric fistula (QVH35-AT N32.1).     Vesicularvaginal fistula (ADH38-NC N82.0).     Continue her on Zosyn 3.375 every 6 hours along with Diflucan 100 mg a day IV.    Continue to follow her urine    Continue all home medications including her levothyroxine.    We will continue GI prophylaxis with Protonix.    Continue DVT prophylaxis with Lovenox 40 mg a day.  Follow her H&H closely.  sHe does have renal insufficiency which is acute on chronic her consulted Dr. Isabella CARR to address this         Dressing: Band-Aid Destruction After The Procedure: No Type Of Destruction Used: Curettage Curettage Text: The wound bed was treated with curettage after the biopsy was performed. Cryotherapy Text: The wound bed was treated with cryotherapy after the biopsy was performed. Electrodesiccation Text: The wound bed was treated with electrodesiccation after the biopsy was performed. Electrodesiccation And Curettage Text: The wound bed was treated with electrodesiccation and curettage after the biopsy was performed. Silver Nitrate Text: The wound bed was treated with silver nitrate after the biopsy was performed. Lab: [id number] Consent: Written consent was obtained and risks were reviewed including but not limited to scarring, infection, bleeding, scabbing, incomplete removal, nerve damage and allergy to anesthesia. Post-Care Instructions: I reviewed with the patient in detail post-care instructions. Patient is to keep the biopsy site dry overnight, and then apply bacitracin twice daily until healed. Patient may apply hydrogen peroxide soaks to remove any crusting. Notification Instructions: Patient will be notified of biopsy results. However, patient instructed to call the office if not contacted within 2 weeks. Billing Type: Third-Party Bill Information: Selecting Yes will display possible errors in your note based on the variables you have selected. This validation is only offered as a suggestion for you. PLEASE NOTE THAT THE VALIDATION TEXT WILL BE REMOVED WHEN YOU FINALIZE YOUR NOTE. IF YOU WANT TO FAX A PRELIMINARY NOTE YOU WILL NEED TO TOGGLE THIS TO 'NO' IF YOU DO NOT WANT IT IN YOUR FAXED NOTE.

## 2024-07-22 PROBLEM — N18.9 ACUTE KIDNEY INJURY SUPERIMPOSED ON CHRONIC KIDNEY DISEASE: Status: RESOLVED | Noted: 2024-04-17 | Resolved: 2024-07-22

## 2024-07-22 PROBLEM — N17.9 ACUTE KIDNEY INJURY SUPERIMPOSED ON CHRONIC KIDNEY DISEASE: Status: RESOLVED | Noted: 2024-04-17 | Resolved: 2024-07-22

## 2024-07-22 PROBLEM — N39.0 ACUTE UTI: Status: RESOLVED | Noted: 2024-01-26 | Resolved: 2024-07-22

## 2024-08-13 ENCOUNTER — CLINICAL SUPPORT (OUTPATIENT)
Dept: RESPIRATORY THERAPY | Facility: HOSPITAL | Age: 74
End: 2024-08-13
Attending: INTERNAL MEDICINE
Payer: MEDICARE

## 2024-08-13 DIAGNOSIS — Z85.038 PERSONAL HISTORY OF COLON CANCER: Primary | ICD-10-CM

## 2024-08-13 DIAGNOSIS — Z85.038 PERSONAL HISTORY OF COLON CANCER: ICD-10-CM

## 2024-08-13 PROCEDURE — 93005 ELECTROCARDIOGRAM TRACING: CPT

## 2024-08-13 PROCEDURE — 93010 ELECTROCARDIOGRAM REPORT: CPT | Mod: ,,, | Performed by: INTERNAL MEDICINE

## 2024-08-13 RX ORDER — DULOXETIN HYDROCHLORIDE 60 MG/1
60 CAPSULE, DELAYED RELEASE ORAL NIGHTLY
COMMUNITY

## 2024-08-13 RX ORDER — CHOLECALCIFEROL (VITAMIN D3) 25 MCG
1 TABLET ORAL EVERY MORNING
COMMUNITY

## 2024-08-13 RX ORDER — ALENDRONATE SODIUM 70 MG/1
70 TABLET ORAL
COMMUNITY

## 2024-08-13 RX ORDER — ACETAMINOPHEN AND PHENYLEPHRINE HCL 325; 5 MG/1; MG/1
1 TABLET ORAL NIGHTLY
COMMUNITY

## 2024-08-13 NOTE — DISCHARGE INSTRUCTIONS
Clear liquids only all day Wednesday. Nothing by mouth after midnight. Stop Plavix.         INSTRUCTIONS  AFTER A COLONOSCOPY/EGD    NO DRIVING X 24 HOURS. NOTIFY YOUR DOCTOR WITH     ABDOMINAL PAIN UNRELIEVED BY  PASSING GAS,   FEVER WITHIN 24 HOURS, OR LARGE AMOUNT OF BLEEDING.

## 2024-08-14 LAB
OHS QRS DURATION: 134 MS
OHS QTC CALCULATION: 466 MS

## 2024-08-14 NOTE — OR NURSING
Colonoscopy rescheduled to 8-22-24--patient did not stop Plavix---spoke with patient-instructions given to stop Plavix Saturday 8-17-24-nothing to eat or drink after midnight 8-21-24-follow per as instructed per Dr. Haley---voices understanding

## 2024-08-21 ENCOUNTER — ANESTHESIA EVENT (OUTPATIENT)
Dept: SURGERY | Facility: HOSPITAL | Age: 74
End: 2024-08-21
Payer: MEDICARE

## 2024-08-21 NOTE — ANESTHESIA PREPROCEDURE EVALUATION
08/21/2024  Kam Reyes is a 73 y.o., female.      Pre-op Assessment    I have reviewed the Patient Summary Reports.     I have reviewed the Nursing Notes. I have reviewed the NPO Status.   I have reviewed the Medications.     Review of Systems  Anesthesia Hx:             Denies Family Hx of Anesthesia complications.    Denies Personal Hx of Anesthesia complications.                    Social:  No Alcohol Use, Non-Smoker       Hematology/Oncology:  Hematology Normal   Oncology Normal                                   EENT/Dental:  EENT/Dental Normal           Cardiovascular:     Hypertension, well controlled              ECG has been reviewed.                          Pulmonary:  Pulmonary Normal                       Renal/:  Chronic Renal Disease, CKD, ESRD                Hepatic/GI:     GERD, well controlled             Musculoskeletal:  Musculoskeletal Normal                Neurological:  Neurology Normal                                      Endocrine:   Hypothyroidism          Dermatological:  Skin Normal    Psych:  Psychiatric History                  Physical Exam  General: Cooperative, Alert and Oriented    Airway:  Mallampati: II   Mouth Opening: Normal  TM Distance: Normal  Tongue: Normal  Neck ROM: Normal ROM    Dental:  Intact        Anesthesia Plan  Type of Anesthesia, risks & benefits discussed:    Anesthesia Type: Gen Natural Airway  Intra-op Monitoring Plan: Standard ASA Monitors  Post Op Pain Control Plan:   (medical reason for not using multimodal pain management)  Induction:  IV  Airway Plan: Direct  Informed Consent: Patient consented to blood products? Yes  ASA Score: 3    Ready For Surgery From Anesthesia Perspective.     .

## 2024-08-22 ENCOUNTER — ANESTHESIA (OUTPATIENT)
Dept: SURGERY | Facility: HOSPITAL | Age: 74
End: 2024-08-22
Payer: MEDICARE

## 2024-08-22 ENCOUNTER — HOSPITAL ENCOUNTER (OUTPATIENT)
Facility: HOSPITAL | Age: 74
Discharge: HOME | End: 2024-08-22
Attending: INTERNAL MEDICINE | Admitting: INTERNAL MEDICINE
Payer: MEDICARE

## 2024-08-22 VITALS
WEIGHT: 163 LBS | RESPIRATION RATE: 18 BRPM | HEART RATE: 73 BPM | BODY MASS INDEX: 30.78 KG/M2 | TEMPERATURE: 98 F | OXYGEN SATURATION: 97 % | DIASTOLIC BLOOD PRESSURE: 68 MMHG | SYSTOLIC BLOOD PRESSURE: 138 MMHG | HEIGHT: 61 IN

## 2024-08-22 DIAGNOSIS — C18.9 COLON CANCER: ICD-10-CM

## 2024-08-22 DIAGNOSIS — Z85.038 PERSONAL HISTORY OF COLON CANCER: ICD-10-CM

## 2024-08-22 PROCEDURE — 37000009 HC ANESTHESIA EA ADD 15 MINS: Performed by: INTERNAL MEDICINE

## 2024-08-22 PROCEDURE — 25000003 PHARM REV CODE 250: Performed by: NURSE ANESTHETIST, CERTIFIED REGISTERED

## 2024-08-22 PROCEDURE — 63600175 PHARM REV CODE 636 W HCPCS: Performed by: NURSE ANESTHETIST, CERTIFIED REGISTERED

## 2024-08-22 PROCEDURE — 45380 COLONOSCOPY AND BIOPSY: CPT | Mod: PT | Performed by: INTERNAL MEDICINE

## 2024-08-22 PROCEDURE — 37000008 HC ANESTHESIA 1ST 15 MINUTES: Performed by: INTERNAL MEDICINE

## 2024-08-22 PROCEDURE — 27201423 OPTIME MED/SURG SUP & DEVICES STERILE SUPPLY: Performed by: INTERNAL MEDICINE

## 2024-08-22 PROCEDURE — 63600175 PHARM REV CODE 636 W HCPCS: Performed by: ANESTHESIOLOGY

## 2024-08-22 RX ORDER — LIDOCAINE HYDROCHLORIDE 20 MG/ML
INJECTION INTRAVENOUS
Status: DISCONTINUED | OUTPATIENT
Start: 2024-08-22 | End: 2024-08-22

## 2024-08-22 RX ORDER — SODIUM CHLORIDE, SODIUM LACTATE, POTASSIUM CHLORIDE, CALCIUM CHLORIDE 600; 310; 30; 20 MG/100ML; MG/100ML; MG/100ML; MG/100ML
INJECTION, SOLUTION INTRAVENOUS CONTINUOUS
Status: DISCONTINUED | OUTPATIENT
Start: 2024-08-22 | End: 2024-08-22 | Stop reason: HOSPADM

## 2024-08-22 RX ORDER — PROPOFOL 10 MG/ML
VIAL (ML) INTRAVENOUS
Status: DISCONTINUED | OUTPATIENT
Start: 2024-08-22 | End: 2024-08-22

## 2024-08-22 RX ADMIN — LIDOCAINE HYDROCHLORIDE 50 MG: 20 INJECTION, SOLUTION INTRAVENOUS at 09:08

## 2024-08-22 RX ADMIN — PROPOFOL 40 MG: 10 INJECTION, EMULSION INTRAVENOUS at 09:08

## 2024-08-22 RX ADMIN — SODIUM CHLORIDE, POTASSIUM CHLORIDE, SODIUM LACTATE AND CALCIUM CHLORIDE: 600; 310; 30; 20 INJECTION, SOLUTION INTRAVENOUS at 08:08

## 2024-08-22 RX ADMIN — PROPOFOL 20 MG: 10 INJECTION, EMULSION INTRAVENOUS at 09:08

## 2024-08-22 NOTE — OP NOTE
[facility] - Periop Services  Operative Note    Date of Procedure: 8/22/2024     Procedure: Procedure(s) (LRB):  COLONOSCOPY (N/A)  COLONOSCOPY, WITH 1 OR MORE BIOPSIES (N/A)   Proctoscopy  Ileoscopy    Surgeons and Role:     * Ash Haley III, MD - Primary    Assisting Surgeon: None    Pre-Operative Diagnosis: Personal history of colon cancer [Z85.038]  History of ulcerative colitis  Status post total colectomy    Post-Operative Diagnosis: Post-Op Diagnosis Codes:     * Personal history of colon cancer [Z85.038]  Normal ileoscopy  Status post rectal biopsy    Endoscopic Specimens:  ID Type Source Tests Collected by Time Destination   A : rectum biopsy Tissue Rectum SPECIMEN TO PATHOLOGY Ash Haley III, MD 8/22/2024 0917          Anesthesia: General    Consent:  Patient was consented for the procedure at my office.  The risks and benefits of procedure explained in detail.  They were willing to undergo those risks.    HPI & Operative Findings (including complications, if any):  73-year-old white female with a history of ulcerative colitis and total colectomy.  She has a rectal stump which we are performing surveillance on because of the propensity involved and associated with her IBD for colorectal cancer.  She has had no alarm features.    [facility] CRNA present. .  See please see his documentation for medications administered.    Patient was brought into the endoscopy suite.  She was gently anesthetized.  She was laid in left lateral decubitus position rectal exam was performed.  She had a tight rectum with induration on exam.  Just gentle digital manipulation caused oozing of purplish type blood mixed with some mucus.      An EGD adult gastroscope was used because of her anatomy and anal size.  It was placed in could be passed about 5 cm at most saw the blind pouch that was at 5 cm sutured off.  The entire area showed rather healthy wall tissue but more distally it had some  shagginess to the mucosa.  These areas were biopsied randomly x3 sent off in jar letter A.  Blood loss was negligible no perforation.      The scope was pulled back after this and removed.  Retroflexion was not a option.      Discussion:     Will recommend repeat proctoscopy as such likely in 3-5 years pending pathology    After the patient was turned and a supine position, she had a colostomy bag that was present.  This was removed.  The EGD adult colonoscope was then placed into the ileostomy stoma and was advanced to 10 cm.  Healthy tissue good peristalsis no need for biopsies.  Just in general healthy stoma.  This was removed patient tolerated procedure well without complications.      Discussion repeat accordingly consult likely with next proctoscopy.         Blood Loss (EBL): * No values recorded between 8/22/2024  9:04 AM and 8/22/2024  9:27 AM *           Implants: * No implants in log *    Specimens:   Specimen (24h ago, onward)       Start     Ordered    08/22/24 0917  Specimen to Pathology  RELEASE UPON ORDERING        References:    Click here for ordering Quick Tip   Question:  Release to patient  Answer:  Immediate    08/22/24 0917                            Condition: Stable for Discharge    Disposition: Home or Self Care        Discharge Note    OUTCOME: Patient tolerated treatment/procedure well without complication and is now ready for discharge.    DISPOSITION: Home or Self Care    FINAL DIAGNOSIS:  <principal problem not specified>    FOLLOWUP: Follow up in clinic in 1-2 weeks to review biopsies    DISCHARGE INSTRUCTIONS:  No discharge procedures on file.

## 2024-08-22 NOTE — ANESTHESIA POSTPROCEDURE EVALUATION
Anesthesia Post Evaluation    Patient: Kam Reyes    Procedure(s) Performed: Procedure(s) (LRB):  COLONOSCOPY (N/A)  COLONOSCOPY, WITH 1 OR MORE BIOPSIES (N/A)    Final Anesthesia Type: general      Patient location during evaluation: OPS  Patient participation: Yes- Able to Participate  Level of consciousness: awake  Post-procedure vital signs: reviewed and stable  Pain management: adequate  Airway patency: patent  ASHKAN mitigation strategies: Multimodal analgesia  PONV status at discharge: No PONV  Anesthetic complications: no      Cardiovascular status: hemodynamically stable  Respiratory status: unassisted, room air and spontaneous ventilation  Hydration status: euvolemic  Follow-up not needed.              Vitals Value Taken Time   /71 08/22/24 0734   Temp 36.7 °C (98 °F) 08/22/24 0734   Pulse 69 08/22/24 0734   Resp 20 08/22/24 0734   SpO2 99 % 08/22/24 0734         No case tracking events are documented in the log.      Pain/Peterson Score: No data recorded

## 2024-08-27 LAB — PSYCHE PATHOLOGY RESULT: NORMAL

## 2025-02-11 DIAGNOSIS — Z12.31 OTHER SCREENING MAMMOGRAM: Primary | ICD-10-CM

## 2025-02-19 ENCOUNTER — HOSPITAL ENCOUNTER (OUTPATIENT)
Dept: RADIOLOGY | Facility: HOSPITAL | Age: 75
Discharge: HOME | End: 2025-02-19
Attending: OBSTETRICS & GYNECOLOGY
Payer: MEDICARE

## 2025-02-19 DIAGNOSIS — Z12.31 OTHER SCREENING MAMMOGRAM: ICD-10-CM

## 2025-02-19 PROCEDURE — 77063 BREAST TOMOSYNTHESIS BI: CPT | Mod: TC

## 2025-03-18 DIAGNOSIS — N18.5 CHRONIC KIDNEY DISEASE, STAGE V: Primary | ICD-10-CM

## 2025-03-24 ENCOUNTER — HOSPITAL ENCOUNTER (OUTPATIENT)
Dept: RADIOLOGY | Facility: HOSPITAL | Age: 75
Discharge: HOME | End: 2025-03-24
Attending: INTERNAL MEDICINE
Payer: MEDICARE

## 2025-03-24 DIAGNOSIS — N18.5 CHRONIC KIDNEY DISEASE, STAGE V: ICD-10-CM

## 2025-03-24 PROCEDURE — 76770 US EXAM ABDO BACK WALL COMP: CPT | Mod: TC

## 2025-03-26 ENCOUNTER — HOSPITAL ENCOUNTER (EMERGENCY)
Facility: HOSPITAL | Age: 75
Discharge: ANOTHER HOSPITAL | End: 2025-03-27
Attending: EMERGENCY MEDICINE
Payer: MEDICARE

## 2025-03-26 DIAGNOSIS — N13.30 HYDRONEPHROSIS, BILATERAL: ICD-10-CM

## 2025-03-26 DIAGNOSIS — N12 PYELONEPHRITIS: Primary | ICD-10-CM

## 2025-03-26 DIAGNOSIS — C18.9 MALIGNANT NEOPLASM OF COLON, UNSPECIFIED PART OF COLON: ICD-10-CM

## 2025-03-26 LAB
ABS NEUT CALC (OHS): 14.27 X10(3)/MCL (ref 2.1–9.2)
ALBUMIN SERPL-MCNC: 2.7 G/DL (ref 3.4–4.8)
ALBUMIN SERPL-MCNC: 2.8 G/DL (ref 3.4–4.8)
ALBUMIN/GLOB SERPL: 0.5 RATIO (ref 1.1–2)
ALBUMIN/GLOB SERPL: 0.5 RATIO (ref 1.1–2)
ALP SERPL-CCNC: 175 UNIT/L (ref 40–150)
ALP SERPL-CCNC: 186 UNIT/L (ref 40–150)
ALT SERPL-CCNC: 12 UNIT/L (ref 0–55)
ALT SERPL-CCNC: 14 UNIT/L (ref 0–55)
ANION GAP SERPL CALC-SCNC: 21 MEQ/L
ANION GAP SERPL CALC-SCNC: 22 MEQ/L
AST SERPL-CCNC: 13 UNIT/L (ref 11–45)
AST SERPL-CCNC: 15 UNIT/L (ref 11–45)
BACTERIA #/AREA URNS AUTO: ABNORMAL /HPF
BILIRUB SERPL-MCNC: 0.4 MG/DL
BILIRUB SERPL-MCNC: 0.5 MG/DL
BILIRUB UR QL STRIP.AUTO: NEGATIVE
BUN SERPL-MCNC: 74 MG/DL (ref 9.8–20.1)
BUN SERPL-MCNC: 75 MG/DL (ref 9.8–20.1)
CALCIUM SERPL-MCNC: 8.5 MG/DL (ref 8.4–10.2)
CALCIUM SERPL-MCNC: 8.7 MG/DL (ref 8.4–10.2)
CHLORIDE SERPL-SCNC: 100 MMOL/L (ref 98–107)
CHLORIDE SERPL-SCNC: 102 MMOL/L (ref 98–107)
CLARITY UR: ABNORMAL
CO2 SERPL-SCNC: 10 MMOL/L (ref 23–31)
CO2 SERPL-SCNC: 12 MMOL/L (ref 23–31)
COLOR UR AUTO: YELLOW
CREAT SERPL-MCNC: 12.69 MG/DL (ref 0.55–1.02)
CREAT SERPL-MCNC: 12.97 MG/DL (ref 0.55–1.02)
CREAT/UREA NIT SERPL: 6
CREAT/UREA NIT SERPL: 6
EOSINOPHIL NFR BLD MANUAL: 0.15 X10(3)/MCL (ref 0–0.9)
EOSINOPHIL NFR BLD MANUAL: 1 % (ref 0–8)
ERYTHROCYTE [DISTWIDTH] IN BLOOD BY AUTOMATED COUNT: 12.3 % (ref 11.5–17)
GFR SERPLBLD CREATININE-BSD FMLA CKD-EPI: 3 ML/MIN/1.73/M2
GFR SERPLBLD CREATININE-BSD FMLA CKD-EPI: 3 ML/MIN/1.73/M2
GLOBULIN SER-MCNC: 5.4 GM/DL (ref 2.4–3.5)
GLOBULIN SER-MCNC: 5.9 GM/DL (ref 2.4–3.5)
GLUCOSE SERPL-MCNC: 77 MG/DL (ref 82–115)
GLUCOSE SERPL-MCNC: 93 MG/DL (ref 82–115)
GLUCOSE UR QL STRIP: NEGATIVE
HCT VFR BLD AUTO: 25.7 % (ref 37–47)
HGB BLD-MCNC: 8.3 G/DL (ref 12–16)
HGB UR QL STRIP: ABNORMAL
KETONES UR QL STRIP: NEGATIVE
LACTATE SERPL-SCNC: 0.9 MMOL/L (ref 0.5–2.2)
LEUKOCYTE ESTERASE UR QL STRIP: ABNORMAL
LIPASE SERPL-CCNC: 16 U/L
LYMPHOCYTES NFR BLD MANUAL: 0.3 X10(3)/MCL (ref 0.6–4.6)
LYMPHOCYTES NFR BLD MANUAL: 2 % (ref 13–40)
MACROCYTES BLD QL SMEAR: ABNORMAL
MCH RBC QN AUTO: 32.9 PG (ref 27–31)
MCHC RBC AUTO-ENTMCNC: 32.3 G/DL (ref 33–36)
MCV RBC AUTO: 102 FL (ref 80–94)
MONOCYTES NFR BLD MANUAL: 0.3 X10(3)/MCL (ref 0.1–1.3)
MONOCYTES NFR BLD MANUAL: 2 % (ref 2–11)
NEUTROPHILS NFR BLD MANUAL: 95 % (ref 47–80)
NITRITE UR QL STRIP: NEGATIVE
PH UR STRIP: 7.5 [PH]
PLATELET # BLD AUTO: 333 X10(3)/MCL (ref 130–400)
PLATELET # BLD EST: ADEQUATE 10*3/UL
PMV BLD AUTO: 9.7 FL (ref 7.4–10.4)
POTASSIUM SERPL-SCNC: 5 MMOL/L (ref 3.5–5.1)
POTASSIUM SERPL-SCNC: 5.2 MMOL/L (ref 3.5–5.1)
PROT SERPL-MCNC: 8.1 GM/DL (ref 5.8–7.6)
PROT SERPL-MCNC: 8.7 GM/DL (ref 5.8–7.6)
PROT UR QL STRIP: ABNORMAL
RBC # BLD AUTO: 2.52 X10(6)/MCL (ref 4.2–5.4)
RBC #/AREA URNS AUTO: ABNORMAL /HPF
SODIUM SERPL-SCNC: 133 MMOL/L (ref 136–145)
SODIUM SERPL-SCNC: 134 MMOL/L (ref 136–145)
SP GR UR STRIP.AUTO: 1.02 (ref 1–1.03)
SQUAMOUS #/AREA URNS AUTO: ABNORMAL /HPF
UROBILINOGEN UR STRIP-ACNC: 0.2
WBC # BLD AUTO: 15.02 X10(3)/MCL (ref 4.5–11.5)
WBC #/AREA URNS AUTO: >=100 /HPF

## 2025-03-26 PROCEDURE — 63600175 PHARM REV CODE 636 W HCPCS: Performed by: EMERGENCY MEDICINE

## 2025-03-26 PROCEDURE — 81003 URINALYSIS AUTO W/O SCOPE: CPT | Performed by: EMERGENCY MEDICINE

## 2025-03-26 PROCEDURE — 85025 COMPLETE CBC W/AUTO DIFF WBC: CPT | Performed by: EMERGENCY MEDICINE

## 2025-03-26 PROCEDURE — 87040 BLOOD CULTURE FOR BACTERIA: CPT | Performed by: EMERGENCY MEDICINE

## 2025-03-26 PROCEDURE — 99285 EMERGENCY DEPT VISIT HI MDM: CPT | Mod: 25

## 2025-03-26 PROCEDURE — 80053 COMPREHEN METABOLIC PANEL: CPT | Performed by: EMERGENCY MEDICINE

## 2025-03-26 PROCEDURE — 87154 CUL TYP ID BLD PTHGN 6+ TRGT: CPT | Performed by: EMERGENCY MEDICINE

## 2025-03-26 PROCEDURE — 96374 THER/PROPH/DIAG INJ IV PUSH: CPT

## 2025-03-26 PROCEDURE — 83605 ASSAY OF LACTIC ACID: CPT | Performed by: EMERGENCY MEDICINE

## 2025-03-26 PROCEDURE — 87086 URINE CULTURE/COLONY COUNT: CPT | Performed by: EMERGENCY MEDICINE

## 2025-03-26 PROCEDURE — 83690 ASSAY OF LIPASE: CPT | Performed by: EMERGENCY MEDICINE

## 2025-03-26 RX ORDER — CEFTRIAXONE 1 G/1
1 INJECTION, POWDER, FOR SOLUTION INTRAMUSCULAR; INTRAVENOUS
Status: COMPLETED | OUTPATIENT
Start: 2025-03-26 | End: 2025-03-26

## 2025-03-26 RX ADMIN — CEFTRIAXONE SODIUM 1 G: 1 INJECTION, POWDER, FOR SOLUTION INTRAMUSCULAR; INTRAVENOUS at 04:03

## 2025-03-26 NOTE — ED PROVIDER NOTES
Encounter Date: 3/26/2025       History     Chief Complaint   Patient presents with    Abdominal Pain     LLQ abdominal pain and n/v x 1 week.      HPI  74-year-old female history of chronic kidney disease, colon cancer with ileostomy, GERD, cholecystectomy, CAD with stent, suprapubic catheter now with complaints of one-week history of left lower quadrant abdominal pain.  Patient denies associated fever, chills, nausea, vomiting, chest pain, shortness of breath,, diarrhea.  Patient states she has had this pain in the past but she does not know the etiology.  Patient states the pain is 5/10, no radiation, no exacerbating alleviating factors.  Patient has not taken any medication for the pain.  Review of patient's allergies indicates:   Allergen Reactions    Oxaprozin Nausea And Vomiting and Swelling     Facial swelling      Sulfamethoxazole-trimethoprim Nausea And Vomiting     Severe nausea and vomiting    Doxycycline     Nsaids (non-steroidal anti-inflammatory drug) Rash    Sulfa (sulfonamide antibiotics) Nausea And Vomiting     Past Medical History:   Diagnosis Date    Cancer, colon     Chronic pain     GERD (gastroesophageal reflux disease)     Renal disorder     Thyroiditis, unspecified      Past Surgical History:   Procedure Laterality Date    ANGIOGRAM, AV SHUNT, WITH PERCUTANEOUS MECHANICAL THROMBECTOMY, ANGIOPLASTY, AND STENT INSERTION Left     BACK SURGERY       SECTION      CHOLECYSTECTOMY      COLON SURGERY      COLONOSCOPY N/A 2024    Procedure: COLONOSCOPY;  Surgeon: Ash Haley III, MD;  Location: [facility];  Service: Endoscopy;  Laterality: N/A;    COLONOSCOPY, WITH 1 OR MORE BIOPSIES N/A 2024    Procedure: COLONOSCOPY, WITH 1 OR MORE BIOPSIES;  Surgeon: Ash Haley III, MD;  Location: [facility];  Service: Endoscopy;  Laterality: N/A;  rectal biopsy; cold    EGD, WITH CLOSED BIOPSY N/A 2024    Procedure: EGD, WITH CLOSED BIOPSY;  Surgeon: Robson Tripp MD;   Location: [facility];  Service: Endoscopy;  Laterality: N/A;  A) BX DUODENUM, B) BX ANTRUM FOR H.PYLORI, C) BX GE JUNCTION    ESOPHAGOGASTRODUODENOSCOPY N/A 02/22/2024    Procedure: EGD (ESOPHAGOGASTRODUODENOSCOPY);  Surgeon: Robson Tripp MD;  Location: [facility];  Service: Endoscopy;  Laterality: N/A;  A) DIFFUSE GASTRITIS OF ANTRUM & STOMACH    HYSTERECTOMY      KIDNEY SURGERY       Family History   Problem Relation Name Age of Onset    Hypertension Mother       Social History[1]  Review of Systems   All other systems reviewed and are negative.      Physical Exam     Initial Vitals [03/26/25 1504]   BP Pulse Resp Temp SpO2   92/60 106 18 99.8 °F (37.7 °C) 99 %      MAP       --         Physical Exam    Nursing note and vitals reviewed.  Constitutional: She appears well-developed. She is cooperative.   HENT:   Head: Normocephalic and atraumatic.   Eyes: Conjunctivae and lids are normal.   Neck: Trachea normal. Neck supple.   Normal range of motion.  Cardiovascular:  Normal rate, regular rhythm, normal heart sounds and intact distal pulses.           Pulmonary/Chest: Breath sounds normal.   Abdominal: Abdomen is soft. Bowel sounds are normal. There is abdominal tenderness.   Tender to palpation left lower quadrant of abdomen, suprapubic area with no rebound or guarding.    Intact ileostomy, intact suprapubic catheter appears to be draining   Musculoskeletal:         General: Normal range of motion.      Cervical back: Normal range of motion and neck supple.     Neurological: She is alert and oriented to person, place, and time. She has normal strength. She displays normal reflexes. No cranial nerve deficit or sensory deficit. GCS score is 15. GCS eye subscore is 4. GCS verbal subscore is 5. GCS motor subscore is 6.   Skin: Skin is warm and dry. No rash noted.   Psychiatric: She has a normal mood and affect. Her speech is normal and behavior is normal.         ED Course   Procedures  Labs Reviewed    COMPREHENSIVE METABOLIC PANEL - Abnormal       Result Value    Sodium 133 (*)     Potassium 5.0      Chloride 102      CO2 10 (*)     Glucose 77 (*)     Blood Urea Nitrogen 75.0 (*)     Creatinine 12.69 (*)     Calcium 8.5      Protein Total 8.1 (*)     Albumin 2.7 (*)     Globulin 5.4 (*)     Albumin/Globulin Ratio 0.5 (*)     Bilirubin Total 0.5       (*)     ALT 12      AST 13      eGFR 3      Anion Gap 21.0      BUN/Creatinine Ratio 6     CBC WITH DIFFERENTIAL - Abnormal    WBC 15.02 (*)     RBC 2.52 (*)     Hgb 8.3 (*)     Hct 25.7 (*)     .0 (*)     MCH 32.9 (*)     MCHC 32.3 (*)     RDW 12.3      Platelet 333      MPV 9.7     URINALYSIS - Abnormal    Color, UA Yellow      Appearance, UA Turbid (*)     Specific Gravity, UA 1.020      pH, UA 7.5      Protein, UA 3+ (*)     Glucose, UA Negative      Ketones, UA Negative      Blood, UA 3+ (*)     Bilirubin, UA Negative      Urobilinogen, UA 0.2      Nitrites, UA Negative      Leukocyte Esterase, UA 3+ (*)    MANUAL DIFFERENTIAL - Abnormal    Neutrophils % 95 (*)     Lymphs % 2 (*)     Monocytes % 2      Eosinophils % 1      Neutrophils Abs Calc 14.269 (*)     Lymphs Abs 0.3004 (*)     Eosinophils Abs 0.1502      Monocytes Abs 0.3004      Platelets Adequate      Macrocytosis 1+ (*)    URINALYSIS, MICROSCOPIC - Abnormal    Bacteria, UA Many (*)     RBC, UA 3-5      WBC, UA >=100 (*)     Squamous Epithelial Cells, UA Rare     COMPREHENSIVE METABOLIC PANEL - Abnormal    Sodium 134 (*)     Potassium 5.2 (*)     Chloride 100      CO2 12 (*)     Glucose 93      Blood Urea Nitrogen 74.0 (*)     Creatinine 12.97 (*)     Calcium 8.7      Protein Total 8.7 (*)     Albumin 2.8 (*)     Globulin 5.9 (*)     Albumin/Globulin Ratio 0.5 (*)     Bilirubin Total 0.4       (*)     ALT 14      AST 15      eGFR 3      Anion Gap 22.0      BUN/Creatinine Ratio 6     LIPASE - Normal    Lipase Level 16     LACTIC ACID, PLASMA - Normal    Lactic Acid Level 0.9      CULTURE, URINE   BLOOD CULTURE [facility]   BLOOD CULTURE [facility]   CBC W/ AUTO DIFFERENTIAL    Narrative:     The following orders were created for panel order CBC auto differential.  Procedure                               Abnormality         Status                     ---------                               -----------         ------                     CBC with Differential[[id number]]       Abnormal            Final result               Manual Differential[[id number]]         Abnormal            Final result                 Please view results for these tests on the individual orders.          Imaging Results              CT Abdomen Pelvis  Without Contrast (Final result)  Result time 03/26/25 16:38:02      Final result by Damon Fernandez MD (03/26/25 16:38:02)                   Impression:      1. Interim increasing bilateral hydroureteronephrosis and pelvicaliectasis with interim placement of a left renal stent and removal of the right ureteral stent since the prior exam  2. Suprapubic catheter present within a nondistended bladder with postsurgical changes at the pelvis  3. Status post colectomy with ostomy at the right lower abdomen and ventral hernia containing loops of bowel without evidence of bowel obstruction again identified  4. Cardiomegaly  5. Elevated right hemidiaphragm  6. Suspect fluid distended rectal pouch with mild perirectal stranding/edema  7. Findings and other details as above      Electronically signed by: Damon Fernandez  Date:    03/26/2025  Time:    16:38               Narrative:    EXAMINATION:  CT ABDOMEN PELVIS WITHOUT CONTRAST    CLINICAL HISTORY:  LLQ abdominal pain;, .    TECHNIQUE:  PATIENT RADIATION DOSE: DLP(mGycm) 319    As per PQRS measures, all CT scans at this facility used dose modulation, iterative reconstruction, and/or weight based dose adjustment when appropriate to reduce radiation dose to as low as reasonably achievable.    COMPARISON:  02/07/2024    FINDINGS:  Serial axial  images were obtained of the abdomen pelvis without the administration of IV contrast.  Additional sagittal and coronal reconstructions were performed. Degenerative changes are noted to the thoracolumbar spine.  Postsurgical/decompression changes are evident from L3-L5.  There is heterogeneity of bone trabecula diffusely throughout suggesting osteopenia.  The heart is mildly enlarged.  There is mild elevation of the right hemidiaphragm.  Mild atelectasis and/or scarring is at the posterior lower lungs.  The liver, spleen, adrenal glands, and pancreas are grossly within normal limits without the administration of IV contrast.  The gallbladder is surgically absent.  There is mild mucosal prominence at the GE junction.  Stomach is partially with gas and fluid.  Atherosclerosis is seen within the aorta and branching vessels.  The kidneys are mostly symmetric in size.  There is interim increasing bilateral hydroureteronephrosis/pelvicaliectasis.  A left ureteral stent has been placed since the prior exam.  There is interim removal of the right ureteral stent since the prior exam.  There is bilateral perinephric stranding.  Few small lymph nodes are seen within the periaortic and pericaval region.  There is left periureteral stranding.  A suprapubic Chun catheter is present within a nondistended bladder.  Postsurgical changes seen within the pelvis.  No dilated loops of bowel are identified.  The majority the colon appears to be absent with small fluid collection within a rectal pouch.  There is mild perirectal stranding/edema.  The uterus is absent.  A ventral hernia is again identified containing a multiple loops of bowel without evidence of bowel obstruction.  There is a ostomy at the at right lower abdomen.                                       Medications   cefTRIAXone injection 1 g (1 g Intravenous Given 3/26/25 5040)     Medical Decision Making  Amount and/or Complexity of Data Reviewed  Labs: ordered.  Radiology:  ordered.    Risk  Prescription drug management.    74-year-old female history of colon cancer with ileostomy, suprapubic catheter, CKD now with increasing lower abdominal pain times one-week.  Patient's vital signs have been stable in ED, afebrile, O2 sat 96% on room air.  Patient is minimally tender to palpation over the suprapubic area and left lower quadrant of her abdomen.  Labs remarkable for WBC count at 15, hemoglobin 8, BUN 75 creatinine 12.89 (last known creatinine 7 months ago in chart was 4.3.  UA with evidence of infection with greater than 100 WBCs per high-power field.  CT scan abdomen and pelvis is remarkable for Interim increasing bilateral hydroureteronephrosis and pelvicaliectasis with interim placement of a left renal stent and removal of the right ureteral stent since the prior exam, see additional details and full report.  After blood cultures patient was given Rocephin 1 g IV.  Discussed case with nurse practitioner on-call for patient's urologist Dr. Castro who feels patient should be transferred for high-level care for urology services given that patient has pyelonephritis with worsening hydronephrosis and worsening creatinine.  Patient currently stable for transfer.           ED Course as of 03/26/25 2112   Wed Mar 26, 2025   2110 Spoke with hospital medicine at main [facility] who accepts under Dr. Ho service so will set everything up for transfer [PL]      ED Course User Index  [PL] Nagi Wray MD                           Clinical Impression:  Final diagnoses:  [N12] Pyelonephritis (Primary)  [N13.30] Hydronephrosis, bilateral  [C18.9] Malignant neoplasm of colon, unspecified part of colon          ED Disposition Condition    Transfer to Another Facility Stable                    [1]   Social History  Tobacco Use    Smoking status: Never    Smokeless tobacco: Never   Substance Use Topics    Alcohol use: Never    Drug use: Never        Nagi Wray  MD Don  03/26/25 [id number]

## 2025-03-27 ENCOUNTER — HOSPITAL ENCOUNTER (INPATIENT)
Facility: HOSPITAL | Age: 75
LOS: 9 days | Discharge: HOME HEALTH | DRG: 690 | End: 2025-04-05
Attending: STUDENT IN AN ORGANIZED HEALTH CARE EDUCATION/TRAINING PROGRAM | Admitting: STUDENT IN AN ORGANIZED HEALTH CARE EDUCATION/TRAINING PROGRAM
Payer: MEDICARE

## 2025-03-27 VITALS
DIASTOLIC BLOOD PRESSURE: 46 MMHG | HEIGHT: 62 IN | WEIGHT: 160 LBS | BODY MASS INDEX: 29.44 KG/M2 | TEMPERATURE: 99 F | HEART RATE: 76 BPM | OXYGEN SATURATION: 100 % | RESPIRATION RATE: 17 BRPM | SYSTOLIC BLOOD PRESSURE: 100 MMHG

## 2025-03-27 DIAGNOSIS — E87.5 HYPERKALEMIA: ICD-10-CM

## 2025-03-27 DIAGNOSIS — N12 PYELONEPHRITIS: ICD-10-CM

## 2025-03-27 DIAGNOSIS — R07.9 CHEST PAIN: ICD-10-CM

## 2025-03-27 DIAGNOSIS — D63.1 ANEMIA OF CHRONIC KIDNEY FAILURE, STAGE 4 (SEVERE): Primary | ICD-10-CM

## 2025-03-27 DIAGNOSIS — N18.6 ESRD (END STAGE RENAL DISEASE): ICD-10-CM

## 2025-03-27 DIAGNOSIS — E87.20 ACIDOSIS: ICD-10-CM

## 2025-03-27 DIAGNOSIS — Z99.2 ESRD (END STAGE RENAL DISEASE) ON DIALYSIS: ICD-10-CM

## 2025-03-27 DIAGNOSIS — N18.4 ANEMIA OF CHRONIC KIDNEY FAILURE, STAGE 4 (SEVERE): Primary | ICD-10-CM

## 2025-03-27 DIAGNOSIS — N18.6 ESRD (END STAGE RENAL DISEASE) ON DIALYSIS: ICD-10-CM

## 2025-03-27 DIAGNOSIS — N19 UREMIA: ICD-10-CM

## 2025-03-27 DIAGNOSIS — N18.4 STAGE 4 CHRONIC KIDNEY DISEASE: ICD-10-CM

## 2025-03-27 LAB
ABS NEUT (OLG): 26.9 X10(3)/MCL (ref 2.1–9.2)
ALBUMIN SERPL-MCNC: 2.4 G/DL (ref 3.4–4.8)
ALBUMIN/GLOB SERPL: 0.4 RATIO (ref 1.1–2)
ALP SERPL-CCNC: 168 UNIT/L (ref 40–150)
ALT SERPL-CCNC: 11 UNIT/L (ref 0–55)
ANION GAP SERPL CALC-SCNC: 18 MEQ/L
ANISOCYTOSIS BLD QL SMEAR: ABNORMAL
AST SERPL-CCNC: 15 UNIT/L (ref 11–45)
BILIRUB SERPL-MCNC: 0.3 MG/DL
BUN SERPL-MCNC: 80.1 MG/DL (ref 9.8–20.1)
CALCIUM SERPL-MCNC: 8.2 MG/DL (ref 8.4–10.2)
CHLORIDE SERPL-SCNC: 101 MMOL/L (ref 98–107)
CO2 SERPL-SCNC: 14 MMOL/L (ref 23–31)
CREAT SERPL-MCNC: 12.71 MG/DL (ref 0.55–1.02)
CREAT/UREA NIT SERPL: 6
ERYTHROCYTE [DISTWIDTH] IN BLOOD BY AUTOMATED COUNT: 12.7 % (ref 11.5–17)
GFR SERPLBLD CREATININE-BSD FMLA CKD-EPI: 3 ML/MIN/1.73/M2
GLOBULIN SER-MCNC: 5.5 GM/DL (ref 2.4–3.5)
GLUCOSE SERPL-MCNC: 79 MG/DL (ref 82–115)
HBV SURFACE AG SERPL QL IA: NONREACTIVE
HCT VFR BLD AUTO: 22.1 % (ref 37–47)
HGB BLD-MCNC: 7.3 G/DL (ref 12–16)
INR PPP: 1.3
INSTRUMENT WBC (OLG): 28.93 X10(3)/MCL
IRON SATN MFR SERPL: 18 % (ref 20–50)
IRON SERPL-MCNC: 22 UG/DL (ref 50–170)
LYMPHOCYTES NFR BLD MANUAL: 1.45 X10(3)/MCL (ref 0.6–4.6)
LYMPHOCYTES NFR BLD MANUAL: 5 %
MACROCYTES BLD QL SMEAR: ABNORMAL
MAGNESIUM SERPL-MCNC: 1.7 MG/DL (ref 1.6–2.6)
MCH RBC QN AUTO: 33.8 PG (ref 27–31)
MCHC RBC AUTO-ENTMCNC: 33 G/DL (ref 33–36)
MCV RBC AUTO: 102.3 FL (ref 80–94)
MONOCYTES NFR BLD MANUAL: 0.58 X10(3)/MCL (ref 0.1–1.3)
MONOCYTES NFR BLD MANUAL: 2 %
NEUTROPHILS NFR BLD MANUAL: 93 %
PLATELET # BLD AUTO: 288 X10(3)/MCL (ref 130–400)
PLATELET # BLD EST: NORMAL 10*3/UL
PMV BLD AUTO: 10.8 FL (ref 7.4–10.4)
POIKILOCYTOSIS BLD QL SMEAR: ABNORMAL
POTASSIUM SERPL-SCNC: 5.5 MMOL/L (ref 3.5–5.1)
PROT SERPL-MCNC: 7.9 GM/DL (ref 5.8–7.6)
PROTHROMBIN TIME: 16.4 SECONDS (ref 12.5–14.5)
RBC # BLD AUTO: 2.16 X10(6)/MCL (ref 4.2–5.4)
RBC MORPH BLD: ABNORMAL
SODIUM SERPL-SCNC: 133 MMOL/L (ref 136–145)
TIBC SERPL-MCNC: 102 UG/DL (ref 70–310)
TIBC SERPL-MCNC: 124 UG/DL (ref 250–450)
TRANSFERRIN SERPL-MCNC: 102 MG/DL (ref 173–360)
WBC # BLD AUTO: 28.93 X10(3)/MCL (ref 4.5–11.5)

## 2025-03-27 PROCEDURE — 25000003 PHARM REV CODE 250: Performed by: INTERNAL MEDICINE

## 2025-03-27 PROCEDURE — 85610 PROTHROMBIN TIME: CPT | Performed by: RADIOLOGY

## 2025-03-27 PROCEDURE — 36415 COLL VENOUS BLD VENIPUNCTURE: CPT | Performed by: RADIOLOGY

## 2025-03-27 PROCEDURE — 99223 1ST HOSP IP/OBS HIGH 75: CPT | Mod: ,,, | Performed by: INTERNAL MEDICINE

## 2025-03-27 PROCEDURE — 86706 HEP B SURFACE ANTIBODY: CPT | Performed by: INTERNAL MEDICINE

## 2025-03-27 PROCEDURE — 5A1D70Z PERFORMANCE OF URINARY FILTRATION, INTERMITTENT, LESS THAN 6 HOURS PER DAY: ICD-10-PCS | Performed by: INTERNAL MEDICINE

## 2025-03-27 PROCEDURE — 11000001 HC ACUTE MED/SURG PRIVATE ROOM

## 2025-03-27 PROCEDURE — 87340 HEPATITIS B SURFACE AG IA: CPT | Performed by: INTERNAL MEDICINE

## 2025-03-27 PROCEDURE — 85025 COMPLETE CBC W/AUTO DIFF WBC: CPT

## 2025-03-27 PROCEDURE — 99223 1ST HOSP IP/OBS HIGH 75: CPT | Mod: ,,,

## 2025-03-27 PROCEDURE — 25000003 PHARM REV CODE 250: Performed by: HOSPITALIST

## 2025-03-27 PROCEDURE — 25000003 PHARM REV CODE 250

## 2025-03-27 PROCEDURE — 83540 ASSAY OF IRON: CPT | Performed by: HOSPITALIST

## 2025-03-27 PROCEDURE — 36415 COLL VENOUS BLD VENIPUNCTURE: CPT | Performed by: INTERNAL MEDICINE

## 2025-03-27 PROCEDURE — 83735 ASSAY OF MAGNESIUM: CPT

## 2025-03-27 PROCEDURE — 80053 COMPREHEN METABOLIC PANEL: CPT

## 2025-03-27 RX ORDER — SODIUM BICARBONATE 650 MG/1
1300 TABLET ORAL 3 TIMES DAILY
Status: DISCONTINUED | OUTPATIENT
Start: 2025-03-27 | End: 2025-03-28

## 2025-03-27 RX ORDER — SODIUM BICARBONATE 650 MG/1
1300 TABLET ORAL 2 TIMES DAILY
Status: DISCONTINUED | OUTPATIENT
Start: 2025-03-27 | End: 2025-03-27

## 2025-03-27 RX ORDER — ALUMINUM HYDROXIDE, MAGNESIUM HYDROXIDE, AND SIMETHICONE 1200; 120; 1200 MG/30ML; MG/30ML; MG/30ML
30 SUSPENSION ORAL 4 TIMES DAILY PRN
Status: DISCONTINUED | OUTPATIENT
Start: 2025-03-27 | End: 2025-04-05 | Stop reason: HOSPADM

## 2025-03-27 RX ORDER — POLYETHYLENE GLYCOL 3350 17 G/17G
17 POWDER, FOR SOLUTION ORAL 2 TIMES DAILY PRN
Status: DISCONTINUED | OUTPATIENT
Start: 2025-03-27 | End: 2025-04-05 | Stop reason: HOSPADM

## 2025-03-27 RX ORDER — TALC
6 POWDER (GRAM) TOPICAL NIGHTLY PRN
Status: DISCONTINUED | OUTPATIENT
Start: 2025-03-27 | End: 2025-04-05 | Stop reason: HOSPADM

## 2025-03-27 RX ORDER — QUETIAPINE FUMARATE 25 MG/1
25 TABLET, FILM COATED ORAL NIGHTLY
Status: DISCONTINUED | OUTPATIENT
Start: 2025-03-27 | End: 2025-04-05 | Stop reason: HOSPADM

## 2025-03-27 RX ORDER — CEFTRIAXONE 1 G/1
1 INJECTION, POWDER, FOR SOLUTION INTRAMUSCULAR; INTRAVENOUS
Status: DISCONTINUED | OUTPATIENT
Start: 2025-03-27 | End: 2025-03-28

## 2025-03-27 RX ORDER — BISACODYL 10 MG/1
10 SUPPOSITORY RECTAL DAILY PRN
Status: DISCONTINUED | OUTPATIENT
Start: 2025-03-27 | End: 2025-04-05 | Stop reason: HOSPADM

## 2025-03-27 RX ORDER — SODIUM CHLORIDE 9 MG/ML
INJECTION, SOLUTION INTRAVENOUS CONTINUOUS
Status: DISCONTINUED | OUTPATIENT
Start: 2025-03-27 | End: 2025-03-27

## 2025-03-27 RX ORDER — CLOPIDOGREL BISULFATE 75 MG/1
75 TABLET ORAL NIGHTLY
Status: DISCONTINUED | OUTPATIENT
Start: 2025-03-27 | End: 2025-04-05 | Stop reason: HOSPADM

## 2025-03-27 RX ORDER — DULOXETIN HYDROCHLORIDE 30 MG/1
60 CAPSULE, DELAYED RELEASE ORAL NIGHTLY
Status: DISCONTINUED | OUTPATIENT
Start: 2025-03-27 | End: 2025-04-05 | Stop reason: HOSPADM

## 2025-03-27 RX ORDER — IBUPROFEN 200 MG
16 TABLET ORAL
Status: DISCONTINUED | OUTPATIENT
Start: 2025-03-27 | End: 2025-04-05 | Stop reason: HOSPADM

## 2025-03-27 RX ORDER — DEXTROSE MONOHYDRATE AND SODIUM CHLORIDE 5; .45 G/100ML; G/100ML
INJECTION, SOLUTION INTRAVENOUS CONTINUOUS
Status: DISCONTINUED | OUTPATIENT
Start: 2025-03-27 | End: 2025-03-28

## 2025-03-27 RX ORDER — LANOLIN ALCOHOL/MO/W.PET/CERES
1 CREAM (GRAM) TOPICAL DAILY
Status: DISCONTINUED | OUTPATIENT
Start: 2025-03-27 | End: 2025-04-05 | Stop reason: HOSPADM

## 2025-03-27 RX ORDER — MIDODRINE HYDROCHLORIDE 5 MG/1
5 TABLET ORAL 2 TIMES DAILY WITH MEALS
Status: DISCONTINUED | OUTPATIENT
Start: 2025-03-27 | End: 2025-03-28

## 2025-03-27 RX ORDER — GLUCAGON 1 MG
1 KIT INJECTION
Status: DISCONTINUED | OUTPATIENT
Start: 2025-03-27 | End: 2025-04-05 | Stop reason: HOSPADM

## 2025-03-27 RX ORDER — ACETAMINOPHEN 325 MG/1
650 TABLET ORAL EVERY 4 HOURS PRN
Status: DISCONTINUED | OUTPATIENT
Start: 2025-03-27 | End: 2025-04-05 | Stop reason: HOSPADM

## 2025-03-27 RX ORDER — IBUPROFEN 200 MG
24 TABLET ORAL
Status: DISCONTINUED | OUTPATIENT
Start: 2025-03-27 | End: 2025-04-05 | Stop reason: HOSPADM

## 2025-03-27 RX ORDER — CIPROFLOXACIN 250 MG/1
250 TABLET, FILM COATED ORAL EVERY 12 HOURS
Status: DISCONTINUED | OUTPATIENT
Start: 2025-03-27 | End: 2025-03-30

## 2025-03-27 RX ORDER — SODIUM CHLORIDE 0.9 % (FLUSH) 0.9 %
10 SYRINGE (ML) INJECTION
Status: DISCONTINUED | OUTPATIENT
Start: 2025-03-27 | End: 2025-04-05 | Stop reason: HOSPADM

## 2025-03-27 RX ADMIN — LEVOTHYROXINE SODIUM 137 MCG: 0.03 TABLET ORAL at 08:03

## 2025-03-27 RX ADMIN — SODIUM ZIRCONIUM CYCLOSILICATE 10 G: 10 POWDER, FOR SUSPENSION ORAL at 10:03

## 2025-03-27 RX ADMIN — MIDODRINE HYDROCHLORIDE 5 MG: 5 TABLET ORAL at 05:03

## 2025-03-27 RX ADMIN — SODIUM ZIRCONIUM CYCLOSILICATE 10 G: 10 POWDER, FOR SUSPENSION ORAL at 02:03

## 2025-03-27 RX ADMIN — MIDODRINE HYDROCHLORIDE 5 MG: 5 TABLET ORAL at 08:03

## 2025-03-27 RX ADMIN — QUETIAPINE FUMARATE 25 MG: 25 TABLET ORAL at 10:03

## 2025-03-27 RX ADMIN — SODIUM BICARBONATE 650 MG TABLET 1300 MG: at 08:03

## 2025-03-27 RX ADMIN — CLOPIDOGREL 75 MG: 75 TABLET ORAL at 10:03

## 2025-03-27 RX ADMIN — ACETAMINOPHEN 650 MG: 325 TABLET ORAL at 10:03

## 2025-03-27 RX ADMIN — CIPROFLOXACIN HYDROCHLORIDE 250 MG: 250 TABLET, FILM COATED ORAL at 10:03

## 2025-03-27 RX ADMIN — SODIUM ZIRCONIUM CYCLOSILICATE 10 G: 10 POWDER, FOR SUSPENSION ORAL at 08:03

## 2025-03-27 RX ADMIN — CIPROFLOXACIN HYDROCHLORIDE 250 MG: 250 TABLET, FILM COATED ORAL at 09:03

## 2025-03-27 RX ADMIN — SODIUM BICARBONATE 650 MG TABLET 650 MG: at 02:03

## 2025-03-27 RX ADMIN — SODIUM BICARBONATE 650 MG TABLET 1300 MG: at 10:03

## 2025-03-27 RX ADMIN — DULOXETINE HYDROCHLORIDE 60 MG: 30 CAPSULE, DELAYED RELEASE ORAL at 10:03

## 2025-03-27 RX ADMIN — FERROUS SULFATE TAB 325 MG (65 MG ELEMENTAL FE) 1 EACH: 325 (65 FE) TAB at 08:03

## 2025-03-27 NOTE — H&P (VIEW-ONLY)
"Kam Parkerpauijaleel 1950  [id number]  3/27/2025    CONSULTING PHYSICIAN: Sanam Jackson NP    REASON FOR CONSULTATION:     Increased creatinine, pyelo/hydro     HPI:  Patient is a 74-year-old female with a past medical history of colon cancer with ileostomy, CKD, GERD, thyroiditis.  She also has a history of cervical cancer treated with pelvic radiation with vesicle vaginal fistula which has failed multiple attempts at closure.  She is known to Dr. Castro and has a chronic suprapubic tube.  She also has bilateral distal ureteral strictures which are managed with stents.  She underwent cystoscopy with bilateral stent change on 12/16/2024.  Her right-sided stent was unintentionally removed during a suprapubic catheter exchange.  Most recent ultrasound in the office did not reveal hydronephrosis on the right.  She presented to an outlying facility with left lower quadrant abdominal pain.  [facility] increasing bilateral hydroureteronephrosis and pelvicaliectasis with interim placement of left ureteral stent and removal of right ureteral stent compared to imaging from February; bilateral perinephric stranding and left periureteral stranding; suprapubic catheter in appropriate position with a nondistended bladder.  UA with turbid yellow urine, negative nitrites, 3+ leukocyte esterase, greater than 100 WBC, 3-5 RBCs, many bacteria, rare squamous epithelial cells.  WBC 15.02, H&H 8.3/25.7, BUN and creatinine 74/12.97. The patient is resting in bed.  She states that she does not want to proceed with dialysis.  She states that she " has heard bad things about it".  She is withdrawn.     Past Medical History:   Diagnosis Date    Cancer, colon     Chronic pain     GERD (gastroesophageal reflux disease)     Renal disorder     Thyroiditis, unspecified      Past Surgical History:   Procedure Laterality Date    ANGIOGRAM, AV SHUNT, WITH PERCUTANEOUS MECHANICAL THROMBECTOMY, ANGIOPLASTY, AND " "STENT INSERTION Left     BACK SURGERY       SECTION      CHOLECYSTECTOMY      COLON SURGERY      COLONOSCOPY N/A 2024    Procedure: COLONOSCOPY;  Surgeon: Ash Haley III, MD;  Location: [facility];  Service: Endoscopy;  Laterality: N/A;    COLONOSCOPY, WITH 1 OR MORE BIOPSIES N/A 2024    Procedure: COLONOSCOPY, WITH 1 OR MORE BIOPSIES;  Surgeon: Ash Haley III, MD;  Location: [facility];  Service: Endoscopy;  Laterality: N/A;  rectal biopsy; cold    EGD, WITH CLOSED BIOPSY N/A 2024    Procedure: EGD, WITH CLOSED BIOPSY;  Surgeon: Robson Tripp MD;  Location: [facility];  Service: Endoscopy;  Laterality: N/A;  A) BX DUODENUM, B) BX ANTRUM FOR H.PYLORI, C) BX GE JUNCTION    ESOPHAGOGASTRODUODENOSCOPY N/A 2024    Procedure: EGD (ESOPHAGOGASTRODUODENOSCOPY);  Surgeon: Robson Tripp MD;  Location: [facility];  Service: Endoscopy;  Laterality: N/A;  A) DIFFUSE GASTRITIS OF ANTRUM & STOMACH    HYSTERECTOMY      KIDNEY SURGERY       Family History   Problem Relation Name Age of Onset    Hypertension Mother       Social History[1]  Current Medications[2]  Review of patient's allergies indicates:   Allergen Reactions    Oxaprozin Nausea And Vomiting and Swelling     Facial swelling      Sulfamethoxazole-trimethoprim Nausea And Vomiting     Severe nausea and vomiting    Doxycycline     Nsaids (non-steroidal anti-inflammatory drug) Rash    Sulfa (sulfonamide antibiotics) Nausea And Vomiting       ROS: 12 point review of systems negative other than the HPI    PHYSICAL EXAM:  Vitals:    25 0222 25 0428 25 0715 25 0830   BP: 102/60 (!) 91/42 (!) 95/56    BP Location: Right arm  Right arm    Patient Position:   Lying    Pulse: 88 71 67    Resp: 14 16 20    Temp: 97.6 °F (36.4 °C) 97.4 °F (36.3 °C) 98.4 °F (36.9 °C)    TempSrc: Oral Oral Oral    SpO2: 100% 99% 98%    Weight:    72.6 kg (160 lb 0.9 oz)   Height:    5' 2" (1.575 m)       Intake/Output " Summary (Last 24 hours) at 3/27/2025 0848  Last data filed at 3/27/2025 0638  Gross per 24 hour   Intake --   Output 1075 ml   Net -1075 ml       GEN: WN/WD NAD  HEENT: normocephalic, atraumatic, PERRLA, EOMI, OP clear, nares patent  CV: RRR  RESP: Even and unlabored  ABD:  Soft, ileostomy   :  SP tube site clean dry and intact, draining cloudy green urine.  EXT: no C/C/E  NEURO: no focal deficits, MAEW, AAOx4    LABS:  Recent Results (from the past 24 hours)   Comprehensive metabolic panel    Collection Time: 03/26/25  3:15 PM   Result Value Ref Range    Sodium 133 (L) 136 - 145 mmol/L    Potassium 5.0 3.5 - 5.1 mmol/L    Chloride 102 98 - 107 mmol/L    CO2 10 (L) 23 - 31 mmol/L    Glucose 77 (L) 82 - 115 mg/dL    Blood Urea Nitrogen 75.0 (H) 9.8 - 20.1 mg/dL    Creatinine 12.69 (H) 0.55 - 1.02 mg/dL    Calcium 8.5 8.4 - 10.2 mg/dL    Protein Total 8.1 (H) 5.8 - 7.6 gm/dL    Albumin 2.7 (L) 3.4 - 4.8 g/dL    Globulin 5.4 (H) 2.4 - 3.5 gm/dL    Albumin/Globulin Ratio 0.5 (L) 1.1 - 2.0 ratio    Bilirubin Total 0.5 <=1.5 mg/dL     (H) 40 - 150 unit/L    ALT 12 0 - 55 unit/L    AST 13 11 - 45 unit/L    eGFR 3 mL/min/1.73/m2    Anion Gap 21.0 mEq/L    BUN/Creatinine Ratio 6    Lipase    Collection Time: 03/26/25  3:15 PM   Result Value Ref Range    Lipase Level 16 <=60 U/L   CBC with Differential    Collection Time: 03/26/25  3:15 PM   Result Value Ref Range    WBC 15.02 (H) 4.50 - 11.50 x10(3)/mcL    RBC 2.52 (L) 4.20 - 5.40 x10(6)/mcL    Hgb 8.3 (L) 12.0 - 16.0 g/dL    Hct 25.7 (L) 37.0 - 47.0 %    .0 (H) 80.0 - 94.0 fL    MCH 32.9 (H) 27.0 - 31.0 pg    MCHC 32.3 (L) 33.0 - 36.0 g/dL    RDW 12.3 11.5 - 17.0 %    Platelet 333 130 - 400 x10(3)/mcL    MPV 9.7 7.4 - 10.4 fL   Manual Differential    Collection Time: 03/26/25  3:15 PM   Result Value Ref Range    Neutrophils % 95 (H) 47 - 80 %    Lymphs % 2 (L) 13 - 40 %    Monocytes % 2 2 - 11 %    Eosinophils % 1 0 - 8 %    Neutrophils Abs Calc 14.269 (H)  2.1 - 9.2 x10(3)/mcL    Lymphs Abs 0.3004 (L) 0.6 - 4.6 x10(3)/mcL    Eosinophils Abs 0.1502 0 - 0.9 x10(3)/mcL    Monocytes Abs 0.3004 0.1 - 1.3 x10(3)/mcL    Platelets Adequate Normal, Adequate    Macrocytosis 1+ (A) (none)   Urinalysis Clean Catch    Collection Time: 03/26/25  3:53 PM   Result Value Ref Range    Color, UA Yellow Yellow, Light-Yellow, Dark Yellow, Tala, Straw    Appearance, UA Turbid (A) Clear    Specific Gravity, UA 1.020 1.005 - 1.030    pH, UA 7.5 5.0 - 8.5    Protein, UA 3+ (A) Negative    Glucose, UA Negative Negative, Normal    Ketones, UA Negative Negative    Blood, UA 3+ (A) Negative    Bilirubin, UA Negative Negative    Urobilinogen, UA 0.2 0.2, 1.0, Normal    Nitrites, UA Negative Negative    Leukocyte Esterase, UA 3+ (A) Negative   Urinalysis, Microscopic    Collection Time: 03/26/25  3:53 PM   Result Value Ref Range    Bacteria, UA Many (A) None Seen, Rare, Occasional /HPF    RBC, UA 3-5 None Seen, 0-2, 3-5, 0-5 /HPF    WBC, UA >=100 (A) None Seen, 0-2, 3-5, 0-5 /HPF    Squamous Epithelial Cells, UA Rare None Seen, Rare, Occasional, Occ /HPF   Lactic acid, plasma    Collection Time: 03/26/25  4:43 PM   Result Value Ref Range    Lactic Acid Level 0.9 0.5 - 2.2 mmol/L   Comprehensive metabolic panel    Collection Time: 03/26/25  6:51 PM   Result Value Ref Range    Sodium 134 (L) 136 - 145 mmol/L    Potassium 5.2 (H) 3.5 - 5.1 mmol/L    Chloride 100 98 - 107 mmol/L    CO2 12 (L) 23 - 31 mmol/L    Glucose 93 82 - 115 mg/dL    Blood Urea Nitrogen 74.0 (H) 9.8 - 20.1 mg/dL    Creatinine 12.97 (H) 0.55 - 1.02 mg/dL    Calcium 8.7 8.4 - 10.2 mg/dL    Protein Total 8.7 (H) 5.8 - 7.6 gm/dL    Albumin 2.8 (L) 3.4 - 4.8 g/dL    Globulin 5.9 (H) 2.4 - 3.5 gm/dL    Albumin/Globulin Ratio 0.5 (L) 1.1 - 2.0 ratio    Bilirubin Total 0.4 <=1.5 mg/dL     (H) 40 - 150 unit/L    ALT 14 0 - 55 unit/L    AST 15 11 - 45 unit/L    eGFR 3 mL/min/1.73/m2    Anion Gap 22.0 mEq/L    BUN/Creatinine Ratio 6         IMAGING:  CT ABDOMEN PELVIS WITHOUT CONTRAST     CLINICAL HISTORY:  LLQ abdominal pain;, .     TECHNIQUE:  PATIENT RADIATION DOSE: DLP(mGycm) 319     As per PQRS measures, all CT scans at this facility used dose modulation, iterative reconstruction, and/or weight based dose adjustment when appropriate to reduce radiation dose to as low as reasonably achievable.     COMPARISON:  02/07/2024     FINDINGS:  Serial axial images were obtained of the abdomen pelvis without the administration of IV contrast.  Additional sagittal and coronal reconstructions were performed. Degenerative changes are noted to the thoracolumbar spine.  Postsurgical/decompression changes are evident from L3-L5.  There is heterogeneity of bone trabecula diffusely throughout suggesting osteopenia.  The heart is mildly enlarged.  There is mild elevation of the right hemidiaphragm.  Mild atelectasis and/or scarring is at the posterior lower lungs.  The liver, spleen, adrenal glands, and pancreas are grossly within normal limits without the administration of IV contrast.  The gallbladder is surgically absent.  There is mild mucosal prominence at the GE junction.  Stomach is partially with gas and fluid.  Atherosclerosis is seen within the aorta and branching vessels.  The kidneys are mostly symmetric in size.  There is interim increasing bilateral hydroureteronephrosis/pelvicaliectasis.  A left ureteral stent has been placed since the prior exam.  There is interim removal of the right ureteral stent since the prior exam.  There is bilateral perinephric stranding.  Few small lymph nodes are seen within the periaortic and pericaval region.  There is left periureteral stranding.  A suprapubic Chun catheter is present within a nondistended bladder.  Postsurgical changes seen within the pelvis.  No dilated loops of bowel are identified.  The majority the colon appears to be absent with small fluid collection within a rectal pouch.  There is mild  perirectal stranding/edema.  The uterus is absent.  A ventral hernia is again identified containing a multiple loops of bowel without evidence of bowel obstruction.  There is a ostomy at the at right lower abdomen.     Impression:     1. Interim increasing bilateral hydroureteronephrosis and pelvicaliectasis with interim placement of a left renal stent and removal of the right ureteral stent since the prior exam  2. Suprapubic catheter present within a nondistended bladder with postsurgical changes at the pelvis  3. Status post colectomy with ostomy at the right lower abdomen and ventral hernia containing loops of bowel without evidence of bowel obstruction again identified  4. Cardiomegaly  5. Elevated right hemidiaphragm  6. Suspect fluid distended rectal pouch with mild perirectal stranding/edema  7. Findings and other details as above        Electronically signed by:Damon Fernandez  Date:                                            03/26/2025  Time:                                           16:38    ASSESSMENT:  74-year-old female with a history of cervical cancer status post pelvic radiation 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  PMH also includes CKD and colon cancer with ileostomy. [facility] - CT abdomen and pelvis reveals increasing bilateral hydroureteronephrosis and pelvicaliectasis with left double-J stent in appropriate position and removal of right ureteral stent since imaging in February; bilateral perinephric stranding, left periureteral stranding; SP tube in the appropriate position.   -hyperkalemia, acidosis -  she has been evaluated by Nephrology who was recommending hemodialysis however patient is currently refusing  -UTI - was started on IV Rocephin however no IV access, changed to p.o. Cipro      PLAN:  -discussed imaging and lab work with the  patient.   -unable to place retrograde stents in the past due to inability to visualize UO on cystoscopy from her radiation changes.  Recommendation is for right antegrade stent with IR.  This was discussed with the patient and she is in agreement.  -continue antibiotics, tailor according to final C&S results  -palliative Care has been consulted to further discuss goals of care.        Tala Cherry NP         [1]   Social History  Tobacco Use    Smoking status: Never    Smokeless tobacco: Never   Substance Use Topics    Alcohol use: Never    Drug use: Never   [2]   Current Facility-Administered Medications   Medication Dose Route Frequency Provider Last Rate Last Admin    acetaminophen tablet 650 mg  650 mg Oral Q4H PRN Sanam Jackson, POOJAP        aluminum-magnesium hydroxide-simethicone 200-200-20 mg/5 mL suspension 30 mL  30 mL Oral QID PRN Sanam Jackson, STARLA        bisacodyL suppository 10 mg  10 mg Rectal Daily PRN Sanam Jackson, STARLA        cefTRIAXone injection 1 g  1 g Intravenous Q24H Sanam Jackson FNP        ciprofloxacin HCl tablet 250 mg  250 mg Oral Q12H Deanne Molina MD        clopidogreL tablet 75 mg  75 mg Oral QHS Tyron Doe MD        dextrose 5 % and 0.45 % NaCl infusion   Intravenous Continuous Tyron Doe MD        dextrose 50% injection 12.5 g  12.5 g Intravenous PRN Sanam Jackson, STARLA        dextrose 50% injection 25 g  25 g Intravenous PRN Sanam Jackson, STARLA        DULoxetine DR capsule 60 mg  60 mg Oral QHS Tyron Doe MD        ferrous sulfate tablet 1 each  1 tablet Oral Daily Tyron Doe MD        glucagon (human recombinant) injection 1 mg  1 mg Intramuscular PRN Sanam Jackson, POOJAP        glucose chewable tablet 16 g  16 g Oral PRN Sanam Jackson, STARLA        glucose chewable tablet 24 g  24 g Oral PRN Sanam Jackson, POOJAP        levothyroxine tablet 137 mcg  137 mcg Oral Before breakfast Tyron Doe MD        melatonin tablet 6 mg  6 mg Oral Nightly PRN  Sanam Jackson FNP        midodrine tablet 5 mg  5 mg Oral BID  Deanne Molina MD        polyethylene glycol packet 17 g  17 g Oral BID PRN Sanam Jackson FNP        QUEtiapine tablet 25 mg  25 mg Oral QHS Tyron Doe MD        sodium bicarbonate tablet 1,300 mg  1,300 mg Oral TID Deanne Molina MD        sodium chloride 0.9% flush 10 mL  10 mL Intravenous PRN Sanam Jackson FNP        sodium zirconium cyclosilicate packet 10 g  10 g Oral TID Deanne Molina MD

## 2025-03-27 NOTE — CONSULTS
[facility] [place]    Patient Name: Kam Reyes  Admission Date: 3/27/2025  Hospital Length of Stay: 0 days  Code Status: Full Code   Attending Physician: Tyron Doe MD   Primary Care Physician: Gregor Heaton MD  Principal Problem:<principal problem not specified>    RENAL ON CALL CONSULT NOTE    HPI:    Kam Reyes is a 74 y.o. female who  has a past medical history of Cancer, colon, Chronic pain, GERD (gastroesophageal reflux disease), Renal disorder, and Thyroiditis, unspecified.. The patient presented to [facility] on 3/27/2025      74-year-old male transferred from an outside facility secondary to complicated UTI involving a suprapubic catheter.  She originally presented for left lower quadrant abdominal pain.  She also has a past medical history of chronic kidney disease stage IV, colon cancer with ileostomy in place, CAD, and hypothyroidism.  She was transferred for Urology consultation as a CT of the abdomen revealed increasing bilateral hydro ureteral nephrosis despite bilateral ureteral stents.     In the emergency room she is noted to have some mild hypotension down to 91/42.  She has a significant leukocytosis of 15.02.  Anemia with a hemoglobin of 8.3.  Elevated potassium at 5.2 he has significant electrolyte abnormalities.  Her urinalysis showed many bacteria with pyuria and hematuria.She was started empirically on IV Rocephin and admitted to the hospitalist group    Being on unassigned call I am being consulted  Pt sees Dr Lester Max in [place] and on anticipation for need for dialysis she has a L arm AVGraft    Currently pt in no pain  I reviewed her labs and she has significant acidosis and worsening uremic indices and hyperkalemia  I advised pt that she needs HD but she is REFUSING despite my advice    Pt needs IV, and there is no peripheral vein  I am hoping if she gets HD, we will give IV antibiotic after dialysis and I started PO cipro PND Cultures    US -->  CHRONIC HYDRONEPHROSIS ( Pt FU by TANGELA Castro)        Medical History:   Past Medical History:   Diagnosis Date    Cancer, colon     Chronic pain     GERD (gastroesophageal reflux disease)     Renal disorder     Thyroiditis, unspecified        Surgical History:   Past Surgical History:   Procedure Laterality Date    ANGIOGRAM, AV SHUNT, WITH PERCUTANEOUS MECHANICAL THROMBECTOMY, ANGIOPLASTY, AND STENT INSERTION Left     BACK SURGERY       SECTION      CHOLECYSTECTOMY      COLON SURGERY      COLONOSCOPY N/A 2024    Procedure: COLONOSCOPY;  Surgeon: Ash Haley III, MD;  Location: [facility];  Service: Endoscopy;  Laterality: N/A;    COLONOSCOPY, WITH 1 OR MORE BIOPSIES N/A 2024    Procedure: COLONOSCOPY, WITH 1 OR MORE BIOPSIES;  Surgeon: Ash Haley III, MD;  Location: [facility];  Service: Endoscopy;  Laterality: N/A;  rectal biopsy; cold    EGD, WITH CLOSED BIOPSY N/A 2024    Procedure: EGD, WITH CLOSED BIOPSY;  Surgeon: Robson Tripp MD;  Location: [facility];  Service: Endoscopy;  Laterality: N/A;  A) BX DUODENUM, B) BX ANTRUM FOR H.PYLORI, C) BX GE JUNCTION    ESOPHAGOGASTRODUODENOSCOPY N/A 2024    Procedure: EGD (ESOPHAGOGASTRODUODENOSCOPY);  Surgeon: Robson Tripp MD;  Location: [facility];  Service: Endoscopy;  Laterality: N/A;  A) DIFFUSE GASTRITIS OF ANTRUM & STOMACH    HYSTERECTOMY      KIDNEY SURGERY         Family History:   Family History   Problem Relation Name Age of Onset    Hypertension Mother     .     Social History:   Social History     Tobacco Use    Smoking status: Never    Smokeless tobacco: Never   Substance Use Topics    Alcohol use: Never       Allergies:  Review of patient's allergies indicates:   Allergen Reactions    Oxaprozin Nausea And Vomiting and Swelling     Facial swelling      Sulfamethoxazole-trimethoprim Nausea And Vomiting     Severe nausea and vomiting    Doxycycline     Nsaids (non-steroidal anti-inflammatory drug) Rash     Sulfa (sulfonamide antibiotics) Nausea And Vomiting         Review of Systems:  Constitutional:blood pressure on lower end  Skin: no skin lesions or itching  EENT: decrease in hearing  Respiratory:  no  cough, shortness of breath, or wheezing  Cardiovascular: Denies chest pain, palpitations, or swelling  Gastrointestional: ++ostomy  Genitourinary: dec UO  Musculoskeletal: no myalgias, back pain, flank pain, new decreased ROM or acute focal weakness  Neurological: no  seizures, paresthesias, dizziness or asterixis  Hematological: no  unusual bruising or bleeding    Medications:  Current Medications[1]     Scheduled Meds:   cefTRIAXone (Rocephin) IV (PEDS and ADULTS)  1 g Intravenous Q24H    ciprofloxacin HCl  250 mg Oral Q12H    clopidogreL  75 mg Oral QHS    DULoxetine  60 mg Oral QHS    ferrous sulfate  1 tablet Oral Daily    levothyroxine  137 mcg Oral Before breakfast    midodrine  5 mg Oral BID WM    QUEtiapine  25 mg Oral QHS    sodium bicarbonate  1,300 mg Oral TID     Continuous Infusions:   D5 and 0.45% NaCl   Intravenous Continuous             Objective:  BP (!) 95/56 (BP Location: Right arm, Patient Position: Lying)   Pulse 67   Temp 98.4 °F (36.9 °C) (Oral)   Resp 20   SpO2 98%  There is no height or weight on file to calculate BMI.    I/O last 3 completed shifts:  In: -   Out: 1075 [Urine:125; Stool:950]  No intake/output data recorded.        Physical Exam:  General: no acute distress, awake, alert, pale  Eyes: PERRLA, EOMI, conjunctiva clear, eyelids without swelling  HENT: atraumatic, oropharynx and nasal mucosa patent, +++Decrease in hearing  Neck: full ROM, no JVD, no thyromegaly or lymphadenopathy  Respiratory: equal, unlabored, clear to auscultation A/P  Cardiovascular: RRR without rub; BL radial and pedal pulses felt  Edema: trace  Gastrointestinal: soft, non-tender,++R ostomy  Musculoskeletal:  without major limitation or discomfort  Integumentary: warm, dry; no rashes, wounds, or skin  "lesions  Neurological: oriented,  no acute deficits  ++PATENT L AVGraft  Labs:  Chemistries:   Recent Labs   Lab 03/26/25  1515 03/26/25  1851   * 134*   K 5.0 5.2*    100   CO2 10* 12*   BUN 75.0* 74.0*   CREATININE 12.69* 12.97*   CALCIUM 8.5 8.7   BILITOT 0.5 0.4   ALKPHOS 175* 186*   ALT 12 14   AST 13 15   GLUCOSE 77* 93        CBC/Anemia Labs: Coags:    Recent Labs   Lab 03/26/25  1515   WBC 15.02*   HGB 8.3*   HCT 25.7*      .0*   RDW 12.3    No results for input(s): "PT", "INR", "APTT" in the last 168 hours.       Impression:  UREMIA  Hyperkalemia and acidosis  Chronic obstructive uropathy  Possible UTI  As per Pmhx      Plan:  Spent a long time with pt explaining that she needs HD  For some reason she is refusing.....  Will put on PO cipro, hopefully she may get IV rocephin with HD  ( I prefer no pic line in R arm if possible)   also consulted  Meanwhile will put on lokelma and increase NaHCo3 dose and add midodrine       Deanne Molina      Thank you for this consult.        [1]   Current Facility-Administered Medications:     acetaminophen tablet 650 mg, 650 mg, Oral, Q4H PRN, Sanam Jackson, POOJAP    aluminum-magnesium hydroxide-simethicone 200-200-20 mg/5 mL suspension 30 mL, 30 mL, Oral, QID PRN, Sanam Jackson, STARLA    bisacodyL suppository 10 mg, 10 mg, Rectal, Daily PRN, Saanm Jackson FNP    cefTRIAXone injection 1 g, 1 g, Intravenous, Q24H, Sanam Jackson FNP    ciprofloxacin HCl tablet 250 mg, 250 mg, Oral, Q12H, Deanne Molina MD    clopidogreL tablet 75 mg, 75 mg, Oral, QHS, Tyron Doe MD    dextrose 5 % and 0.45 % NaCl infusion, , Intravenous, Continuous, Tyron Doe MD    dextrose 50% injection 12.5 g, 12.5 g, Intravenous, PRN, Sanam Jackson, FNP    dextrose 50% injection 25 g, 25 g, Intravenous, PRN, Sanam Jackson, STARLA    DULoxetine DR capsule 60 mg, 60 mg, Oral, QHS, Tyron Doe MD    ferrous sulfate tablet 1 each, 1 tablet, Oral, Daily, Nader, " Tyron OSPINA MD    glucagon (human recombinant) injection 1 mg, 1 mg, Intramuscular, PRN, Sanam Jackson FNP    glucose chewable tablet 16 g, 16 g, Oral, PRN, Sanam Jackson FNP    glucose chewable tablet 24 g, 24 g, Oral, PRN, Sanam Jackson FNP    levothyroxine tablet 137 mcg, 137 mcg, Oral, Before breakfast, Tyron Doe MD    melatonin tablet 6 mg, 6 mg, Oral, Nightly PRN, Sanam Jackson FNP    midodrine tablet 5 mg, 5 mg, Oral, BID WM, Deanne Molina MD    polyethylene glycol packet 17 g, 17 g, Oral, BID PRN, Sanam Jackson FNP    QUEtiapine tablet 25 mg, 25 mg, Oral, QHS, Tyron Doe MD    sodium bicarbonate tablet 1,300 mg, 1,300 mg, Oral, TID, Deanne Molina MD    sodium chloride 0.9% flush 10 mL, 10 mL, Intravenous, PRN, Sanam Jackson FNP

## 2025-03-27 NOTE — CONSULTS
"Kam Parkerpauijaleel 1950  [id number]  3/27/2025    CONSULTING PHYSICIAN: Sanam Jackson NP    REASON FOR CONSULTATION:     Increased creatinine, pyelo/hydro     HPI:  Patient is a 74-year-old female with a past medical history of colon cancer with ileostomy, CKD, GERD, thyroiditis.  She also has a history of cervical cancer treated with pelvic radiation with vesicle vaginal fistula which has failed multiple attempts at closure.  She is known to Dr. Castro and has a chronic suprapubic tube.  She also has bilateral distal ureteral strictures which are managed with stents.  She underwent cystoscopy with bilateral stent change on 12/16/2024.  Her right-sided stent was unintentionally removed during a suprapubic catheter exchange.  Most recent ultrasound in the office did not reveal hydronephrosis on the right.  She presented to an outlying facility with left lower quadrant abdominal pain.  [facility] increasing bilateral hydroureteronephrosis and pelvicaliectasis with interim placement of left ureteral stent and removal of right ureteral stent compared to imaging from February; bilateral perinephric stranding and left periureteral stranding; suprapubic catheter in appropriate position with a nondistended bladder.  UA with turbid yellow urine, negative nitrites, 3+ leukocyte esterase, greater than 100 WBC, 3-5 RBCs, many bacteria, rare squamous epithelial cells.  WBC 15.02, H&H 8.3/25.7, BUN and creatinine 74/12.97. The patient is resting in bed.  She states that she does not want to proceed with dialysis.  She states that she " has heard bad things about it".  She is withdrawn.     Past Medical History:   Diagnosis Date    Cancer, colon     Chronic pain     GERD (gastroesophageal reflux disease)     Renal disorder     Thyroiditis, unspecified      Past Surgical History:   Procedure Laterality Date    ANGIOGRAM, AV SHUNT, WITH PERCUTANEOUS MECHANICAL THROMBECTOMY, ANGIOPLASTY, AND " "STENT INSERTION Left     BACK SURGERY       SECTION      CHOLECYSTECTOMY      COLON SURGERY      COLONOSCOPY N/A 2024    Procedure: COLONOSCOPY;  Surgeon: Ash Haley III, MD;  Location: [facility];  Service: Endoscopy;  Laterality: N/A;    COLONOSCOPY, WITH 1 OR MORE BIOPSIES N/A 2024    Procedure: COLONOSCOPY, WITH 1 OR MORE BIOPSIES;  Surgeon: Ash Haley III, MD;  Location: [facility];  Service: Endoscopy;  Laterality: N/A;  rectal biopsy; cold    EGD, WITH CLOSED BIOPSY N/A 2024    Procedure: EGD, WITH CLOSED BIOPSY;  Surgeon: Robson Tripp MD;  Location: [facility];  Service: Endoscopy;  Laterality: N/A;  A) BX DUODENUM, B) BX ANTRUM FOR H.PYLORI, C) BX GE JUNCTION    ESOPHAGOGASTRODUODENOSCOPY N/A 2024    Procedure: EGD (ESOPHAGOGASTRODUODENOSCOPY);  Surgeon: Robson Tripp MD;  Location: [facility];  Service: Endoscopy;  Laterality: N/A;  A) DIFFUSE GASTRITIS OF ANTRUM & STOMACH    HYSTERECTOMY      KIDNEY SURGERY       Family History   Problem Relation Name Age of Onset    Hypertension Mother       Social History[1]  Current Medications[2]  Review of patient's allergies indicates:   Allergen Reactions    Oxaprozin Nausea And Vomiting and Swelling     Facial swelling      Sulfamethoxazole-trimethoprim Nausea And Vomiting     Severe nausea and vomiting    Doxycycline     Nsaids (non-steroidal anti-inflammatory drug) Rash    Sulfa (sulfonamide antibiotics) Nausea And Vomiting       ROS: 12 point review of systems negative other than the HPI    PHYSICAL EXAM:  Vitals:    25 0222 25 0428 25 0715 25 0830   BP: 102/60 (!) 91/42 (!) 95/56    BP Location: Right arm  Right arm    Patient Position:   Lying    Pulse: 88 71 67    Resp: 14 16 20    Temp: 97.6 °F (36.4 °C) 97.4 °F (36.3 °C) 98.4 °F (36.9 °C)    TempSrc: Oral Oral Oral    SpO2: 100% 99% 98%    Weight:    72.6 kg (160 lb 0.9 oz)   Height:    5' 2" (1.575 m)       Intake/Output " Summary (Last 24 hours) at 3/27/2025 0848  Last data filed at 3/27/2025 0638  Gross per 24 hour   Intake --   Output 1075 ml   Net -1075 ml       GEN: WN/WD NAD  HEENT: normocephalic, atraumatic, PERRLA, EOMI, OP clear, nares patent  CV: RRR  RESP: Even and unlabored  ABD:  Soft, ileostomy   :  SP tube site clean dry and intact, draining cloudy green urine.  EXT: no C/C/E  NEURO: no focal deficits, MAEW, AAOx4    LABS:  Recent Results (from the past 24 hours)   Comprehensive metabolic panel    Collection Time: 03/26/25  3:15 PM   Result Value Ref Range    Sodium 133 (L) 136 - 145 mmol/L    Potassium 5.0 3.5 - 5.1 mmol/L    Chloride 102 98 - 107 mmol/L    CO2 10 (L) 23 - 31 mmol/L    Glucose 77 (L) 82 - 115 mg/dL    Blood Urea Nitrogen 75.0 (H) 9.8 - 20.1 mg/dL    Creatinine 12.69 (H) 0.55 - 1.02 mg/dL    Calcium 8.5 8.4 - 10.2 mg/dL    Protein Total 8.1 (H) 5.8 - 7.6 gm/dL    Albumin 2.7 (L) 3.4 - 4.8 g/dL    Globulin 5.4 (H) 2.4 - 3.5 gm/dL    Albumin/Globulin Ratio 0.5 (L) 1.1 - 2.0 ratio    Bilirubin Total 0.5 <=1.5 mg/dL     (H) 40 - 150 unit/L    ALT 12 0 - 55 unit/L    AST 13 11 - 45 unit/L    eGFR 3 mL/min/1.73/m2    Anion Gap 21.0 mEq/L    BUN/Creatinine Ratio 6    Lipase    Collection Time: 03/26/25  3:15 PM   Result Value Ref Range    Lipase Level 16 <=60 U/L   CBC with Differential    Collection Time: 03/26/25  3:15 PM   Result Value Ref Range    WBC 15.02 (H) 4.50 - 11.50 x10(3)/mcL    RBC 2.52 (L) 4.20 - 5.40 x10(6)/mcL    Hgb 8.3 (L) 12.0 - 16.0 g/dL    Hct 25.7 (L) 37.0 - 47.0 %    .0 (H) 80.0 - 94.0 fL    MCH 32.9 (H) 27.0 - 31.0 pg    MCHC 32.3 (L) 33.0 - 36.0 g/dL    RDW 12.3 11.5 - 17.0 %    Platelet 333 130 - 400 x10(3)/mcL    MPV 9.7 7.4 - 10.4 fL   Manual Differential    Collection Time: 03/26/25  3:15 PM   Result Value Ref Range    Neutrophils % 95 (H) 47 - 80 %    Lymphs % 2 (L) 13 - 40 %    Monocytes % 2 2 - 11 %    Eosinophils % 1 0 - 8 %    Neutrophils Abs Calc 14.269 (H)  2.1 - 9.2 x10(3)/mcL    Lymphs Abs 0.3004 (L) 0.6 - 4.6 x10(3)/mcL    Eosinophils Abs 0.1502 0 - 0.9 x10(3)/mcL    Monocytes Abs 0.3004 0.1 - 1.3 x10(3)/mcL    Platelets Adequate Normal, Adequate    Macrocytosis 1+ (A) (none)   Urinalysis Clean Catch    Collection Time: 03/26/25  3:53 PM   Result Value Ref Range    Color, UA Yellow Yellow, Light-Yellow, Dark Yellow, Tala, Straw    Appearance, UA Turbid (A) Clear    Specific Gravity, UA 1.020 1.005 - 1.030    pH, UA 7.5 5.0 - 8.5    Protein, UA 3+ (A) Negative    Glucose, UA Negative Negative, Normal    Ketones, UA Negative Negative    Blood, UA 3+ (A) Negative    Bilirubin, UA Negative Negative    Urobilinogen, UA 0.2 0.2, 1.0, Normal    Nitrites, UA Negative Negative    Leukocyte Esterase, UA 3+ (A) Negative   Urinalysis, Microscopic    Collection Time: 03/26/25  3:53 PM   Result Value Ref Range    Bacteria, UA Many (A) None Seen, Rare, Occasional /HPF    RBC, UA 3-5 None Seen, 0-2, 3-5, 0-5 /HPF    WBC, UA >=100 (A) None Seen, 0-2, 3-5, 0-5 /HPF    Squamous Epithelial Cells, UA Rare None Seen, Rare, Occasional, Occ /HPF   Lactic acid, plasma    Collection Time: 03/26/25  4:43 PM   Result Value Ref Range    Lactic Acid Level 0.9 0.5 - 2.2 mmol/L   Comprehensive metabolic panel    Collection Time: 03/26/25  6:51 PM   Result Value Ref Range    Sodium 134 (L) 136 - 145 mmol/L    Potassium 5.2 (H) 3.5 - 5.1 mmol/L    Chloride 100 98 - 107 mmol/L    CO2 12 (L) 23 - 31 mmol/L    Glucose 93 82 - 115 mg/dL    Blood Urea Nitrogen 74.0 (H) 9.8 - 20.1 mg/dL    Creatinine 12.97 (H) 0.55 - 1.02 mg/dL    Calcium 8.7 8.4 - 10.2 mg/dL    Protein Total 8.7 (H) 5.8 - 7.6 gm/dL    Albumin 2.8 (L) 3.4 - 4.8 g/dL    Globulin 5.9 (H) 2.4 - 3.5 gm/dL    Albumin/Globulin Ratio 0.5 (L) 1.1 - 2.0 ratio    Bilirubin Total 0.4 <=1.5 mg/dL     (H) 40 - 150 unit/L    ALT 14 0 - 55 unit/L    AST 15 11 - 45 unit/L    eGFR 3 mL/min/1.73/m2    Anion Gap 22.0 mEq/L    BUN/Creatinine Ratio 6         IMAGING:  CT ABDOMEN PELVIS WITHOUT CONTRAST     CLINICAL HISTORY:  LLQ abdominal pain;, .     TECHNIQUE:  PATIENT RADIATION DOSE: DLP(mGycm) 319     As per PQRS measures, all CT scans at this facility used dose modulation, iterative reconstruction, and/or weight based dose adjustment when appropriate to reduce radiation dose to as low as reasonably achievable.     COMPARISON:  02/07/2024     FINDINGS:  Serial axial images were obtained of the abdomen pelvis without the administration of IV contrast.  Additional sagittal and coronal reconstructions were performed. Degenerative changes are noted to the thoracolumbar spine.  Postsurgical/decompression changes are evident from L3-L5.  There is heterogeneity of bone trabecula diffusely throughout suggesting osteopenia.  The heart is mildly enlarged.  There is mild elevation of the right hemidiaphragm.  Mild atelectasis and/or scarring is at the posterior lower lungs.  The liver, spleen, adrenal glands, and pancreas are grossly within normal limits without the administration of IV contrast.  The gallbladder is surgically absent.  There is mild mucosal prominence at the GE junction.  Stomach is partially with gas and fluid.  Atherosclerosis is seen within the aorta and branching vessels.  The kidneys are mostly symmetric in size.  There is interim increasing bilateral hydroureteronephrosis/pelvicaliectasis.  A left ureteral stent has been placed since the prior exam.  There is interim removal of the right ureteral stent since the prior exam.  There is bilateral perinephric stranding.  Few small lymph nodes are seen within the periaortic and pericaval region.  There is left periureteral stranding.  A suprapubic Chun catheter is present within a nondistended bladder.  Postsurgical changes seen within the pelvis.  No dilated loops of bowel are identified.  The majority the colon appears to be absent with small fluid collection within a rectal pouch.  There is mild  perirectal stranding/edema.  The uterus is absent.  A ventral hernia is again identified containing a multiple loops of bowel without evidence of bowel obstruction.  There is a ostomy at the at right lower abdomen.     Impression:     1. Interim increasing bilateral hydroureteronephrosis and pelvicaliectasis with interim placement of a left renal stent and removal of the right ureteral stent since the prior exam  2. Suprapubic catheter present within a nondistended bladder with postsurgical changes at the pelvis  3. Status post colectomy with ostomy at the right lower abdomen and ventral hernia containing loops of bowel without evidence of bowel obstruction again identified  4. Cardiomegaly  5. Elevated right hemidiaphragm  6. Suspect fluid distended rectal pouch with mild perirectal stranding/edema  7. Findings and other details as above        Electronically signed by:Damon Fernandez  Date:                                            03/26/2025  Time:                                           16:38    ASSESSMENT:  74-year-old female with a history of cervical cancer status post pelvic radiation 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  PMH also includes CKD and colon cancer with ileostomy. [facility] - CT abdomen and pelvis reveals increasing bilateral hydroureteronephrosis and pelvicaliectasis with left double-J stent in appropriate position and removal of right ureteral stent since imaging in February; bilateral perinephric stranding, left periureteral stranding; SP tube in the appropriate position.   -hyperkalemia, acidosis -  she has been evaluated by Nephrology who was recommending hemodialysis however patient is currently refusing  -UTI - was started on IV Rocephin however no IV access, changed to p.o. Cipro      PLAN:  -discussed imaging and lab work with the  patient.   -unable to place retrograde stents in the past due to inability to visualize UO on cystoscopy from her radiation changes.  Recommendation is for right antegrade stent with IR.  This was discussed with the patient and she is in agreement.  -continue antibiotics, tailor according to final C&S results  -palliative Care has been consulted to further discuss goals of care.        Tala Cherry NP         [1]   Social History  Tobacco Use    Smoking status: Never    Smokeless tobacco: Never   Substance Use Topics    Alcohol use: Never    Drug use: Never   [2]   Current Facility-Administered Medications   Medication Dose Route Frequency Provider Last Rate Last Admin    acetaminophen tablet 650 mg  650 mg Oral Q4H PRN Sanam Jackson, POOJAP        aluminum-magnesium hydroxide-simethicone 200-200-20 mg/5 mL suspension 30 mL  30 mL Oral QID PRN Sanam Jackson, STARLA        bisacodyL suppository 10 mg  10 mg Rectal Daily PRN Sanam Jackson, STARLA        cefTRIAXone injection 1 g  1 g Intravenous Q24H Sanam Jackson FNP        ciprofloxacin HCl tablet 250 mg  250 mg Oral Q12H Deanne Molina MD        clopidogreL tablet 75 mg  75 mg Oral QHS Tyron Doe MD        dextrose 5 % and 0.45 % NaCl infusion   Intravenous Continuous Tyron Doe MD        dextrose 50% injection 12.5 g  12.5 g Intravenous PRN Sanam Jackson, STARLA        dextrose 50% injection 25 g  25 g Intravenous PRN Sanam Jackson, STARLA        DULoxetine DR capsule 60 mg  60 mg Oral QHS Tyron Doe MD        ferrous sulfate tablet 1 each  1 tablet Oral Daily Tyron Doe MD        glucagon (human recombinant) injection 1 mg  1 mg Intramuscular PRN Sanam Jackson, POOJAP        glucose chewable tablet 16 g  16 g Oral PRN Sanam Jackson, STARLA        glucose chewable tablet 24 g  24 g Oral PRN Sanam Jackson, POOJAP        levothyroxine tablet 137 mcg  137 mcg Oral Before breakfast Tyron Doe MD        melatonin tablet 6 mg  6 mg Oral Nightly PRN  Sanam Jackson FNP        midodrine tablet 5 mg  5 mg Oral BID  Deanne Molina MD        polyethylene glycol packet 17 g  17 g Oral BID PRN Sanam Jackson FNP        QUEtiapine tablet 25 mg  25 mg Oral QHS Tyron Doe MD        sodium bicarbonate tablet 1,300 mg  1,300 mg Oral TID Deanne Molina MD        sodium chloride 0.9% flush 10 mL  10 mL Intravenous PRN Sanam Jackson FNP        sodium zirconium cyclosilicate packet 10 g  10 g Oral TID Deanne Molina MD

## 2025-03-27 NOTE — PLAN OF CARE
03/27/25 1029   Discharge Assessment   Assessment Type Discharge Planning Assessment   Confirmed/corrected address, phone number and insurance Yes   Confirmed Demographics Correct on Facesheet   Source of Information patient   When was your last doctors appointment? 03/04/25   Communicated JOSHUA with patient/caregiver Date not available/Unable to determine   Reason For Admission pyelonephritis   People in Home child(willam), adult   Facility Arrived From: [facility]   Do you expect to return to your current living situation? Yes   Do you have help at home or someone to help you manage your care at home? Yes   Who are your caregiver(s) and their phone number(s)? Pranay Reyes [phone]   Prior to hospitilization cognitive status: Alert/Oriented   Current cognitive status: Alert/Oriented   Walking or Climbing Stairs Difficulty yes   Walking or Climbing Stairs ambulation difficulty, requires equipment   Mobility Management rollator, quad cane   Dressing/Bathing Difficulty yes   Dressing/Bathing bathing difficulty, requires equipment   Dressing/Bathing Management bath bench, grab bars   Home Accessibility stairs to enter home   Number of Stairs, Main Entrance none   Stair Railings, Main Entrance none   Home Layout Able to live on 1st floor   Equipment Currently Used at Home rollator;grab bar;bedside commode;bath bench;cane, quad   Patient currently being followed by outpatient case management? No   Do you currently have service(s) that help you manage your care at home? No   Do you take prescription medications? Yes   Do you have prescription coverage? Yes   Do you have any problems affording any of your prescribed medications? No   Is the patient taking medications as prescribed? yes   Who is going to help you get home at discharge? Pranay Reyes   How do you get to doctors appointments? family or friend will provide   Are you on dialysis? No   Do you take coumadin? No   Discharge Plan A Home   Discharge Plan  B Home   DME Needed Upon Discharge  other (see comments)  (tbd)   Discharge Plan discussed with: Patient   Transition of Care Barriers None   OTHER   Name(s) of People in Home Pranay Reyes     Assessment done with pt in bed. Had Shannon OCONNOR in the past. Pt lives with her son. He does not work and is home with her day and night per pt. She uses [facility].

## 2025-03-27 NOTE — CONSULTS
Inpatient consult to Palliative Care  Consult performed by: Jailene Powell NP  Consult ordered by: Tyron Doe MD      Patient Name: Kam Reyes   MRN: [id number]   Admission Date: 3/27/2025   Hospital Length of Stay: 0   Attending Provider: Tyron Doe MD   Consulting Provider: STARLA Main  Reason for Consult: Goals of Care  Primary Care Physician: Gregor Heaton MD     Principal Problem: <principal problem not specified>     Patient information was obtained from patient and ER records.      Final diagnoses:  [N12] Pyelonephritis     Assessment/Plan:     I reviewed the patient and family's understanding of the seriousness of the illness and its expected prognosis. We discussed the patient's goals of care and treatment preferences. I clarified current code status, which is full code. I identified the surrogate decision maker to be her son, Pranay. I answered all questions and we formulated a plan including recommendations for symptom management and how to best achieve goals of care.       Patient is resting comfortably in bed on RA. She denies complaints. Introduced service. Reviewed current clinical condition and consultation notes. She states several people have come in today discussing the need for hemodialysis with her, but she remains undecided and needs more time to think about it. She appears to be overwhelmed by the information. Explained that given AVG in her LUE and that she has been following with Dr. Max for approximately 20 years due to CKD, it appears dialysis has been discussed previously. She became tearful and explains that her son  at the age of 45 due to cardiac arrest/MI while on dialysis. Therefore, she is very hesitant and fearful to pursue dialysis. Reviewed that her renal function remains very poor due to infection and urology complications. She is unsure if urology plans to pursue interventions at this time and asks that we not continue discussing renal  function. Reviewed resuscitation measures, along with risks and benefits associated. States she will also think about this. Informed her that unless stated otherwise, she is listed as full code status and all aggressive measures will be pursued. She verbalizes understanding. States her son, sister, and second cousin all know her medical wishes that she completed with her . States the paperwork is not a living will or [place]. Offered spiritual support, consult placed. Offered to call her son to answer any questions, which she declines at this time. Offered support. Encouraged to call with questions or concerns. Palliative Medicine will continue to follow.    Advance Care Planning     Date: 03/27/2025    [facility]  I engaged the patient in a voluntary conversation about advance care planning and we specifically addressed what the goals of care would be moving forward, in light of the patient's change in clinical status, specifically current condition.  We did specifically address the patient's likely prognosis, which is fair .  We explored the patient's values and preferences for future care.  The patient endorses that what is most important right now is to focus on curative/life-prolongation (regardless of treatment burdens)    Accordingly, we have decided that the best plan to meet the patient's goals includes continuing with treatment         Recommendations:     Consult spiritual care      History of Present Illness:     74-year-old female with PMH of colon cancer with ileostomy, chronic pain, GERD, CKD stage 4, CAD, and hypothyroidism.  Presented to ED at [facility] on 03/27/2025 after transfer from outside facility secondary to complicated UTI involving suprapubic catheter needing urology evaluation.  She originally presented with left lower quadrant abdominal pain and CT of abdomen revealed increasing bilateral hydroureteronephrosis despite bilateral ureteral stents.  In ED noted to have borderline hypotension and  leukocytosis.  UA with many bacteria, pyuria, and hematuria therefore initiated on empiric antibiotics.  Renal consulted with recommendations for hemodialysis (it appears she has been preparing for dialysis outpatient with left arm AV graft), but she is currently refusing HD.  Pending urology evaluation.  Palliative medicine is consulted for goals of care.      Active Ambulatory Problems     Diagnosis Date Noted    Anemia of chronic kidney failure, stage 4 (severe) 2023    GERD (gastroesophageal reflux disease) 2021    Ileostomy status 2023    Recurrent major depression 2023    Personal history of other malignant neoplasm of large intestine 2023    Hypothyroid 2021    Hypertension 2024    Vesicovaginal fistula 2021    Multiple drug resistant organism (MDRO) culture positive 2024    Metabolic acidosis 2024     Resolved Ambulatory Problems     Diagnosis Date Noted    Dyspnea 2022    Congenital hypothyroidism without goiter 2023    Anxiety disorder 2021    COVID-19 2024    Acute UTI 2024    Fracture of surgical neck of humerus 2024    Hypertensive chronic kidney disease w stg 1-4/unsp chr kdny 2023    Edema of left upper extremity 2024    Gastrointestinal hemorrhage with melena 2024    Altered awareness, transient 2024    Moderate protein-calorie malnutrition 2024    Acute kidney injury superimposed on chronic kidney disease 2024     Past Medical History:   Diagnosis Date    Cancer, colon     Chronic pain     Renal disorder     Thyroiditis, unspecified         Past Surgical History:   Procedure Laterality Date    ANGIOGRAM, AV SHUNT, WITH PERCUTANEOUS MECHANICAL THROMBECTOMY, ANGIOPLASTY, AND STENT INSERTION Left     BACK SURGERY       SECTION      CHOLECYSTECTOMY      COLON SURGERY      COLONOSCOPY N/A 2024    Procedure: COLONOSCOPY;  Surgeon: Ash Haley III, MD;   Location: [facility];  Service: Endoscopy;  Laterality: N/A;    COLONOSCOPY, WITH 1 OR MORE BIOPSIES N/A 8/22/2024    Procedure: COLONOSCOPY, WITH 1 OR MORE BIOPSIES;  Surgeon: Ash Haley III, MD;  Location: [facility];  Service: Endoscopy;  Laterality: N/A;  rectal biopsy; cold    EGD, WITH CLOSED BIOPSY N/A 02/22/2024    Procedure: EGD, WITH CLOSED BIOPSY;  Surgeon: Robson Tripp MD;  Location: [facility];  Service: Endoscopy;  Laterality: N/A;  A) BX DUODENUM, B) BX ANTRUM FOR H.PYLORI, C) BX GE JUNCTION    ESOPHAGOGASTRODUODENOSCOPY N/A 02/22/2024    Procedure: EGD (ESOPHAGOGASTRODUODENOSCOPY);  Surgeon: Robson Tripp MD;  Location: [facility];  Service: Endoscopy;  Laterality: N/A;  A) DIFFUSE GASTRITIS OF ANTRUM & STOMACH    HYSTERECTOMY      KIDNEY SURGERY          Review of patient's allergies indicates:   Allergen Reactions    Oxaprozin Nausea And Vomiting and Swelling     Facial swelling      Sulfamethoxazole-trimethoprim Nausea And Vomiting     Severe nausea and vomiting    Doxycycline     Nsaids (non-steroidal anti-inflammatory drug) Rash    Sulfa (sulfonamide antibiotics) Nausea And Vomiting        Current Medications[1]       Current Facility-Administered Medications:     acetaminophen, 650 mg, Oral, Q4H PRN    aluminum-magnesium hydroxide-simethicone, 30 mL, Oral, QID PRN    bisacodyL, 10 mg, Rectal, Daily PRN    dextrose 50%, 12.5 g, Intravenous, PRN    dextrose 50%, 25 g, Intravenous, PRN    glucagon (human recombinant), 1 mg, Intramuscular, PRN    glucose, 16 g, Oral, PRN    glucose, 24 g, Oral, PRN    melatonin, 6 mg, Oral, Nightly PRN    polyethylene glycol, 17 g, Oral, BID PRN    sodium chloride 0.9%, 10 mL, Intravenous, PRN     Family History   Problem Relation Name Age of Onset    Hypertension Mother          Review of Systems   Constitutional: Negative.    HENT: Negative.     Respiratory: Negative.     Cardiovascular: Negative.    Gastrointestinal: Negative.    Genitourinary:  "Negative.    Musculoskeletal: Negative.    Neurological:  Positive for weakness.   Psychiatric/Behavioral: Negative.              Objective:   BP (!) 95/56 (BP Location: Right arm, Patient Position: Lying)   Pulse 67   Temp 98.4 °F (36.9 °C) (Oral)   Resp 20   Ht 5' 2" (1.575 m)   Wt 72.6 kg (160 lb 0.9 oz)   SpO2 98%   BMI 29.27 kg/m²      Physical Exam  Constitutional:       General: She is not in acute distress.     Appearance: She is obese. She is ill-appearing.   HENT:      Head: Normocephalic.   Eyes:      Extraocular Movements: Extraocular movements intact.   Cardiovascular:      Rate and Rhythm: Normal rate.   Pulmonary:      Effort: Pulmonary effort is normal. No respiratory distress.   Abdominal:      General: There is no distension.   Musculoskeletal:      Right lower leg: No edema.      Left lower leg: No edema.   Skin:     General: Skin is warm and dry.   Neurological:      General: No focal deficit present.      Mental Status: She is alert and oriented to person, place, and time.   Psychiatric:         Behavior: Behavior normal.            Review of Symptoms  Review of Symptoms        Living Arrangements:  Lives with family and Lives in home    Psychosocial/Cultural:   See Palliative Psychosocial Note: Yes  . Has one living son, who she lives with. Has additional family support, including her second cousin (attends doctors appts with her) and her sister. She is no longer driving, uses a cane or walker, and continues to perform most ADLs herself.  **Primary  to Follow**  Palliative Care  Consult: No    Spiritual:  F - Monae and Belief:  Jain  I - Importance:  Yes  C - Community:  Yes  A - Address in Care:  Yes      Advance Care Planning   Advance Directives:     Decision Making:  Patient answered questions  Goals of Care: The patient endorses that what is most important right now is to focus on curative/life-prolongation (regardless of treatment " burdens)    Accordingly, we have decided that the best plan to meet the patient's goals includes continuing with treatment          PAINAD: NA    Caregiver burden formerly assessed: Yes    > 50% of 75 min of encounter was spent in chart review, face to face discussion of goals of care, symptom assessment, coordination of care and emotional support.    STARLA Main-BC  Palliative Medicine  [facility]         [1]   Current Facility-Administered Medications:     acetaminophen tablet 650 mg, 650 mg, Oral, Q4H PRN, Elentia Jacksonsa, FNP    aluminum-magnesium hydroxide-simethicone 200-200-20 mg/5 mL suspension 30 mL, 30 mL, Oral, QID PRN, Elenita Jacksonsa, FNP    bisacodyL suppository 10 mg, 10 mg, Rectal, Daily PRN, Sanam Jackson, POOJAP    cefTRIAXone injection 1 g, 1 g, Intravenous, Q24H, Sanam Jackson, POOJAP    ciprofloxacin HCl tablet 250 mg, 250 mg, Oral, Q12H, Deanne Molina MD, 250 mg at 03/27/25 0910    clopidogreL tablet 75 mg, 75 mg, Oral, QHS, Tyron Doe MD    dextrose 5 % and 0.45 % NaCl infusion, , Intravenous, Continuous, Tyron Doe MD    dextrose 50% injection 12.5 g, 12.5 g, Intravenous, PRN, Elenita Jacksonsa, FNP    dextrose 50% injection 25 g, 25 g, Intravenous, PRN, Elenita Jacksonsa, FNP    DULoxetine DR capsule 60 mg, 60 mg, Oral, QHS, Tyron Doe MD    ferrous sulfate tablet 1 each, 1 tablet, Oral, Daily, Tyron Doe MD, 1 each at 03/27/25 0852    glucagon (human recombinant) injection 1 mg, 1 mg, Intramuscular, PRN, Elenita Jacksonsa, POOJAP    glucose chewable tablet 16 g, 16 g, Oral, PRN, Elenita Jacksonsa, FNP    glucose chewable tablet 24 g, 24 g, Oral, PRN, Elenita Jacksonsa, FNP    levothyroxine tablet 137 mcg, 137 mcg, Oral, Before breakfast, Tyron Doe MD, 137 mcg at 03/27/25 0852    melatonin tablet 6 mg, 6 mg, Oral, Nightly PRN, Sanam Jackson, STARLA    midodrine tablet 5 mg, 5 mg, Oral, BID WM, Deanne Molina MD, 5 mg at 03/27/25 4505    polyethylene glycol  packet 17 g, 17 g, Oral, BID PRN, Sanam Jackson FNP    QUEtiapine tablet 25 mg, 25 mg, Oral, QHS, Tyron Doe MD    sodium bicarbonate tablet 1,300 mg, 1,300 mg, Oral, TID, Deanne Molina MD, 1,300 mg at 03/27/25 0852    sodium chloride 0.9% flush 10 mL, 10 mL, Intravenous, PRN, Sanam Jackson FNP    sodium zirconium cyclosilicate packet 10 g, 10 g, Oral, TID, Deanne Molina MD, 10 g at 03/27/25 0852

## 2025-03-27 NOTE — H&P
[facility] Hospital Medicine History & Physical Examination       Patient Name: Kam Reyes  MRN: [id number]  Patient Class: IP- Inpatient   Admission Date: 3/27/2025   Admitting Physician:  Service   Attending Physician: Tyron Doe MD  Primary Care Provider: Gregor Heaton MD  Face-to-Face encounter date: 03/27/2025  Code Status:Code Status Discussion Note   Chief Complaint: No chief complaint on file.         Patient information was obtained from patient, patient's family, past medical records and ER records.     HISTORY OF PRESENT ILLNESS:   Kam Reyes is a 74 y.o. female who  has a past medical history of Cancer, colon, Chronic pain, GERD (gastroesophageal reflux disease), Renal disorder, and Thyroiditis, unspecified.. The patient presented to [facility] on 3/27/2025     74-year-old male transferred from an outside facility secondary to complicated UTI involving a suprapubic catheter.  She originally presented for left lower quadrant abdominal pain.  She also has a past medical history of chronic kidney disease stage IV, colon cancer with ileostomy in place, CAD, and hypothyroidism.  She was transferred for Urology consultation as a CT of the abdomen revealed increasing bilateral hydro ureteral nephrosis despite bilateral ureteral stents.    In the emergency room she is noted to have some mild hypotension down to 91/42.  She has a significant leukocytosis of 15.02.  Anemia with a hemoglobin of 8.3.  Elevated potassium at 5.2 he has significant electrolyte abnormalities.  Her urinalysis showed many bacteria with pyuria and hematuria.She was started empirically on IV Rocephin and admitted to the hospitalist group  PAST MEDICAL HISTORY:     Past Medical History:   Diagnosis Date    Cancer, colon     Chronic pain     GERD (gastroesophageal reflux disease)     Renal disorder     Thyroiditis, unspecified        PAST SURGICAL HISTORY:     Past Surgical History:    Procedure Laterality Date    ANGIOGRAM, AV SHUNT, WITH PERCUTANEOUS MECHANICAL THROMBECTOMY, ANGIOPLASTY, AND STENT INSERTION Left     BACK SURGERY       SECTION      CHOLECYSTECTOMY      COLON SURGERY      COLONOSCOPY N/A 2024    Procedure: COLONOSCOPY;  Surgeon: Ash Haley III, MD;  Location: [facility];  Service: Endoscopy;  Laterality: N/A;    COLONOSCOPY, WITH 1 OR MORE BIOPSIES N/A 2024    Procedure: COLONOSCOPY, WITH 1 OR MORE BIOPSIES;  Surgeon: Ash Haley III, MD;  Location: [facility];  Service: Endoscopy;  Laterality: N/A;  rectal biopsy; cold    EGD, WITH CLOSED BIOPSY N/A 2024    Procedure: EGD, WITH CLOSED BIOPSY;  Surgeon: Robson Tripp MD;  Location: [facility];  Service: Endoscopy;  Laterality: N/A;  A) BX DUODENUM, B) BX ANTRUM FOR H.PYLORI, C) BX GE JUNCTION    ESOPHAGOGASTRODUODENOSCOPY N/A 2024    Procedure: EGD (ESOPHAGOGASTRODUODENOSCOPY);  Surgeon: Robson Tripp MD;  Location: [facility];  Service: Endoscopy;  Laterality: N/A;  A) DIFFUSE GASTRITIS OF ANTRUM & STOMACH    HYSTERECTOMY      KIDNEY SURGERY         ALLERGIES:   Oxaprozin, Sulfamethoxazole-trimethoprim, Doxycycline, Nsaids (non-steroidal anti-inflammatory drug), and Sulfa (sulfonamide antibiotics)    FAMILY HISTORY:   Reviewed and negative    SOCIAL HISTORY:     Social History     Tobacco Use    Smoking status: Never    Smokeless tobacco: Never   Substance Use Topics    Alcohol use: Never        HOME MEDICATIONS:     Prior to Admission medications    Medication Sig Start Date End Date Taking? Authorizing Provider   alendronate (FOSAMAX) 70 MG tablet Take 70 mg by mouth every Thursday.   Yes Provider, Historical   calcitRIOL (ROCALTROL) 0.5 MCG Cap Take 0.5 mcg by mouth. Monday-Friday only.   Yes Provider, Historical   clopidogreL (PLAVIX) 75 mg tablet Take 75 mg by mouth every evening. Stopped 24   Yes Provider, Historical   DULoxetine (CYMBALTA) 60 MG capsule Take 60 mg  by mouth every evening.   Yes Provider, Historical   ferrous sulfate 325 (65 FE) MG EC tablet Take 325 mg by mouth every evening.   Yes Provider, Historical   Lacto no.76/Bifido/FOS/larch (WOMEN'S PROBIOTIC ORAL) Take 1 capsule by mouth every evening.   Yes Provider, Historical   levothyroxine (SYNTHROID) 100 MCG tablet Take 137 mcg by mouth before breakfast.   Yes Provider, Historical   omeprazole (PRILOSEC) 40 MG capsule Take 40 mg by mouth every evening.   Yes Provider, Historical   QUEtiapine (SEROQUEL) 25 MG Tab Take 25 mg by mouth every evening.   Yes Provider, Historical   sodium bicarbonate 650 MG tablet Take 2 tablets by mouth 2 (two) times daily.   Yes Provider, Historical   vitamin D (VITAMIN D3) 1000 units Tab Take 1 tablet by mouth every morning.   Yes Provider, Historical   ascorbic acid, vitamin C, (VITAMIN C) 100 MG tablet Take 100 mg by mouth every evening.    Provider, Historical   biotin 10,000 mcg Cap Take 1 capsule by mouth every evening.    Provider, Historical   ciprofloxacin HCl (CIPRO) 500 MG tablet Take 1 tablet (500 mg total) by mouth every 12 (twelve) hours. 4/19/24   Gregor Heaton MD       REVIEW OF SYSTEMS:   Except as documented, all other systems reviewed and negative     PHYSICAL EXAM:     VITAL SIGNS: 24 HRS MIN & MAX LAST   Temp  Min: 97.4 °F (36.3 °C)  Max: 99.8 °F (37.7 °C) 97.4 °F (36.3 °C)   BP  Min: 91/42  Max: 135/58 (!) 91/42   Pulse  Min: 71  Max: 106  71   Resp  Min: 14  Max: 22 16   SpO2  Min: 95 %  Max: 100 % 99 %       General appearance: Well-developed, well-nourished female in no apparent distress.  HENT: Atraumatic head. Moist mucous membranes of oral cavity.  Eyes: Normal extraocular movements.   Neck: Supple.   Lungs: Clear to auscultation bilaterally. No wheezing present.   Heart: Regular rate and rhythm. S1 and S2 present with no murmurs/gallop/rub. No pedal edema. No JVD present.   Abdomen: Soft, non-distended, non-tender. No rebound tenderness/guarding.  Bowel sounds are normal.  Ileostomy, suprapubic catheter  Extremities: No cyanosis, clubbing, or edema.  Skin: No Rash.   Neuro: Motor and sensory exams grossly intact. Good tone. Muscle strength 5/5 in all 4 extremities  Psych/mental status: Appropriate mood and affect. Responds appropriately to questions.     LABS AND IMAGING:     Recent Labs   Lab 03/26/25  1515   WBC 15.02*   RBC 2.52*   HGB 8.3*   HCT 25.7*   .0*   MCH 32.9*   MCHC 32.3*   RDW 12.3      MPV 9.7       Recent Labs   Lab 03/26/25  1515 03/26/25  1851   * 134*   K 5.0 5.2*    100   CO2 10* 12*   BUN 75.0* 74.0*   CREATININE 12.69* 12.97*   CALCIUM 8.5 8.7   ALBUMIN 2.7* 2.8*   ALKPHOS 175* 186*   ALT 12 14   AST 13 15   BILITOT 0.5 0.4       Microbiology Results (last 7 days)       ** No results found for the last 168 hours. **             US Retroperitoneal Complete  Narrative: EXAMINATION:  US RETROPERITONEAL COMPLETE    CLINICAL HISTORY:  GHADA;    COMPARISON:  Yesterday    FINDINGS:  Grayscale and color Doppler sonographic evaluation of the kidneys and urinary bladder.    The right kidney measures 10-11 cm. The left kidney measures 11-12 cm.   There is moderate bilateral hydronephrosis similar to yesterday.    The bladder is not visualized secondary to being empty.  Impression: Similar bilateral hydronephrosis.    Electronically signed by: Theo Shaw  Date:    03/27/2025  Time:    06:34      _____________________________________  INPATIENT LIST OF MEDICATIONS   Current Medications[1]      Scheduled Meds:   cefTRIAXone (Rocephin) IV (PEDS and ADULTS)  1 g Intravenous Q24H     Continuous Infusions:   0.9% NaCl   Intravenous Continuous         PRN Meds:.  Current Facility-Administered Medications:     acetaminophen, 650 mg, Oral, Q4H PRN    aluminum-magnesium hydroxide-simethicone, 30 mL, Oral, QID PRN    bisacodyL, 10 mg, Rectal, Daily PRN    dextrose 50%, 12.5 g, Intravenous, PRN    dextrose 50%, 25 g, Intravenous,  PRN    glucagon (human recombinant), 1 mg, Intramuscular, PRN    glucose, 16 g, Oral, PRN    glucose, 24 g, Oral, PRN    melatonin, 6 mg, Oral, Nightly PRN    polyethylene glycol, 17 g, Oral, BID PRN    sodium chloride 0.9%, 10 mL, Intravenous, PRN      VTE Prophylaxis: will be placed on appropriate DVT prophylaxis and will be advised to be as mobile as possible and sit in a chair as tolerated  _____________________________________    ASSESSMENT & PLAN:   Severe sepsis secondary to UTI  Acute on chronic renal failure, baseline stage IV   Hyperkalemia   Obstructive uropathy  Complicated UTI, POA   Metabolic acidosis  Anemia, suspect combined inflammatory and iron-deficiency  Hypotension with history of hypertension  Coronary artery disease  Hypothyroidism    Patient was significant worsening of her renal function.  Creatinine up to 12 with a baseline around 4.  Also with hyperkalemia.  Received a dose of Lokelma in the emergency room.  Nephrology has been consulted case she needs hemodialysis.  Suspect stent dysfunction.  She has previous bilateral ureteral/renal stents.  Urology has already been consulted.  She does have some urine output from her suprapubic catheter.  Also treating for complicated UTI.  Will need suprapubic catheter changed out.  Continue with empiric Rocephin for now, day 1.  Will follow up cultures and monitor leukocytosis.  Blood pressure better.  Will continue with IV fluids.  Will review her home medications and resume as appropriate.  Okay for meds despite NPO status for possible urologic intervention.  Repeat labs this morning to see if we have made any progress    Critical care diagnosis: sepsis, iv antibiotics  Critical care interventions: hands on evaluation, review of labs/radiographs/records and discussions with family  Critical care time spent: >32 minutes        Tyron Doe MD  6:53 AM 03/27/2025    Screening for Social Drivers for health:  Patient screened for food insecurity,  housing instability, transportation needs, utility difficulties, and interpersonal safety (select all that apply as identified as concern)  []Housing or Food  []Transportation Needs  []Utility Difficulties  []Interpersonal safety  [x]None             [1]   Current Facility-Administered Medications:     0.9% NaCl infusion, , Intravenous, Continuous, Renetta, Sanam, FNP    acetaminophen tablet 650 mg, 650 mg, Oral, Q4H PRN, Renetta Sanam, FNP    aluminum-magnesium hydroxide-simethicone 200-200-20 mg/5 mL suspension 30 mL, 30 mL, Oral, QID PRN, Renetta, Sanam, FNP    bisacodyL suppository 10 mg, 10 mg, Rectal, Daily PRN, Elenita Jacksonsa, FNP    cefTRIAXone injection 1 g, 1 g, Intravenous, Q24H, Renetta Sanam, FNP    dextrose 50% injection 12.5 g, 12.5 g, Intravenous, PRN, Renetta, Sanam, FNP    dextrose 50% injection 25 g, 25 g, Intravenous, PRN, Renetta, Sanam, FNP    glucagon (human recombinant) injection 1 mg, 1 mg, Intramuscular, PRN, [place], Sanam, FNP    glucose chewable tablet 16 g, 16 g, Oral, PRN, [place], Sanam, FNP    glucose chewable tablet 24 g, 24 g, Oral, PRN, Renetta, Sanam, FNP    melatonin tablet 6 mg, 6 mg, Oral, Nightly PRN, Renetta, Sanam, FNP    polyethylene glycol packet 17 g, 17 g, Oral, BID PRN, [place], Sanam, FNP    sodium chloride 0.9% flush 10 mL, 10 mL, Intravenous, PRN, Renetta, Sanam, FNP

## 2025-03-28 PROBLEM — E87.5 HYPERKALEMIA: Status: ACTIVE | Noted: 2025-03-28

## 2025-03-28 PROBLEM — N19 UREMIA: Status: ACTIVE | Noted: 2025-03-28

## 2025-03-28 PROBLEM — N12 PYELONEPHRITIS: Status: ACTIVE | Noted: 2025-03-28

## 2025-03-28 LAB
ACB COMPLEX DNA BLD POS QL NAA+NON-PROBE: NOT DETECTED
ANION GAP SERPL CALC-SCNC: 20 MEQ/L
B FRAGILIS DNA BLD POS QL NAA+PROBE: NOT DETECTED
BACTERIA UR CULT: NORMAL
BASOPHILS # BLD AUTO: 0.03 X10(3)/MCL
BASOPHILS NFR BLD AUTO: 0.2 %
BUN SERPL-MCNC: 90.4 MG/DL (ref 9.8–20.1)
C ALBICANS DNA BLD POS QL NAA+PROBE: NOT DETECTED
C AURIS DNA BLD POS QL NAA+NON-PROBE: NOT DETECTED
C GATTII+NEOFOR DNA CSF QL NAA+NON-PROBE: NOT DETECTED
C GLABRATA DNA BLD POS QL NAA+PROBE: NOT DETECTED
C KRUSEI DNA BLD POS QL NAA+PROBE: NOT DETECTED
C PARAP DNA BLD POS QL NAA+PROBE: NOT DETECTED
C TROPICLS DNA BLD POS QL NAA+PROBE: NOT DETECTED
CALCIUM SERPL-MCNC: 6.8 MG/DL (ref 8.4–10.2)
CHLORIDE SERPL-SCNC: 101 MMOL/L (ref 98–107)
CO2 SERPL-SCNC: 11 MMOL/L (ref 23–31)
COLISTIN RES MCR-1 ISLT/SPM QL: ABNORMAL
CREAT SERPL-MCNC: 13.05 MG/DL (ref 0.55–1.02)
CREAT/UREA NIT SERPL: 7
E CLOAC COMP DNA BLD POS QL NAA+PROBE: NOT DETECTED
E COLI DNA BLD POS QL NAA+PROBE: NOT DETECTED
E FAECALIS+OTHR E SP RRNA BLD POS FISH: NOT DETECTED
E FAECIUM HSP60 BLD POS QL PROBE: NOT DETECTED
ENTEROBACTERALES DNA BLD POS NAA+N-PRB: DETECTED
EOSINOPHIL # BLD AUTO: 0.16 X10(3)/MCL (ref 0–0.9)
EOSINOPHIL NFR BLD AUTO: 1.1 %
ERYTHROCYTE [DISTWIDTH] IN BLOOD BY AUTOMATED COUNT: 12.2 % (ref 11.5–17)
ESBL CFT TO CFT-CLAV IC RTO BD POS IMP: NOT DETECTED
GFR SERPLBLD CREATININE-BSD FMLA CKD-EPI: 3 ML/MIN/1.73/M2
GLUCOSE SERPL-MCNC: 48 MG/DL (ref 82–115)
GP B STREP DNA CSF QL NAA+NON-PROBE: NOT DETECTED
HAEM INFLU DNA CSF QL NAA+NON-PROBE: NOT DETECTED
HBV SURFACE AB SER QL IA: POSITIVE
HBV SURFACE AB SERPL IA-ACNC: >1000 MIU/ML
HCT VFR BLD AUTO: 23 % (ref 37–47)
HGB BLD-MCNC: 7.3 G/DL (ref 12–16)
IMM GRANULOCYTES # BLD AUTO: 0.11 X10(3)/MCL (ref 0–0.04)
IMM GRANULOCYTES NFR BLD AUTO: 0.7 %
IMP CARBAPENEMASE ISLT QL IA.RAPID: NOT DETECTED
K OXYTOCA OMPA BLD POS QL PROBE: NOT DETECTED
KLEBSIELLA SP DNA BLD POS QL NAA+NON-PRB: NOT DETECTED
KLEBSIELLA SP DNA BLD POS QL NAA+NON-PRB: NOT DETECTED
KPC CARBAPENEMASE ISLT QL IA.RAPID: NOT DETECTED
L MONOCYTOG DNA CSF QL NAA+NON-PROBE: NOT DETECTED
LYMPHOCYTES # BLD AUTO: 0.62 X10(3)/MCL (ref 0.6–4.6)
LYMPHOCYTES NFR BLD AUTO: 4.1 %
MCH RBC QN AUTO: 32.7 PG (ref 27–31)
MCHC RBC AUTO-ENTMCNC: 31.7 G/DL (ref 33–36)
MCV RBC AUTO: 103.1 FL (ref 80–94)
MECA+MECC NOSE QL NAA+PROBE: ABNORMAL
MECA+MECC+MREJ ISLT/SPM QL: ABNORMAL
MONOCYTES # BLD AUTO: 0.49 X10(3)/MCL (ref 0.1–1.3)
MONOCYTES NFR BLD AUTO: 3.2 %
N MEN DNA CSF QL NAA+NON-PROBE: NOT DETECTED
NDM CARBAPENEMASE ISLT QL IA.RAPID: NOT DETECTED
NEUTROPHILS # BLD AUTO: 13.67 X10(3)/MCL (ref 2.1–9.2)
NEUTROPHILS NFR BLD AUTO: 90.7 %
NRBC BLD AUTO-RTO: 0 %
OXA-48-LIKE CRBPNASE ISLT QL IA.RAPID: NOT DETECTED
P AERUGINOSA DNA BLD POS QL NAA+PROBE: NOT DETECTED
PLATELET # BLD AUTO: 292 X10(3)/MCL (ref 130–400)
PMV BLD AUTO: 10.3 FL (ref 7.4–10.4)
POCT GLUCOSE: 57 MG/DL (ref 70–110)
POTASSIUM SERPL-SCNC: 5.6 MMOL/L (ref 3.5–5.1)
PROTEUS SP DNA BLD POS QL NAA+PROBE: NOT DETECTED
RBC # BLD AUTO: 2.23 X10(6)/MCL (ref 4.2–5.4)
S AUREUS DNA BLD POS QL NAA+PROBE: NOT DETECTED
S ENT+BONG DNA STL QL NAA+NON-PROBE: NOT DETECTED
S EPIDERMIDIS HSP60 BLD POS QL PROBE: NOT DETECTED
S LUGDUNENSIS SODA BLD POS QL PROBE: NOT DETECTED
S MALTOPH DNA BLD POS QL NAA+PROBE: NOT DETECTED
S MARCESCENS DNA BLD POS QL NAA+PROBE: NOT DETECTED
S PNEUM DNA CSF QL NAA+NON-PROBE: NOT DETECTED
S PYOGENES HSP60 BLD POS QL PROBE: NOT DETECTED
SODIUM SERPL-SCNC: 132 MMOL/L (ref 136–145)
STAPH SP TUF BLD POS QL PROBE: NOT DETECTED
STREPTOCOCCUS SP TUF BLD POS QL PROBE: NOT DETECTED
VAN(A+B+C1+C2) GENES ISLT/SPM: ABNORMAL
VIM CARBAPENEMASE ISLT QL IA.RAPID: NOT DETECTED
WBC # BLD AUTO: 15.08 X10(3)/MCL (ref 4.5–11.5)

## 2025-03-28 PROCEDURE — 0T9330Z DRAINAGE OF RIGHT KIDNEY PELVIS WITH DRAINAGE DEVICE, PERCUTANEOUS APPROACH: ICD-10-PCS | Performed by: RADIOLOGY

## 2025-03-28 PROCEDURE — 99233 SBSQ HOSP IP/OBS HIGH 50: CPT | Mod: ,,, | Performed by: INTERNAL MEDICINE

## 2025-03-28 PROCEDURE — 02HV33Z INSERTION OF INFUSION DEVICE INTO SUPERIOR VENA CAVA, PERCUTANEOUS APPROACH: ICD-10-PCS | Performed by: INTERNAL MEDICINE

## 2025-03-28 PROCEDURE — 25000003 PHARM REV CODE 250

## 2025-03-28 PROCEDURE — 99232 SBSQ HOSP IP/OBS MODERATE 35: CPT | Mod: ,,,

## 2025-03-28 PROCEDURE — 25000003 PHARM REV CODE 250: Performed by: INTERNAL MEDICINE

## 2025-03-28 PROCEDURE — 11000001 HC ACUTE MED/SURG PRIVATE ROOM

## 2025-03-28 PROCEDURE — 63600175 PHARM REV CODE 636 W HCPCS

## 2025-03-28 PROCEDURE — 63600175 PHARM REV CODE 636 W HCPCS: Performed by: RADIOLOGY

## 2025-03-28 PROCEDURE — 80048 BASIC METABOLIC PNL TOTAL CA: CPT | Performed by: HOSPITALIST

## 2025-03-28 PROCEDURE — 99152 MOD SED SAME PHYS/QHP 5/>YRS: CPT | Mod: ,,, | Performed by: RADIOLOGY

## 2025-03-28 PROCEDURE — 50432 PLMT NEPHROSTOMY CATHETER: CPT | Mod: RT,,, | Performed by: RADIOLOGY

## 2025-03-28 PROCEDURE — 25000003 PHARM REV CODE 250: Performed by: HOSPITALIST

## 2025-03-28 PROCEDURE — 36415 COLL VENOUS BLD VENIPUNCTURE: CPT | Performed by: HOSPITALIST

## 2025-03-28 PROCEDURE — 90935 HEMODIALYSIS ONE EVALUATION: CPT

## 2025-03-28 PROCEDURE — 85025 COMPLETE CBC W/AUTO DIFF WBC: CPT | Performed by: HOSPITALIST

## 2025-03-28 PROCEDURE — 25500020 PHARM REV CODE 255: Performed by: HOSPITALIST

## 2025-03-28 RX ORDER — ALBUMIN HUMAN 250 G/1000ML
25 SOLUTION INTRAVENOUS
Status: DISCONTINUED | OUTPATIENT
Start: 2025-03-28 | End: 2025-04-05 | Stop reason: HOSPADM

## 2025-03-28 RX ORDER — MIDODRINE HYDROCHLORIDE 5 MG/1
10 TABLET ORAL
Status: DISCONTINUED | OUTPATIENT
Start: 2025-03-28 | End: 2025-04-05 | Stop reason: HOSPADM

## 2025-03-28 RX ORDER — LIDOCAINE HYDROCHLORIDE 20 MG/ML
INJECTION, SOLUTION INFILTRATION; PERINEURAL
Status: COMPLETED | OUTPATIENT
Start: 2025-03-28 | End: 2025-03-28

## 2025-03-28 RX ORDER — HYDROCODONE BITARTRATE AND ACETAMINOPHEN 10; 325 MG/1; MG/1
1 TABLET ORAL EVERY 6 HOURS PRN
Refills: 0 | Status: DISCONTINUED | OUTPATIENT
Start: 2025-03-28 | End: 2025-04-05 | Stop reason: HOSPADM

## 2025-03-28 RX ORDER — MUPIROCIN 20 MG/G
OINTMENT TOPICAL 2 TIMES DAILY
Status: DISPENSED | OUTPATIENT
Start: 2025-03-28 | End: 2025-04-02

## 2025-03-28 RX ORDER — MORPHINE SULFATE 4 MG/ML
2 INJECTION, SOLUTION INTRAMUSCULAR; INTRAVENOUS EVERY 4 HOURS PRN
Refills: 0 | Status: DISCONTINUED | OUTPATIENT
Start: 2025-03-28 | End: 2025-04-05 | Stop reason: HOSPADM

## 2025-03-28 RX ORDER — FENTANYL CITRATE 50 UG/ML
INJECTION, SOLUTION INTRAMUSCULAR; INTRAVENOUS
Status: COMPLETED | OUTPATIENT
Start: 2025-03-28 | End: 2025-03-28

## 2025-03-28 RX ORDER — SODIUM BICARBONATE 650 MG/1
1300 TABLET ORAL 2 TIMES DAILY
Status: DISCONTINUED | OUTPATIENT
Start: 2025-03-28 | End: 2025-03-31

## 2025-03-28 RX ORDER — CALCIUM GLUCONATE 20 MG/ML
1 INJECTION, SOLUTION INTRAVENOUS ONCE
Status: COMPLETED | OUTPATIENT
Start: 2025-03-28 | End: 2025-03-28

## 2025-03-28 RX ORDER — MIDAZOLAM HYDROCHLORIDE 1 MG/ML
INJECTION, SOLUTION INTRAMUSCULAR; INTRAVENOUS
Status: COMPLETED | OUTPATIENT
Start: 2025-03-28 | End: 2025-03-28

## 2025-03-28 RX ORDER — ALPRAZOLAM 0.25 MG/1
0.25 TABLET ORAL 2 TIMES DAILY PRN
Status: DISCONTINUED | OUTPATIENT
Start: 2025-03-28 | End: 2025-04-05 | Stop reason: HOSPADM

## 2025-03-28 RX ORDER — IOPAMIDOL 755 MG/ML
40 INJECTION, SOLUTION INTRAVASCULAR
Status: COMPLETED | OUTPATIENT
Start: 2025-03-28 | End: 2025-03-28

## 2025-03-28 RX ADMIN — PIPERACILLIN SODIUM AND TAZOBACTAM SODIUM 4.5 G: 4; .5 INJECTION, POWDER, LYOPHILIZED, FOR SOLUTION INTRAVENOUS at 05:03

## 2025-03-28 RX ADMIN — PIPERACILLIN SODIUM AND TAZOBACTAM SODIUM 4.5 G: 4; .5 INJECTION, POWDER, LYOPHILIZED, FOR SOLUTION INTRAVENOUS at 04:03

## 2025-03-28 RX ADMIN — CALCIUM GLUCONATE 1 G: 20 INJECTION, SOLUTION INTRAVENOUS at 12:03

## 2025-03-28 RX ADMIN — SODIUM BICARBONATE 650 MG TABLET 1300 MG: at 09:03

## 2025-03-28 RX ADMIN — SODIUM ZIRCONIUM CYCLOSILICATE 10 G: 10 POWDER, FOR SUSPENSION ORAL at 09:03

## 2025-03-28 RX ADMIN — FERROUS SULFATE TAB 325 MG (65 MG ELEMENTAL FE) 1 EACH: 325 (65 FE) TAB at 08:03

## 2025-03-28 RX ADMIN — IOPAMIDOL 40 ML: 755 INJECTION, SOLUTION INTRAVENOUS at 03:03

## 2025-03-28 RX ADMIN — ACETAMINOPHEN 650 MG: 325 TABLET ORAL at 01:03

## 2025-03-28 RX ADMIN — CIPROFLOXACIN HYDROCHLORIDE 250 MG: 250 TABLET, FILM COATED ORAL at 09:03

## 2025-03-28 RX ADMIN — CIPROFLOXACIN HYDROCHLORIDE 250 MG: 250 TABLET, FILM COATED ORAL at 08:03

## 2025-03-28 RX ADMIN — QUETIAPINE FUMARATE 25 MG: 25 TABLET ORAL at 09:03

## 2025-03-28 RX ADMIN — DEXTROSE MONOHYDRATE 12.5 G: 25 INJECTION, SOLUTION INTRAVENOUS at 06:03

## 2025-03-28 RX ADMIN — MUPIROCIN: 20 OINTMENT TOPICAL at 09:03

## 2025-03-28 RX ADMIN — SODIUM BICARBONATE 650 MG TABLET 1300 MG: at 08:03

## 2025-03-28 RX ADMIN — MIDODRINE HYDROCHLORIDE 10 MG: 5 TABLET ORAL at 12:03

## 2025-03-28 RX ADMIN — CLOPIDOGREL 75 MG: 75 TABLET ORAL at 09:03

## 2025-03-28 RX ADMIN — DULOXETINE HYDROCHLORIDE 60 MG: 30 CAPSULE, DELAYED RELEASE ORAL at 09:03

## 2025-03-28 RX ADMIN — MIDAZOLAM 0.5 MG: 1 INJECTION INTRAMUSCULAR; INTRAVENOUS at 02:03

## 2025-03-28 RX ADMIN — MIDODRINE HYDROCHLORIDE 10 MG: 5 TABLET ORAL at 04:03

## 2025-03-28 RX ADMIN — LIDOCAINE HYDROCHLORIDE 5 ML: 20 INJECTION, SOLUTION INFILTRATION; PERINEURAL at 02:03

## 2025-03-28 RX ADMIN — SODIUM BICARBONATE: 84 INJECTION, SOLUTION INTRAVENOUS at 01:03

## 2025-03-28 RX ADMIN — SODIUM ZIRCONIUM CYCLOSILICATE 10 G: 10 POWDER, FOR SUSPENSION ORAL at 08:03

## 2025-03-28 RX ADMIN — FENTANYL CITRATE 25 MCG: 50 INJECTION, SOLUTION INTRAMUSCULAR; INTRAVENOUS at 02:03

## 2025-03-28 RX ADMIN — SODIUM ZIRCONIUM CYCLOSILICATE 10 G: 10 POWDER, FOR SUSPENSION ORAL at 04:03

## 2025-03-28 RX ADMIN — LEVOTHYROXINE SODIUM 137 MCG: 0.03 TABLET ORAL at 05:03

## 2025-03-28 RX ADMIN — ALPRAZOLAM 0.25 MG: 0.25 TABLET ORAL at 08:03

## 2025-03-28 NOTE — PLAN OF CARE
Problem: Adult Inpatient Plan of Care  Goal: Plan of Care Review  Outcome: Progressing  Goal: Patient-Specific Goal (Individualized)  Outcome: Progressing  Goal: Absence of Hospital-Acquired Illness or Injury  Outcome: Progressing  Goal: Optimal Comfort and Wellbeing  Outcome: Progressing  Goal: Readiness for Transition of Care  Outcome: Progressing     Problem: Wound  Goal: Optimal Coping  Outcome: Progressing  Goal: Optimal Functional Ability  Outcome: Progressing  Goal: Absence of Infection Signs and Symptoms  Outcome: Progressing  Goal: Improved Oral Intake  Outcome: Progressing  Goal: Optimal Pain Control and Function  Outcome: Progressing  Goal: Skin Health and Integrity  Outcome: Progressing  Goal: Optimal Wound Healing  Outcome: Progressing     Problem: Skin Injury Risk Increased  Goal: Skin Health and Integrity  Outcome: Progressing     Problem: Hemodialysis  Goal: Safe, Effective Therapy Delivery  Outcome: Progressing  Goal: Effective Tissue Perfusion  Outcome: Progressing  Goal: Absence of Infection Signs and Symptoms  Outcome: Progressing     Problem: Coping Ineffective  Goal: Effective Coping  Outcome: Progressing     Problem: Infection  Goal: Absence of Infection Signs and Symptoms  Outcome: Progressing

## 2025-03-28 NOTE — PROGRESS NOTES
Patient Name: Kam Reyes   MRN: [id number]   Admission Date: 3/27/2025   Hospital Length of Stay: 1   Attending Provider: Tyron Doe MD   Consulting Provider: STARLA Main  Reason for Consult: Goals of Care  Primary Care Physician:  Gregor Heaton MD     Principal Problem: <principal problem not specified>     Patient information was obtained from patient and ER records.     Final diagnoses:  [N12] Pyelonephritis      Assessment/Plan:     I reviewed the patient and family's understanding of the seriousness of the illness and its expected prognosis. We discussed the patient's goals of care and treatment preferences. I clarified current code status, which is full code. I identified the surrogate decision maker to be her son, Pranay. I answered all questions and we formulated a plan including recommendations for symptom management and how to best achieve goals of care.       Patient is resting comfortably in bed.  She appears withdrawn.  Again reviewed worsening renal function and that without dialysis she will have a very poor, short-term prognosis.  Reviewed a.m. labs.  States that after discussion with Nephrology this morning and discussion with her son she has elected to trial dialysis.  Again reviewed that in order for other acute issues to be addressed dialysis would also be necessary. She verbalizes understanding.  She remains hesitant about dialysis and appears to have some anxiety related to the treatment.  She is on Cymbalta as maintenance regimen and states she has utilized Xanax in the past.  Discussed option to utilize Xanax low-dose p.r.n. prior to or during HD if needed for acute anxiety symptoms.  She is agreeable.  Followed up code status discussion after reviewing resuscitation measures along with risks and benefits yesterday.  States she would elect for full code status, although she would not wish to be on mechanical ventilation long-term.  Discussed with nursing and attending.   Offered support.  Encouraged to call with questions or concerns.  Palliative Medicine will continue to follow.    Advance Care Planning     Date: 03/28/2025    [facility]  I engaged the patient in a voluntary conversation about advance care planning and we specifically addressed what the goals of care would be moving forward, in light of the patient's change in clinical status, specifically current condition.  We did specifically address the patient's likely prognosis, which is fair .  We explored the patient's values and preferences for future care.  The patient endorses that what is most important right now is to focus on remaining as independent as possible and curative/life-prolongation (regardless of treatment burdens)    Accordingly, we have decided that the best plan to meet the patient's goals includes continuing with treatment        Recommendations:     Xanax 0.25mg BID PRN      Interval History:     No acute events overnight      Active Ambulatory Problems     Diagnosis Date Noted    Anemia of chronic kidney failure, stage 4 (severe) 01/01/2023    GERD (gastroesophageal reflux disease) 08/12/2021    Ileostomy status 01/01/2023    Recurrent major depression 01/01/2023    Personal history of other malignant neoplasm of large intestine 01/01/2023    Hypothyroid 08/12/2021    Hypertension 01/26/2024    Vesicovaginal fistula 03/01/2021    Multiple drug resistant organism (MDRO) culture positive 04/17/2024    Metabolic acidosis 04/17/2024     Resolved Ambulatory Problems     Diagnosis Date Noted    Dyspnea 08/30/2022    Congenital hypothyroidism without goiter 01/01/2023    Anxiety disorder 08/12/2021    COVID-19 01/26/2024    Acute UTI 01/26/2024    Fracture of surgical neck of humerus 01/30/2024    Hypertensive chronic kidney disease w stg 1-4/unsp chr kdny 01/01/2023    Edema of left upper extremity 02/12/2024    Gastrointestinal hemorrhage with melena 02/20/2024    Altered awareness, transient 02/21/2024     Moderate protein-calorie malnutrition 2024    Acute kidney injury superimposed on chronic kidney disease 2024     Past Medical History:   Diagnosis Date    Cancer, colon     Chronic pain     Renal disorder     Thyroiditis, unspecified         Past Surgical History:   Procedure Laterality Date    ANGIOGRAM, AV SHUNT, WITH PERCUTANEOUS MECHANICAL THROMBECTOMY, ANGIOPLASTY, AND STENT INSERTION Left     BACK SURGERY       SECTION      CHOLECYSTECTOMY      COLON SURGERY      COLONOSCOPY N/A 2024    Procedure: COLONOSCOPY;  Surgeon: Ash Haley III, MD;  Location: [facility];  Service: Endoscopy;  Laterality: N/A;    COLONOSCOPY, WITH 1 OR MORE BIOPSIES N/A 2024    Procedure: COLONOSCOPY, WITH 1 OR MORE BIOPSIES;  Surgeon: Ash Haley III, MD;  Location: [facility];  Service: Endoscopy;  Laterality: N/A;  rectal biopsy; cold    EGD, WITH CLOSED BIOPSY N/A 2024    Procedure: EGD, WITH CLOSED BIOPSY;  Surgeon: Robson Tripp MD;  Location: [facility];  Service: Endoscopy;  Laterality: N/A;  A) BX DUODENUM, B) BX ANTRUM FOR H.PYLORI, C) BX GE JUNCTION    ESOPHAGOGASTRODUODENOSCOPY N/A 2024    Procedure: EGD (ESOPHAGOGASTRODUODENOSCOPY);  Surgeon: Robson Tripp MD;  Location: [facility];  Service: Endoscopy;  Laterality: N/A;  A) DIFFUSE GASTRITIS OF ANTRUM & STOMACH    HYSTERECTOMY      KIDNEY SURGERY          Review of patient's allergies indicates:   Allergen Reactions    Oxaprozin Nausea And Vomiting and Swelling     Facial swelling      Sulfamethoxazole-trimethoprim Nausea And Vomiting     Severe nausea and vomiting    Doxycycline     Nsaids (non-steroidal anti-inflammatory drug) Rash    Sulfa (sulfonamide antibiotics) Nausea And Vomiting        Current Medications[1]       Current Facility-Administered Medications:     acetaminophen, 650 mg, Oral, Q4H PRN    albumin human 25%, 25 g, Intravenous, Q20 Min PRN    ALPRAZolam, 0.25 mg, Oral, BID PRN     "aluminum-magnesium hydroxide-simethicone, 30 mL, Oral, QID PRN    bisacodyL, 10 mg, Rectal, Daily PRN    dextrose 50%, 12.5 g, Intravenous, PRN    dextrose 50%, 25 g, Intravenous, PRN    glucagon (human recombinant), 1 mg, Intramuscular, PRN    glucose, 16 g, Oral, PRN    glucose, 24 g, Oral, PRN    melatonin, 6 mg, Oral, Nightly PRN    polyethylene glycol, 17 g, Oral, BID PRN    sodium chloride 0.9%, 10 mL, Intravenous, PRN     Family History   Problem Relation Name Age of Onset    Hypertension Mother            Review of Systems   Constitutional: Negative.    HENT: Negative.     Respiratory: Negative.     Cardiovascular: Negative.    Gastrointestinal: Negative.    Genitourinary: Negative.    Musculoskeletal: Negative.    Neurological:  Positive for weakness.   Psychiatric/Behavioral:  The patient is nervous/anxious.             Objective:   BP (!) 86/42   Pulse 72   Temp 97.4 °F (36.3 °C)   Resp 18   Ht 5' 2" (1.575 m)   Wt 72.6 kg (160 lb 0.9 oz)   SpO2 98%   BMI 29.27 kg/m²      Physical Exam   Constitutional: She is oriented to person, place, and time. No distress. She appears ill.   HENT:   Mouth/Throat: Mucous membranes are moist.   Cardiovascular: Normal rate. Pulmonary:      Effort: Pulmonary effort is normal. No respiratory distress.     Abdominal: She exhibits no distension.   Musculoskeletal:      Cervical back: Normal range of motion.      Right lower leg: No edema.      Left lower leg: No edema.   Neurological: She is alert and oriented to person, place, and time.   Skin: Skin is warm and dry.   Psychiatric:   withdrawn          Review of Symptoms  Review of Symptoms      Symptom Assessment (ESAS 0-10 Scale)       Anxiety:  Is nervous/anxious    Psychosocial/Cultural:   See Palliative Psychosocial Note: Yes  **Primary  to Follow**  Palliative Care  Consult: No      Advance Care Planning   Advance Directives:     Decision Making:  Patient answered questions  Goals of " Care: What is most important right now is to focus on curative/life-prolongation (regardless of treatment burdens). Accordingly, we have decided that the best plan to meet the patient's goals includes continuing with treatment.          PAINAD: NA    Caregiver burden formerly assessed: Yes      > 50% of 30 min of encounter was spent in chart review, face to face discussion of goals of care, symptom assessment, coordination of care and emotional support.    STARLA Main-BC  Palliative Medicine  [facility]         [1]   Current Facility-Administered Medications:     acetaminophen tablet 650 mg, 650 mg, Oral, Q4H PRN, Sanam Jackson, FNP, 650 mg at 03/27/25 2232    albumin human 25% bottle 25 g, 25 g, Intravenous, Q20 Min PRN, Deanne Molina MD    ALPRAZolam tablet 0.25 mg, 0.25 mg, Oral, BID PRN, Jailene Powell, NP    aluminum-magnesium hydroxide-simethicone 200-200-20 mg/5 mL suspension 30 mL, 30 mL, Oral, QID PRN, Sanam Jackson, STARLA    bisacodyL suppository 10 mg, 10 mg, Rectal, Daily PRN, Sanam Jackson, POOJAP    calcium gluconate 1 g in NS IVPB (premixed), 1 g, Intravenous, Once, Deanne Molina MD    ciprofloxacin HCl tablet 250 mg, 250 mg, Oral, Q12H, Deanne Molina MD, 250 mg at 03/28/25 0833    clopidogreL tablet 75 mg, 75 mg, Oral, QHS, Tyron Doe MD, 75 mg at 03/27/25 2224    dextrose 50% injection 12.5 g, 12.5 g, Intravenous, PRN, Sanam Jackson, FNP, 12.5 g at 03/28/25 0631    dextrose 50% injection 25 g, 25 g, Intravenous, PRN, Sanam Jackson, POOJAP    DULoxetine DR capsule 60 mg, 60 mg, Oral, QHS, Tyron Doe MD, 60 mg at 03/27/25 2224    ferrous sulfate tablet 1 each, 1 tablet, Oral, Daily, Tyron Doe MD, 1 each at 03/28/25 0833    glucagon (human recombinant) injection 1 mg, 1 mg, Intramuscular, PRN, Sanam Jackson, POOJAP    glucose chewable tablet 16 g, 16 g, Oral, PRN, Sanam Jackson, POOJAP    glucose chewable tablet 24 g, 24 g, Oral, PRN, Renetta, Sanam, POOJAP     levothyroxine tablet 137 mcg, 137 mcg, Oral, Before breakfast, Tyron Doe MD, 137 mcg at 03/28/25 0532    melatonin tablet 6 mg, 6 mg, Oral, Nightly PRN, Sanam Jackson FNP    midodrine tablet 10 mg, 10 mg, Oral, TID WM, Deanne Molina MD    mupirocin 2 % ointment, , Nasal, BID, Tyron Doe MD    piperacillin-tazobactam (ZOSYN) 4.5 g in D5W 100 mL IVPB (MB+), 4.5 g, Intravenous, Q12H, Sanam Jackson FNP, Last Rate: 25 mL/hr at 03/28/25 0536, 4.5 g at 03/28/25 0536    polyethylene glycol packet 17 g, 17 g, Oral, BID PRN, Sanam Jackson FNP    QUEtiapine tablet 25 mg, 25 mg, Oral, QHS, Tyron Doe MD, 25 mg at 03/27/25 2224    sodium bicarbonate 150 mEq in D5W 1,000 mL infusion, , Intravenous, Continuous, Deanne Molina MD    sodium bicarbonate tablet 1,300 mg, 1,300 mg, Oral, BID, Deanne Molina MD, 1,300 mg at 03/28/25 0833    sodium chloride 0.9% flush 10 mL, 10 mL, Intravenous, PRN, Sanam Jackson FNP    sodium zirconium cyclosilicate packet 10 g, 10 g, Oral, TID, Deanne Molina MD, 10 g at 03/28/25 0833

## 2025-03-28 NOTE — OP NOTE
Radiology Post-Procedure Note    Pre Op Diagnosis: Bilateral Hydronephrosis with Left Ureteral Stent    Post Op Diagnosis: Same    Secondary Diagnoses:   Problem List Items Addressed This Visit       Pyelonephritis    Uremia - Primary    Hyperkalemia     Other Visit Diagnoses         Chest pain        Relevant Orders    EKG 12-lead      Acidosis                 Procedure: US & Fluoro Guided Right Nephrostomy Tube Placement    Procedure performed by: Pollo Chacon MD    Assistant: Kasandra    Written Informed Consent Obtained: Yes    Specimen Removed: None    Estimated Blood Loss: 5 cc    Condition: Stable    Outcome: The patient tolerated the procedure well and was without complications.    For further details please see the imaging report associated.    Disposition: Transfer back to inpatient unit.

## 2025-03-28 NOTE — PROGRESS NOTES
Pharmacist Renal Dose Adjustment Note    Kam Reyes is a 74 y.o. female being treated with the medication zosyn    Patient Data:    Vital Signs (Most Recent):  Temp: 97.6 °F (36.4 °C) (03/28/25 0026)  Pulse: 88 (03/28/25 0026)  Resp: 18 (03/27/25 1917)  BP: 115/65 (03/28/25 0026)  SpO2: 98 % (03/28/25 0026) Vital Signs (72h Range):  Temp:  [97.4 °F (36.3 °C)-99.8 °F (37.7 °C)]   Pulse:  []   Resp:  [14-22]   BP: ()/(40-77)   SpO2:  [95 %-100 %]      Recent Labs   Lab 03/26/25  1515 03/26/25  1851 03/27/25  0829   CREATININE 12.69* 12.97* 12.71*     Serum creatinine: 12.71 mg/dL (H) 03/27/25 0829  Estimated creatinine clearance: 3.6 mL/min (A)    Medication:zosyn dose: 4.5 g frequency every 8 hours will be changed to medication:zosyn dose:4.5 g frequency:every 12 hours    Pharmacist's Name: Dylan Alvarado  Pharmacist's Extension: [phone]

## 2025-03-28 NOTE — INTERVAL H&P NOTE
The patient has been examined and the H&P has been reviewed:    I concur with the findings and no changes have occurred since H&P was written. Kam Reyes is a 74 y.o. female with Bilateral Hydronephrosis who presents for Bilateral Nephrostomy Tube Placement.           Active Hospital Problems    Diagnosis  POA    Pyelonephritis [N12]  Unknown    Uremia [N19]  Unknown    Hyperkalemia [E87.5]  Unknown      Resolved Hospital Problems   No resolved problems to display.

## 2025-03-28 NOTE — PROCEDURES
"Kam Reyes is a 74 y.o. female patient.    Temp: 97.4 °F (36.3 °C) (03/28/25 0531)  Pulse: 69 (03/28/25 0531)  Resp: 18 (03/27/25 1917)  BP: 111/78 (03/28/25 0531)  SpO2: 98 % (03/28/25 0531)  Weight: 72.6 kg (160 lb 0.9 oz) (03/27/25 0830)  Height: 5' 2" (157.5 cm) (03/27/25 0830)       Central Line    Date/Time: 3/28/2025 6:10 AM    Performed by: Mary Villarreal MD  Authorized by: Mary Villarreal MD    Location procedure was performed:  [facility] GENERAL SURGERY  Assisting Provider:  Reilly Jack MD  Indications:  Med administration and vascular access  Anesthesia:  Local infiltration  Local anesthetic:  Lidocaine 1% with epinephrine  Preparation:  Skin prepped with ChloraPrep  Skin prep agent dried: Skin prep agent completely dried prior to procedure    Sterile barriers: All five maximal sterile barriers used - gloves, gown, cap, mask and large sterile sheet    Hand hygiene: Hand hygiene performed immediately prior to central venous catheter insertion    Location:  Right internal jugular  Catheter type:  Triple lumen  Catheter size:  7 Fr  Ultrasound guidance: Yes    Vessel Caliber:  Medium   patent  Comprressibility:  Normal  Needle advanced into vessel with real time ultrasound guidance.    Guidewire confirmed in vessel.    Steril sheath on probe.    Sterile gel used.  Manometry: No    Number of attempts:  1  Securement:  Line sutured, sterile dressing applied and blood return through all ports  Guidewire: guidewire removed intact    XRay:  Placement verified by x-ray, no pneumothorax on x-ray, tip termination and successful placementTermination Site: right atrium      3/28/2025    "

## 2025-03-28 NOTE — PROGRESS NOTES
"OCCUPATIONAL THERAPY Initial Evaluation:    Date: 1/31/2019  Recorded diagnosis/complaint at time of admission:  There are no active hospital problems to display for this patient. Co-morbidities:   Patient Active Problem List   Diagnosis   â¢ Essential hypertension, benign   â¢ Lichen sclerosus et atrophicus of the vulva   â¢ IFG (impaired fasting glucose)   â¢ Malignant neoplasm of upper-outer quadrant of right breast in female, estrogen receptor positive (CMS/HCC)   â¢ Malignant neoplasm of lower-inner quadrant of left breast in female, estrogen receptor positive (CMS/HCC)   â¢ Chemotherapy induced neutropenia (CMS/HCC)   â¢ Dehydration     Task Modification: clinical decision making of high complexity, significant task modification  Treatment Diagnosis: Impaired Posture, Impaired Motor Function/Muscle Performance, Impaired Gait/Locomotion Deficits, Impaired Mobility and Impaired Balance    Therapy Precautions:  Fall Risk    Activity: As tolerated    Cognition: Lethargic and Answers questions when prompted with verbal cues; keeps eyes closed frequently due to blurred vision      SUBJECTIVE:   Pt. reported agreeable to therapy  Pt's personal goal for therapy: return home   Pt stated ""I want to try using the commode. \""    Pain:  None Reported  None Observed  Patient denies pain when asked. OBSERVATION:   Pt is utilizing Capped IV and Bed alarm - activated at end of session by writer  Skin Integrity: Intact; overall; very dry skin noted to back; writer applied lotion  Edema: mild noted to [place] UEs; elevated on pillows at end of session  Collaboration with: Nurse Rusty Peralta, Nursing Assistant Migue Segundo and Rehab Aide Jessica DICKSON-PAC Outcomes  Daily Activity Domain  How much help from another person does the patient currently need? *  Task Score  Norm   1. Putting on and taking off regular lower body clothing? 1 - Total   2. Bathing (including washing, rinsing, drying)? 1 - Total   3.  Toileting, which includes using " [facility] Hospital Medicine Progress Note        Chief Complaint: Inpatient Follow-up     HPI:   74-year-old male transferred from an outside facility secondary to complicated UTI involving a suprapubic catheter.  She originally presented for left lower quadrant abdominal pain.  She also has a past medical history of chronic kidney disease stage IV, colon cancer with ileostomy in place, CAD, and hypothyroidism.  She was transferred for Urology consultation as a CT of the abdomen revealed increasing bilateral hydro ureteral nephrosis despite bilateral ureteral stents.     In the emergency room she is noted to have some mild hypotension down to 91/42.  She has a significant leukocytosis of 15.02.  Anemia with a hemoglobin of 8.3.  Elevated potassium at 5.2 he has significant electrolyte abnormalities.  Her urinalysis showed many bacteria with pyuria and hematuria.She was started empirically on IV Rocephin and admitted to the hospitalist group.  She was evaluated by Nephrology.  They recommended hemodialysis due to worsening renal function and hyperkalemia.  Patient refused.  Palliative Care was consulted and patient wishes to remain full code and seek aggressive treatment.  She has not yet decided about hemodialysis.  Urology evaluated.  Recommending continuing antibiotics.  They were previously unable to placed retrograde stents due to poor visualization.  Recommended IR consultation for right antegrade stenting    Interval Hx:  No significant changes overnight.  NPO for intervention Radiology stent placement today.  She was agreeable to have a central line placed in the right IJ overnight.  Now on some fluids.  Afebrile.  Vital signs are stable.    Objective/physical exam:  General: In no acute distress, afebrile, right IJ TLC  Chest: Clear to auscultation bilaterally  Heart: RRR, +S1, S2, no appreciable murmur  Abdomen: Soft, nontender, BS +  MSK: Warm, no lower extremity edema, no  "toilet, bedpan, or urinal? 1 - Total   4. Putting on and taking off regular upper body clothing? 2 - A Lot   5. Taking care of personal grooming such as brushing teeth? 2 - A Lot   6. Eating meals? 2 - A Lot   Raw Score: 9/24   Converted Score:  25.33 (Raw score = 9)   G code conversion CL: 60-79% impairment (Raw score: 9-12)   Converted score >39.4 predictive of discharge to home  *Score based on clinical judgment/expected performance, may not have been performed during this session  Scoring Guidelines  1) Patient may use assistive devices unless otherwise indicated in question  2) Do not consider help for management of medical devices only (IV poles, catheters, NG, etc) as part of assist level  3) If patient requires device that substitutes for toileting or eating function (catheter, NG tube, PEG) they do not engage in these activities and are scored as Total  4) If activity was not observed and patient unable to do the activity, select ""Total\"" . If the patient can do the activity but was not directly observed, use professional judgment to determine how much assistance needed. Vital Signs: Pt with increasing fatigue and lethargy with prolonged stance requiring manual assistance to sit back down onto commode; pt somewhat slumped forward; attempted to take BP; unsuccessful; CNA took BP; 90s/60s; RN notified; once supine, writer able to obtain BP; 142/67.  RN notified    ROM (degrees):  pt unable to participate in formal ROM testing due to lethargy, weakness; PROM WFL; pt is able to use [place] UEs initially for functional transfers, however after time, significant weakness noted and decreased use noted    Strength (out of 5 in available AROM):  UE: not formally tested due to lethargy    Neurological:  Coordination: impaired due to lethargy/weakness  Vision: pt reports blurry vision; keeps eyes closed primarily; prefers the room darker vs brighter    Balance:   Static Sitting: impaired SBA initially; however " clubbing or cyanosis  Neurologic: Alert and oriented x4, Cranial nerve II-XII intact, Strength 5/5 in all 4 extremities    VITAL SIGNS: 24 HRS MIN & MAX LAST   Temp  Min: 97.4 °F (36.3 °C)  Max: 98.4 °F (36.9 °C) 97.4 °F (36.3 °C)   BP  Min: 95/56  Max: 115/65 111/78   Pulse  Min: 67  Max: 88  69   Resp  Min: 18  Max: 20 18   SpO2  Min: 98 %  Max: 100 % 98 %       Recent Labs   Lab 03/26/25  1515 03/27/25  0829 03/28/25  0452   WBC 15.02* 28.93  28.93* 15.08*   RBC 2.52* 2.16* 2.23*   HGB 8.3* 7.3* 7.3*   HCT 25.7* 22.1* 23.0*   .0* 102.3* 103.1*   MCH 32.9* 33.8* 32.7*   MCHC 32.3* 33.0 31.7*   RDW 12.3 12.7 12.2    288 292   MPV 9.7 10.8* 10.3       Recent Labs   Lab 03/26/25  1515 03/26/25  1851 03/27/25  0829 03/28/25  0452   * 134* 133* 132*   K 5.0 5.2* 5.5* 5.6*    100 101 101   CO2 10* 12* 14* 11*   BUN 75.0* 74.0* 80.1* 90.4*   CREATININE 12.69* 12.97* 12.71* 13.05*   CALCIUM 8.5 8.7 8.2* 6.8*   MG  --   --  1.70  --    ALBUMIN 2.7* 2.8* 2.4*  --    ALKPHOS 175* 186* 168*  --    ALT 12 14 11  --    AST 13 15 15  --    BILITOT 0.5 0.4 0.3  --           Microbiology Results (last 7 days)       ** No results found for the last 168 hours. **             Radiology:  X-Ray Chest 1 View  Narrative: EXAMINATION:  XR CHEST 1 VIEW    CLINICAL HISTORY:  Post-central line;    TECHNIQUE:  One view    COMPARISON:  February 21, 2024    FINDINGS:  Right central line terminates within distal superior vena cava Cardiopericardial silhouette is within normal limits.  No acute dense focal or segmental consolidation, congestive process, pleural effusions or pneumothorax.    Fracture deformity proximal left humerus with some callus formation  Impression: Optimal placement right central line.    Electronically signed by: King Cruz  Date:    03/27/2025  Time:    21:25  X-Ray Chest 1 View  Narrative: EXAMINATION:  XR CHEST 1 VIEW    CLINICAL HISTORY:  Post- cvc;    TECHNIQUE:  One  requiring heavy mod A after time due to anterior lean/weakness/fatigue/lethargy  Static Standing: impaired min A x2 with [place] UE support on 2ww; however with increased time, pt with anterior lean requiring max A to return to seated position; pt unable correct posture on own and LE alejoling noted       ADLs:   Toileting:  Level of Assistance: Total Assistance (tot A), max A x2  Reason for Assistance: requires increased time to complete, safety due to anterior lean in stance during hygiene post void, weakness, manual cues for upright posture, unsteadiness   Adaptive Equipment: bedside commode    Upper Body Bathing:   Deferred secondary to lethargy/fatigue/weakness    Upper Body Dressing:   Not addressed this session    Lower Body Bathing:   Not addressed this session    Lower Body Dressing:    Level of Assistance: Total Assistance (tot A)  Dressing Position: seated on toilet  Reason for Assistance: to doff/don brief; patient unable to participate in task due to weakness, lethargy/fatigue    Hygiene/Grooming:   Deferred secondary to lethargy/fatigue/weakness    Self Feeding:  Deferred secondary to lethargy/fatigue/weakness; pts meal tray arrived during session; writer did not set patient up with food due to lethargy; writer notified pts spouse and RN to provide assistance to patient for meal consumption due to lethargy    ADL Functional Mobility:  Not addressed this session       MOBILITY:    Bed:   Supine to Sit: Minimal Assistance (min A)  Sit to Supine: Total Assistance (tot A), max A x2  Reason for Assistance: requires increased time to complete, safety due to lethargy/weakness; manual/verbal cues for sequencing to sit upright; pt very lethargic/fatigued to assist in returning to supine    Transfers:   Device Used: gait belt, 2 wheeled walker  Sit to Stand: Total Assistance (tot A), min A x2  Stand to Sit: Total Assistance (tot A), mod A x2  Stand Pivot: Total Assistance (tot A), mod A x2  Squat Pivot:  Total view    COMPARISON:  March 27, 2025.    FINDINGS:  Cardiopericardial silhouette is within normal limits.  Right transjugular approach central venous catheter terminates within the right atrium.  Lungs chronic interstitial changes without convincing acute dense focal or segmental consolidation, congestive process, pleural effusions or pneumothorax.  A stent projects over the left axilla.  Impression: No significant interval change.    Electronically signed by: King Cruz  Date:    03/27/2025  Time:    21:23  US Retroperitoneal Complete  Narrative: EXAMINATION:  US RETROPERITONEAL COMPLETE    CLINICAL HISTORY:  GHADA;    COMPARISON:  Yesterday    FINDINGS:  Grayscale and color Doppler sonographic evaluation of the kidneys and urinary bladder.    The right kidney measures 10-11 cm. The left kidney measures 11-12 cm.   There is moderate bilateral hydronephrosis similar to yesterday.    The bladder is not visualized secondary to being empty.  Impression: Similar bilateral hydronephrosis.    Electronically signed by: Theo Shaw  Date:    03/27/2025  Time:    06:34        Medications:  Scheduled Meds:   ciprofloxacin HCl  250 mg Oral Q12H    clopidogreL  75 mg Oral QHS    DULoxetine  60 mg Oral QHS    ferrous sulfate  1 tablet Oral Daily    levothyroxine  137 mcg Oral Before breakfast    midodrine  5 mg Oral BID WM    piperacillin-tazobactam (Zosyn) IV (PEDS and ADULTS) (extended infusion is not appropriate)  4.5 g Intravenous Q12H    QUEtiapine  25 mg Oral QHS    sodium bicarbonate  1,300 mg Oral TID    sodium zirconium cyclosilicate  10 g Oral TID     Continuous Infusions:   D5 and 0.45% NaCl   Intravenous Continuous         PRN Meds:.  Current Facility-Administered Medications:     acetaminophen, 650 mg, Oral, Q4H PRN    aluminum-magnesium hydroxide-simethicone, 30 mL, Oral, QID PRN    bisacodyL, 10 mg, Rectal, Daily PRN    dextrose 50%, 12.5 g, Intravenous, PRN    dextrose 50%, 25 g, Intravenous, PRN    glucagon  (human recombinant), 1 mg, Intramuscular, PRN    glucose, 16 g, Oral, PRN    glucose, 24 g, Oral, PRN    melatonin, 6 mg, Oral, Nightly PRN    polyethylene glycol, 17 g, Oral, BID PRN    sodium chloride 0.9%, 10 mL, Intravenous, PRN    Nutrition:  Nutrition consulted. Most recent weight and BMI monitored-     Measurements:  Wt Readings from Last 1 Encounters:   03/27/25 72.6 kg (160 lb 0.9 oz)   Body mass index is 29.27 kg/m².    Patient has been screened and assessed by RD.    Malnutrition Type:  Context:    Level:      Malnutrition Characteristic Summary:       Interventions/Recommendations (treatment strategy):           Assessment/Plan:   Severe sepsis secondary to UTI  Acute on chronic renal failure, baseline stage IV   Hyperkalemia   Obstructive uropathy  Complicated UTI, POA   Metabolic acidosis  Anemia, suspect combined inflammatory and iron-deficiency  Hypotension with history of hypertension  Coronary artery disease  Hypothyroidism    Leukocytosis improved this morning.  She is on renal dose ciprofloxacin.  Was previously on Rocephin discontinued due to lack of IV access.  She now has a triple lumen catheter placed early this morning.  Getting some IV fluids.  Will follow up urine cultures and tailor antibiotics per sensitivities.  Blood cultures growing possible Enterobacter.  Zosyn added to oral ciprofloxacin.  Urology following.  They were unable to place retrograde stents in the past due to poor visualization.  Intervention Radiology has been consulted for antegrade stenting.  She is NPO this morning.  Concerned that she may need hemodialysis due to stability prior to intervention Radiology procedure  Unfortunately she has had worsening of her renal function over last 24 hours.  Possibly due to obstruction versus sepsis versus poor oral intake.  Potassium is higher again today.  She is yet to make a decision about hemodialysis.  She met with palliative Care yesterday and wishes to remain a full code.   Assistance (tot A), max A x3  Toilet: Total Assistance (tot A), mod A x2  Reason for Assistance: requires increased time to complete, safety due to lethargy, weakness, fatigue, unsteadiness     Ambulation:   Deferred secondary to weakness, lethargy    Exercises:  Not addressed this session     Activity Tolerance: limited by weakness, lethargy, fatigue     GOALS:     Rehab potential is fair due to positive factors age, family support, motivation level, pain level and negative factors co-morbidities, increasing symptoms since onset    Review Date: 2/7/2019  1. Patient will complete upper body bathing with Supervision (Sup). Goal not met  2. Patient will complete upper body dressing with Supervision (Sup). Goal not met  3. Patient will complete lower body bathing with Supervision (Sup). Goal not met  4. Patient will complete lower body dressing with Supervision (Sup). Goal not met  5. Patient will complete toileting with Supervision (Sup). Goal not met  6. Patient will complete hygiene/grooming with Supervision (Sup). Goal not met     PLAN OF CARE:    OT Frequency: 6 days/week(increase frequency as appropriate)  Duration: until goals met or d/c  Treatment Interventions: ADL retraining, Functional transfer training, UE strengthening/ROM, Patient/Family training, Equipment eval/education, Compensatory technique education, Continued evaluation     RECOMMENDATIONS/EDUCATION:    Learner: patient  Readiness to Learn: acceptance  Learning Method: Verbal  Barriers to Learning: cognitive lethargy  Learning Evaluation: Needs reinforcement  Teaching Content: Role of occupational therapy in the hospital setting  Please reference the patient education activity for further information regarding the patient's learning assessment. Equipment for discharge: to be determined - continuing to assess needs at this time     See doc flowsheet (OT assess/treat/goals/charges) for specific information regarding time spent with patient. Treatment Plan for Next Session: seated edge of bed bathing/grooming vs in chair    The plan of care and goals were established with the patient and she is in agreement. ASSESSMENT:      Patient presents to occupational therapy below baseline functional mobility level of modified independence. Patient is demonstrating decreased strength, balance deficits, decreased activity tolerance, decreased endurance, diminished cognition which is limiting the completion of all ADLs and all functional mobility at this time. Further skilled occupational therapy is required to address these limitations in attempt to maximize the patient's independence. Recommendations for Discharge: OT: Sub-acute nursing home        Recommendation for Discharge: PT: Sub-acute nursing home           After today's session this therapist is recommending the above location for optimal discharge. This recommendation is supported by at initial evaluation  patient scored a 25.33 on the Am-Pac Activity domain, which indicates patient may benefit from continued therapy prior to returning home. Patient is motivated to return home, however requires assist x2-3 caregivers for transfers this session. Spouse unable to provide this level of assistance. Will continue to follow while hospitalized to maximize strength and functional independence.  .. Will follow up further conversations with Nephrology.    Critical care diagnosis: sepsis, iv antibiotics  Critical care interventions: hands on evaluation, review of labs/radiographs/records and discussions with family  Critical care time spent: >32 minutes    Afternoon update:   After further conversations with patient and family patient was finally agreeable to hemodialysis.  Taken this morning and tolerated well.  She has also taken for antegrade right nephrostomy tube placement.  Returned to the floor in stable condition.  Will follow up her labs tomorrow morning.  Blood cultures updated this morning.  Still growing Gram-negative rods with PCR positive for Enterobacterales      Tyron Doe MD   03/28/2025     All diagnosis and differential diagnosis have been reviewed; assessment and plan has been documented; I have personally reviewed the labs and test results that are presently available; I have reviewed the patients medication list; I have reviewed the consulting providers response and recommendations. I have reviewed or attempted to review medical records based upon their availability    All of the patient's questions have been  addressed and answered. Patient's is agreeable to the above stated plan. I will continue to monitor closely and make adjustments to medical management as needed.  _____________________________________________________________________

## 2025-03-28 NOTE — PROGRESS NOTES
UROLOGY  PROGRESS  NOTE    Saulonicole GALLEGOS Amy 1950  [id number]  3/28/2025    Patient in IR for antegrade stent  Plans to begin dialysis today  Blood cultures with Gram-negative rods, on Zosyn    Some hypotension, afebrile   50 mL urine output overnight   WBC 15.08   H&H 7.3/23   BUN and creatinine 90.4/13.05   Potassium 5.6  CO2 11      Intake/Output:  No intake/output data recorded.  I/O last 3 completed shifts:  In: 100 [P.O.:100]  Out: 1200 [Urine:175; Stool:1025]     Exam:    Patient off unit    Recent Results (from the past 24 hours)   Protime-INR    Collection Time: 03/27/25  2:02 PM   Result Value Ref Range    PT 16.4 (H) 12.5 - 14.5 seconds    INR 1.3 <=1.3   Basic Metabolic Panel    Collection Time: 03/28/25  4:52 AM   Result Value Ref Range    Sodium 132 (L) 136 - 145 mmol/L    Potassium 5.6 (H) 3.5 - 5.1 mmol/L    Chloride 101 98 - 107 mmol/L    CO2 11 (L) 23 - 31 mmol/L    Glucose 48 (LL) 82 - 115 mg/dL    Blood Urea Nitrogen 90.4 (H) 9.8 - 20.1 mg/dL    Creatinine 13.05 (H) 0.55 - 1.02 mg/dL    BUN/Creatinine Ratio 7     Calcium 6.8 (LL) 8.4 - 10.2 mg/dL    Anion Gap 20.0 mEq/L    eGFR 3 mL/min/1.73/m2   CBC with Differential    Collection Time: 03/28/25  4:52 AM   Result Value Ref Range    WBC 15.08 (H) 4.50 - 11.50 x10(3)/mcL    RBC 2.23 (L) 4.20 - 5.40 x10(6)/mcL    Hgb 7.3 (L) 12.0 - 16.0 g/dL    Hct 23.0 (L) 37.0 - 47.0 %    .1 (H) 80.0 - 94.0 fL    MCH 32.7 (H) 27.0 - 31.0 pg    MCHC 31.7 (L) 33.0 - 36.0 g/dL    RDW 12.2 11.5 - 17.0 %    Platelet 292 130 - 400 x10(3)/mcL    MPV 10.3 7.4 - 10.4 fL    Neut % 90.7 %    Lymph % 4.1 %    Mono % 3.2 %    Eos % 1.1 %    Basophil % 0.2 %    Imm Grans % 0.7 %    Neut # 13.67 (H) 2.1 - 9.2 x10(3)/mcL    Lymph # 0.62 0.6 - 4.6 x10(3)/mcL    Mono # 0.49 0.1 - 1.3 x10(3)/mcL    Eos # 0.16 0 - 0.9 x10(3)/mcL    Baso # 0.03 <=0.2 x10(3)/mcL    Imm Gran # 0.11 (H) 0.00 - 0.04 x10(3)/mcL    NRBC% 0.0 %   POCT glucose    Collection Time: 03/28/25  6:23 AM    Result Value Ref Range    POCT Glucose 57 (L) 70 - 110 mg/dL       Assessment:  74-year-old female with a history of cervical cancer status post pelvic radiation 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  PMH also includes CKD and colon cancer with ileostomy. She presented to [facility] facility with left lower quadrant pain - CT abdomen and pelvis reveals increasing bilateral hydroureteronephrosis and pelvicaliectasis with left double-J stent in appropriate position and removal of right ureteral stent since imaging in February; bilateral perinephric stranding, left periureteral stranding; SP tube in the appropriate position.   -acute renal failure with hyperkalemia and acidosis - has agreed to proceed with dialysis which is planned for today  -blood cultures with Gram-negative rods, urine culture with multiple organisms - on Zosyn  -IR consulted for right antegrade stent    Plan:  -continue current management  -continue antibiotics, tailor according to final C&S results  -following      Tala Cherry NP

## 2025-03-28 NOTE — PROGRESS NOTES
[facility] Inpatient Progress Note      HPI:     Patient Name: Kam Reyes  Admission Date: 3/27/2025  Hospital Length of Stay: 1 days  Code Status: Full Code   Attending Physician: Tyron Doe MD   Primary Care Physician: Gregor Heaton MD  Principal Problem:<principal problem not specified>      Today patient seen and examined  Labs, recent events, imaging and medications reviewed for this hospital stay  Surprisingly agreeing on central line was inserted but still refusing dialysis despite multiple attempts to convince her otherwise  Now getting IV antibiotics  Final culture pending    Review of Systems:   Decrease in hearing  Hemodynamics more labile  BP lower  Now on IV fluids and IV antibiotics  No dyspnea, dec UO  Still oxygenating ok  No abd pain  No vomiting        Medications:   Scheduled Meds:   ciprofloxacin HCl  250 mg Oral Q12H    clopidogreL  75 mg Oral QHS    DULoxetine  60 mg Oral QHS    ferrous sulfate  1 tablet Oral Daily    levothyroxine  137 mcg Oral Before breakfast    midodrine  5 mg Oral BID WM    piperacillin-tazobactam (Zosyn) IV (PEDS and ADULTS) (extended infusion is not appropriate)  4.5 g Intravenous Q12H    QUEtiapine  25 mg Oral QHS    sodium bicarbonate  1,300 mg Oral TID    sodium zirconium cyclosilicate  10 g Oral Q6H     Continuous Infusions:   D5 and 0.45% NaCl   Intravenous Continuous             Vitals:     Vitals:    03/27/25 1555 03/27/25 1917 03/28/25 0026 03/28/25 0531   BP: (!) 97/43 (!) 107/51 115/65 111/78   BP Location:       Patient Position:       Pulse: 69 67 88 69   Resp: 18 18     Temp: 97.5 °F (36.4 °C) 97.7 °F (36.5 °C) 97.6 °F (36.4 °C) 97.4 °F (36.3 °C)   TempSrc: Oral Oral Oral Oral   SpO2: 100% 100% 98% 98%   Weight:       Height:             I/O last 3 completed shifts:  In: -   Out: 1075 [Urine:125; Stool:950]  No intake or output data in the 24 hours ending 03/28/25 0742    Physical Exam:   General: no acute distress, awake, alert  Eyes:  PERRLA, EOMI, conjunctiva clear, \  HENT: atraumatic, oropharynx and nasal mucosa patent  Neck: full ROM, no JVD, no thyromegaly or lymphadenopathy  Respiratory: equal, unlabored, rhonchi  Cardiovascular: RRR without rub; BL radial and pedal pulses felt  Edema: tr +1  Gastrointestinal: soft, non-tender, non-distended; positive bowel sounds; no masses to palpation, +ostomy    Musculoskeletal:without limitation or discomfort  Integumentary: warm, dry; no rashes, wounds, or skin lesions  Neurological: oriented, appropriate, no acute deficits  Dialysis access:  L AVG      Labs:     Chemistries:   Recent Labs   Lab 03/26/25  1515 03/26/25  1851 03/27/25  0829 03/28/25  0452   * 134* 133* 132*   K 5.0 5.2* 5.5* 5.6*    100 101 101   CO2 10* 12* 14* 11*   BUN 75.0* 74.0* 80.1* 90.4*   CREATININE 12.69* 12.97* 12.71* 13.05*   CALCIUM 8.5 8.7 8.2* 6.8*   BILITOT 0.5 0.4 0.3  --    ALKPHOS 175* 186* 168*  --    ALT 12 14 11  --    AST 13 15 15  --    GLUCOSE 77* 93 79* 48*   MG  --   --  1.70  --         CBC/Anemia Labs: Coags:    Recent Labs   Lab 03/26/25  1515 03/27/25  0829 03/28/25  0452   WBC 15.02* 28.93  28.93* 15.08*   HGB 8.3* 7.3* 7.3*   HCT 25.7* 22.1* 23.0*    288 292   .0* 102.3* 103.1*   RDW 12.3 12.7 12.2   IRON  --  22*  --     Recent Labs   Lab 03/27/25  1402   INR 1.3          Impression:   Worsening uremia  Worsening hyperkalemia despite Lokelma  Severe acidosis  Hemodynamic instability  Possible pyelo  Obstructive uropathy  ? sepsis      Plan:   Patient does need dialysis asap. I spent a  while trying to convince the patient and she is again refusing....  Unfortunately we are limited with the options  We will try to start her on a bicarb drip add some Kayexalate to the Lokelma  I will try to reach emergency family contact maybe they can try to convince her  If she is still refusing dialysis she needs to be made DNR and consider hospice palliative care new case uremia we will  hyperkalemia continues to deteriorate       8:00 called her son        Pranay Reyes (Son)  [phone] ([place])     Explained to him the seriousness of his mother condition and that she could die without dialysis  He will try to talk to her      8:15 son called back  She finally agrees on HD  Got blood culture results --> gm-ve rods  Pt on zosyn  Will start HD today  Decrease HCO3 drip to 75cc/hours  CAROLYN Molina

## 2025-03-29 LAB
ANION GAP SERPL CALC-SCNC: 11 MEQ/L
BASOPHILS # BLD AUTO: 0.03 X10(3)/MCL
BASOPHILS # BLD AUTO: 0.03 X10(3)/MCL
BASOPHILS NFR BLD AUTO: 0.2 %
BASOPHILS NFR BLD AUTO: 0.2 %
BUN SERPL-MCNC: 7.9 MG/DL (ref 9.8–20.1)
CALCIUM SERPL-MCNC: 8 MG/DL (ref 8.4–10.2)
CHLORIDE SERPL-SCNC: 101 MMOL/L (ref 98–107)
CO2 SERPL-SCNC: 30 MMOL/L (ref 23–31)
CREAT SERPL-MCNC: 2.41 MG/DL (ref 0.55–1.02)
CREAT/UREA NIT SERPL: 3
EOSINOPHIL # BLD AUTO: 0.02 X10(3)/MCL (ref 0–0.9)
EOSINOPHIL # BLD AUTO: 0.08 X10(3)/MCL (ref 0–0.9)
EOSINOPHIL NFR BLD AUTO: 0.1 %
EOSINOPHIL NFR BLD AUTO: 0.6 %
ERYTHROCYTE [DISTWIDTH] IN BLOOD BY AUTOMATED COUNT: 12.1 % (ref 11.5–17)
ERYTHROCYTE [DISTWIDTH] IN BLOOD BY AUTOMATED COUNT: 12.3 % (ref 11.5–17)
GFR SERPLBLD CREATININE-BSD FMLA CKD-EPI: 21 ML/MIN/1.73/M2
GLUCOSE SERPL-MCNC: 185 MG/DL (ref 82–115)
GROUP & RH: NORMAL
HCT VFR BLD AUTO: 23 % (ref 37–47)
HCT VFR BLD AUTO: 25.8 % (ref 37–47)
HGB BLD-MCNC: 7.5 G/DL (ref 12–16)
HGB BLD-MCNC: 8 G/DL (ref 12–16)
IMM GRANULOCYTES # BLD AUTO: 0.08 X10(3)/MCL (ref 0–0.04)
IMM GRANULOCYTES # BLD AUTO: 0.11 X10(3)/MCL (ref 0–0.04)
IMM GRANULOCYTES NFR BLD AUTO: 0.6 %
IMM GRANULOCYTES NFR BLD AUTO: 0.8 %
INDIRECT COOMBS: NORMAL
LYMPHOCYTES # BLD AUTO: 0.6 X10(3)/MCL (ref 0.6–4.6)
LYMPHOCYTES # BLD AUTO: 0.61 X10(3)/MCL (ref 0.6–4.6)
LYMPHOCYTES NFR BLD AUTO: 4.2 %
LYMPHOCYTES NFR BLD AUTO: 4.7 %
MCH RBC QN AUTO: 32.7 PG (ref 27–31)
MCH RBC QN AUTO: 32.8 PG (ref 27–31)
MCHC RBC AUTO-ENTMCNC: 31 G/DL (ref 33–36)
MCHC RBC AUTO-ENTMCNC: 32.6 G/DL (ref 33–36)
MCV RBC AUTO: 100.4 FL (ref 80–94)
MCV RBC AUTO: 105.3 FL (ref 80–94)
MONOCYTES # BLD AUTO: 0.45 X10(3)/MCL (ref 0.1–1.3)
MONOCYTES # BLD AUTO: 0.79 X10(3)/MCL (ref 0.1–1.3)
MONOCYTES NFR BLD AUTO: 3.5 %
MONOCYTES NFR BLD AUTO: 5.5 %
NEUTROPHILS # BLD AUTO: 11.67 X10(3)/MCL (ref 2.1–9.2)
NEUTROPHILS # BLD AUTO: 12.87 X10(3)/MCL (ref 2.1–9.2)
NEUTROPHILS NFR BLD AUTO: 89.4 %
NEUTROPHILS NFR BLD AUTO: 90.2 %
NRBC BLD AUTO-RTO: 0 %
NRBC BLD AUTO-RTO: 0 %
PLATELET # BLD AUTO: 286 X10(3)/MCL (ref 130–400)
PLATELET # BLD AUTO: 287 X10(3)/MCL (ref 130–400)
PMV BLD AUTO: 9.7 FL (ref 7.4–10.4)
PMV BLD AUTO: 9.9 FL (ref 7.4–10.4)
POTASSIUM SERPL-SCNC: 3.6 MMOL/L (ref 3.5–5.1)
RBC # BLD AUTO: 2.29 X10(6)/MCL (ref 4.2–5.4)
RBC # BLD AUTO: 2.45 X10(6)/MCL (ref 4.2–5.4)
SODIUM SERPL-SCNC: 142 MMOL/L (ref 136–145)
SPECIMEN OUTDATE: NORMAL
WBC # BLD AUTO: 12.95 X10(3)/MCL (ref 4.5–11.5)
WBC # BLD AUTO: 14.39 X10(3)/MCL (ref 4.5–11.5)

## 2025-03-29 PROCEDURE — 11000001 HC ACUTE MED/SURG PRIVATE ROOM

## 2025-03-29 PROCEDURE — 63600175 PHARM REV CODE 636 W HCPCS

## 2025-03-29 PROCEDURE — 86850 RBC ANTIBODY SCREEN: CPT | Performed by: INTERNAL MEDICINE

## 2025-03-29 PROCEDURE — 63600175 PHARM REV CODE 636 W HCPCS: Mod: JZ,TB | Performed by: HOSPITALIST

## 2025-03-29 PROCEDURE — 85025 COMPLETE CBC W/AUTO DIFF WBC: CPT | Performed by: INTERNAL MEDICINE

## 2025-03-29 PROCEDURE — 25000003 PHARM REV CODE 250: Performed by: HOSPITALIST

## 2025-03-29 PROCEDURE — 36415 COLL VENOUS BLD VENIPUNCTURE: CPT | Performed by: HOSPITALIST

## 2025-03-29 PROCEDURE — 85025 COMPLETE CBC W/AUTO DIFF WBC: CPT | Performed by: HOSPITALIST

## 2025-03-29 PROCEDURE — 86922 COMPATIBILITY TEST ANTIGLOB: CPT | Performed by: INTERNAL MEDICINE

## 2025-03-29 PROCEDURE — 90935 HEMODIALYSIS ONE EVALUATION: CPT

## 2025-03-29 PROCEDURE — 25000003 PHARM REV CODE 250: Performed by: INTERNAL MEDICINE

## 2025-03-29 PROCEDURE — 90935 HEMODIALYSIS ONE EVALUATION: CPT | Mod: ,,, | Performed by: INTERNAL MEDICINE

## 2025-03-29 PROCEDURE — 36415 COLL VENOUS BLD VENIPUNCTURE: CPT | Performed by: INTERNAL MEDICINE

## 2025-03-29 PROCEDURE — 25000003 PHARM REV CODE 250

## 2025-03-29 PROCEDURE — 80048 BASIC METABOLIC PNL TOTAL CA: CPT | Performed by: NURSE PRACTITIONER

## 2025-03-29 RX ORDER — HYDROCODONE BITARTRATE AND ACETAMINOPHEN 500; 5 MG/1; MG/1
TABLET ORAL
Status: DISCONTINUED | OUTPATIENT
Start: 2025-03-29 | End: 2025-04-05 | Stop reason: HOSPADM

## 2025-03-29 RX ADMIN — SODIUM BICARBONATE: 84 INJECTION, SOLUTION INTRAVENOUS at 12:03

## 2025-03-29 RX ADMIN — SODIUM ZIRCONIUM CYCLOSILICATE 10 G: 10 POWDER, FOR SUSPENSION ORAL at 08:03

## 2025-03-29 RX ADMIN — DULOXETINE HYDROCHLORIDE 60 MG: 30 CAPSULE, DELAYED RELEASE ORAL at 10:03

## 2025-03-29 RX ADMIN — CIPROFLOXACIN HYDROCHLORIDE 250 MG: 250 TABLET, FILM COATED ORAL at 10:03

## 2025-03-29 RX ADMIN — MIDODRINE HYDROCHLORIDE 10 MG: 5 TABLET ORAL at 05:03

## 2025-03-29 RX ADMIN — MIDODRINE HYDROCHLORIDE 10 MG: 5 TABLET ORAL at 12:03

## 2025-03-29 RX ADMIN — PIPERACILLIN SODIUM AND TAZOBACTAM SODIUM 4.5 G: 4; .5 INJECTION, POWDER, LYOPHILIZED, FOR SOLUTION INTRAVENOUS at 03:03

## 2025-03-29 RX ADMIN — SODIUM ZIRCONIUM CYCLOSILICATE 10 G: 10 POWDER, FOR SUSPENSION ORAL at 03:03

## 2025-03-29 RX ADMIN — ALPRAZOLAM 0.25 MG: 0.25 TABLET ORAL at 08:03

## 2025-03-29 RX ADMIN — CIPROFLOXACIN HYDROCHLORIDE 250 MG: 250 TABLET, FILM COATED ORAL at 08:03

## 2025-03-29 RX ADMIN — HYDROCODONE BITARTRATE AND ACETAMINOPHEN 1 TABLET: 10; 325 TABLET ORAL at 12:03

## 2025-03-29 RX ADMIN — CLOPIDOGREL 75 MG: 75 TABLET ORAL at 10:03

## 2025-03-29 RX ADMIN — FERROUS SULFATE TAB 325 MG (65 MG ELEMENTAL FE) 1 EACH: 325 (65 FE) TAB at 08:03

## 2025-03-29 RX ADMIN — QUETIAPINE FUMARATE 25 MG: 25 TABLET ORAL at 10:03

## 2025-03-29 RX ADMIN — SODIUM BICARBONATE 650 MG TABLET 1300 MG: at 10:03

## 2025-03-29 RX ADMIN — LEVOTHYROXINE SODIUM 137 MCG: 0.03 TABLET ORAL at 05:03

## 2025-03-29 RX ADMIN — EPOETIN ALFA-EPBX 20000 UNITS: 10000 INJECTION, SOLUTION INTRAVENOUS; SUBCUTANEOUS at 10:03

## 2025-03-29 RX ADMIN — SODIUM BICARBONATE: 84 INJECTION, SOLUTION INTRAVENOUS at 10:03

## 2025-03-29 RX ADMIN — MUPIROCIN: 20 OINTMENT TOPICAL at 08:03

## 2025-03-29 RX ADMIN — SODIUM BICARBONATE 650 MG TABLET 1300 MG: at 08:03

## 2025-03-29 NOTE — PROGRESS NOTES
[facility] Hospital Medicine Progress Note        Chief Complaint: Inpatient Follow-up     HPI:   74-year-old male transferred from an outside facility secondary to complicated UTI involving a suprapubic catheter.  She originally presented for left lower quadrant abdominal pain.  She also has a past medical history of chronic kidney disease stage IV, colon cancer with ileostomy in place, CAD, and hypothyroidism.  She was transferred for Urology consultation as a CT of the abdomen revealed increasing bilateral hydro ureteral nephrosis despite bilateral ureteral stents.     In the emergency room she is noted to have some mild hypotension down to 91/42.  She has a significant leukocytosis of 15.02.  Anemia with a hemoglobin of 8.3.  Elevated potassium at 5.2 he has significant electrolyte abnormalities.  Her urinalysis showed many bacteria with pyuria and hematuria.She was started empirically on IV Rocephin and admitted to the hospitalist group.  She was evaluated by Nephrology.  They recommended hemodialysis due to worsening renal function and hyperkalemia.  Patient refused.  Palliative Care was consulted and patient wishes to remain full code and seek aggressive treatment.  She has not yet decided about hemodialysis.  Urology evaluated.  Recommending continuing antibiotics.  They were previously unable to placed retrograde stents due to poor visualization.  Recommended IR consultation for right antegrade stenting    After further conversations with patient and family patient was finally agreeable to hemodialysis.  Taken this morning and tolerated well.  She has also taken for antegrade right nephrostomy tube placement.  Returned to the floor in stable condition.  Will follow up her labs tomorrow morning.  Blood cultures updated this morning.  Still growing Gram-negative rods with PCR positive for Enterobacterales      Interval Hx:  Patient went for hemodialysis yesterday.  Tolerated  well.  Still some soft blood pressures but otherwise stable.  She also got her nephrostomy tube with IR yesterday     Objective/physical exam:  General: In no acute distress, afebrile, right IJ TLC  Chest: Clear to auscultation bilaterally  Heart: RRR, +S1, S2, no appreciable murmur  Abdomen: Soft, nontender, BS +, right nephrostomy  MSK: Warm, no lower extremity edema, no clubbing or cyanosis  Neurologic: Alert and oriented x4, Cranial nerve II-XII intact, Strength 5/5 in all 4 extremities     VITAL SIGNS: 24 HRS MIN & MAX LAST   Temp  Min: 97.4 °F (36.3 °C)  Max: 98.2 °F (36.8 °C) 97.9 °F (36.6 °C)   BP  Min: 80/46  Max: 131/75 (!) 80/46   Pulse  Min: 66  Max: 83  83   Resp  Min: 16  Max: 18 18   SpO2  Min: 95 %  Max: 99 % 95 %       Recent Labs   Lab 03/26/25  1515 03/27/25  0829 03/28/25  0452   WBC 15.02* 28.93  28.93* 15.08*   RBC 2.52* 2.16* 2.23*   HGB 8.3* 7.3* 7.3*   HCT 25.7* 22.1* 23.0*   .0* 102.3* 103.1*   MCH 32.9* 33.8* 32.7*   MCHC 32.3* 33.0 31.7*   RDW 12.3 12.7 12.2    288 292   MPV 9.7 10.8* 10.3       Recent Labs   Lab 03/26/25  1515 03/26/25  1851 03/27/25  0829 03/28/25  0452   * 134* 133* 132*   K 5.0 5.2* 5.5* 5.6*    100 101 101   CO2 10* 12* 14* 11*   BUN 75.0* 74.0* 80.1* 90.4*   CREATININE 12.69* 12.97* 12.71* 13.05*   CALCIUM 8.5 8.7 8.2* 6.8*   MG  --   --  1.70  --    ALBUMIN 2.7* 2.8* 2.4*  --    ALKPHOS 175* 186* 168*  --    ALT 12 14 11  --    AST 13 15 15  --    BILITOT 0.5 0.4 0.3  --           Microbiology Results (last 7 days)       ** No results found for the last 168 hours. **             Radiology:  IR Ultrasound Guidance  Narrative: PROCEDURE:  US and Fluoroscopy Guided Nephrostomy Tube Placement    CLINICAL HISTORY:  74-year-old female with bilateral hydronephrosis and left ureteral stent    ANESTHESIA:  Level of sedation: Moderate sedation    Medications: 0.5 mg Versed IV; 25 mcg Fentanyl IV; 0 mg Ativan IV; other: None    Administration:  Moderate sedation was administered during this procedure. Continuous monitoring of the patient's level of consciousness and physiological status was provided throughout the procedure by an independent trained observer under the direct supervision of the operating physician.    Duration of sedation: 16 minutes    Antibiotic prophylaxis: None.  Patient already being treated with Zosyn and Cipro.    PHYSICIANS:  Pollo Chacon MD    RADIATION DOSE:  Fluoroscopy time: 1.8 minutes    Radiation exposure dose: 13.7 mGy    Exposure images: 0    CONTRAST:  30 cc of Isovue 370    PROCEDURAL SUMMARY:  After informed consent was obtained, the patient was placed prone on the angiography table. The skin overlying the right kidney was then prepped and draped in the usual sterile fashion.    The skin and soft tissues were anesthetized using 1% lidocaine. Under real-time ultrasound guidance, a 25g needle was advanced into an inferior pole calyx of the selected kidney.  Once ultrasound imaging demonstrated the tip of the needle within the calyx, the stylet was removed and return of urine was confirmed.  Contrast was injected to opacify the renal collecting system.  A Mandril wire was then advanced through the needle into the collecting system.  The needle was removed and the coaxial dilator was advanced over the wire into the kidney.  The inner dilators and wire were removed and a Glidewire was advanced through the outer sheath of the dilator into kidney.  The sheath was then removed and an 8F nephrostomy drain was advanced over the wire and into the collecting system.  The wire was removed and the [place] loop was formed.  The catheter was attached to the skin and a sterile dressing was applied.  The catheter was then attached to a gravity drainage bag.    The patient tolerated the procedure well and experienced no immediate complications. The patient was then brought to the recovery room in good condition.  Impression: Technically  successful Nephrostomy Tube placement.    Electronically signed by: Pollo Chacon  Date:    03/28/2025  Time:    15:54  IR Nephrostomy Tube Placement  Narrative: PROCEDURE:  US and Fluoroscopy Guided Nephrostomy Tube Placement    CLINICAL HISTORY:  74-year-old female with bilateral hydronephrosis and left ureteral stent    ANESTHESIA:  Level of sedation: Moderate sedation    Medications: 0.5 mg Versed IV; 25 mcg Fentanyl IV; 0 mg Ativan IV; other: None    Administration: Moderate sedation was administered during this procedure. Continuous monitoring of the patient's level of consciousness and physiological status was provided throughout the procedure by an independent trained observer under the direct supervision of the operating physician.    Duration of sedation: 16 minutes    Antibiotic prophylaxis: None.  Patient already being treated with Zosyn and Cipro.    PHYSICIANS:  Pollo Chacon MD    RADIATION DOSE:  Fluoroscopy time: 1.8 minutes    Radiation exposure dose: 13.7 mGy    Exposure images: 0    CONTRAST:  30 cc of Isovue 370    PROCEDURAL SUMMARY:  After informed consent was obtained, the patient was placed prone on the angiography table. The skin overlying the right kidney was then prepped and draped in the usual sterile fashion.    The skin and soft tissues were anesthetized using 1% lidocaine. Under real-time ultrasound guidance, a 25g needle was advanced into an inferior pole calyx of the selected kidney.  Once ultrasound imaging demonstrated the tip of the needle within the calyx, the stylet was removed and return of urine was confirmed.  Contrast was injected to opacify the renal collecting system.  A Mandril wire was then advanced through the needle into the collecting system.  The needle was removed and the coaxial dilator was advanced over the wire into the kidney.  The inner dilators and wire were removed and a Glidewire was advanced through the outer sheath of the dilator into kidney.  The sheath  was then removed and an 8F nephrostomy drain was advanced over the wire and into the collecting system.  The wire was removed and the [place] loop was formed.  The catheter was attached to the skin and a sterile dressing was applied.  The catheter was then attached to a gravity drainage bag.    The patient tolerated the procedure well and experienced no immediate complications. The patient was then brought to the recovery room in good condition.  Impression: Technically successful Nephrostomy Tube placement.    Electronically signed by: Pollo Chacon  Date:    03/28/2025  Time:    15:54        Medications:  Scheduled Meds:   ciprofloxacin HCl  250 mg Oral Q12H    clopidogreL  75 mg Oral QHS    DULoxetine  60 mg Oral QHS    ferrous sulfate  1 tablet Oral Daily    levothyroxine  137 mcg Oral Before breakfast    midodrine  10 mg Oral TID WM    mupirocin   Nasal BID    piperacillin-tazobactam (Zosyn) IV (PEDS and ADULTS) (extended infusion is not appropriate)  4.5 g Intravenous Q12H    QUEtiapine  25 mg Oral QHS    sodium bicarbonate  1,300 mg Oral BID    sodium zirconium cyclosilicate  10 g Oral TID     Continuous Infusions:   sodium bicarbonate 150 mEq in D5W 1,000 mL infusion   Intravenous Continuous 75 mL/hr at 03/29/25 0058 New Bag at 03/29/25 0058     PRN Meds:.  Current Facility-Administered Medications:     acetaminophen, 650 mg, Oral, Q4H PRN    albumin human 25%, 25 g, Intravenous, Q20 Min PRN    ALPRAZolam, 0.25 mg, Oral, BID PRN    aluminum-magnesium hydroxide-simethicone, 30 mL, Oral, QID PRN    bisacodyL, 10 mg, Rectal, Daily PRN    dextrose 50%, 12.5 g, Intravenous, PRN    dextrose 50%, 25 g, Intravenous, PRN    glucagon (human recombinant), 1 mg, Intramuscular, PRN    glucose, 16 g, Oral, PRN    glucose, 24 g, Oral, PRN    HYDROcodone-acetaminophen, 1 tablet, Oral, Q6H PRN    melatonin, 6 mg, Oral, Nightly PRN    morphine, 2 mg, Intravenous, Q4H PRN    polyethylene glycol, 17 g, Oral, BID PRN    sodium  chloride 0.9%, 10 mL, Intravenous, PRN    Nutrition:  Nutrition consulted. Most recent weight and BMI monitored-     Measurements:  Wt Readings from Last 1 Encounters:   03/27/25 72.6 kg (160 lb 0.9 oz)   Body mass index is 29.27 kg/m².    Patient has been screened and assessed by RD.    Malnutrition Type:  Context:    Level:      Malnutrition Characteristic Summary:       Interventions/Recommendations (treatment strategy):           Assessment/Plan:   Severe sepsis secondary to UTI  Acute on chronic renal failure, baseline stage IV   Hyperkalemia   Obstructive uropathy  Complicated UTI, POA   Metabolic acidosis  Anemia, suspect combined inflammatory and iron-deficiency  Hypotension with history of hypertension  Coronary artery disease  Hypothyroidism     Patient currently on oral ciprofloxacin and IV Zosyn.  We do not need double coverage for Pseudomonas.  Will DC Cipro and continue Zosyn for now.  Blood cultures are growing Gram-negative rods in 2 of 2 bottles.  PCR is positive for Enterobacterales.  Will follow up sensitivities.  Leukocytosis has been improving.  Blood pressure is little soft.  Continue with IV fluids.  She is on a bicarbonate drip for acidosis.  Nephrology following.  Ran on hemodialysis yesterday.  Suspect that she will go again today.  Follow up BMP and renal function.  Urology continues to follow along.  Intervention Radiology was able to place right nephrostomy tube yesterday to bypass obstruction  Palliative care on board.  Patient remains full code at this time     Critical care diagnosis: sepsis, iv antibiotics  Critical care interventions: hands on evaluation, review of labs/radiographs/records and discussions with family  Critical care time spent: >32 minutes      Tyron Doe MD   03/29/2025     All diagnosis and differential diagnosis have been reviewed; assessment and plan has been documented; I have personally reviewed the labs and test results that are presently available; I have  reviewed the patients medication list; I have reviewed the consulting providers response and recommendations. I have reviewed or attempted to review medical records based upon their availability    All of the patient's questions have been  addressed and answered. Patient's is agreeable to the above stated plan. I will continue to monitor closely and make adjustments to medical management as needed.  _____________________________________________________________________

## 2025-03-29 NOTE — PROGRESS NOTES
renal_HD  Pt seen in HD  Dialysing acutely for clearance  Tolerating well  Lungs clear   RRR   Abd soft  No edema      Impression  Obstructive uropathy  CKD4- 5\  ESRD    P  HD today  Transfuse one unit today  Labs in am

## 2025-03-29 NOTE — PROGRESS NOTES
03/29/25 1143   Vital Signs   Temp 97.9 °F (36.6 °C)   Temp Source Oral   Pulse 79   Heart Rate Source Right;NIBP   Resp 16   Device (Oxygen Therapy) room air   BP (!) 92/45   MAP (mmHg) 56   BP Location Right arm   BP Method cNIBP   Patient Position Lying        Hemodialysis AV Fistula Left upper arm   No placement date or time found.   Present Prior to Hospital Arrival?: Yes  Location: Left upper arm   Site Assessment Clean;Dry;Intact;No redness;No swelling;No warmth;No drainage   Patency Present;Thrill;Bruit   Status Deaccessed   Site Condition No complications   Dressing Gauze   Post-Hemodialysis Assessment   Rinseback Volume (mL) 500 mL   Blood Volume Processed (Liters) 80.7 L   Dialyzer Clearance Lightly streaked   Duration of Treatment 180 minutes   Total UF (mL) 1000 mL   Net Fluid Removal 500   Patient Response to Treatment Tx completed, tolerated well, lines flushed and then needles pulled and pressure held till hemostasis achieved, no bleeding or hematoma noted, denies SOB and no distress noted   Post-Treatment Weight 75.6 kg (166 lb 10.7 oz)   Treatment Weight Change -0.8

## 2025-03-29 NOTE — AI DETERIORATION ALERT
Artificial Intelligence Notification  [facility]  [address]  [place] [address]  Phone: [phone]    This documentation was triggered by an Artificial Intelligence Notification:    Admit Date: 3/27/2025   LOS: 2  Code Status: Full Code  : 1950  Age: 74 y.o.  Weight:   Wt Readings from Last 1 Encounters:   25 72.6 kg (160 lb 0.9 oz)        Sex: female  Bed: 4[facility]  MRN: [id number]  Attending Physician: Tyron Doe MD     Date of Alert: 2025  Time AI Alert Received: 1445            Vitals:    25 1143   BP: (!) 92/45   Pulse: 79   Resp: 16   Temp: 97.9 °F (36.6 °C)     SpO2: 95 %      Artificial Intelligence alert discussed with Provider:     Name:    Date/Time of Provider Notification: 3/29 @1500      Patient Condition: no sepsis suspected at present

## 2025-03-29 NOTE — PROGRESS NOTES
UROLOGY  PROGRESS  NOTE    Kam GALLEGOS Amy 1950  [id number]  3/29/2025    S/p right nephrostomy tube placement. BMP not resulted yet from hours ago? BP soft, HR stable, afebrile. NO URINE OUTPUT CHARTED OVERNIGHT. Hypotensive, stable HR, afebrile.       Exam:    GEN: NAD  CV: RRR  RESP: Even and unlabored  ABD: soft, NTND  : clear yellow urine draining from PCN, mucous green urine draining to  bag from SP site.   NEURO:AAOx4      Recent Results (from the past 24 hours)   Prepare RBC 1 Unit    Collection Time: 03/29/25  9:14 AM   Result Value Ref Range    UNIT NUMBER [id number]     UNIT ABO/RH O POS     DISPENSE STATUS Selected     Unit Expiration [id number]     Product Code Y1753V55     Unit Blood Type Code 5100     CROSSMATCH INTERPRETATION Compatible     UNIT NUMBER [id number]     UNIT ABO/RH O NEG     DISPENSE STATUS Selected     Unit Expiration [id number]     Product Code Z2295O01     Unit Blood Type Code 9500     CROSSMATCH INTERPRETATION Compatible    Type & Screen    Collection Time: 03/29/25  9:14 AM   Result Value Ref Range    Group & Rh O POS     Indirect [place] GEL NEG     Specimen Outdate 04/01/2025 23:59    CBC with Differential    Collection Time: 03/29/25  9:14 AM   Result Value Ref Range    WBC 12.95 (H) 4.50 - 11.50 x10(3)/mcL    RBC 2.29 (L) 4.20 - 5.40 x10(6)/mcL    Hgb 7.5 (L) 12.0 - 16.0 g/dL    Hct 23.0 (L) 37.0 - 47.0 %    .4 (H) 80.0 - 94.0 fL    MCH 32.8 (H) 27.0 - 31.0 pg    MCHC 32.6 (L) 33.0 - 36.0 g/dL    RDW 12.3 11.5 - 17.0 %    Platelet 287 130 - 400 x10(3)/mcL    MPV 9.9 7.4 - 10.4 fL    Neut % 90.2 %    Lymph % 4.7 %    Mono % 3.5 %    Eos % 0.6 %    Basophil % 0.2 %    Imm Grans % 0.8 %    Neut # 11.67 (H) 2.1 - 9.2 x10(3)/mcL    Lymph # 0.61 0.6 - 4.6 x10(3)/mcL    Mono # 0.45 0.1 - 1.3 x10(3)/mcL    Eos # 0.08 0 - 0.9 x10(3)/mcL    Baso # 0.03 <=0.2 x10(3)/mcL    Imm Gran # 0.11 (H) 0.00 - 0.04 x10(3)/mcL    NRBC% 0.0 %       Assessment:  74-year-old  female with a history of cervical cancer status post pelvic radiation 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  PMH also includes CKD and colon cancer with ileostomy. [facility] - CT abdomen and pelvis reveals increasing bilateral hydroureteronephrosis and pelvicaliectasis with left double-J stent in appropriate position and removal of right ureteral stent since imaging in February; bilateral perinephric stranding, left periureteral stranding; SP tube in the appropriate position.   -acute renal failure with hyperkalemia and acidosis - has agreed to proceed with dialysis which is planned for today  -blood cultures with Gram-negative rods, urine culture with multiple organisms - on Zosyn  -IR placed right PCN 3/28      Plan:  BMP. Please chart accurate I&O's. Following      Becca Valencia NP

## 2025-03-29 NOTE — PLAN OF CARE
Problem: Adult Inpatient Plan of Care  Goal: Plan of Care Review  Outcome: Progressing  Goal: Patient-Specific Goal (Individualized)  Outcome: Progressing  Goal: Absence of Hospital-Acquired Illness or Injury  Outcome: Progressing  Goal: Optimal Comfort and Wellbeing  Outcome: Progressing  Goal: Readiness for Transition of Care  Outcome: Progressing     Problem: Wound  Goal: Optimal Coping  Outcome: Not Progressing  Goal: Optimal Functional Ability  Outcome: Not Progressing  Goal: Absence of Infection Signs and Symptoms  Outcome: Progressing  Goal: Improved Oral Intake  Outcome: Progressing  Goal: Optimal Pain Control and Function  Outcome: Progressing  Goal: Skin Health and Integrity  Outcome: Progressing  Goal: Optimal Wound Healing  Outcome: Progressing     Problem: Skin Injury Risk Increased  Goal: Skin Health and Integrity  Outcome: Progressing     Problem: Hemodialysis  Goal: Safe, Effective Therapy Delivery  Outcome: Progressing  Goal: Effective Tissue Perfusion  Outcome: Progressing  Goal: Absence of Infection Signs and Symptoms  Outcome: Progressing     Problem: Coping Ineffective  Goal: Effective Coping  Outcome: Progressing     Problem: Infection  Goal: Absence of Infection Signs and Symptoms  Outcome: Progressing

## 2025-03-30 LAB
ALBUMIN SERPL-MCNC: 2.1 G/DL (ref 3.4–4.8)
ALBUMIN/GLOB SERPL: 0.5 RATIO (ref 1.1–2)
ALP SERPL-CCNC: 111 UNIT/L (ref 40–150)
ALT SERPL-CCNC: 7 UNIT/L (ref 0–55)
ANION GAP SERPL CALC-SCNC: 12 MEQ/L
AST SERPL-CCNC: 16 UNIT/L (ref 11–45)
BASOPHILS # BLD AUTO: 0.02 X10(3)/MCL
BASOPHILS NFR BLD AUTO: 0.2 %
BILIRUB SERPL-MCNC: 0.7 MG/DL
BUN SERPL-MCNC: 10.2 MG/DL (ref 9.8–20.1)
CALCIUM SERPL-MCNC: 7.3 MG/DL (ref 8.4–10.2)
CHLORIDE SERPL-SCNC: 94 MMOL/L (ref 98–107)
CO2 SERPL-SCNC: 34 MMOL/L (ref 23–31)
CREAT SERPL-MCNC: 3.34 MG/DL (ref 0.55–1.02)
CREAT/UREA NIT SERPL: 3
EOSINOPHIL # BLD AUTO: 0.08 X10(3)/MCL (ref 0–0.9)
EOSINOPHIL NFR BLD AUTO: 0.9 %
ERYTHROCYTE [DISTWIDTH] IN BLOOD BY AUTOMATED COUNT: 12.2 % (ref 11.5–17)
GFR SERPLBLD CREATININE-BSD FMLA CKD-EPI: 14 ML/MIN/1.73/M2
GLOBULIN SER-MCNC: 4 GM/DL (ref 2.4–3.5)
GLUCOSE SERPL-MCNC: 94 MG/DL (ref 82–115)
HCT VFR BLD AUTO: 23.7 % (ref 37–47)
HGB BLD-MCNC: 7.3 G/DL (ref 12–16)
IMM GRANULOCYTES # BLD AUTO: 0.07 X10(3)/MCL (ref 0–0.04)
IMM GRANULOCYTES NFR BLD AUTO: 0.8 %
LYMPHOCYTES # BLD AUTO: 0.91 X10(3)/MCL (ref 0.6–4.6)
LYMPHOCYTES NFR BLD AUTO: 10.1 %
MCH RBC QN AUTO: 32.6 PG (ref 27–31)
MCHC RBC AUTO-ENTMCNC: 30.8 G/DL (ref 33–36)
MCV RBC AUTO: 105.8 FL (ref 80–94)
MONOCYTES # BLD AUTO: 0.59 X10(3)/MCL (ref 0.1–1.3)
MONOCYTES NFR BLD AUTO: 6.5 %
NEUTROPHILS # BLD AUTO: 7.35 X10(3)/MCL (ref 2.1–9.2)
NEUTROPHILS NFR BLD AUTO: 81.5 %
NRBC BLD AUTO-RTO: 0 %
PHOSPHATE SERPL-MCNC: 3.8 MG/DL (ref 2.3–4.7)
PLATELET # BLD AUTO: 295 X10(3)/MCL (ref 130–400)
PLATELETS.RETICULATED NFR BLD AUTO: 1.9 % (ref 0.9–11.2)
PMV BLD AUTO: 10 FL (ref 7.4–10.4)
POTASSIUM SERPL-SCNC: 3.7 MMOL/L (ref 3.5–5.1)
PROT SERPL-MCNC: 6.1 GM/DL (ref 5.8–7.6)
PTH-INTACT SERPL-MCNC: 396.5 PG/ML (ref 8.7–77)
RBC # BLD AUTO: 2.24 X10(6)/MCL (ref 4.2–5.4)
SODIUM SERPL-SCNC: 140 MMOL/L (ref 136–145)
WBC # BLD AUTO: 9.02 X10(3)/MCL (ref 4.5–11.5)

## 2025-03-30 PROCEDURE — 36415 COLL VENOUS BLD VENIPUNCTURE: CPT | Performed by: INTERNAL MEDICINE

## 2025-03-30 PROCEDURE — 25000003 PHARM REV CODE 250

## 2025-03-30 PROCEDURE — 80053 COMPREHEN METABOLIC PANEL: CPT | Performed by: INTERNAL MEDICINE

## 2025-03-30 PROCEDURE — 25000003 PHARM REV CODE 250: Performed by: INTERNAL MEDICINE

## 2025-03-30 PROCEDURE — 84100 ASSAY OF PHOSPHORUS: CPT | Performed by: INTERNAL MEDICINE

## 2025-03-30 PROCEDURE — 83970 ASSAY OF PARATHORMONE: CPT | Performed by: INTERNAL MEDICINE

## 2025-03-30 PROCEDURE — 63600175 PHARM REV CODE 636 W HCPCS

## 2025-03-30 PROCEDURE — 99232 SBSQ HOSP IP/OBS MODERATE 35: CPT | Mod: ,,, | Performed by: INTERNAL MEDICINE

## 2025-03-30 PROCEDURE — 63600175 PHARM REV CODE 636 W HCPCS: Mod: JZ,EC,TB | Performed by: INTERNAL MEDICINE

## 2025-03-30 PROCEDURE — 11000001 HC ACUTE MED/SURG PRIVATE ROOM

## 2025-03-30 PROCEDURE — 63600175 PHARM REV CODE 636 W HCPCS: Performed by: HOSPITALIST

## 2025-03-30 PROCEDURE — 25000003 PHARM REV CODE 250: Performed by: HOSPITALIST

## 2025-03-30 PROCEDURE — 85025 COMPLETE CBC W/AUTO DIFF WBC: CPT | Performed by: INTERNAL MEDICINE

## 2025-03-30 RX ORDER — ONDANSETRON HYDROCHLORIDE 2 MG/ML
4 INJECTION, SOLUTION INTRAVENOUS EVERY 6 HOURS PRN
Status: DISCONTINUED | OUTPATIENT
Start: 2025-03-30 | End: 2025-04-05 | Stop reason: HOSPADM

## 2025-03-30 RX ORDER — PROMETHAZINE HYDROCHLORIDE 12.5 MG/1
12.5 TABLET ORAL EVERY 6 HOURS PRN
Status: DISCONTINUED | OUTPATIENT
Start: 2025-03-30 | End: 2025-04-05 | Stop reason: HOSPADM

## 2025-03-30 RX ORDER — SODIUM CHLORIDE 9 MG/ML
INJECTION, SOLUTION INTRAVENOUS CONTINUOUS
Status: DISCONTINUED | OUTPATIENT
Start: 2025-03-30 | End: 2025-03-31

## 2025-03-30 RX ADMIN — MIDODRINE HYDROCHLORIDE 10 MG: 5 TABLET ORAL at 06:03

## 2025-03-30 RX ADMIN — CLOPIDOGREL 75 MG: 75 TABLET ORAL at 09:03

## 2025-03-30 RX ADMIN — SODIUM BICARBONATE 650 MG TABLET 1300 MG: at 09:03

## 2025-03-30 RX ADMIN — SODIUM ZIRCONIUM CYCLOSILICATE 10 G: 10 POWDER, FOR SUSPENSION ORAL at 09:03

## 2025-03-30 RX ADMIN — MUPIROCIN: 20 OINTMENT TOPICAL at 09:03

## 2025-03-30 RX ADMIN — LEVOTHYROXINE SODIUM 137 MCG: 0.03 TABLET ORAL at 05:03

## 2025-03-30 RX ADMIN — ONDANSETRON 4 MG: 2 INJECTION INTRAMUSCULAR; INTRAVENOUS at 11:03

## 2025-03-30 RX ADMIN — DULOXETINE HYDROCHLORIDE 60 MG: 30 CAPSULE, DELAYED RELEASE ORAL at 09:03

## 2025-03-30 RX ADMIN — ERYTHROPOIETIN 20000 UNITS: 10000 INJECTION, SOLUTION INTRAVENOUS; SUBCUTANEOUS at 12:03

## 2025-03-30 RX ADMIN — QUETIAPINE FUMARATE 25 MG: 25 TABLET ORAL at 09:03

## 2025-03-30 RX ADMIN — SODIUM CHLORIDE: 9 INJECTION, SOLUTION INTRAVENOUS at 09:03

## 2025-03-30 RX ADMIN — SODIUM ZIRCONIUM CYCLOSILICATE 10 G: 10 POWDER, FOR SUSPENSION ORAL at 04:03

## 2025-03-30 RX ADMIN — FERROUS SULFATE TAB 325 MG (65 MG ELEMENTAL FE) 1 EACH: 325 (65 FE) TAB at 09:03

## 2025-03-30 RX ADMIN — MIDODRINE HYDROCHLORIDE 10 MG: 5 TABLET ORAL at 12:03

## 2025-03-30 RX ADMIN — MIDODRINE HYDROCHLORIDE 10 MG: 5 TABLET ORAL at 09:03

## 2025-03-30 RX ADMIN — SODIUM CHLORIDE: 9 INJECTION, SOLUTION INTRAVENOUS at 11:03

## 2025-03-30 RX ADMIN — PIPERACILLIN SODIUM AND TAZOBACTAM SODIUM 4.5 G: 4; .5 INJECTION, POWDER, LYOPHILIZED, FOR SOLUTION INTRAVENOUS at 05:03

## 2025-03-30 RX ADMIN — PIPERACILLIN SODIUM AND TAZOBACTAM SODIUM 4.5 G: 4; .5 INJECTION, POWDER, LYOPHILIZED, FOR SOLUTION INTRAVENOUS at 04:03

## 2025-03-30 NOTE — PROGRESS NOTES
[facility] Hospital Medicine Progress Note        Chief Complaint: Inpatient Follow-up     HPI:   74-year-old male transferred from an outside facility secondary to complicated UTI involving a suprapubic catheter.  She originally presented for left lower quadrant abdominal pain.  She also has a past medical history of chronic kidney disease stage IV, colon cancer with ileostomy in place, CAD, and hypothyroidism.  She was transferred for Urology consultation as a CT of the abdomen revealed increasing bilateral hydro ureteral nephrosis despite bilateral ureteral stents.     In the emergency room she is noted to have some mild hypotension down to 91/42.  She has a significant leukocytosis of 15.02.  Anemia with a hemoglobin of 8.3.  Elevated potassium at 5.2 he has significant electrolyte abnormalities.  Her urinalysis showed many bacteria with pyuria and hematuria.She was started empirically on IV Rocephin and admitted to the hospitalist group.  She was evaluated by Nephrology.  They recommended hemodialysis due to worsening renal function and hyperkalemia.  Patient refused.  Palliative Care was consulted and patient wishes to remain full code and seek aggressive treatment.  She has not yet decided about hemodialysis.  Urology evaluated.  Recommending continuing antibiotics.  They were previously unable to placed retrograde stents due to poor visualization.  Recommended IR consultation for right antegrade stenting     After further conversations with patient and family patient was finally agreeable to hemodialysis.  Taken this morning and tolerated well.  She has also taken for antegrade right nephrostomy tube placement on 3/28.  Returned to the floor in stable condition.  Will follow up her labs tomorrow morning.  Blood cultures updated this morning.  Still growing Gram-negative rods with PCR positive for Enterobacterales.       Interval Hx:  Tolerated HD again yesterday.  Afebrile and  labs improved.  BP still soft but symptomatic.      Objective/physical exam:  General: In no acute distress, afebrile, right IJ TLC  Chest: Clear to auscultation bilaterally  Heart: RRR, +S1, S2, no appreciable murmur  Abdomen: Soft, nontender, BS +, right nephrostomy  MSK: Warm, no lower extremity edema, no clubbing or cyanosis  Neurologic: Alert and oriented x4, Cranial nerve II-XII intact, Strength 5/5 in all 4 extremities     VITAL SIGNS: 24 HRS MIN & MAX LAST   Temp  Min: 97.9 °F (36.6 °C)  Max: 98.3 °F (36.8 °C) 98.1 °F (36.7 °C)   BP  Min: 80/46  Max: 112/50 (!) 91/53   Pulse  Min: 54  Max: 83  62   Resp  Min: 16  Max: 18 16   SpO2  Min: 95 %  Max: 97 % 96 %       Recent Labs   Lab 03/28/25  0452 03/29/25  0914 03/29/25  1405   WBC 15.08* 12.95* 14.39*   RBC 2.23* 2.29* 2.45*   HGB 7.3* 7.5* 8.0*   HCT 23.0* 23.0* 25.8*   .1* 100.4* 105.3*   MCH 32.7* 32.8* 32.7*   MCHC 31.7* 32.6* 31.0*   RDW 12.2 12.3 12.1    287 286   MPV 10.3 9.9 9.7       Recent Labs   Lab 03/26/25  1515 03/26/25  1851 03/27/25  0829 03/28/25  0452 03/29/25  1405   * 134* 133* 132* 142   K 5.0 5.2* 5.5* 5.6* 3.6    100 101 101 101   CO2 10* 12* 14* 11* 30   BUN 75.0* 74.0* 80.1* 90.4* 7.9*   CREATININE 12.69* 12.97* 12.71* 13.05* 2.41*   CALCIUM 8.5 8.7 8.2* 6.8* 8.0*   MG  --   --  1.70  --   --    ALBUMIN 2.7* 2.8* 2.4*  --   --    ALKPHOS 175* 186* 168*  --   --    ALT 12 14 11  --   --    AST 13 15 15  --   --    BILITOT 0.5 0.4 0.3  --   --           Microbiology Results (last 7 days)       ** No results found for the last 168 hours. **             Radiology:  IR Ultrasound Guidance  Narrative: PROCEDURE:  US and Fluoroscopy Guided Nephrostomy Tube Placement    CLINICAL HISTORY:  74-year-old female with bilateral hydronephrosis and left ureteral stent    ANESTHESIA:  Level of sedation: Moderate sedation    Medications: 0.5 mg Versed IV; 25 mcg Fentanyl IV; 0 mg Ativan IV; other: None    Administration:  Moderate sedation was administered during this procedure. Continuous monitoring of the patient's level of consciousness and physiological status was provided throughout the procedure by an independent trained observer under the direct supervision of the operating physician.    Duration of sedation: 16 minutes    Antibiotic prophylaxis: None.  Patient already being treated with Zosyn and Cipro.    PHYSICIANS:  Pollo Chacon MD    RADIATION DOSE:  Fluoroscopy time: 1.8 minutes    Radiation exposure dose: 13.7 mGy    Exposure images: 0    CONTRAST:  30 cc of Isovue 370    PROCEDURAL SUMMARY:  After informed consent was obtained, the patient was placed prone on the angiography table. The skin overlying the right kidney was then prepped and draped in the usual sterile fashion.    The skin and soft tissues were anesthetized using 1% lidocaine. Under real-time ultrasound guidance, a 25g needle was advanced into an inferior pole calyx of the selected kidney.  Once ultrasound imaging demonstrated the tip of the needle within the calyx, the stylet was removed and return of urine was confirmed.  Contrast was injected to opacify the renal collecting system.  A Mandril wire was then advanced through the needle into the collecting system.  The needle was removed and the coaxial dilator was advanced over the wire into the kidney.  The inner dilators and wire were removed and a Glidewire was advanced through the outer sheath of the dilator into kidney.  The sheath was then removed and an 8F nephrostomy drain was advanced over the wire and into the collecting system.  The wire was removed and the [place] loop was formed.  The catheter was attached to the skin and a sterile dressing was applied.  The catheter was then attached to a gravity drainage bag.    The patient tolerated the procedure well and experienced no immediate complications. The patient was then brought to the recovery room in good condition.  Impression: Technically  successful Nephrostomy Tube placement.    Electronically signed by: Pollo Chacon  Date:    03/28/2025  Time:    15:54  IR Nephrostomy Tube Placement  Narrative: PROCEDURE:  US and Fluoroscopy Guided Nephrostomy Tube Placement    CLINICAL HISTORY:  74-year-old female with bilateral hydronephrosis and left ureteral stent    ANESTHESIA:  Level of sedation: Moderate sedation    Medications: 0.5 mg Versed IV; 25 mcg Fentanyl IV; 0 mg Ativan IV; other: None    Administration: Moderate sedation was administered during this procedure. Continuous monitoring of the patient's level of consciousness and physiological status was provided throughout the procedure by an independent trained observer under the direct supervision of the operating physician.    Duration of sedation: 16 minutes    Antibiotic prophylaxis: None.  Patient already being treated with Zosyn and Cipro.    PHYSICIANS:  Pollo Chacon MD    RADIATION DOSE:  Fluoroscopy time: 1.8 minutes    Radiation exposure dose: 13.7 mGy    Exposure images: 0    CONTRAST:  30 cc of Isovue 370    PROCEDURAL SUMMARY:  After informed consent was obtained, the patient was placed prone on the angiography table. The skin overlying the right kidney was then prepped and draped in the usual sterile fashion.    The skin and soft tissues were anesthetized using 1% lidocaine. Under real-time ultrasound guidance, a 25g needle was advanced into an inferior pole calyx of the selected kidney.  Once ultrasound imaging demonstrated the tip of the needle within the calyx, the stylet was removed and return of urine was confirmed.  Contrast was injected to opacify the renal collecting system.  A Mandril wire was then advanced through the needle into the collecting system.  The needle was removed and the coaxial dilator was advanced over the wire into the kidney.  The inner dilators and wire were removed and a Glidewire was advanced through the outer sheath of the dilator into kidney.  The sheath  was then removed and an 8F nephrostomy drain was advanced over the wire and into the collecting system.  The wire was removed and the [place] loop was formed.  The catheter was attached to the skin and a sterile dressing was applied.  The catheter was then attached to a gravity drainage bag.    The patient tolerated the procedure well and experienced no immediate complications. The patient was then brought to the recovery room in good condition.  Impression: Technically successful Nephrostomy Tube placement.    Electronically signed by: Pollo Chacon  Date:    03/28/2025  Time:    15:54        Medications:  Scheduled Meds:   ciprofloxacin HCl  250 mg Oral Q12H    clopidogreL  75 mg Oral QHS    DULoxetine  60 mg Oral QHS    epoetin louis-epbx  20,000 Units Subcutaneous Once    ferrous sulfate  1 tablet Oral Daily    levothyroxine  137 mcg Oral Before breakfast    midodrine  10 mg Oral TID WM    mupirocin   Nasal BID    piperacillin-tazobactam (Zosyn) IV (PEDS and ADULTS) (extended infusion is not appropriate)  4.5 g Intravenous Q12H    QUEtiapine  25 mg Oral QHS    sodium bicarbonate  1,300 mg Oral BID    sodium zirconium cyclosilicate  10 g Oral TID     Continuous Infusions:   sodium bicarbonate 150 mEq in D5W 1,000 mL infusion   Intravenous Continuous 75 mL/hr at 03/29/25 2203 New Bag at 03/29/25 2203     PRN Meds:.  Current Facility-Administered Medications:     0.9%  NaCl infusion (for blood administration), , Intravenous, Q24H PRN    acetaminophen, 650 mg, Oral, Q4H PRN    albumin human 25%, 25 g, Intravenous, Q20 Min PRN    ALPRAZolam, 0.25 mg, Oral, BID PRN    aluminum-magnesium hydroxide-simethicone, 30 mL, Oral, QID PRN    bisacodyL, 10 mg, Rectal, Daily PRN    dextrose 50%, 12.5 g, Intravenous, PRN    dextrose 50%, 25 g, Intravenous, PRN    glucagon (human recombinant), 1 mg, Intramuscular, PRN    glucose, 16 g, Oral, PRN    glucose, 24 g, Oral, PRN    HYDROcodone-acetaminophen, 1 tablet, Oral, Q6H PRN     melatonin, 6 mg, Oral, Nightly PRN    morphine, 2 mg, Intravenous, Q4H PRN    polyethylene glycol, 17 g, Oral, BID PRN    sodium chloride 0.9%, 10 mL, Intravenous, PRN    Nutrition:  Nutrition consulted. Most recent weight and BMI monitored-     Measurements:  Wt Readings from Last 1 Encounters:   03/27/25 72.6 kg (160 lb 0.9 oz)   Body mass index is 29.27 kg/m².    Patient has been screened and assessed by RD.    Malnutrition Type:  Context:    Level:      Malnutrition Characteristic Summary:       Interventions/Recommendations (treatment strategy):           Assessment/Plan:   Severe sepsis secondary to UTI  Acute on chronic renal failure, baseline stage IV   Hyperkalemia   Obstructive uropathy  Complicated UTI, POA   Metabolic acidosis  Anemia, suspect combined inflammatory and iron-deficiency  Hypotension with history of hypertension  Coronary artery disease  Hypothyroidism     Zosyn D3.  Enterobacterales growing in blood cultures from 3/26.  Repeat today. No sensitivities yet.  De-escalate antibiotics when able but she did have a slight bump in white count yesterday.  Overall clinical picture improving with stent and HD.  Follow WBC trend.   Remains on bicarb gtt but can likely be discontinued today.  Acidosis resolved with HD.   BP still soft but stable.   S/p R nephrostomy 3/28.  Good UOP. Urology following.   Palliative care on board.  Patient remains full code at this time     Critical care diagnosis: sepsis, iv antibiotics  Critical care interventions: hands on evaluation, review of labs/radiographs/records and discussions with family  Critical care time spent: >32 minutes         Tyron Doe MD   03/30/2025     All diagnosis and differential diagnosis have been reviewed; assessment and plan has been documented; I have personally reviewed the labs and test results that are presently available; I have reviewed the patients medication list; I have reviewed the consulting providers response and  recommendations. I have reviewed or attempted to review medical records based upon their availability    All of the patient's questions have been  addressed and answered. Patient's is agreeable to the above stated plan. I will continue to monitor closely and make adjustments to medical management as needed.  _____________________________________________________________________

## 2025-03-30 NOTE — PROGRESS NOTES
[facility] [place]      HPI:     Patient Name: Kam Reyes  Admission Date: 3/27/2025  Hospital Length of Stay: 3 days  Code Status: Full Code   Attending Physician: Tyron Doe MD   Primary Care Physician: Gregor Heaton MD  Principal Problem:<principal problem not specified>      Today patient seen and examined  Labs, recent events, imaging and medications reviewed for this hospital stay  More alert and responsive  Good urine output from the nephrostomy drain of the right  No complaints      Review of Systems:   Constitutional: Denies fever, fatigue, generalized weakness, night sweats, or acute weight change  Skin: Denies wounds, no rashes, no itching, no new skin lesions  HEENT: Denies acute change in hearing or vision, tinnitus, or dysphagia  Respiratory:  Denies cough, shortness of breath, or wheezing  Cardiovascular: Denies chest pain, palpitations, or swelling  Gastrointestional: Denies abdominal pain, nausea, vomiting, diarrhea, or constipation  Genitourinary:  Improving urine output  Musculoskeletal: Denies myalgias, back pain, flank pain, new decreased ROM or acute focal weakness  Neurological: Denies headaches, seizures, dizziness, paresthesias or asterixis  Hematological: Denies unusual bruising or bleeding  Psychiatric: Denies hallucinations, depression, or confusion      Medications:   Scheduled Meds:   clopidogreL  75 mg Oral QHS    DULoxetine  60 mg Oral QHS    epoetin louis (PROCRIT) injection  20,000 Units Subcutaneous Once    ferrous sulfate  1 tablet Oral Daily    levothyroxine  137 mcg Oral Before breakfast    midodrine  10 mg Oral TID WM    mupirocin   Nasal BID    piperacillin-tazobactam (Zosyn) IV (PEDS and ADULTS) (extended infusion is not appropriate)  4.5 g Intravenous Q12H    QUEtiapine  25 mg Oral QHS    sodium bicarbonate  1,300 mg Oral BID    sodium zirconium cyclosilicate  10 g Oral TID     Continuous Infusions:   0.9% NaCl   Intravenous Continuous              Vitals:     Vitals:    03/29/25 1940 03/29/25 2355 03/30/25 0453 03/30/25 0730   BP: 109/66 (!) 91/53 101/60 (!) 112/53   BP Location:       Patient Position:       Pulse: (!) 54 62 67 69   Resp: 18 16 16    Temp: 98.1 °F (36.7 °C) 98.1 °F (36.7 °C) 97.9 °F (36.6 °C) 98.6 °F (37 °C)   TempSrc: Oral Oral Oral Oral   SpO2: 97% 96% 95% 97%   Weight:       Height:             I/O last 3 completed shifts:  In: 960 [P.O.:960]  Out: 2920 [Urine:845; Other:1000; Stool:1075]    Intake/Output Summary (Last 24 hours) at 3/30/2025 0829  Last data filed at 3/29/2025 2300  Gross per 24 hour   Intake 960 ml   Output 2920 ml   Net -1960 ml       Physical Exam:   General: no acute distress, awake, alert,  Eyes: PERRLA, EOMI, conjunctiva clear, eyelids without swelling  HENT: atraumatic, oropharynx and nasal mucosa patent  Neck: full ROM, no JVD, no thyromegaly or lymphadenopathy  Respiratory: equal, unlabored, clear to auscultation A/P  Cardiovascular: RRR without r rub; BL radial and pedal pulses felt  Edema: none  Gastrointestinal: soft, non-tender, non-distended; positive bowel sounds; no masses to palpation  Genitourinary:  Good drainage right nephrostomy  Musculoskeletal: full ROM without limitation or discomfort  Integumentary: warm, dry; no rashes, wounds, or skin lesions  Neurological: oriented, appropriate, no acute deficits        Labs:     Chemistries:   Recent Labs   Lab 03/26/25  1851 03/27/25  0829 03/28/25  0452 03/29/25  1405 03/30/25  0446   * 133* 132* 142 140   K 5.2* 5.5* 5.6* 3.6 3.7    101 101 101 94*   CO2 12* 14* 11* 30 34*   BUN 74.0* 80.1* 90.4* 7.9* 10.2   CREATININE 12.97* 12.71* 13.05* 2.41* 3.34*   CALCIUM 8.7 8.2* 6.8* 8.0* 7.3*   BILITOT 0.4 0.3  --   --  0.7   ALKPHOS 186* 168*  --   --  111   ALT 14 11  --   --  7   AST 15 15  --   --  16   GLUCOSE 93 79* 48* 185* 94   MG  --  1.70  --   --   --    PHOS  --   --   --   --  3.8        CBC/Anemia Labs: Coags:    Recent Labs   Lab  03/27/25  0829 03/28/25  0452 03/29/25  0914 03/29/25  1405 03/30/25  0446   WBC 28.93  28.93*   < > 12.95* 14.39* 9.02   HGB 7.3*   < > 7.5* 8.0* 7.3*   HCT 22.1*   < > 23.0* 25.8* 23.7*      < > 287 286 295   .3*   < > 100.4* 105.3* 105.8*   RDW 12.7   < > 12.3 12.1 12.2   IRON 22*  --   --   --   --     < > = values in this interval not displayed.    Recent Labs   Lab 03/27/25  1402   INR 1.3          Impression:     GHADA related to obstructive uropathy  Uremia improved post dialysis  Renal function improving following nephrostomy    Plan:   DC bicarb drip  Change the sodium chloride  1 dose Procrit for anemia  Labs in a.m.  If hemoglobin keeps dropping plan for transfusion  No need for dialysis         Deanne Molina

## 2025-03-31 LAB
ABO + RH BLD: NORMAL
ABO + RH BLD: NORMAL
ALBUMIN SERPL-MCNC: 2 G/DL (ref 3.4–4.8)
ALBUMIN/GLOB SERPL: 0.6 RATIO (ref 1.1–2)
ALP SERPL-CCNC: 101 UNIT/L (ref 40–150)
ALT SERPL-CCNC: 6 UNIT/L (ref 0–55)
ANION GAP SERPL CALC-SCNC: 13 MEQ/L
AST SERPL-CCNC: 11 UNIT/L (ref 11–45)
BASOPHILS # BLD AUTO: 0.02 X10(3)/MCL
BASOPHILS NFR BLD AUTO: 0.2 %
BILIRUB SERPL-MCNC: 0.8 MG/DL
BLD PROD TYP BPU: NORMAL
BLD PROD TYP BPU: NORMAL
BLOOD UNIT EXPIRATION DATE: NORMAL
BLOOD UNIT EXPIRATION DATE: NORMAL
BLOOD UNIT TYPE CODE: 5100
BLOOD UNIT TYPE CODE: 9500
BUN SERPL-MCNC: 13.6 MG/DL (ref 9.8–20.1)
CALCIUM SERPL-MCNC: 6.7 MG/DL (ref 8.4–10.2)
CHLORIDE SERPL-SCNC: 92 MMOL/L (ref 98–107)
CO2 SERPL-SCNC: 33 MMOL/L (ref 23–31)
COLOR STL: NORMAL
CONSISTENCY STL: NORMAL
CREAT SERPL-MCNC: 4.56 MG/DL (ref 0.55–1.02)
CREAT/UREA NIT SERPL: 3
CROSSMATCH INTERPRETATION: NORMAL
CROSSMATCH INTERPRETATION: NORMAL
DISPENSE STATUS: NORMAL
DISPENSE STATUS: NORMAL
EOSINOPHIL # BLD AUTO: 0.09 X10(3)/MCL (ref 0–0.9)
EOSINOPHIL NFR BLD AUTO: 1 %
ERYTHROCYTE [DISTWIDTH] IN BLOOD BY AUTOMATED COUNT: 12 % (ref 11.5–17)
GFR SERPLBLD CREATININE-BSD FMLA CKD-EPI: 10 ML/MIN/1.73/M2
GLOBULIN SER-MCNC: 3.6 GM/DL (ref 2.4–3.5)
GLUCOSE SERPL-MCNC: 77 MG/DL (ref 82–115)
HCT VFR BLD AUTO: 21.6 % (ref 37–47)
HEMOCCULT SP1 STL QL: NEGATIVE
HGB BLD-MCNC: 6.8 G/DL (ref 12–16)
IMM GRANULOCYTES # BLD AUTO: 0.08 X10(3)/MCL (ref 0–0.04)
IMM GRANULOCYTES NFR BLD AUTO: 0.9 %
LYMPHOCYTES # BLD AUTO: 1.39 X10(3)/MCL (ref 0.6–4.6)
LYMPHOCYTES NFR BLD AUTO: 15.9 %
MCH RBC QN AUTO: 33 PG (ref 27–31)
MCHC RBC AUTO-ENTMCNC: 31.5 G/DL (ref 33–36)
MCV RBC AUTO: 104.9 FL (ref 80–94)
MONOCYTES # BLD AUTO: 0.63 X10(3)/MCL (ref 0.1–1.3)
MONOCYTES NFR BLD AUTO: 7.2 %
NEUTROPHILS # BLD AUTO: 6.51 X10(3)/MCL (ref 2.1–9.2)
NEUTROPHILS NFR BLD AUTO: 74.8 %
NRBC BLD AUTO-RTO: 0 %
PHOSPHATE SERPL-MCNC: 4.3 MG/DL (ref 2.3–4.7)
PLATELET # BLD AUTO: 226 X10(3)/MCL (ref 130–400)
PMV BLD AUTO: 10.2 FL (ref 7.4–10.4)
POTASSIUM SERPL-SCNC: 3.1 MMOL/L (ref 3.5–5.1)
PROT SERPL-MCNC: 5.6 GM/DL (ref 5.8–7.6)
PTH-INTACT SERPL-MCNC: 500.3 PG/ML (ref 8.7–77)
RBC # BLD AUTO: 2.06 X10(6)/MCL (ref 4.2–5.4)
SODIUM SERPL-SCNC: 138 MMOL/L (ref 136–145)
UNIT NUMBER: NORMAL
UNIT NUMBER: NORMAL
WBC # BLD AUTO: 8.72 X10(3)/MCL (ref 4.5–11.5)

## 2025-03-31 PROCEDURE — 63600175 PHARM REV CODE 636 W HCPCS

## 2025-03-31 PROCEDURE — 87040 BLOOD CULTURE FOR BACTERIA: CPT | Performed by: HOSPITALIST

## 2025-03-31 PROCEDURE — 63600175 PHARM REV CODE 636 W HCPCS: Performed by: HOSPITALIST

## 2025-03-31 PROCEDURE — 25000003 PHARM REV CODE 250

## 2025-03-31 PROCEDURE — 83970 ASSAY OF PARATHORMONE: CPT | Performed by: INTERNAL MEDICINE

## 2025-03-31 PROCEDURE — 80053 COMPREHEN METABOLIC PANEL: CPT | Performed by: INTERNAL MEDICINE

## 2025-03-31 PROCEDURE — 99231 SBSQ HOSP IP/OBS SF/LOW 25: CPT | Mod: ,,,

## 2025-03-31 PROCEDURE — 11000001 HC ACUTE MED/SURG PRIVATE ROOM

## 2025-03-31 PROCEDURE — 82270 OCCULT BLOOD FECES: CPT | Performed by: INTERNAL MEDICINE

## 2025-03-31 PROCEDURE — 25000003 PHARM REV CODE 250: Performed by: HOSPITALIST

## 2025-03-31 PROCEDURE — P9016 RBC LEUKOCYTES REDUCED: HCPCS | Performed by: INTERNAL MEDICINE

## 2025-03-31 PROCEDURE — 36430 TRANSFUSION BLD/BLD COMPNT: CPT

## 2025-03-31 PROCEDURE — 30233N1 TRANSFUSION OF NONAUTOLOGOUS RED BLOOD CELLS INTO PERIPHERAL VEIN, PERCUTANEOUS APPROACH: ICD-10-PCS | Performed by: INTERNAL MEDICINE

## 2025-03-31 PROCEDURE — 25000003 PHARM REV CODE 250: Performed by: INTERNAL MEDICINE

## 2025-03-31 PROCEDURE — 84100 ASSAY OF PHOSPHORUS: CPT | Performed by: INTERNAL MEDICINE

## 2025-03-31 PROCEDURE — 36415 COLL VENOUS BLD VENIPUNCTURE: CPT | Performed by: INTERNAL MEDICINE

## 2025-03-31 PROCEDURE — 85025 COMPLETE CBC W/AUTO DIFF WBC: CPT | Performed by: INTERNAL MEDICINE

## 2025-03-31 PROCEDURE — 36415 COLL VENOUS BLD VENIPUNCTURE: CPT | Performed by: HOSPITALIST

## 2025-03-31 PROCEDURE — 99232 SBSQ HOSP IP/OBS MODERATE 35: CPT | Mod: ,,, | Performed by: INTERNAL MEDICINE

## 2025-03-31 PROCEDURE — 63600175 PHARM REV CODE 636 W HCPCS: Performed by: INTERNAL MEDICINE

## 2025-03-31 RX ORDER — FUROSEMIDE 10 MG/ML
60 INJECTION INTRAMUSCULAR; INTRAVENOUS ONCE
Status: COMPLETED | OUTPATIENT
Start: 2025-04-01 | End: 2025-03-31

## 2025-03-31 RX ORDER — POTASSIUM CHLORIDE 20 MEQ/1
40 TABLET, EXTENDED RELEASE ORAL ONCE
Status: COMPLETED | OUTPATIENT
Start: 2025-03-31 | End: 2025-03-31

## 2025-03-31 RX ORDER — HYDROCODONE BITARTRATE AND ACETAMINOPHEN 500; 5 MG/1; MG/1
TABLET ORAL
Status: DISCONTINUED | OUTPATIENT
Start: 2025-03-31 | End: 2025-04-05 | Stop reason: HOSPADM

## 2025-03-31 RX ORDER — SODIUM CHLORIDE, SODIUM LACTATE, POTASSIUM CHLORIDE, CALCIUM CHLORIDE 600; 310; 30; 20 MG/100ML; MG/100ML; MG/100ML; MG/100ML
INJECTION, SOLUTION INTRAVENOUS CONTINUOUS
Status: DISCONTINUED | OUTPATIENT
Start: 2025-03-31 | End: 2025-04-04

## 2025-03-31 RX ADMIN — MUPIROCIN: 20 OINTMENT TOPICAL at 08:03

## 2025-03-31 RX ADMIN — QUETIAPINE FUMARATE 25 MG: 25 TABLET ORAL at 08:03

## 2025-03-31 RX ADMIN — DULOXETINE HYDROCHLORIDE 60 MG: 30 CAPSULE, DELAYED RELEASE ORAL at 08:03

## 2025-03-31 RX ADMIN — POTASSIUM CHLORIDE 40 MEQ: 1500 TABLET, EXTENDED RELEASE ORAL at 08:03

## 2025-03-31 RX ADMIN — PIPERACILLIN SODIUM AND TAZOBACTAM SODIUM 4.5 G: 4; .5 INJECTION, POWDER, LYOPHILIZED, FOR SOLUTION INTRAVENOUS at 05:03

## 2025-03-31 RX ADMIN — MIDODRINE HYDROCHLORIDE 10 MG: 5 TABLET ORAL at 12:03

## 2025-03-31 RX ADMIN — FUROSEMIDE 60 MG: 10 INJECTION, SOLUTION INTRAMUSCULAR; INTRAVENOUS at 10:03

## 2025-03-31 RX ADMIN — MIDODRINE HYDROCHLORIDE 10 MG: 5 TABLET ORAL at 05:03

## 2025-03-31 RX ADMIN — CLOPIDOGREL 75 MG: 75 TABLET ORAL at 08:03

## 2025-03-31 RX ADMIN — SODIUM CHLORIDE, POTASSIUM CHLORIDE, SODIUM LACTATE AND CALCIUM CHLORIDE: 600; 310; 30; 20 INJECTION, SOLUTION INTRAVENOUS at 09:03

## 2025-03-31 RX ADMIN — SODIUM CHLORIDE, POTASSIUM CHLORIDE, SODIUM LACTATE AND CALCIUM CHLORIDE: 600; 310; 30; 20 INJECTION, SOLUTION INTRAVENOUS at 11:03

## 2025-03-31 RX ADMIN — LEVOTHYROXINE SODIUM 137 MCG: 0.03 TABLET ORAL at 05:03

## 2025-03-31 RX ADMIN — MIDODRINE HYDROCHLORIDE 10 MG: 5 TABLET ORAL at 08:03

## 2025-03-31 RX ADMIN — ONDANSETRON 4 MG: 2 INJECTION INTRAMUSCULAR; INTRAVENOUS at 08:03

## 2025-03-31 RX ADMIN — FERROUS SULFATE TAB 325 MG (65 MG ELEMENTAL FE) 1 EACH: 325 (65 FE) TAB at 08:03

## 2025-03-31 NOTE — PROGRESS NOTES
[facility] Inpatient Progress Note      HPI:     Patient Name: Kam Reyes  Admission Date: 3/27/2025  Hospital Length of Stay: 4 days  Code Status: Full Code   Attending Physician: Tyron Doe MD   Primary Care Physician: Gregor Heaton MD  Principal Problem:<principal problem not specified>      Today patient seen and examined  Labs, recent events, imaging and medications reviewed for this hospital stay  Feels the same.  No dyspnea no nausea or vomiting    Review of Systems:   Good urinalysis from the nephrostomy site  No fever no dyspnea  Otherwise no change    Medications:   Scheduled Meds:   clopidogreL  75 mg Oral QHS    DULoxetine  60 mg Oral QHS    ferrous sulfate  1 tablet Oral Daily    levothyroxine  137 mcg Oral Before breakfast    midodrine  10 mg Oral TID WM    mupirocin   Nasal BID    piperacillin-tazobactam (Zosyn) IV (PEDS and ADULTS) (extended infusion is not appropriate)  4.5 g Intravenous Q12H    potassium chloride  40 mEq Oral Once    QUEtiapine  25 mg Oral QHS     Continuous Infusions:      Vitals:     Vitals:    03/30/25 2025 03/30/25 2354 03/31/25 0523 03/31/25 0754   BP: (!) 152/74 122/73 122/67 (!) 109/48   Pulse: (!) 50 (!) 57 60 (!) 56   Resp: 18  16 20   Temp: 98 °F (36.7 °C) 98 °F (36.7 °C) 98.1 °F (36.7 °C) 98 °F (36.7 °C)   TempSrc: Oral Oral Oral Oral   SpO2: 97% 97% 95% 98%   Weight:       Height:             I/O last 3 completed shifts:  In: 850 [P.O.:850]  Out: 2000 [Urine:625; Stool:1375]    Intake/Output Summary (Last 24 hours) at 3/31/2025 0852  Last data filed at 3/31/2025 0608  Gross per 24 hour   Intake 610 ml   Output 1575 ml   Net -965 ml       Physical Exam:   General: no acute distress, awake, alert, pale  Eyes: PERRLA, EOMI, conjunctiva clear, eyelids without swelling  HENT: atraumatic, oropharynx and nasal mucosa patent  Neck: full ROM, no JVD, no thyromegaly or lymphadenopathy  Respiratory: equal, unlabored, clear to auscultation A/P  Cardiovascular:  RRR without  rub; BL radial and pedal pulses felt  Edema: none  Gastrointestinal:  Ostomy  Genitourinary:  Right nephrostomy  Musculoskeletal: full ROM without limitation or discomfort  Integumentary: warm, dry; no rashes, wounds, or skin lesions  Neurological: oriented, appropriate, no acute deficits        Labs:     Chemistries:   Recent Labs   Lab 03/27/25  0829 03/28/25  0452 03/29/25  1405 03/30/25  0446 03/31/25  0420   *   < > 142 140 138   K 5.5*   < > 3.6 3.7 3.1*      < > 101 94* 92*   CO2 14*   < > 30 34* 33*   BUN 80.1*   < > 7.9* 10.2 13.6   CREATININE 12.71*   < > 2.41* 3.34* 4.56*   CALCIUM 8.2*   < > 8.0* 7.3* 6.7*   BILITOT 0.3  --   --  0.7 0.8   ALKPHOS 168*  --   --  111 101   ALT 11  --   --  7 6   AST 15  --   --  16 11   GLUCOSE 79*   < > 185* 94 77*   MG 1.70  --   --   --   --    PHOS  --   --   --  3.8 4.3    < > = values in this interval not displayed.        CBC/Anemia Labs: Coags:    Recent Labs   Lab 03/27/25  0829 03/28/25  0452 03/29/25  1405 03/30/25  0446 03/31/25  0420   WBC 28.93  28.93*   < > 14.39* 9.02 8.72   HGB 7.3*   < > 8.0* 7.3* 6.8*   HCT 22.1*   < > 25.8* 23.7* 21.6*      < > 286 295 226   .3*   < > 105.3* 105.8* 104.9*   RDW 12.7   < > 12.1 12.2 12.0   IRON 22*  --   --   --   --     < > = values in this interval not displayed.    Recent Labs   Lab 03/27/25  1402   INR 1.3          Impression:     Chronic obstructive uropathy  GHADA status post right nephrostomy  Baseline CKD 4  Uremia improved status post dialysis  Significant anemia  Plan:     No acute indication for dialysis  Transfuse with 2 units packed RBCs  DC Lokelma  1 dose KCl p.o.         Deanne Molina

## 2025-03-31 NOTE — PROGRESS NOTES
[facility] Hospital Medicine Progress Note        Chief Complaint: Inpatient Follow-up     HPI:   74-year-old male transferred from an outside facility secondary to complicated UTI involving a suprapubic catheter.  She originally presented for left lower quadrant abdominal pain.  She also has a past medical history of chronic kidney disease stage IV, colon cancer with ileostomy in place, CAD, and hypothyroidism.  She was transferred for Urology consultation as a CT of the abdomen revealed increasing bilateral hydro ureteral nephrosis despite bilateral ureteral stents.     In the emergency room she is noted to have some mild hypotension down to 91/42.  She has a significant leukocytosis of 15.02.  Anemia with a hemoglobin of 8.3.  Elevated potassium at 5.2 he has significant electrolyte abnormalities.  Her urinalysis showed many bacteria with pyuria and hematuria.She was started empirically on IV Rocephin and admitted to the hospitalist group.  She was evaluated by Nephrology.  They recommended hemodialysis due to worsening renal function and hyperkalemia.  Patient refused.  Palliative Care was consulted and patient wishes to remain full code and seek aggressive treatment.  She has not yet decided about hemodialysis.  Urology evaluated.  Recommending continuing antibiotics.  They were previously unable to placed retrograde stents due to poor visualization.  Recommended IR consultation for right antegrade stenting     After further conversations with patient and family patient was finally agreeable to hemodialysis.  Taken this morning and tolerated well.  She has also taken for antegrade right nephrostomy tube placement on 3/28.  Returned to the floor in stable condition.  Will follow up her labs tomorrow morning.  Blood cultures updated this morning.  Still growing Gram-negative rods with PCR positive for Enterobacterales.       Interval Hx:  Patient doing much better this morning.  She  is bright and alert.  Denies any nausea or pain.  Blood pressure better as well.  Actually getting a little high at times.  Her leukocytosis has resolved and afebrile.  She did not require hemodialysis yesterday.      Objective/physical exam:  General: In no acute distress, afebrile, right IJ TLC  Chest: Clear to auscultation bilaterally  Heart: RRR, +S1, S2, no appreciable murmur  Abdomen: Soft, nontender, BS +, right nephrostomy  MSK: Warm, no lower extremity edema, no clubbing or cyanosis  Neurologic: Alert and oriented x4, Cranial nerve II-XII intact, Strength 5/5 in all 4 extremities  VITAL SIGNS: 24 HRS MIN & MAX LAST   Temp  Min: 98 °F (36.7 °C)  Max: 98.6 °F (37 °C) 98.1 °F (36.7 °C)   BP  Min: 112/53  Max: 152/74 122/67   Pulse  Min: 50  Max: 72  60   Resp  Min: 16  Max: 18 16   SpO2  Min: 92 %  Max: 98 % 95 %       Recent Labs   Lab 03/29/25  1405 03/30/25  0446 03/31/25  0420   WBC 14.39* 9.02 8.72   RBC 2.45* 2.24* 2.06*   HGB 8.0* 7.3* 6.8*   HCT 25.8* 23.7* 21.6*   .3* 105.8* 104.9*   MCH 32.7* 32.6* 33.0*   MCHC 31.0* 30.8* 31.5*   RDW 12.1 12.2 12.0    295 226   MPV 9.7 10.0 10.2       Recent Labs   Lab 03/27/25  0829 03/28/25  0452 03/29/25  1405 03/30/25  0446 03/31/25  0420   *   < > 142 140 138   K 5.5*   < > 3.6 3.7 3.1*      < > 101 94* 92*   CO2 14*   < > 30 34* 33*   BUN 80.1*   < > 7.9* 10.2 13.6   CREATININE 12.71*   < > 2.41* 3.34* 4.56*   CALCIUM 8.2*   < > 8.0* 7.3* 6.7*   MG 1.70  --   --   --   --    ALBUMIN 2.4*  --   --  2.1* 2.0*   ALKPHOS 168*  --   --  111 101   ALT 11  --   --  7 6   AST 15  --   --  16 11   BILITOT 0.3  --   --  0.7 0.8    < > = values in this interval not displayed.          Microbiology Results (last 7 days)       ** No results found for the last 168 hours. **             Radiology:  IR Ultrasound Guidance  Narrative: PROCEDURE:  US and Fluoroscopy Guided Nephrostomy Tube Placement    CLINICAL HISTORY:  74-year-old female with  bilateral hydronephrosis and left ureteral stent    ANESTHESIA:  Level of sedation: Moderate sedation    Medications: 0.5 mg Versed IV; 25 mcg Fentanyl IV; 0 mg Ativan IV; other: None    Administration: Moderate sedation was administered during this procedure. Continuous monitoring of the patient's level of consciousness and physiological status was provided throughout the procedure by an independent trained observer under the direct supervision of the operating physician.    Duration of sedation: 16 minutes    Antibiotic prophylaxis: None.  Patient already being treated with Zosyn and Cipro.    PHYSICIANS:  Pollo Chacon MD    RADIATION DOSE:  Fluoroscopy time: 1.8 minutes    Radiation exposure dose: 13.7 mGy    Exposure images: 0    CONTRAST:  30 cc of Isovue 370    PROCEDURAL SUMMARY:  After informed consent was obtained, the patient was placed prone on the angiography table. The skin overlying the right kidney was then prepped and draped in the usual sterile fashion.    The skin and soft tissues were anesthetized using 1% lidocaine. Under real-time ultrasound guidance, a 25g needle was advanced into an inferior pole calyx of the selected kidney.  Once ultrasound imaging demonstrated the tip of the needle within the calyx, the stylet was removed and return of urine was confirmed.  Contrast was injected to opacify the renal collecting system.  A Mandril wire was then advanced through the needle into the collecting system.  The needle was removed and the coaxial dilator was advanced over the wire into the kidney.  The inner dilators and wire were removed and a Glidewire was advanced through the outer sheath of the dilator into kidney.  The sheath was then removed and an 8F nephrostomy drain was advanced over the wire and into the collecting system.  The wire was removed and the Lanse loop was formed.  The catheter was attached to the skin and a sterile dressing was applied.  The catheter was then attached to a  gravity drainage bag.    The patient tolerated the procedure well and experienced no immediate complications. The patient was then brought to the recovery room in good condition.  Impression: Technically successful Nephrostomy Tube placement.    Electronically signed by: Pollo Chacon  Date:    03/28/2025  Time:    15:54  IR Nephrostomy Tube Placement  Narrative: PROCEDURE:  US and Fluoroscopy Guided Nephrostomy Tube Placement    CLINICAL HISTORY:  74-year-old female with bilateral hydronephrosis and left ureteral stent    ANESTHESIA:  Level of sedation: Moderate sedation    Medications: 0.5 mg Versed IV; 25 mcg Fentanyl IV; 0 mg Ativan IV; other: None    Administration: Moderate sedation was administered during this procedure. Continuous monitoring of the patient's level of consciousness and physiological status was provided throughout the procedure by an independent trained observer under the direct supervision of the operating physician.    Duration of sedation: 16 minutes    Antibiotic prophylaxis: None.  Patient already being treated with Zosyn and Cipro.    PHYSICIANS:  Pollo Chacon MD    RADIATION DOSE:  Fluoroscopy time: 1.8 minutes    Radiation exposure dose: 13.7 mGy    Exposure images: 0    CONTRAST:  30 cc of Isovue 370    PROCEDURAL SUMMARY:  After informed consent was obtained, the patient was placed prone on the angiography table. The skin overlying the right kidney was then prepped and draped in the usual sterile fashion.    The skin and soft tissues were anesthetized using 1% lidocaine. Under real-time ultrasound guidance, a 25g needle was advanced into an inferior pole calyx of the selected kidney.  Once ultrasound imaging demonstrated the tip of the needle within the calyx, the stylet was removed and return of urine was confirmed.  Contrast was injected to opacify the renal collecting system.  A Mandril wire was then advanced through the needle into the collecting system.  The needle was removed  and the coaxial dilator was advanced over the wire into the kidney.  The inner dilators and wire were removed and a Glidewire was advanced through the outer sheath of the dilator into kidney.  The sheath was then removed and an 8F nephrostomy drain was advanced over the wire and into the collecting system.  The wire was removed and the Berger loop was formed.  The catheter was attached to the skin and a sterile dressing was applied.  The catheter was then attached to a gravity drainage bag.    The patient tolerated the procedure well and experienced no immediate complications. The patient was then brought to the recovery room in good condition.  Impression: Technically successful Nephrostomy Tube placement.    Electronically signed by: Pollo Chacon  Date:    03/28/2025  Time:    15:54        Medications:  Scheduled Meds:   clopidogreL  75 mg Oral QHS    DULoxetine  60 mg Oral QHS    ferrous sulfate  1 tablet Oral Daily    levothyroxine  137 mcg Oral Before breakfast    midodrine  10 mg Oral TID WM    mupirocin   Nasal BID    piperacillin-tazobactam (Zosyn) IV (PEDS and ADULTS) (extended infusion is not appropriate)  4.5 g Intravenous Q12H    QUEtiapine  25 mg Oral QHS    sodium bicarbonate  1,300 mg Oral BID    sodium zirconium cyclosilicate  10 g Oral TID     Continuous Infusions:   0.9% NaCl   Intravenous Continuous 75 mL/hr at 03/30/25 2356 New Bag at 03/30/25 2356     PRN Meds:.  Current Facility-Administered Medications:     0.9%  NaCl infusion (for blood administration), , Intravenous, Q24H PRN    acetaminophen, 650 mg, Oral, Q4H PRN    albumin human 25%, 25 g, Intravenous, Q20 Min PRN    ALPRAZolam, 0.25 mg, Oral, BID PRN    aluminum-magnesium hydroxide-simethicone, 30 mL, Oral, QID PRN    bisacodyL, 10 mg, Rectal, Daily PRN    dextrose 50%, 12.5 g, Intravenous, PRN    dextrose 50%, 25 g, Intravenous, PRN    glucagon (human recombinant), 1 mg, Intramuscular, PRN    glucose, 16 g, Oral, PRN    glucose, 24 g,  Oral, PRN    HYDROcodone-acetaminophen, 1 tablet, Oral, Q6H PRN    melatonin, 6 mg, Oral, Nightly PRN    morphine, 2 mg, Intravenous, Q4H PRN    ondansetron, 4 mg, Intravenous, Q6H PRN    polyethylene glycol, 17 g, Oral, BID PRN    promethazine, 12.5 mg, Oral, Q6H PRN    sodium chloride 0.9%, 10 mL, Intravenous, PRN    Nutrition:  Nutrition consulted. Most recent weight and BMI monitored-     Measurements:  Wt Readings from Last 1 Encounters:   03/27/25 72.6 kg (160 lb 0.9 oz)   Body mass index is 29.27 kg/m².    Patient has been screened and assessed by RD.    Malnutrition Type:  Context:    Level:      Malnutrition Characteristic Summary:       Interventions/Recommendations (treatment strategy):           Assessment/Plan:   Severe sepsis secondary to UTI  Acute on chronic renal failure, baseline stage IV   Hyperkalemia   Obstructive uropathy  Complicated UTI, POA   Metabolic acidosis  Anemia, suspect combined inflammatory and iron-deficiency  Hypotension with history of hypertension  Coronary artery disease  Hypothyroidism     Zosyn D4.  Enterobacterales growing in blood cultures from 3/26.  Awaiting sensitivities Need repeat blood cultures.  Leukocytosis has resolved and now afebrile.  Sepsis resolved    Overall clinical picture improving with stent and HD  Bicarbonate drip has been discontinued.  Now on normal saline.  Started on oral bicarbonate.  Acidosis has greatly improved.  Her creatinine did come up a little bit this morning to 4.56.  Potassium low at 3.1.  Will hold off on potassium replacement in case she ends up going for hemodialysis today.  Will follow up Nephrology recommendations.    Noted drop in her hemoglobin.  6.8 this morning.  Will transfuse 1 unit.  Nephrology also mentioned initiating Procrit.  S/p R nephrostomy 3/28.  Good UOP. Urology following.   Palliative care on board.  Patient remains full code at this time     Critical care diagnosis: critical anemia requiring blood  transfusion  Critical care interventions: hands on evaluation, review of labs/radiographs/records and discussions with family  Critical care time spent: >32 minutes        Tyron Doe MD   03/31/2025     All diagnosis and differential diagnosis have been reviewed; assessment and plan has been documented; I have personally reviewed the labs and test results that are presently available; I have reviewed the patients medication list; I have reviewed the consulting providers response and recommendations. I have reviewed or attempted to review medical records based upon their availability    All of the patient's questions have been  addressed and answered. Patient's is agreeable to the above stated plan. I will continue to monitor closely and make adjustments to medical management as needed.  _____________________________________________________________________

## 2025-03-31 NOTE — PROGRESS NOTES
"Patient Name: Kam Reyes   MRN: [id number]   Admission Date: 3/27/2025   Hospital Length of Stay: 4   Attending Provider: Tyron Doe MD   Consulting Provider: STARLA Main  Reason for Consult: Goals of Care  Primary Care Physician:  Gregor Heaton MD     Principal Problem: <principal problem not specified>     Patient information was obtained from patient and ER records.     Final diagnoses:  [N12] Pyelonephritis      Assessment/Plan:     I reviewed the patient and family's understanding of the seriousness of the illness and its expected prognosis. We discussed the patient's goals of care and treatment preferences. I clarified current code status, which is full code. I identified the surrogate decision maker to be her son, Pranay. I answered all questions and we formulated a plan including recommendations for symptom management and how to best achieve goals of care.       Patient is resting comfortably in bed.  She is receiving blood transfusion at this time.  Reviewed current clinical condition and treatment plan.  Per Nephrology no acute indication for dialysis today, which she is excited about.  She states dialysis was not as bad as I thought it would be.   And reports significant improvement in her anxiety related to HD.  Discussed that she has Xanax available p.r.n. for anxiety, which she reports did help symptoms some last week.  She will consider dialysis long-term if necessary, but is currently taking it day by day." Reviewed resuscitation measures at length last week and she elected for full resuscitation at that time.  Confirmed wishes again today for full code status. Discussed that if she elects not to continue dialysis we would need to revisit resuscitation status.  She verbalizes understanding and denies questions at this time.  States she discussed with her son over the weekend.  Offered support.  Encouraged to call with questions or concerns.  Palliative Medicine will continue " to follow.    Advance Care Planning     Date: 03/31/2025    [facility]  I engaged the patient in a voluntary conversation about advance care planning and we specifically addressed what the goals of care would be moving forward, in light of the patient's change in clinical status, specifically current condition.  We did specifically address the patient's likely prognosis, which is fair .  We explored the patient's values and preferences for future care.  The patient endorses that what is most important right now is to focus on remaining as independent as possible and curative/life-prolongation (regardless of treatment burdens)    Accordingly, we have decided that the best plan to meet the patient's goals includes continuing with treatment       Interval History:     No acute events overnight.  She underwent antegrade right nephrostomy tube placement on 03/28.  Tolerated dialysis over the weekend.      Active Ambulatory Problems     Diagnosis Date Noted    Anemia of chronic kidney failure, stage 4 (severe) 01/01/2023    GERD (gastroesophageal reflux disease) 08/12/2021    Ileostomy status 01/01/2023    Recurrent major depression 01/01/2023    Personal history of other malignant neoplasm of large intestine 01/01/2023    Hypothyroid 08/12/2021    Hypertension 01/26/2024    Vesicovaginal fistula 03/01/2021    Multiple drug resistant organism (MDRO) culture positive 04/17/2024    Metabolic acidosis 04/17/2024     Resolved Ambulatory Problems     Diagnosis Date Noted    Dyspnea 08/30/2022    Congenital hypothyroidism without goiter 01/01/2023    Anxiety disorder 08/12/2021    COVID-19 01/26/2024    Acute UTI 01/26/2024    Fracture of surgical neck of humerus 01/30/2024    Hypertensive chronic kidney disease w stg 1-4/unsp chr kdny 01/01/2023    Edema of left upper extremity 02/12/2024    Gastrointestinal hemorrhage with melena 02/20/2024    Altered awareness, transient 02/21/2024    Moderate protein-calorie malnutrition  2024    Acute kidney injury superimposed on chronic kidney disease 2024     Past Medical History:   Diagnosis Date    Cancer, colon     Chronic pain     Renal disorder     Thyroiditis, unspecified         Past Surgical History:   Procedure Laterality Date    ANGIOGRAM, AV SHUNT, WITH PERCUTANEOUS MECHANICAL THROMBECTOMY, ANGIOPLASTY, AND STENT INSERTION Left     BACK SURGERY       SECTION      CHOLECYSTECTOMY      COLON SURGERY      COLONOSCOPY N/A 2024    Procedure: COLONOSCOPY;  Surgeon: Ash Haley III, MD;  Location: [facility];  Service: Endoscopy;  Laterality: N/A;    COLONOSCOPY, WITH 1 OR MORE BIOPSIES N/A 2024    Procedure: COLONOSCOPY, WITH 1 OR MORE BIOPSIES;  Surgeon: Ash Haley III, MD;  Location: [facility];  Service: Endoscopy;  Laterality: N/A;  rectal biopsy; cold    EGD, WITH CLOSED BIOPSY N/A 2024    Procedure: EGD, WITH CLOSED BIOPSY;  Surgeon: Robson Tripp MD;  Location: [facility];  Service: Endoscopy;  Laterality: N/A;  A) BX DUODENUM, B) BX ANTRUM FOR H.PYLORI, C) BX GE JUNCTION    ESOPHAGOGASTRODUODENOSCOPY N/A 2024    Procedure: EGD (ESOPHAGOGASTRODUODENOSCOPY);  Surgeon: Robson Tripp MD;  Location: [facility];  Service: Endoscopy;  Laterality: N/A;  A) DIFFUSE GASTRITIS OF ANTRUM & STOMACH    HYSTERECTOMY      KIDNEY SURGERY          Review of patient's allergies indicates:   Allergen Reactions    Oxaprozin Nausea And Vomiting and Swelling     Facial swelling      Sulfamethoxazole-trimethoprim Nausea And Vomiting     Severe nausea and vomiting    Doxycycline     Nsaids (non-steroidal anti-inflammatory drug) Rash    Sulfa (sulfonamide antibiotics) Nausea And Vomiting        Current Medications[1]       Current Facility-Administered Medications:     0.9%  NaCl infusion (for blood administration), , Intravenous, Q24H PRN    0.9%  NaCl infusion (for blood administration), , Intravenous, Q24H PRN    0.9%  NaCl infusion (for  "blood administration), , Intravenous, Q24H PRN    acetaminophen, 650 mg, Oral, Q4H PRN    albumin human 25%, 25 g, Intravenous, Q20 Min PRN    ALPRAZolam, 0.25 mg, Oral, BID PRN    aluminum-magnesium hydroxide-simethicone, 30 mL, Oral, QID PRN    bisacodyL, 10 mg, Rectal, Daily PRN    dextrose 50%, 12.5 g, Intravenous, PRN    dextrose 50%, 25 g, Intravenous, PRN    glucagon (human recombinant), 1 mg, Intramuscular, PRN    glucose, 16 g, Oral, PRN    glucose, 24 g, Oral, PRN    HYDROcodone-acetaminophen, 1 tablet, Oral, Q6H PRN    melatonin, 6 mg, Oral, Nightly PRN    morphine, 2 mg, Intravenous, Q4H PRN    ondansetron, 4 mg, Intravenous, Q6H PRN    polyethylene glycol, 17 g, Oral, BID PRN    promethazine, 12.5 mg, Oral, Q6H PRN    sodium chloride 0.9%, 10 mL, Intravenous, PRN     Family History   Problem Relation Name Age of Onset    Hypertension Mother            Review of Systems   Constitutional: Negative.    HENT: Negative.     Respiratory: Negative.     Cardiovascular: Negative.    Gastrointestinal: Negative.    Genitourinary: Negative.    Musculoskeletal: Negative.    Neurological: Negative.             Objective:   BP (!) 134/53   Pulse (!) 52   Temp 97.9 °F (36.6 °C) (Oral)   Resp 16   Ht 5' 2" (1.575 m)   Wt 72.6 kg (160 lb 0.9 oz)   SpO2 99%   BMI 29.27 kg/m²      Physical Exam   Constitutional: She is oriented to person, place, and time. No distress. She appears ill.   HENT:   Head: Normocephalic.   Mouth/Throat: Mucous membranes are moist.   Cardiovascular: Normal rate. Pulmonary:      Effort: Pulmonary effort is normal. No respiratory distress.     Abdominal: She exhibits no distension.   Musculoskeletal:      Cervical back: Normal range of motion.      Right lower leg: No edema.      Left lower leg: No edema.   Neurological: She is alert and oriented to person, place, and time.   Skin: Skin is warm and dry.   Psychiatric:   withdrawn          Review of Symptoms  Review of " Symptoms        Psychosocial/Cultural:   See Palliative Psychosocial Note: Yes  **Primary  to Follow**  Palliative Care  Consult: No      Advance Care Planning   Advance Directives:     Decision Making:  Patient answered questions  Goals of Care: What is most important right now is to focus on remaining as independent as possible, curative/life-prolongation (regardless of treatment burdens). Accordingly, we have decided that the best plan to meet the patient's goals includes continuing with treatment.          PAINAD: NA    Caregiver burden formerly assessed: Yes    > 50% of 25 min of encounter was spent in chart review, face to face discussion of goals of care, symptom assessment, coordination of care and emotional support.    STARLA Main-BC  Palliative Medicine  [facility]         [1]   Current Facility-Administered Medications:     0.9%  NaCl infusion (for blood administration), , Intravenous, Q24H PRN, Deanne Molina MD    0.9%  NaCl infusion (for blood administration), , Intravenous, Q24H PRN, Tyron Doe MD    0.9%  NaCl infusion (for blood administration), , Intravenous, Q24H PRN, Deanne Molina MD    acetaminophen tablet 650 mg, 650 mg, Oral, Q4H PRN, Ricardo Jacksonyssa, FNP, 650 mg at 03/28/25 1322    albumin human 25% bottle 25 g, 25 g, Intravenous, Q20 Min PRN, Deanne Molina MD    ALPRAZolam tablet 0.25 mg, 0.25 mg, Oral, BID PRN, Jailene Powell NP, 0.25 mg at 03/29/25 0826    aluminum-magnesium hydroxide-simethicone 200-200-20 mg/5 mL suspension 30 mL, 30 mL, Oral, QID PRN, Ricardo Jacksonyssa, FNP    bisacodyL suppository 10 mg, 10 mg, Rectal, Daily PRN, Sanam Jackson FNP    clopidogreL tablet 75 mg, 75 mg, Oral, QHS, Tyron Doe MD, 75 mg at 03/30/25 2113    dextrose 50% injection 12.5 g, 12.5 g, Intravenous, PRN, Renetta Sanam, FNP, 12.5 g at 03/28/25 0631    dextrose 50% injection 25 g, 25 g, Intravenous, PRN, Sanam Jackson, FNP     DULoxetine DR capsule 60 mg, 60 mg, Oral, QHS, Tyron Doe MD, 60 mg at 03/30/25 2112    ferrous sulfate tablet 1 each, 1 tablet, Oral, Daily, Tyron Doe MD, 1 each at 03/31/25 0853    glucagon (human recombinant) injection 1 mg, 1 mg, Intramuscular, PRN, Ricardo Jacksonyssa, FNP    glucose chewable tablet 16 g, 16 g, Oral, PRN, Renetta, Sanam, FNP    glucose chewable tablet 24 g, 24 g, Oral, PRN, Renetta Sanam, FNP    HYDROcodone-acetaminophen  mg per tablet 1 tablet, 1 tablet, Oral, Q6H PRN, Tyron Doe MD, 1 tablet at 03/29/25 0057    lactated ringers infusion, , Intravenous, Continuous, Deanne Molina MD, Last Rate: 75 mL/hr at 03/31/25 0907, New Bag at 03/31/25 0907    levothyroxine tablet 137 mcg, 137 mcg, Oral, Before breakfast, Tyron Doe MD, 137 mcg at 03/31/25 0516    melatonin tablet 6 mg, 6 mg, Oral, Nightly PRN, Sanam Jackson, POOJAP    midodrine tablet 10 mg, 10 mg, Oral, TID WM, Deanne Molina MD, 10 mg at 03/31/25 0853    morphine injection 2 mg, 2 mg, Intravenous, Q4H PRN, Tyron Doe MD    mupirocin 2 % ointment, , Nasal, BID, Tyron Doe MD, Given at 03/31/25 0854    ondansetron injection 4 mg, 4 mg, Intravenous, Q6H PRN, Tyron Doe MD, 4 mg at 03/30/25 1114    piperacillin-tazobactam (ZOSYN) 4.5 g in D5W 100 mL IVPB (MB+), 4.5 g, Intravenous, Q12H, Ricardo Jacksonyssa, FNP, Stopped at 03/31/25 0916    polyethylene glycol packet 17 g, 17 g, Oral, BID PRN, Ricardo Jacksonyssa, FNP    promethazine tablet 12.5 mg, 12.5 mg, Oral, Q6H PRN, Tyron Doe MD    QUEtiapine tablet 25 mg, 25 mg, Oral, QHS, Tyron Doe MD, 25 mg at 03/30/25 2112    sodium chloride 0.9% flush 10 mL, 10 mL, Intravenous, PRN, Sanam Jackson, FNP

## 2025-03-31 NOTE — PROGRESS NOTES
UROLOGY  PROGRESS  NOTE    Saulonicole GALLEGOS Amy 1950  [id number]  3/31/2025    Patient resting in bed   Feeling much better today   No complaints during rounds   No acute events overnight    VSS, afebrile   350 mL right nephrostomy overnight   75 mL SP tube overnight   WBC 8.72   H&H 6.8/21.6   BUN/creatinine 13.6/4.56    Exam:    GEN: NAD  CV: RRR  RESP: Even and unlabored  ABD: soft, NTND  : clear yellow urine draining from PCN, mucous yellow urine draining to  bag from SP site.   NEURO:AAOx4      Recent Results (from the past 24 hours)   Comprehensive Metabolic Panel    Collection Time: 03/31/25  4:20 AM   Result Value Ref Range    Sodium 138 136 - 145 mmol/L    Potassium 3.1 (L) 3.5 - 5.1 mmol/L    Chloride 92 (L) 98 - 107 mmol/L    CO2 33 (H) 23 - 31 mmol/L    Glucose 77 (L) 82 - 115 mg/dL    Blood Urea Nitrogen 13.6 9.8 - 20.1 mg/dL    Creatinine 4.56 (H) 0.55 - 1.02 mg/dL    Calcium 6.7 (LL) 8.4 - 10.2 mg/dL    Protein Total 5.6 (L) 5.8 - 7.6 gm/dL    Albumin 2.0 (L) 3.4 - 4.8 g/dL    Globulin 3.6 (H) 2.4 - 3.5 gm/dL    Albumin/Globulin Ratio 0.6 (L) 1.1 - 2.0 ratio    Bilirubin Total 0.8 <=1.5 mg/dL     40 - 150 unit/L    ALT 6 0 - 55 unit/L    AST 11 11 - 45 unit/L    eGFR 10 mL/min/1.73/m2    Anion Gap 13.0 mEq/L    BUN/Creatinine Ratio 3    Phosphorus    Collection Time: 03/31/25  4:20 AM   Result Value Ref Range    Phosphorus Level 4.3 2.3 - 4.7 mg/dL   PTH, Intact    Collection Time: 03/31/25  4:20 AM   Result Value Ref Range    PARATHYROID HORMONE INTACT 500.3 (H) 8.7 - 77.0 pg/mL   CBC with Differential    Collection Time: 03/31/25  4:20 AM   Result Value Ref Range    WBC 8.72 4.50 - 11.50 x10(3)/mcL    RBC 2.06 (L) 4.20 - 5.40 x10(6)/mcL    Hgb 6.8 (L) 12.0 - 16.0 g/dL    Hct 21.6 (L) 37.0 - 47.0 %    .9 (H) 80.0 - 94.0 fL    MCH 33.0 (H) 27.0 - 31.0 pg    MCHC 31.5 (L) 33.0 - 36.0 g/dL    RDW 12.0 11.5 - 17.0 %    Platelet 226 130 - 400 x10(3)/mcL    MPV 10.2 7.4 - 10.4 fL    Neut  % 74.8 %    Lymph % 15.9 %    Mono % 7.2 %    Eos % 1.0 %    Basophil % 0.2 %    Imm Grans % 0.9 %    Neut # 6.51 2.1 - 9.2 x10(3)/mcL    Lymph # 1.39 0.6 - 4.6 x10(3)/mcL    Mono # 0.63 0.1 - 1.3 x10(3)/mcL    Eos # 0.09 0 - 0.9 x10(3)/mcL    Baso # 0.02 <=0.2 x10(3)/mcL    Imm Gran # 0.08 (H) 0.00 - 0.04 x10(3)/mcL    NRBC% 0.0 %   Occult Blood Stool, CA Screen    Collection Time: 03/31/25  2:23 PM   Result Value Ref Range    Stool Color 1 Green     Stool Consistancy 1 liquid     Occult Blood Stool 1 Negative Negative       Assessment:  74-year-old female with a history of cervical cancer status post pelvic radiation 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  PMH also includes CKD and colon cancer with ileostomy. She presented to [facility] facility with left lower quadrant pain - CT abdomen and pelvis reveals increasing bilateral hydroureteronephrosis and pelvicaliectasis with left double-J stent in appropriate position and removal of right ureteral stent since imaging in February; bilateral perinephric stranding, left periureteral stranding; SP tube in the appropriate position.   -acute renal failure with hyperkalemia and acidosis - initiated on hemodialysis this day, last dialyzed 3/29  -blood cultures with Gram-negative rods, urine culture with multiple organisms - on Zosyn  -IR placed right PCN 3/28      Plan:  -continue antibiotics, tailor according to final C&S results  -continue PCN, SP tube to drainage, accurate I&O  -status post transfusion   -HD per Nephrology      Tala Cherry NP

## 2025-04-01 LAB
ALBUMIN SERPL-MCNC: 2.1 G/DL (ref 3.4–4.8)
ALBUMIN/GLOB SERPL: 0.4 RATIO (ref 1.1–2)
ALP SERPL-CCNC: 116 UNIT/L (ref 40–150)
ALT SERPL-CCNC: 8 UNIT/L (ref 0–55)
ANION GAP SERPL CALC-SCNC: 16 MEQ/L
AST SERPL-CCNC: 18 UNIT/L (ref 11–45)
BASOPHILS # BLD AUTO: 0.05 X10(3)/MCL
BASOPHILS NFR BLD AUTO: 0.4 %
BILIRUB SERPL-MCNC: 1.4 MG/DL
BUN SERPL-MCNC: 20.1 MG/DL (ref 9.8–20.1)
CALCIUM SERPL-MCNC: 7.9 MG/DL (ref 8.4–10.2)
CHLORIDE SERPL-SCNC: 98 MMOL/L (ref 98–107)
CO2 SERPL-SCNC: 25 MMOL/L (ref 23–31)
COLOR STL: YELLOW
COLOR STL: YELLOW
CONSISTENCY STL: NORMAL
CONSISTENCY STL: NORMAL
CREAT SERPL-MCNC: 5.36 MG/DL (ref 0.55–1.02)
CREAT/UREA NIT SERPL: 4
EOSINOPHIL # BLD AUTO: 0.17 X10(3)/MCL (ref 0–0.9)
EOSINOPHIL NFR BLD AUTO: 1.4 %
ERYTHROCYTE [DISTWIDTH] IN BLOOD BY AUTOMATED COUNT: 15.5 % (ref 11.5–17)
GFR SERPLBLD CREATININE-BSD FMLA CKD-EPI: 8 ML/MIN/1.73/M2
GLOBULIN SER-MCNC: 5.4 GM/DL (ref 2.4–3.5)
GLUCOSE SERPL-MCNC: 75 MG/DL (ref 82–115)
HCT VFR BLD AUTO: 33.1 % (ref 37–47)
HEMOCCULT SP2 STL QL: NEGATIVE
HEMOCCULT SP3 STL QL: NEGATIVE
HGB BLD-MCNC: 10.9 G/DL (ref 12–16)
IMM GRANULOCYTES # BLD AUTO: 0.15 X10(3)/MCL (ref 0–0.04)
IMM GRANULOCYTES NFR BLD AUTO: 1.2 %
LYMPHOCYTES # BLD AUTO: 1.44 X10(3)/MCL (ref 0.6–4.6)
LYMPHOCYTES NFR BLD AUTO: 11.8 %
MCH RBC QN AUTO: 32.5 PG (ref 27–31)
MCHC RBC AUTO-ENTMCNC: 32.9 G/DL (ref 33–36)
MCV RBC AUTO: 98.8 FL (ref 80–94)
MONOCYTES # BLD AUTO: 0.62 X10(3)/MCL (ref 0.1–1.3)
MONOCYTES NFR BLD AUTO: 5.1 %
NEUTROPHILS # BLD AUTO: 9.74 X10(3)/MCL (ref 2.1–9.2)
NEUTROPHILS NFR BLD AUTO: 80.1 %
NRBC BLD AUTO-RTO: 0 %
PLATELET # BLD AUTO: 241 X10(3)/MCL (ref 130–400)
PMV BLD AUTO: 10.3 FL (ref 7.4–10.4)
POTASSIUM SERPL-SCNC: 3.9 MMOL/L (ref 3.5–5.1)
PROT SERPL-MCNC: 7.5 GM/DL (ref 5.8–7.6)
RBC # BLD AUTO: 3.35 X10(6)/MCL (ref 4.2–5.4)
SODIUM SERPL-SCNC: 139 MMOL/L (ref 136–145)
WBC # BLD AUTO: 12.17 X10(3)/MCL (ref 4.5–11.5)

## 2025-04-01 PROCEDURE — 63600175 PHARM REV CODE 636 W HCPCS: Performed by: INTERNAL MEDICINE

## 2025-04-01 PROCEDURE — 63600175 PHARM REV CODE 636 W HCPCS

## 2025-04-01 PROCEDURE — 85025 COMPLETE CBC W/AUTO DIFF WBC: CPT | Performed by: HOSPITALIST

## 2025-04-01 PROCEDURE — 25000003 PHARM REV CODE 250: Performed by: HOSPITALIST

## 2025-04-01 PROCEDURE — 25000003 PHARM REV CODE 250: Performed by: INTERNAL MEDICINE

## 2025-04-01 PROCEDURE — 25000003 PHARM REV CODE 250

## 2025-04-01 PROCEDURE — 63600175 PHARM REV CODE 636 W HCPCS: Performed by: NURSE PRACTITIONER

## 2025-04-01 PROCEDURE — 80053 COMPREHEN METABOLIC PANEL: CPT | Performed by: INTERNAL MEDICINE

## 2025-04-01 PROCEDURE — 11000001 HC ACUTE MED/SURG PRIVATE ROOM

## 2025-04-01 PROCEDURE — 99232 SBSQ HOSP IP/OBS MODERATE 35: CPT | Mod: ,,, | Performed by: INTERNAL MEDICINE

## 2025-04-01 PROCEDURE — 36415 COLL VENOUS BLD VENIPUNCTURE: CPT | Performed by: INTERNAL MEDICINE

## 2025-04-01 RX ADMIN — PIPERACILLIN SODIUM AND TAZOBACTAM SODIUM 4.5 G: 4; .5 INJECTION, POWDER, LYOPHILIZED, FOR SOLUTION INTRAVENOUS at 05:04

## 2025-04-01 RX ADMIN — SODIUM CHLORIDE, POTASSIUM CHLORIDE, SODIUM LACTATE AND CALCIUM CHLORIDE: 600; 310; 30; 20 INJECTION, SOLUTION INTRAVENOUS at 12:04

## 2025-04-01 RX ADMIN — MIDODRINE HYDROCHLORIDE 10 MG: 5 TABLET ORAL at 08:04

## 2025-04-01 RX ADMIN — QUETIAPINE FUMARATE 25 MG: 25 TABLET ORAL at 09:04

## 2025-04-01 RX ADMIN — MUPIROCIN: 20 OINTMENT TOPICAL at 09:04

## 2025-04-01 RX ADMIN — HYDROCODONE BITARTRATE AND ACETAMINOPHEN 1 TABLET: 10; 325 TABLET ORAL at 02:04

## 2025-04-01 RX ADMIN — SODIUM CHLORIDE, POTASSIUM CHLORIDE, SODIUM LACTATE AND CALCIUM CHLORIDE: 600; 310; 30; 20 INJECTION, SOLUTION INTRAVENOUS at 09:04

## 2025-04-01 RX ADMIN — MIDODRINE HYDROCHLORIDE 10 MG: 5 TABLET ORAL at 12:04

## 2025-04-01 RX ADMIN — LEVOTHYROXINE SODIUM 137 MCG: 0.03 TABLET ORAL at 05:04

## 2025-04-01 RX ADMIN — CLOPIDOGREL 75 MG: 75 TABLET ORAL at 09:04

## 2025-04-01 RX ADMIN — MIDODRINE HYDROCHLORIDE 10 MG: 5 TABLET ORAL at 05:04

## 2025-04-01 RX ADMIN — DULOXETINE HYDROCHLORIDE 60 MG: 30 CAPSULE, DELAYED RELEASE ORAL at 09:04

## 2025-04-01 RX ADMIN — FERROUS SULFATE TAB 325 MG (65 MG ELEMENTAL FE) 1 EACH: 325 (65 FE) TAB at 09:04

## 2025-04-01 NOTE — CONSULTS
The patient is in good mood. She only complained of minor headache.   I prayed and gave gauri blessing   No Vaccines Administered.

## 2025-04-01 NOTE — PROGRESS NOTES
[facility] Hospital Medicine Progress Note        Chief Complaint: Inpatient Follow-up     HPI:   74-year-old male transferred from an outside facility secondary to complicated UTI involving a suprapubic catheter.  She originally presented for left lower quadrant abdominal pain.  She also has a past medical history of chronic kidney disease stage IV, colon cancer with ileostomy in place, CAD, and hypothyroidism.  She was transferred for Urology consultation as a CT of the abdomen revealed increasing bilateral hydro ureteral nephrosis despite bilateral ureteral stents.     In the emergency room she is noted to have some mild hypotension down to 91/42.  She has a significant leukocytosis of 15.02.  Anemia with a hemoglobin of 8.3.  Elevated potassium at 5.2 he has significant electrolyte abnormalities.  Her urinalysis showed many bacteria with pyuria and hematuria.She was started empirically on IV Rocephin and admitted to the hospitalist group.  She was evaluated by Nephrology.  They recommended hemodialysis due to worsening renal function and hyperkalemia.  Patient refused.  Palliative Care was consulted and patient wishes to remain full code and seek aggressive treatment.  She has not yet decided about hemodialysis.  Urology evaluated.  Recommending continuing antibiotics.  They were previously unable to placed retrograde stents due to poor visualization.  Recommended IR consultation for right antegrade stenting     After further conversations with patient and family patient was finally agreeable to hemodialysis.  Taken this morning and tolerated well.  She has also taken for antegrade right nephrostomy tube placement on 3/28.  Returned to the floor in stable condition.  Will follow up her labs tomorrow morning.  Blood cultures updated this morning.  Still growing Gram-negative rods with PCR positive for Enterobacterales.  She is trend fused 2 units of packed red blood cells on 3/31  due to continued anemia.  Likely some bone marrow suppression in the setting of renal disease and severe sepsis.     Interval Hx:  Doing well this morning.  No current complaints.  Vital stable overnight.  Got her blood yesterday.  No dialysis     Objective/physical exam:  General: In no acute distress, afebrile, right IJ TLC  Chest: Clear to auscultation bilaterally  Heart: RRR, +S1, S2, no appreciable murmur  Abdomen: Soft, nontender, BS +, right nephrostomy  MSK: Warm, no lower extremity edema, no clubbing or cyanosis  Neurologic: Alert and oriented x4, Cranial nerve II-XII intact, Strength 5/5 in all 4 extremities    VITAL SIGNS: 24 HRS MIN & MAX LAST   Temp  Min: 97.7 °F (36.5 °C)  Max: 98.5 °F (36.9 °C) 98.5 °F (36.9 °C)   BP  Min: 109/48  Max: 154/74 116/83   Pulse  Min: 52  Max: 58  (!) 57   Resp  Min: 16  Max: 20 16   SpO2  Min: 95 %  Max: 99 % 97 %       Recent Labs   Lab 03/30/25  0446 03/31/25  0420 04/01/25  0507   WBC 9.02 8.72 12.17*   RBC 2.24* 2.06* 3.35*   HGB 7.3* 6.8* 10.9*   HCT 23.7* 21.6* 33.1*   .8* 104.9* 98.8*   MCH 32.6* 33.0* 32.5*   MCHC 30.8* 31.5* 32.9*   RDW 12.2 12.0 15.5    226 241   MPV 10.0 10.2 10.3       Recent Labs   Lab 03/27/25  0829 03/28/25  0452 03/30/25  0446 03/31/25  0420 04/01/25  0507   *   < > 140 138 139   K 5.5*   < > 3.7 3.1* 3.9      < > 94* 92* 98   CO2 14*   < > 34* 33* 25   BUN 80.1*   < > 10.2 13.6 20.1   CREATININE 12.71*   < > 3.34* 4.56* 5.36*   CALCIUM 8.2*   < > 7.3* 6.7* 7.9*   MG 1.70  --   --   --   --    ALBUMIN 2.4*  --  2.1* 2.0* 2.1*   ALKPHOS 168*  --  111 101 116   ALT 11  --  7 6 8   AST 15  --  16 11 18   BILITOT 0.3  --  0.7 0.8 1.4    < > = values in this interval not displayed.          Microbiology Results (last 7 days)       Procedure Component Value Units Date/Time    Blood Culture [[id number]] Collected: 03/31/25 0707    Order Status: Resulted Specimen: Blood from Hand, Right Updated: 03/31/25 0712    Blood  Culture [[id number]] Collected: 03/31/25 0637    Order Status: Resulted Specimen: Blood from Arm, Right Updated: 03/31/25 0640             Radiology:  X-Ray Chest 1 View  START OF REPORT:  Technique: 1 portable AP view of the chest was performed.    Comparison: Comparison is with prior study dated 2025-03-27 20:20:33.    Clinical History: SOB.    Findings:  Soft Tissues: Unremarkable.  Lines and Tubes: A right internal jugular, catheter is present with its tip in the right atrium in stable position approximately 4 cm below the right atrial SVC junction.  Mediastinum: The cardiomediastinal silhouette is stable after accounting for patient position, technique, and patient body habitus.  Lungs: Compared to the prior study there appears to be some increased reticular opacity throughout the lungs without focal consolidation or definitive discrete infiltrate identified.  Pleura: No pneumothorax or effusions are identified.  Bony Structures: The visualized bony structures appear unremarkable.    Impression:  1. A right internal jugular, catheter is present with its tip in the right atrium in stable position approximately 4 cm below the right atrial SVC junction.  2. Compared to the prior study there appears to be some increased reticular opacity throughout the lungs without focal consolidation or definitive discrete infiltrate identified. Correlate with clinical and laboratory findings as regards additional evaluation and follow-up.  3. Details and other findings as noted above.        Medications:  Scheduled Meds:   clopidogreL  75 mg Oral QHS    DULoxetine  60 mg Oral QHS    ferrous sulfate  1 tablet Oral Daily    levothyroxine  137 mcg Oral Before breakfast    midodrine  10 mg Oral TID WM    mupirocin   Nasal BID    piperacillin-tazobactam (Zosyn) IV (PEDS and ADULTS) (extended infusion is not appropriate)  4.5 g Intravenous Q12H    QUEtiapine  25 mg Oral QHS     Continuous Infusions:   lactated ringers   Intravenous  Continuous 75 mL/hr at 03/31/25 2344 New Bag at 03/31/25 2344     PRN Meds:.  Current Facility-Administered Medications:     0.9%  NaCl infusion (for blood administration), , Intravenous, Q24H PRN    0.9%  NaCl infusion (for blood administration), , Intravenous, Q24H PRN    0.9%  NaCl infusion (for blood administration), , Intravenous, Q24H PRN    acetaminophen, 650 mg, Oral, Q4H PRN    albumin human 25%, 25 g, Intravenous, Q20 Min PRN    ALPRAZolam, 0.25 mg, Oral, BID PRN    aluminum-magnesium hydroxide-simethicone, 30 mL, Oral, QID PRN    bisacodyL, 10 mg, Rectal, Daily PRN    dextrose 50%, 12.5 g, Intravenous, PRN    dextrose 50%, 25 g, Intravenous, PRN    glucagon (human recombinant), 1 mg, Intramuscular, PRN    glucose, 16 g, Oral, PRN    glucose, 24 g, Oral, PRN    HYDROcodone-acetaminophen, 1 tablet, Oral, Q6H PRN    melatonin, 6 mg, Oral, Nightly PRN    morphine, 2 mg, Intravenous, Q4H PRN    ondansetron, 4 mg, Intravenous, Q6H PRN    polyethylene glycol, 17 g, Oral, BID PRN    promethazine, 12.5 mg, Oral, Q6H PRN    sodium chloride 0.9%, 10 mL, Intravenous, PRN    Nutrition:  Nutrition consulted. Most recent weight and BMI monitored-     Measurements:  Wt Readings from Last 1 Encounters:   03/27/25 72.6 kg (160 lb 0.9 oz)   Body mass index is 29.27 kg/m².    Patient has been screened and assessed by RD.    Malnutrition Type:  Context:    Level:      Malnutrition Characteristic Summary:       Interventions/Recommendations (treatment strategy):           Assessment/Plan:   Severe sepsis secondary to UTI  Acute on chronic renal failure, baseline stage IV   Hyperkalemia   Obstructive uropathy  Complicated UTI, POA   Metabolic acidosis  Anemia, suspect combined inflammatory and iron-deficiency  Hypotension with history of hypertension  Coronary artery disease  Hypothyroidism    Zosyn day 5.  Still waiting on Enterobacterales sensitivities.  Repeat blood cultures sent yesterday as well.  We would like to  deescalate when possible potentially to oral.  She did have a slight bump in her white count this morning.  Possibly reactive from transfusion yesterday.    Get an appropriate increase in her hemoglobin after transfusing 2 units packed red blood cells yesterday.  10.9 this morning.    Electrolytes look great.  Lokelma discontinued due to hypokalemia yesterday.  Unfortunately her creatinine continues to creep up.  Nephrology following and dialyzing PRN.  No signs of uremia, acidosis, or volume overload this morning.  She does have good urine output.  Out 2.5 L yesterday  Urology continues to follow.  Nephrostomy tube is functioning well.  No new recommendations.    Palliative care's on board as well    Critical care diagnosis: sepsis, iv antibiotics  Critical care interventions: hands on evaluation, review of labs/radiographs/records and discussions with family  Critical care time spent: >32 minutes        Tyron Doe MD   04/01/2025     All diagnosis and differential diagnosis have been reviewed; assessment and plan has been documented; I have personally reviewed the labs and test results that are presently available; I have reviewed the patients medication list; I have reviewed the consulting providers response and recommendations. I have reviewed or attempted to review medical records based upon their availability    All of the patient's questions have been  addressed and answered. Patient's is agreeable to the above stated plan. I will continue to monitor closely and make adjustments to medical management as needed.  _____________________________________________________________________

## 2025-04-01 NOTE — PROGRESS NOTES
UROLOGY  PROGRESS  NOTE    Kam GALLEGOS Amy 1950  [id number]  4/1/2025    Patient resting in bed   No complaints during rounds   No acute events overnight    VSS, afebrile   125 mL right nephrostomy overnight   500 mL SP tube overnight   WBC 12.17   H&H 10.9/33.1   BUN/creatinine 20.1/5.36    Exam:    GEN: NAD  CV: RRR  RESP: Even and unlabored  ABD: soft, NTND  : clear yellow urine draining from PCN, mucous yellow urine draining to  bag from SP site.   NEURO:AAOx4      Recent Results (from the past 24 hours)   Occult Blood Stool, CA Screen    Collection Time: 03/31/25  2:23 PM   Result Value Ref Range    Stool Color 1 Green     Stool Consistancy 1 liquid     Occult Blood Stool 1 Negative Negative   OCCULT BLOOD STOOL, CA SCREEN 2ND SPEC    Collection Time: 03/31/25 11:45 PM   Result Value Ref Range    Stool Color 2 Yellow     Stool Consistancy 2 liquid     Occult Blood Stool 2 Negative Negative, N/A   OCCULT BLOOD STOOL, CA SCREEN 3RD SPEC    Collection Time: 04/01/25 12:29 AM   Result Value Ref Range    Stool Color 3 Yellow     Stool Consistancy 3 liquid     Occult Blood Stool 3 Negative Negative, N/A   Comprehensive Metabolic Panel    Collection Time: 04/01/25  5:07 AM   Result Value Ref Range    Sodium 139 136 - 145 mmol/L    Potassium 3.9 3.5 - 5.1 mmol/L    Chloride 98 98 - 107 mmol/L    CO2 25 23 - 31 mmol/L    Glucose 75 (L) 82 - 115 mg/dL    Blood Urea Nitrogen 20.1 9.8 - 20.1 mg/dL    Creatinine 5.36 (H) 0.55 - 1.02 mg/dL    Calcium 7.9 (L) 8.4 - 10.2 mg/dL    Protein Total 7.5 5.8 - 7.6 gm/dL    Albumin 2.1 (L) 3.4 - 4.8 g/dL    Globulin 5.4 (H) 2.4 - 3.5 gm/dL    Albumin/Globulin Ratio 0.4 (L) 1.1 - 2.0 ratio    Bilirubin Total 1.4 <=1.5 mg/dL     40 - 150 unit/L    ALT 8 0 - 55 unit/L    AST 18 11 - 45 unit/L    eGFR 8 mL/min/1.73/m2    Anion Gap 16.0 mEq/L    BUN/Creatinine Ratio 4    CBC with Differential    Collection Time: 04/01/25  5:07 AM   Result Value Ref Range    WBC 12.17 (H)  4.50 - 11.50 x10(3)/mcL    RBC 3.35 (L) 4.20 - 5.40 x10(6)/mcL    Hgb 10.9 (L) 12.0 - 16.0 g/dL    Hct 33.1 (L) 37.0 - 47.0 %    MCV 98.8 (H) 80.0 - 94.0 fL    MCH 32.5 (H) 27.0 - 31.0 pg    MCHC 32.9 (L) 33.0 - 36.0 g/dL    RDW 15.5 11.5 - 17.0 %    Platelet 241 130 - 400 x10(3)/mcL    MPV 10.3 7.4 - 10.4 fL    Neut % 80.1 %    Lymph % 11.8 %    Mono % 5.1 %    Eos % 1.4 %    Basophil % 0.4 %    Imm Grans % 1.2 %    Neut # 9.74 (H) 2.1 - 9.2 x10(3)/mcL    Lymph # 1.44 0.6 - 4.6 x10(3)/mcL    Mono # 0.62 0.1 - 1.3 x10(3)/mcL    Eos # 0.17 0 - 0.9 x10(3)/mcL    Baso # 0.05 <=0.2 x10(3)/mcL    Imm Gran # 0.15 (H) 0.00 - 0.04 x10(3)/mcL    NRBC% 0.0 %       Assessment:  74-year-old female with a history of cervical cancer status post pelvic radiation 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  PMH also includes CKD and colon cancer with ileostomy. She presented to [facility] facility with left lower quadrant pain - CT abdomen and pelvis reveals increasing bilateral hydroureteronephrosis and pelvicaliectasis with left double-J stent in appropriate position and removal of right ureteral stent since imaging in February; bilateral perinephric stranding, left periureteral stranding; SP tube in the appropriate position.   -acute renal failure with hyperkalemia and acidosis - initiated on hemodialysis this day, last dialyzed 3/29  -blood cultures with Gram-negative rods, urine culture with multiple organisms - on Zosyn  -IR placed right PCN 3/28      Plan:  -continue antibiotics, tailor according to final C&S results  -continue PCN, SP tube to drainage, accurate I&O  -she will eventually need right antegrade stent placement with IR and can have nephrostomy tube removed afterwards.  This can be performed on an outpatient basis.  -HD per Nephrology  -following      Tala Cherry NP

## 2025-04-01 NOTE — PROGRESS NOTES
[facility] [place]      HPI:     Patient Name: Kam Reyes  Admission Date: 3/27/2025  Hospital Length of Stay: 5 days  Code Status: Full Code   Attending Physician: Tyron Doe MD   Primary Care Physician: Gregor Heaton MD  Principal Problem:<principal problem not specified>      Today patient seen and examined  Labs, recent events, imaging and medications reviewed for this hospital stay  Feels ok  No nausea or vomiting  No dyspnea  Good urine output    Review of Systems:   Constitutional: Denies fever, fatigue, generalized weakness, night sweats, or acute weight change  Skin: Denies wounds, no rashes, no itching, no new skin lesions  HEENT: Denies acute change in hearing or vision, tinnitus, or dysphagia  Respiratory:  Denies cough, shortness of breath, or wheezing  Cardiovascular: Denies chest pain, palpitations, or swelling  Gastrointestional: Denies abdominal pain, nausea, vomiting, diarrhea, or constipation, ++lose stool from ostomy  Genitourinary: good urine output and good ostomy drainage  Musculoskeletal: Denies myalgias, back pain, flank pain, new decreased ROM or acute focal weakness  Neurological: Denies headaches, seizures, dizziness, paresthesias or asterixis  Hematological: Denies unusual bruising or bleeding  Psychiatric: Denies hallucinations, depression, or confusion      Medications:   Scheduled Meds:   clopidogreL  75 mg Oral QHS    DULoxetine  60 mg Oral QHS    ferrous sulfate  1 tablet Oral Daily    levothyroxine  137 mcg Oral Before breakfast    midodrine  10 mg Oral TID WM    mupirocin   Nasal BID    piperacillin-tazobactam (Zosyn) IV (PEDS and ADULTS) (extended infusion is not appropriate)  4.5 g Intravenous Q12H    QUEtiapine  25 mg Oral QHS     Continuous Infusions:   lactated ringers   Intravenous Continuous 75 mL/hr at 03/31/25 2344 New Bag at 03/31/25 2344         Vitals:     Vitals:    03/31/25 2238 04/01/25 0023 04/01/25 0800 04/01/25 0851   BP: (!)  154/74 116/83 131/73    Pulse: (!) 53 (!) 57 60 76   Resp:  16 16    Temp:  98.5 °F (36.9 °C) 98.2 °F (36.8 °C)    TempSrc:  Oral     SpO2: 96% 97% 99%    Weight:       Height:             I/O last 3 completed shifts:  In: 1240 [P.O.:340; Blood:900]  Out: 4250 [Urine:1850; Stool:2400]    Intake/Output Summary (Last 24 hours) at 4/1/2025 1000  Last data filed at 4/1/2025 0600  Gross per 24 hour   Intake 840 ml   Output 3325 ml   Net -2485 ml       Physical Exam:   General: no acute distress, awake, alert  Eyes: PERRLA, EOMI, conjunctiva clear, eyelids without swelling  HENT: atraumatic, oropharynx and nasal mucosa patent  Neck: full ROM, no JVD, no thyromegaly or lymphadenopathy  Respiratory: equal, unlabored, clear to auscultation A/P  Cardiovascular: RRR without  rub; BL radial and pedal pulses felt  Edema: none  Gastrointestinal: soft, non-tender, non-distended; positive bowel sounds; no masses to palpation, +R ostomy  Genitourinary: R nephrostomy  Musculoskeletal: full ROM without limitation or discomfort  Integumentary: warm, dry; no rashes, wounds, or skin lesions  Neurological: oriented, appropriate, no acute deficits        Labs:     Chemistries:   Recent Labs   Lab 03/27/25  0829 03/28/25  0452 03/30/25  0446 03/31/25  0420 04/01/25  0507   *   < > 140 138 139   K 5.5*   < > 3.7 3.1* 3.9      < > 94* 92* 98   CO2 14*   < > 34* 33* 25   BUN 80.1*   < > 10.2 13.6 20.1   CREATININE 12.71*   < > 3.34* 4.56* 5.36*   CALCIUM 8.2*   < > 7.3* 6.7* 7.9*   BILITOT 0.3  --  0.7 0.8 1.4   ALKPHOS 168*  --  111 101 116   ALT 11  --  7 6 8   AST 15  --  16 11 18   GLUCOSE 79*   < > 94 77* 75*   MG 1.70  --   --   --   --    PHOS  --   --  3.8 4.3  --     < > = values in this interval not displayed.        CBC/Anemia Labs: Coags:    Recent Labs   Lab 03/27/25  0829 03/28/25  0452 03/30/25  0446 03/31/25  0420 04/01/25  0507   WBC 28.93  28.93*   < > 9.02 8.72 12.17*   HGB 7.3*   < > 7.3* 6.8* 10.9*   HCT 22.1*    < > 23.7* 21.6* 33.1*      < > 295 226 241   .3*   < > 105.8* 104.9* 98.8*   RDW 12.7   < > 12.2 12.0 15.5   IRON 22*  --   --   --   --     < > = values in this interval not displayed.    Recent Labs   Lab 03/27/25  1402   INR 1.3          Impression:     GHADA related to obstructive uropathy SP nephrostomy  CKD4  Uremia improved    Plan:   Renal function not showing signs of improvement despite increase in urine output    Will cont IVF , monitor for Plateau,   No acute indications for HD         Deanne Molina

## 2025-04-02 LAB
ALBUMIN SERPL-MCNC: 2 G/DL (ref 3.4–4.8)
ALBUMIN/GLOB SERPL: 0.5 RATIO (ref 1.1–2)
ALP SERPL-CCNC: 99 UNIT/L (ref 40–150)
ALT SERPL-CCNC: 6 UNIT/L (ref 0–55)
ANION GAP SERPL CALC-SCNC: 16 MEQ/L
AST SERPL-CCNC: 20 UNIT/L (ref 11–45)
BASOPHILS # BLD AUTO: 0.05 X10(3)/MCL
BASOPHILS NFR BLD AUTO: 0.4 %
BILIRUB SERPL-MCNC: 1.1 MG/DL
BUN SERPL-MCNC: 25 MG/DL (ref 9.8–20.1)
CALCIUM SERPL-MCNC: 7.3 MG/DL (ref 8.4–10.2)
CHLORIDE SERPL-SCNC: 98 MMOL/L (ref 98–107)
CO2 SERPL-SCNC: 24 MMOL/L (ref 23–31)
CREAT SERPL-MCNC: 5.82 MG/DL (ref 0.55–1.02)
CREAT/UREA NIT SERPL: 4
EOSINOPHIL # BLD AUTO: 0.26 X10(3)/MCL (ref 0–0.9)
EOSINOPHIL NFR BLD AUTO: 1.9 %
ERYTHROCYTE [DISTWIDTH] IN BLOOD BY AUTOMATED COUNT: 14.4 % (ref 11.5–17)
GFR SERPLBLD CREATININE-BSD FMLA CKD-EPI: 7 ML/MIN/1.73/M2
GLOBULIN SER-MCNC: 4.1 GM/DL (ref 2.4–3.5)
GLUCOSE SERPL-MCNC: 80 MG/DL (ref 82–115)
HCT VFR BLD AUTO: 33.1 % (ref 37–47)
HGB BLD-MCNC: 10.5 G/DL (ref 12–16)
IMM GRANULOCYTES # BLD AUTO: 0.24 X10(3)/MCL (ref 0–0.04)
IMM GRANULOCYTES NFR BLD AUTO: 1.7 %
LYMPHOCYTES # BLD AUTO: 1.42 X10(3)/MCL (ref 0.6–4.6)
LYMPHOCYTES NFR BLD AUTO: 10.3 %
MCH RBC QN AUTO: 32.2 PG (ref 27–31)
MCHC RBC AUTO-ENTMCNC: 31.7 G/DL (ref 33–36)
MCV RBC AUTO: 101.5 FL (ref 80–94)
MONOCYTES # BLD AUTO: 0.7 X10(3)/MCL (ref 0.1–1.3)
MONOCYTES NFR BLD AUTO: 5.1 %
NEUTROPHILS # BLD AUTO: 11.05 X10(3)/MCL (ref 2.1–9.2)
NEUTROPHILS NFR BLD AUTO: 80.6 %
NRBC BLD AUTO-RTO: 0 %
PHOSPHATE SERPL-MCNC: 4.6 MG/DL (ref 2.3–4.7)
PLATELET # BLD AUTO: 229 X10(3)/MCL (ref 130–400)
PMV BLD AUTO: 10.8 FL (ref 7.4–10.4)
POTASSIUM SERPL-SCNC: 4 MMOL/L (ref 3.5–5.1)
PROT SERPL-MCNC: 6.1 GM/DL (ref 5.8–7.6)
RBC # BLD AUTO: 3.26 X10(6)/MCL (ref 4.2–5.4)
RBCS: NORMAL
RBCS: NORMAL
SODIUM SERPL-SCNC: 138 MMOL/L (ref 136–145)
WBC # BLD AUTO: 13.72 X10(3)/MCL (ref 4.5–11.5)

## 2025-04-02 PROCEDURE — 99232 SBSQ HOSP IP/OBS MODERATE 35: CPT | Mod: ,,, | Performed by: INTERNAL MEDICINE

## 2025-04-02 PROCEDURE — 25000003 PHARM REV CODE 250: Performed by: INTERNAL MEDICINE

## 2025-04-02 PROCEDURE — 85025 COMPLETE CBC W/AUTO DIFF WBC: CPT | Performed by: HOSPITALIST

## 2025-04-02 PROCEDURE — 63600175 PHARM REV CODE 636 W HCPCS: Performed by: INTERNAL MEDICINE

## 2025-04-02 PROCEDURE — 25000003 PHARM REV CODE 250

## 2025-04-02 PROCEDURE — 80053 COMPREHEN METABOLIC PANEL: CPT | Performed by: INTERNAL MEDICINE

## 2025-04-02 PROCEDURE — 63600175 PHARM REV CODE 636 W HCPCS

## 2025-04-02 PROCEDURE — 25000003 PHARM REV CODE 250: Performed by: HOSPITALIST

## 2025-04-02 PROCEDURE — 36415 COLL VENOUS BLD VENIPUNCTURE: CPT | Performed by: HOSPITALIST

## 2025-04-02 PROCEDURE — 11000001 HC ACUTE MED/SURG PRIVATE ROOM

## 2025-04-02 PROCEDURE — 84100 ASSAY OF PHOSPHORUS: CPT | Performed by: INTERNAL MEDICINE

## 2025-04-02 RX ORDER — CALCITRIOL 0.25 UG/1
0.25 CAPSULE ORAL DAILY
Status: DISCONTINUED | OUTPATIENT
Start: 2025-04-02 | End: 2025-04-05 | Stop reason: HOSPADM

## 2025-04-02 RX ADMIN — QUETIAPINE FUMARATE 25 MG: 25 TABLET ORAL at 10:04

## 2025-04-02 RX ADMIN — CLOPIDOGREL 75 MG: 75 TABLET ORAL at 10:04

## 2025-04-02 RX ADMIN — FERROUS SULFATE TAB 325 MG (65 MG ELEMENTAL FE) 1 EACH: 325 (65 FE) TAB at 08:04

## 2025-04-02 RX ADMIN — SODIUM CHLORIDE, POTASSIUM CHLORIDE, SODIUM LACTATE AND CALCIUM CHLORIDE: 600; 310; 30; 20 INJECTION, SOLUTION INTRAVENOUS at 09:04

## 2025-04-02 RX ADMIN — MIDODRINE HYDROCHLORIDE 10 MG: 5 TABLET ORAL at 04:04

## 2025-04-02 RX ADMIN — SODIUM CHLORIDE, POTASSIUM CHLORIDE, SODIUM LACTATE AND CALCIUM CHLORIDE: 600; 310; 30; 20 INJECTION, SOLUTION INTRAVENOUS at 05:04

## 2025-04-02 RX ADMIN — DULOXETINE HYDROCHLORIDE 60 MG: 30 CAPSULE, DELAYED RELEASE ORAL at 10:04

## 2025-04-02 RX ADMIN — PIPERACILLIN SODIUM AND TAZOBACTAM SODIUM 4.5 G: 4; .5 INJECTION, POWDER, LYOPHILIZED, FOR SOLUTION INTRAVENOUS at 04:04

## 2025-04-02 RX ADMIN — LEVOTHYROXINE SODIUM 137 MCG: 0.03 TABLET ORAL at 05:04

## 2025-04-02 RX ADMIN — MIDODRINE HYDROCHLORIDE 10 MG: 5 TABLET ORAL at 12:04

## 2025-04-02 RX ADMIN — MUPIROCIN: 20 OINTMENT TOPICAL at 08:04

## 2025-04-02 RX ADMIN — PIPERACILLIN SODIUM AND TAZOBACTAM SODIUM 4.5 G: 4; .5 INJECTION, POWDER, LYOPHILIZED, FOR SOLUTION INTRAVENOUS at 05:04

## 2025-04-02 RX ADMIN — SODIUM CHLORIDE, POTASSIUM CHLORIDE, SODIUM LACTATE AND CALCIUM CHLORIDE: 600; 310; 30; 20 INJECTION, SOLUTION INTRAVENOUS at 06:04

## 2025-04-02 RX ADMIN — CALCITRIOL CAPSULES 0.25 MCG 0.25 MCG: 0.25 CAPSULE ORAL at 08:04

## 2025-04-02 RX ADMIN — MIDODRINE HYDROCHLORIDE 10 MG: 5 TABLET ORAL at 08:04

## 2025-04-02 NOTE — PROGRESS NOTES
[facility] Nephrology Inpatient Progress Note      HPI:     Patient Name: Kam Reyes  Admission Date: 3/27/2025  Hospital Length of Stay: 6 days  Code Status: Full Code   Attending Physician: Tyron Doe MD   Primary Care Physician: Gregor Heaton MD  Principal Problem:<principal problem not specified>      Today patient seen and examined  Labs, recent events, imaging and medications reviewed for this hospital stay  Feels ok  No SOB  No NV    Review of Systems:   Good urine output  No dyspnea  No NV  Otherwise no change      Medications:   Scheduled Meds:   calcitRIOL  0.25 mcg Oral Daily    clopidogreL  75 mg Oral QHS    DULoxetine  60 mg Oral QHS    ferrous sulfate  1 tablet Oral Daily    levothyroxine  137 mcg Oral Before breakfast    midodrine  10 mg Oral TID WM    mupirocin   Nasal BID    piperacillin-tazobactam (Zosyn) IV (PEDS and ADULTS) (extended infusion is not appropriate)  4.5 g Intravenous Q12H    QUEtiapine  25 mg Oral QHS     Continuous Infusions:   lactated ringers   Intravenous Continuous 100 mL/hr at 04/02/25 0545 New Bag at 04/02/25 0545         Vitals:     Vitals:    04/01/25 1550 04/01/25 1923 04/02/25 0019 04/02/25 0413   BP: 128/82 (!) 146/82 115/65 126/69   Pulse: 63 61 73 73   Resp: 16  18 18   Temp: 98 °F (36.7 °C) 98 °F (36.7 °C) 98.2 °F (36.8 °C) 98.4 °F (36.9 °C)   TempSrc: Oral Oral Oral Oral   SpO2: 97% 99% 97% 98%   Weight:       Height:             I/O last 3 completed shifts:  In: 610 [P.O.:610]  Out: 3700 [Urine:1450; Stool:2250]    Intake/Output Summary (Last 24 hours) at 4/2/2025 0820  Last data filed at 4/2/2025 0556  Gross per 24 hour   Intake 370 ml   Output 1925 ml   Net -1555 ml       Physical Exam:   General: no acute distress, awake, alert  Eyes: PERRLA, EOMI, conjunctiva clear, eyelids without swelling  HENT: atraumatic, oropharynx and nasal mucosa patent  Neck: full ROM, no JVD, no thyromegaly or lymphadenopathy  Respiratory: equal, unlabored, clear to  auscultation A/P  Cardiovascular: RRR without  rub; BL radial and pedal pulses felt  Edema: none  Gastrointestinal: soft, non-tender, non-distended; positive bowel sounds; no masses to palpation+R ostomy  Genitourinary: R nephrostomy  Musculoskeletal: full ROM without limitation or discomfort  Integumentary: warm, dry; no rashes, wounds, or skin lesions  Neurological: oriented, appropriate, no acute deficits      Labs:     Chemistries:   Recent Labs   Lab 03/27/25  0829 03/28/25  0452 03/30/25  0446 03/31/25  0420 04/01/25  0507 04/02/25  0502   *   < > 140 138 139 138   K 5.5*   < > 3.7 3.1* 3.9 4.0      < > 94* 92* 98 98   CO2 14*   < > 34* 33* 25 24   BUN 80.1*   < > 10.2 13.6 20.1 25.0*   CREATININE 12.71*   < > 3.34* 4.56* 5.36* 5.82*   CALCIUM 8.2*   < > 7.3* 6.7* 7.9* 7.3*   BILITOT 0.3  --  0.7 0.8 1.4 1.1   ALKPHOS 168*  --  111 101 116 99   ALT 11  --  7 6 8 6   AST 15  --  16 11 18 20   GLUCOSE 79*   < > 94 77* 75* 80*   MG 1.70  --   --   --   --   --    PHOS  --   --  3.8 4.3  --  4.6    < > = values in this interval not displayed.        CBC/Anemia Labs: Coags:    Recent Labs   Lab 03/27/25  0829 03/28/25  0452 03/31/25  0420 04/01/25  0507 04/02/25  0502   WBC 28.93  28.93*   < > 8.72 12.17* 13.72*   HGB 7.3*   < > 6.8* 10.9* 10.5*   HCT 22.1*   < > 21.6* 33.1* 33.1*      < > 226 241 229   .3*   < > 104.9* 98.8* 101.5*   RDW 12.7   < > 12.0 15.5 14.4   IRON 22*  --   --   --   --     < > = values in this interval not displayed.    Recent Labs   Lab 03/27/25  1402   INR 1.3          Impression:     CKD4   Obstructive uropathy SP R nephrostomy  Uremia on presentation requiring HD  Despite increase in urine output, renal function not stabilzing    Plan:     Cont IVF one more day  If no improvement by am may need HD and likely setup for output HD and monitor as outpt signs of recovery       Deanne Molina

## 2025-04-02 NOTE — PLAN OF CARE
Problem: Adult Inpatient Plan of Care  Goal: Plan of Care Review  Outcome: Progressing  Goal: Patient-Specific Goal (Individualized)  Outcome: Progressing  Goal: Absence of Hospital-Acquired Illness or Injury  Outcome: Progressing  Goal: Optimal Comfort and Wellbeing  Outcome: Progressing  Goal: Readiness for Transition of Care  Outcome: Progressing     Problem: Wound  Goal: Optimal Coping  Outcome: Progressing  Goal: Optimal Functional Ability  Outcome: Progressing  Goal: Absence of Infection Signs and Symptoms  Outcome: Progressing  Goal: Improved Oral Intake  Outcome: Progressing  Goal: Optimal Pain Control and Function  Outcome: Progressing  Goal: Skin Health and Integrity  Outcome: Progressing  Goal: Optimal Wound Healing  Outcome: Progressing     Problem: Skin Injury Risk Increased  Goal: Skin Health and Integrity  Outcome: Progressing     Problem: Coping Ineffective  Goal: Effective Coping  Outcome: Progressing     Problem: Infection  Goal: Absence of Infection Signs and Symptoms  Outcome: Progressing

## 2025-04-02 NOTE — PROGRESS NOTES
"Advance Care Planning     Date: 04/02/2025    Attempted to see patient, wound care at bedside providing dressing change, will follow up this afternoon.     15:00 Met with patient, patient awake, alert and oriented x's 4. Reports she spoke to nephrology this morning and verbalized understanding of plan to receive IV fluids today and plan to re-evaluate in the morning. We did discuss briefly on possibility of HD outpatient, states "hoping one more day of fluids will help my kidneys". Emotional support provided, and informed that palliative care would follow up tomorrow to see how her kidneys respond to the IVF and nephrology's further recommendations. Informed would provide guidance and provide further goals of care discussion, to which patient was agreeable of further discussion.            "

## 2025-04-02 NOTE — PROGRESS NOTES
UROLOGY  PROGRESS  NOTE    Kam GALLEGOS Amy 1950  [id number]  4/2/2025    Patient resting in bed   Nursing at bedside during rounds   No acute events overnight   She does not have any complaints during rounds    VSS, afebrile   250 mL right nephrostomy overnight   200 mL SP tube overnight   WBC 13.72   H&H 10.5/33.1   BUN/creatinine 25/5.82    Exam:    GEN: NAD  CV: RRR  RESP: Even and unlabored  ABD: soft, NTND  : clear yellow urine draining from PCN, mucous yellow urine draining to  bag from SP site.   NEURO:AAOx4      Recent Results (from the past 24 hours)   Comprehensive Metabolic Panel    Collection Time: 04/02/25  5:02 AM   Result Value Ref Range    Sodium 138 136 - 145 mmol/L    Potassium 4.0 3.5 - 5.1 mmol/L    Chloride 98 98 - 107 mmol/L    CO2 24 23 - 31 mmol/L    Glucose 80 (L) 82 - 115 mg/dL    Blood Urea Nitrogen 25.0 (H) 9.8 - 20.1 mg/dL    Creatinine 5.82 (H) 0.55 - 1.02 mg/dL    Calcium 7.3 (L) 8.4 - 10.2 mg/dL    Protein Total 6.1 5.8 - 7.6 gm/dL    Albumin 2.0 (L) 3.4 - 4.8 g/dL    Globulin 4.1 (H) 2.4 - 3.5 gm/dL    Albumin/Globulin Ratio 0.5 (L) 1.1 - 2.0 ratio    Bilirubin Total 1.1 <=1.5 mg/dL    ALP 99 40 - 150 unit/L    ALT 6 0 - 55 unit/L    AST 20 11 - 45 unit/L    eGFR 7 mL/min/1.73/m2    Anion Gap 16.0 mEq/L    BUN/Creatinine Ratio 4    Phosphorus    Collection Time: 04/02/25  5:02 AM   Result Value Ref Range    Phosphorus Level 4.6 2.3 - 4.7 mg/dL   CBC with Differential    Collection Time: 04/02/25  5:02 AM   Result Value Ref Range    WBC 13.72 (H) 4.50 - 11.50 x10(3)/mcL    RBC 3.26 (L) 4.20 - 5.40 x10(6)/mcL    Hgb 10.5 (L) 12.0 - 16.0 g/dL    Hct 33.1 (L) 37.0 - 47.0 %    .5 (H) 80.0 - 94.0 fL    MCH 32.2 (H) 27.0 - 31.0 pg    MCHC 31.7 (L) 33.0 - 36.0 g/dL    RDW 14.4 11.5 - 17.0 %    Platelet 229 130 - 400 x10(3)/mcL    MPV 10.8 (H) 7.4 - 10.4 fL    Neut % 80.6 %    Lymph % 10.3 %    Mono % 5.1 %    Eos % 1.9 %    Basophil % 0.4 %    Imm Grans % 1.7 %    Neut #  11.05 (H) 2.1 - 9.2 x10(3)/mcL    Lymph # 1.42 0.6 - 4.6 x10(3)/mcL    Mono # 0.70 0.1 - 1.3 x10(3)/mcL    Eos # 0.26 0 - 0.9 x10(3)/mcL    Baso # 0.05 <=0.2 x10(3)/mcL    Imm Gran # 0.24 (H) 0.00 - 0.04 x10(3)/mcL    NRBC% 0.0 %       Assessment:  74-year-old female with a history of cervical cancer status post pelvic radiation 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  PMH also includes CKD and colon cancer with ileostomy. She presented to [facility] facility with left lower quadrant pain - CT abdomen and pelvis reveals increasing bilateral hydroureteronephrosis and pelvicaliectasis with left double-J stent in appropriate position and removal of right ureteral stent since imaging in February; bilateral perinephric stranding, left periureteral stranding; SP tube in the appropriate position.   -acute renal failure with hyperkalemia and acidosis - initiated on hemodialysis this day, last dialyzed 3/29; possible plans for HD tomorrow if no improvement per nephrology  -blood cultures with Gram-negative rods, urine culture with multiple organisms - on Zosyn; repeat BC from 3/31 with no growth at 48hrs  -IR placed right PCN 3/28      Plan:  -continue antibiotics, tailor according to final C&S results  -continue PCN, SP tube to drainage, accurate I&O  -HD per Nephrology  -she will eventually need right antegrade stent placement with IR.  This can be performed on an outpatient basis.      Tala Cherry NP

## 2025-04-02 NOTE — PLAN OF CARE
04/02/25 0817   Medicare Message   Important Message from Medicare regarding Discharge Appeal Rights Given to patient/caregiver;Explained to patient/caregiver

## 2025-04-02 NOTE — PROGRESS NOTES
[facility] Hospital Medicine Progress Note        Chief Complaint: Inpatient Follow-up     HPI:   74-year-old male transferred from an outside facility secondary to complicated UTI involving a suprapubic catheter.  She originally presented for left lower quadrant abdominal pain.  She also has a past medical history of chronic kidney disease stage IV, colon cancer with ileostomy in place, CAD, and hypothyroidism.  She was transferred for Urology consultation as a CT of the abdomen revealed increasing bilateral hydro ureteral nephrosis despite bilateral ureteral stents.     In the emergency room she is noted to have some mild hypotension down to 91/42.  She has a significant leukocytosis of 15.02.  Anemia with a hemoglobin of 8.3.  Elevated potassium at 5.2 he has significant electrolyte abnormalities.  Her urinalysis showed many bacteria with pyuria and hematuria.She was started empirically on IV Rocephin and admitted to the hospitalist group.  She was evaluated by Nephrology.  They recommended hemodialysis due to worsening renal function and hyperkalemia.  Patient refused.  Palliative Care was consulted and patient wishes to remain full code and seek aggressive treatment.  She has not yet decided about hemodialysis.  Urology evaluated.  Recommending continuing antibiotics.  They were previously unable to placed retrograde stents due to poor visualization.  Recommended IR consultation for right antegrade stenting     After further conversations with patient and family patient was finally agreeable to hemodialysis.  Taken this morning and tolerated well.  She has also taken for antegrade right nephrostomy tube placement on 3/28.  Returned to the floor in stable condition.  Will follow up her labs tomorrow morning.  Blood cultures updated this morning.  Still growing Gram-negative rods with PCR positive for Enterobacterales.  She is trend fused 2 units of packed red blood cells on 3/31  due to continued anemia.  Likely some bone marrow suppression in the setting of renal disease and severe sepsis.     Interval Hx:  No new issues or complaints.  Resting comfortably in bed.  Afebrile.  No confusion.  Very pleasant this morning.     Objective/physical exam:  General: In no acute distress, afebrile, right IJ TLC  Chest: Clear to auscultation bilaterally  Heart: RRR, +S1, S2, no appreciable murmur  Abdomen: Soft, nontender, BS +, right nephrostomy  MSK: Warm, no lower extremity edema, no clubbing or cyanosis  Neurologic: Alert and oriented x4, Cranial nerve II-XII intact, Strength 5/5 in all 4 extremities    VITAL SIGNS: 24 HRS MIN & MAX LAST   Temp  Min: 97.7 °F (36.5 °C)  Max: 98.4 °F (36.9 °C) 98.4 °F (36.9 °C)   BP  Min: 115/65  Max: 146/82 126/69   Pulse  Min: 60  Max: 76  73   Resp  Min: 16  Max: 18 18   SpO2  Min: 97 %  Max: 100 % 98 %       Recent Labs   Lab 03/31/25  0420 04/01/25  0507 04/02/25  0502   WBC 8.72 12.17* 13.72*   RBC 2.06* 3.35* 3.26*   HGB 6.8* 10.9* 10.5*   HCT 21.6* 33.1* 33.1*   .9* 98.8* 101.5*   MCH 33.0* 32.5* 32.2*   MCHC 31.5* 32.9* 31.7*   RDW 12.0 15.5 14.4    241 229   MPV 10.2 10.3 10.8*       Recent Labs   Lab 03/27/25  0829 03/28/25  0452 03/31/25  0420 04/01/25  0507 04/02/25  0502   *   < > 138 139 138   K 5.5*   < > 3.1* 3.9 4.0      < > 92* 98 98   CO2 14*   < > 33* 25 24   BUN 80.1*   < > 13.6 20.1 25.0*   CREATININE 12.71*   < > 4.56* 5.36* 5.82*   CALCIUM 8.2*   < > 6.7* 7.9* 7.3*   MG 1.70  --   --   --   --    ALBUMIN 2.4*   < > 2.0* 2.1* 2.0*   ALKPHOS 168*   < > 101 116 99   ALT 11   < > 6 8 6   AST 15   < > 11 18 20   BILITOT 0.3   < > 0.8 1.4 1.1    < > = values in this interval not displayed.          Microbiology Results (last 7 days)       Procedure Component Value Units Date/Time    Blood Culture [[id number]]  (Normal) Collected: 03/31/25 0707    Order Status: Completed Specimen: Blood from Hand, Right Updated: 04/01/25  0901     Blood Culture No Growth At 24 Hours    Blood Culture [[id number]]  (Normal) Collected: 03/31/25 0637    Order Status: Completed Specimen: Blood from Arm, Right Updated: 04/01/25 0901     Blood Culture No Growth At 24 Hours             Radiology:  X-Ray Chest 1 View  Narrative: EXAMINATION:  XR CHEST 1 VIEW    CPT 56339    CLINICAL HISTORY:  SOB;    COMPARISON:  March 27, 2025    FINDINGS:  Examination reveals cardiomediastinal silhouette to be unchanged as compared with the previous exam.    Some increase interstitial and pulmonary vascular markings may indicate a mild degree of congestion.    No focal consolidative changes otherwise identified.    Central line is seen from a right approach tip in the region of the right atrium  Impression: Increase in interstitial and pulmonary vascular markings may indicate a mild degree of congestion.    Otherwise no significant change as compared with the previous exam    Electronically signed by: Jerry Gayle  Date:    04/01/2025  Time:    08:09        Medications:  Scheduled Meds:   clopidogreL  75 mg Oral QHS    DULoxetine  60 mg Oral QHS    ferrous sulfate  1 tablet Oral Daily    levothyroxine  137 mcg Oral Before breakfast    midodrine  10 mg Oral TID WM    mupirocin   Nasal BID    piperacillin-tazobactam (Zosyn) IV (PEDS and ADULTS) (extended infusion is not appropriate)  4.5 g Intravenous Q12H    QUEtiapine  25 mg Oral QHS     Continuous Infusions:   lactated ringers   Intravenous Continuous 100 mL/hr at 04/02/25 0545 New Bag at 04/02/25 0545     PRN Meds:.  Current Facility-Administered Medications:     0.9%  NaCl infusion (for blood administration), , Intravenous, Q24H PRN    0.9%  NaCl infusion (for blood administration), , Intravenous, Q24H PRN    0.9%  NaCl infusion (for blood administration), , Intravenous, Q24H PRN    acetaminophen, 650 mg, Oral, Q4H PRN    albumin human 25%, 25 g, Intravenous, Q20 Min PRN    ALPRAZolam, 0.25 mg, Oral, BID PRN     aluminum-magnesium hydroxide-simethicone, 30 mL, Oral, QID PRN    bisacodyL, 10 mg, Rectal, Daily PRN    dextrose 50%, 12.5 g, Intravenous, PRN    dextrose 50%, 25 g, Intravenous, PRN    glucagon (human recombinant), 1 mg, Intramuscular, PRN    glucose, 16 g, Oral, PRN    glucose, 24 g, Oral, PRN    HYDROcodone-acetaminophen, 1 tablet, Oral, Q6H PRN    melatonin, 6 mg, Oral, Nightly PRN    morphine, 2 mg, Intravenous, Q4H PRN    ondansetron, 4 mg, Intravenous, Q6H PRN    polyethylene glycol, 17 g, Oral, BID PRN    promethazine, 12.5 mg, Oral, Q6H PRN    sodium chloride 0.9%, 10 mL, Intravenous, PRN    Nutrition:  Nutrition consulted. Most recent weight and BMI monitored-     Measurements:  Wt Readings from Last 1 Encounters:   03/27/25 72.6 kg (160 lb 0.9 oz)   Body mass index is 29.27 kg/m².    Patient has been screened and assessed by RD.    Malnutrition Type:  Context:    Level:      Malnutrition Characteristic Summary:       Interventions/Recommendations (treatment strategy):           Assessment/Plan:   Severe sepsis secondary to UTI  Acute on chronic renal failure, baseline stage IV   Hyperkalemia   Obstructive uropathy  Complicated UTI, POA   Metabolic acidosis  Anemia, suspect combined inflammatory and iron-deficiency  Hypotension with history of hypertension  Coronary artery disease  Hypothyroidism     Zosyn day 6.  Still waiting on Enterobacterales sensitivities.  Repeat blood cultures negative times 24 hours.  We would like to deescalate when possible potentially to oral still with some leukocytosis slight we higher this morning.  Clinically she does seem to be improving.  Will just keep Zosyn for now.  Expected 14 day course with the option to transitioned to oral Augmentin once white count trending back down.  Hemoglobin stable.  Transfused 2 units on 3/31  .  No evidence of bleeding.  Likely some bone marrow suppression in the setting of inflammatory disease  Electrolytes are stable but unfortunately  creatinine continues to slowly rise.  BUN up to 25 this morning.  No signs of acidosis, volume overload, or uremia.  Nephrology is following and she does have HD access if needed.   Urology continues to follow.  Nephrostomy tube is functioning well.  No new recommendations.    Palliative care's on board as well     Critical care diagnosis: sepsis, iv antibiotics  Critical care interventions: hands on evaluation, review of labs/radiographs/records and discussions with family  Critical care time spent: >32 minutes      Tyron Doe MD   04/02/2025     All diagnosis and differential diagnosis have been reviewed; assessment and plan has been documented; I have personally reviewed the labs and test results that are presently available; I have reviewed the patients medication list; I have reviewed the consulting providers response and recommendations. I have reviewed or attempted to review medical records based upon their availability    All of the patient's questions have been  addressed and answered. Patient's is agreeable to the above stated plan. I will continue to monitor closely and make adjustments to medical management as needed.  _____________________________________________________________________

## 2025-04-03 LAB
ALBUMIN SERPL-MCNC: 1.8 G/DL (ref 3.4–4.8)
ALBUMIN/GLOB SERPL: 0.4 RATIO (ref 1.1–2)
ALP SERPL-CCNC: 80 UNIT/L (ref 40–150)
ALT SERPL-CCNC: 6 UNIT/L (ref 0–55)
ANION GAP SERPL CALC-SCNC: 14 MEQ/L
AST SERPL-CCNC: 11 UNIT/L (ref 11–45)
BACTERIA BLD CULT: ABNORMAL
BASOPHILS # BLD AUTO: 0.02 X10(3)/MCL
BASOPHILS NFR BLD AUTO: 0.2 %
BILIRUB SERPL-MCNC: 0.8 MG/DL
BUN SERPL-MCNC: 30.3 MG/DL (ref 9.8–20.1)
CALCIUM SERPL-MCNC: 8.2 MG/DL (ref 8.4–10.2)
CHLORIDE SERPL-SCNC: 100 MMOL/L (ref 98–107)
CO2 SERPL-SCNC: 27 MMOL/L (ref 23–31)
CREAT SERPL-MCNC: 6.19 MG/DL (ref 0.55–1.02)
CREAT/UREA NIT SERPL: 5
EOSINOPHIL # BLD AUTO: 0.24 X10(3)/MCL (ref 0–0.9)
EOSINOPHIL NFR BLD AUTO: 2.5 %
ERYTHROCYTE [DISTWIDTH] IN BLOOD BY AUTOMATED COUNT: 13.7 % (ref 11.5–17)
GFR SERPLBLD CREATININE-BSD FMLA CKD-EPI: 7 ML/MIN/1.73/M2
GLOBULIN SER-MCNC: 4.9 GM/DL (ref 2.4–3.5)
GLUCOSE SERPL-MCNC: 80 MG/DL (ref 82–115)
GRAM STN SPEC: ABNORMAL
HCT VFR BLD AUTO: 33.1 % (ref 37–47)
HGB BLD-MCNC: 10.4 G/DL (ref 12–16)
IMM GRANULOCYTES # BLD AUTO: 0.17 X10(3)/MCL (ref 0–0.04)
IMM GRANULOCYTES NFR BLD AUTO: 1.7 %
LYMPHOCYTES # BLD AUTO: 1.27 X10(3)/MCL (ref 0.6–4.6)
LYMPHOCYTES NFR BLD AUTO: 13.1 %
MCH RBC QN AUTO: 32.3 PG (ref 27–31)
MCHC RBC AUTO-ENTMCNC: 31.4 G/DL (ref 33–36)
MCV RBC AUTO: 102.8 FL (ref 80–94)
MONOCYTES # BLD AUTO: 0.52 X10(3)/MCL (ref 0.1–1.3)
MONOCYTES NFR BLD AUTO: 5.3 %
NEUTROPHILS # BLD AUTO: 7.5 X10(3)/MCL (ref 2.1–9.2)
NEUTROPHILS NFR BLD AUTO: 77.2 %
NRBC BLD AUTO-RTO: 0 %
PHOSPHATE SERPL-MCNC: 5.2 MG/DL (ref 2.3–4.7)
PHOSPHATE SERPL-MCNC: 5.2 MG/DL (ref 2.3–4.7)
PLATELET # BLD AUTO: 200 X10(3)/MCL (ref 130–400)
PMV BLD AUTO: 10.8 FL (ref 7.4–10.4)
POTASSIUM SERPL-SCNC: 3.7 MMOL/L (ref 3.5–5.1)
PROT SERPL-MCNC: 6.7 GM/DL (ref 5.8–7.6)
RBC # BLD AUTO: 3.22 X10(6)/MCL (ref 4.2–5.4)
SODIUM SERPL-SCNC: 141 MMOL/L (ref 136–145)
WBC # BLD AUTO: 9.72 X10(3)/MCL (ref 4.5–11.5)

## 2025-04-03 PROCEDURE — 84100 ASSAY OF PHOSPHORUS: CPT | Performed by: STUDENT IN AN ORGANIZED HEALTH CARE EDUCATION/TRAINING PROGRAM

## 2025-04-03 PROCEDURE — 85025 COMPLETE CBC W/AUTO DIFF WBC: CPT | Performed by: INTERNAL MEDICINE

## 2025-04-03 PROCEDURE — 80053 COMPREHEN METABOLIC PANEL: CPT | Performed by: INTERNAL MEDICINE

## 2025-04-03 PROCEDURE — 36415 COLL VENOUS BLD VENIPUNCTURE: CPT | Performed by: STUDENT IN AN ORGANIZED HEALTH CARE EDUCATION/TRAINING PROGRAM

## 2025-04-03 PROCEDURE — 99222 1ST HOSP IP/OBS MODERATE 55: CPT | Mod: ,,, | Performed by: STUDENT IN AN ORGANIZED HEALTH CARE EDUCATION/TRAINING PROGRAM

## 2025-04-03 PROCEDURE — 99232 SBSQ HOSP IP/OBS MODERATE 35: CPT | Mod: ,,,

## 2025-04-03 PROCEDURE — 80100016 HC MAINTENANCE HEMODIALYSIS

## 2025-04-03 PROCEDURE — 25000003 PHARM REV CODE 250

## 2025-04-03 PROCEDURE — 25000003 PHARM REV CODE 250: Performed by: HOSPITALIST

## 2025-04-03 PROCEDURE — 63600175 PHARM REV CODE 636 W HCPCS

## 2025-04-03 PROCEDURE — 63600175 PHARM REV CODE 636 W HCPCS: Performed by: STUDENT IN AN ORGANIZED HEALTH CARE EDUCATION/TRAINING PROGRAM

## 2025-04-03 PROCEDURE — 11000001 HC ACUTE MED/SURG PRIVATE ROOM

## 2025-04-03 PROCEDURE — 25000003 PHARM REV CODE 250: Performed by: INTERNAL MEDICINE

## 2025-04-03 PROCEDURE — 84100 ASSAY OF PHOSPHORUS: CPT | Performed by: INTERNAL MEDICINE

## 2025-04-03 PROCEDURE — 36415 COLL VENOUS BLD VENIPUNCTURE: CPT | Performed by: INTERNAL MEDICINE

## 2025-04-03 PROCEDURE — 99233 SBSQ HOSP IP/OBS HIGH 50: CPT | Mod: ,,, | Performed by: INTERNAL MEDICINE

## 2025-04-03 RX ORDER — CEFTRIAXONE 1 G/1
1 INJECTION, POWDER, FOR SOLUTION INTRAMUSCULAR; INTRAVENOUS
Status: DISCONTINUED | OUTPATIENT
Start: 2025-04-03 | End: 2025-04-03

## 2025-04-03 RX ORDER — CEFEPIME HYDROCHLORIDE 1 G/1
1 INJECTION, POWDER, FOR SOLUTION INTRAMUSCULAR; INTRAVENOUS
Status: DISCONTINUED | OUTPATIENT
Start: 2025-04-03 | End: 2025-04-03

## 2025-04-03 RX ORDER — CEFEPIME HYDROCHLORIDE 2 G/1
1 INJECTION, POWDER, FOR SOLUTION INTRAVENOUS
Status: DISCONTINUED | OUTPATIENT
Start: 2025-04-03 | End: 2025-04-05 | Stop reason: HOSPADM

## 2025-04-03 RX ORDER — CEFEPIME HYDROCHLORIDE 2 G/1
1 INJECTION, POWDER, FOR SOLUTION INTRAVENOUS
Status: DISCONTINUED | OUTPATIENT
Start: 2025-04-03 | End: 2025-04-03

## 2025-04-03 RX ADMIN — LEVOTHYROXINE SODIUM 137 MCG: 0.03 TABLET ORAL at 05:04

## 2025-04-03 RX ADMIN — CLOPIDOGREL 75 MG: 75 TABLET ORAL at 08:04

## 2025-04-03 RX ADMIN — HYDROCODONE BITARTRATE AND ACETAMINOPHEN 1 TABLET: 10; 325 TABLET ORAL at 08:04

## 2025-04-03 RX ADMIN — MIDODRINE HYDROCHLORIDE 10 MG: 5 TABLET ORAL at 04:04

## 2025-04-03 RX ADMIN — DULOXETINE HYDROCHLORIDE 60 MG: 30 CAPSULE, DELAYED RELEASE ORAL at 08:04

## 2025-04-03 RX ADMIN — CEFEPIME 1 G: 2 INJECTION, POWDER, FOR SOLUTION INTRAVENOUS at 04:04

## 2025-04-03 RX ADMIN — FERROUS SULFATE TAB 325 MG (65 MG ELEMENTAL FE) 1 EACH: 325 (65 FE) TAB at 08:04

## 2025-04-03 RX ADMIN — CALCITRIOL CAPSULES 0.25 MCG 0.25 MCG: 0.25 CAPSULE ORAL at 08:04

## 2025-04-03 RX ADMIN — PIPERACILLIN SODIUM AND TAZOBACTAM SODIUM 4.5 G: 4; .5 INJECTION, POWDER, LYOPHILIZED, FOR SOLUTION INTRAVENOUS at 05:04

## 2025-04-03 RX ADMIN — QUETIAPINE FUMARATE 25 MG: 25 TABLET ORAL at 08:04

## 2025-04-03 NOTE — PROGRESS NOTES
UROLOGY  PROGRESS  NOTE    Kam GALLEGOS Amy 1950  [id number]  4/3/2025    No complaints   Not much appetite, eating soup during rounds  Denies any Chun or nephrostomy tube discomfort    VSS, afebrile   Right PCN output not documented  300 mL SP tube overnight   WBC 9.72   H&H 10.4/33.1   BUN/creatinine 30.3/6.19    Exam:    GEN: NAD  CV: RRR  RESP: Even and unlabored  ABD: soft, NTND  : clear yellow urine draining from PCN, mucous yellow urine draining to  bag from SP site.   NEURO:AAOx4      Recent Results (from the past 24 hours)   Comprehensive Metabolic Panel    Collection Time: 04/03/25  8:07 AM   Result Value Ref Range    Sodium 141 136 - 145 mmol/L    Potassium 3.7 3.5 - 5.1 mmol/L    Chloride 100 98 - 107 mmol/L    CO2 27 23 - 31 mmol/L    Glucose 80 (L) 82 - 115 mg/dL    Blood Urea Nitrogen 30.3 (H) 9.8 - 20.1 mg/dL    Creatinine 6.19 (H) 0.55 - 1.02 mg/dL    Calcium 8.2 (L) 8.4 - 10.2 mg/dL    Protein Total 6.7 5.8 - 7.6 gm/dL    Albumin 1.8 (L) 3.4 - 4.8 g/dL    Globulin 4.9 (H) 2.4 - 3.5 gm/dL    Albumin/Globulin Ratio 0.4 (L) 1.1 - 2.0 ratio    Bilirubin Total 0.8 <=1.5 mg/dL    ALP 80 40 - 150 unit/L    ALT 6 0 - 55 unit/L    AST 11 11 - 45 unit/L    eGFR 7 mL/min/1.73/m2    Anion Gap 14.0 mEq/L    BUN/Creatinine Ratio 5    Phosphorus    Collection Time: 04/03/25  8:07 AM   Result Value Ref Range    Phosphorus Level 5.2 (H) 2.3 - 4.7 mg/dL   CBC with Differential    Collection Time: 04/03/25  8:07 AM   Result Value Ref Range    WBC 9.72 4.50 - 11.50 x10(3)/mcL    RBC 3.22 (L) 4.20 - 5.40 x10(6)/mcL    Hgb 10.4 (L) 12.0 - 16.0 g/dL    Hct 33.1 (L) 37.0 - 47.0 %    .8 (H) 80.0 - 94.0 fL    MCH 32.3 (H) 27.0 - 31.0 pg    MCHC 31.4 (L) 33.0 - 36.0 g/dL    RDW 13.7 11.5 - 17.0 %    Platelet 200 130 - 400 x10(3)/mcL    MPV 10.8 (H) 7.4 - 10.4 fL    Neut % 77.2 %    Lymph % 13.1 %    Mono % 5.3 %    Eos % 2.5 %    Basophil % 0.2 %    Imm Grans % 1.7 %    Neut # 7.50 2.1 - 9.2 x10(3)/mcL     Lymph # 1.27 0.6 - 4.6 x10(3)/mcL    Mono # 0.52 0.1 - 1.3 x10(3)/mcL    Eos # 0.24 0 - 0.9 x10(3)/mcL    Baso # 0.02 <=0.2 x10(3)/mcL    Imm Gran # 0.17 (H) 0.00 - 0.04 x10(3)/mcL    NRBC% 0.0 %     Blood Culture #2 **CANNOT BE ORDERED STAT** [[id number]] (Abnormal)  Collected: 03/26/25 1648   Order Status: Completed Specimen: Blood from Arm, Right Updated: 04/03/25 0710    Blood Culture Providencia rettgeri Abnormal      Klebsiella oxytoca Abnormal     GRAM STAIN Gram Negative Rods Panic      Seen in gram stain of broth only Panic      2 of 2 bottles positive Panic    Susceptibility     Klebsiella oxytoca     ELSA     Ampicillin >=32 Resistant     Cefepime <=0.12 Sensitive     Ceftriaxone <=0.25 Sensitive     Ciprofloxacin <=0.06 Sensitive     ESBL Neg Negative     Gentamicin <=1 Sensitive     Levofloxacin <=0.12 Sensitive     Meropenem <=0.25 Sensitive     Trimeth/Sulfa <=20 Sensitive                      Assessment:  74-year-old female with a history of cervical cancer status post pelvic radiation 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  PMH also includes CKD and colon cancer with ileostomy. She presented to outlying facility with left lower quadrant pain - CT abdomen and pelvis reveals increasing bilateral hydroureteronephrosis and pelvicaliectasis with left double-J stent in appropriate position and removal of right ureteral stent since imaging in February; bilateral perinephric stranding, left periureteral stranding; SP tube in the appropriate position.   -acute renal failure with hyperkalemia and acidosis - initiated on hemodialysis this day, last dialyzed 3/29; possible plans for HD tomorrow if no improvement per nephrology  -blood cultures with Providencia, Klebsiella; urine culture with multiple organisms secondary to chronic SP tube - on cefepime  -IR placed right PCN 3/28      Plan:  -continue culture specific  antibiotics  -continue PCN, SP tube to drainage, accurate I&O  -Nephrology planning HD today  -She will f/u outpatient with Gloria Candelario for stent/nephrostomy management      Tala Cherry NP

## 2025-04-03 NOTE — PLAN OF CARE
Problem: Adult Inpatient Plan of Care  Goal: Absence of Hospital-Acquired Illness or Injury  Outcome: Progressing     Problem: Adult Inpatient Plan of Care  Goal: Optimal Comfort and Wellbeing  Outcome: Progressing     Problem: Wound  Goal: Optimal Functional Ability  Outcome: Progressing     Problem: Wound  Goal: Absence of Infection Signs and Symptoms  Outcome: Progressing     Problem: Wound  Goal: Optimal Pain Control and Function  Outcome: Progressing     Problem: Wound  Goal: Skin Health and Integrity  Outcome: Progressing     Problem: Wound  Goal: Optimal Wound Healing  Outcome: Progressing     Problem: Skin Injury Risk Increased  Goal: Skin Health and Integrity  Outcome: Progressing     Problem: Hemodialysis  Goal: Safe, Effective Therapy Delivery  Outcome: Progressing     Problem: Hemodialysis  Goal: Effective Tissue Perfusion  Outcome: Progressing     Problem: Hemodialysis  Goal: Absence of Infection Signs and Symptoms  Outcome: Progressing     Problem: Coping Ineffective  Goal: Effective Coping  Outcome: Progressing     Problem: Infection  Goal: Absence of Infection Signs and Symptoms  Outcome: Progressing

## 2025-04-03 NOTE — PROGRESS NOTES
Patient Name: Kam Reyes   MRN: [id number]   Admission Date: 3/27/2025   Hospital Length of Stay: 7   Attending Provider: Tyron Doe MD   Consulting Provider: STARLA Main  Reason for Consult: Goals of Care  Primary Care Physician:  Gregor Heaton MD     Principal Problem: <principal problem not specified>     Patient information was obtained from patient and ER records.     Final diagnoses:  [N12] Pyelonephritis      Assessment/Plan:     I reviewed the patient's understanding of the seriousness of the illness and its expected prognosis. We discussed the patient's goals of care and treatment preferences. I identified the surrogate decision maker to be her son, Pranay Reyes. I answered all questions and we formulated a plan including recommendations for symptom management and how to best achieve goals of care.       She is resting comfortably in bed.  Denies complaints at this time.  States she will be having dialysis today after tunnel catheter procedure.  Reviewed anxiety regimen and explained that she has Xanax available if needed prior to dialysis.  States she feels anxiety is overall controlled on Cymbalta and does not feel she needs Xanax at this time.  Discussed goals moving forward on discharge.  Her son lives at home with her and does not work.  States he will be able to transport her to and from dialysis and assist with her care.  Discussed continuation of dialysis at outlying facility with renal recommendation to follow up outpatient and monitor need for continuation of renal replacement therapy with Dr. Max.  States she plans to continue dialysis at present and asks if she will ever be able to come off of dialysis.  Explained that given under lying CKD 4 it is less likely that she will have significant renal recovery long-term, however her nephrologist will monitor renal function for recovery.  She verbalizes understanding of need for dialysis and that the alternative would be  to transition to comfort measures. She is not enthusiastic about continuation of dialysis, but is agreeable.  Additionally reviewed urology recommendation for outpatient follow up regarding stenting and she plans to pursue this.  Offered support. Encouraged to call with questions or concerns. Palliative Medicine will continue to follow.    Advance Care Planning     Date: 04/03/2025    [facility]  I engaged the patient in a voluntary conversation about advance care planning and we specifically addressed what the goals of care would be moving forward, in light of the patient's change in clinical status, specifically current continue.  We did specifically address the patient's likely prognosis, which is fair .  We explored the patient's values and preferences for future care.  The patient endorses that what is most important right now is to focus on curative/life-prolongation (regardless of treatment burdens)    Accordingly, we have decided that the best plan to meet the patient's goals includes continuing with treatment       Interval History:     No acute events overnight. Per nephrology recommendations, following renal function with IVF overnight with plans for possible HD this morning pending labs.      Active Ambulatory Problems     Diagnosis Date Noted    Anemia of chronic kidney failure, stage 4 (severe) 01/01/2023    GERD (gastroesophageal reflux disease) 08/12/2021    Ileostomy status 01/01/2023    Recurrent major depression 01/01/2023    Personal history of other malignant neoplasm of large intestine 01/01/2023    Hypothyroid 08/12/2021    Hypertension 01/26/2024    Vesicovaginal fistula 03/01/2021    Multiple drug resistant organism (MDRO) culture positive 04/17/2024    Metabolic acidosis 04/17/2024     Resolved Ambulatory Problems     Diagnosis Date Noted    Dyspnea 08/30/2022    Congenital hypothyroidism without goiter 01/01/2023    Anxiety disorder 08/12/2021    COVID-19 01/26/2024    Acute UTI 01/26/2024     Fracture of surgical neck of humerus 2024    Hypertensive chronic kidney disease w stg 1-4/unsp chr kdny 2023    Edema of left upper extremity 2024    Gastrointestinal hemorrhage with melena 2024    Altered awareness, transient 2024    Moderate protein-calorie malnutrition 2024    Acute kidney injury superimposed on chronic kidney disease 2024     Past Medical History:   Diagnosis Date    Cancer, colon     Chronic pain     Renal disorder     Thyroiditis, unspecified         Past Surgical History:   Procedure Laterality Date    ANGIOGRAM, AV SHUNT, WITH PERCUTANEOUS MECHANICAL THROMBECTOMY, ANGIOPLASTY, AND STENT INSERTION Left     BACK SURGERY       SECTION      CHOLECYSTECTOMY      COLON SURGERY      COLONOSCOPY N/A 2024    Procedure: COLONOSCOPY;  Surgeon: Ash Haley III, MD;  Location: [facility];  Service: Endoscopy;  Laterality: N/A;    COLONOSCOPY, WITH 1 OR MORE BIOPSIES N/A 2024    Procedure: COLONOSCOPY, WITH 1 OR MORE BIOPSIES;  Surgeon: Ash Haley III, MD;  Location: [facility];  Service: Endoscopy;  Laterality: N/A;  rectal biopsy; cold    EGD, WITH CLOSED BIOPSY N/A 2024    Procedure: EGD, WITH CLOSED BIOPSY;  Surgeon: Robson Tripp MD;  Location: [facility];  Service: Endoscopy;  Laterality: N/A;  A) BX DUODENUM, B) BX ANTRUM FOR H.PYLORI, C) BX GE JUNCTION    ESOPHAGOGASTRODUODENOSCOPY N/A 2024    Procedure: EGD (ESOPHAGOGASTRODUODENOSCOPY);  Surgeon: Robson Tripp MD;  Location: [facility];  Service: Endoscopy;  Laterality: N/A;  A) DIFFUSE GASTRITIS OF ANTRUM & STOMACH    HYSTERECTOMY      KIDNEY SURGERY          Review of patient's allergies indicates:   Allergen Reactions    Oxaprozin Nausea And Vomiting and Swelling     Facial swelling      Sulfamethoxazole-trimethoprim Nausea And Vomiting     Severe nausea and vomiting    Doxycycline     Nsaids (non-steroidal anti-inflammatory drug) Rash    Sulfa  "(sulfonamide antibiotics) Nausea And Vomiting        Current Medications[1]       Current Facility-Administered Medications:     0.9%  NaCl infusion (for blood administration), , Intravenous, Q24H PRN    0.9%  NaCl infusion (for blood administration), , Intravenous, Q24H PRN    0.9%  NaCl infusion (for blood administration), , Intravenous, Q24H PRN    acetaminophen, 650 mg, Oral, Q4H PRN    albumin human 25%, 25 g, Intravenous, Q20 Min PRN    ALPRAZolam, 0.25 mg, Oral, BID PRN    aluminum-magnesium hydroxide-simethicone, 30 mL, Oral, QID PRN    bisacodyL, 10 mg, Rectal, Daily PRN    dextrose 50%, 12.5 g, Intravenous, PRN    dextrose 50%, 25 g, Intravenous, PRN    glucagon (human recombinant), 1 mg, Intramuscular, PRN    glucose, 16 g, Oral, PRN    glucose, 24 g, Oral, PRN    HYDROcodone-acetaminophen, 1 tablet, Oral, Q6H PRN    melatonin, 6 mg, Oral, Nightly PRN    morphine, 2 mg, Intravenous, Q4H PRN    ondansetron, 4 mg, Intravenous, Q6H PRN    polyethylene glycol, 17 g, Oral, BID PRN    promethazine, 12.5 mg, Oral, Q6H PRN    sodium chloride 0.9%, 10 mL, Intravenous, PRN     Family History   Problem Relation Name Age of Onset    Hypertension Mother            Review of Systems   Constitutional: Negative.    Respiratory: Negative.     Cardiovascular: Negative.    Gastrointestinal: Negative.    Neurological: Negative.             Objective:   /70   Pulse 63   Temp 98 °F (36.7 °C) (Oral)   Resp 20   Ht 5' 2" (1.575 m)   Wt 76.8 kg (169 lb 5 oz)   SpO2 99%   BMI 30.97 kg/m²      Physical Exam   Constitutional: She is oriented to person, place, and time. No distress. She appears ill.   HENT:   Mouth/Throat: Mucous membranes are moist.   Cardiovascular: Normal rate. Pulmonary:      Effort: Pulmonary effort is normal. No respiratory distress.     Abdominal: She exhibits no distension.   Musculoskeletal:      Cervical back: Normal range of motion.      Right lower leg: No edema.      Left lower leg: No " edema.   Neurological: She is alert and oriented to person, place, and time.   Skin: Skin is warm and dry.   Psychiatric: Her behavior is normal.          Review of Symptoms  Review of Symptoms      Symptom Assessment (ESAS 0-10 Scale)  Pain:  0  Dyspnea:  0  Anxiety:  0  Nausea:  0  Depression:  0  Anorexia:  0  Fatigue:  0  Insomnia:  0  Restlessness:  0  Agitation:  0         Living Arrangements:  Lives with family and Lives in home    Psychosocial/Cultural:   See Palliative Psychosocial Note: Yes  **Primary  to Follow**  Palliative Care  Consult: No      Advance Care Planning   Advance Directives:     Decision Making:  Patient answered questions  Goals of Care: What is most important right now is to focus on curative/life-prolongation (regardless of treatment burdens). Accordingly, we have decided that the best plan to meet the patient's goals includes continuing with treatment.          PAINAD: NA    Caregiver burden formerly assessed: Yes    > 50% of 35 min of encounter was spent in chart review, face to face discussion of goals of care, symptom assessment, coordination of care and emotional support.    STARLA Main-BC  Palliative Medicine  [facility]         [1]   Current Facility-Administered Medications:     0.9%  NaCl infusion (for blood administration), , Intravenous, Q24H PRN, Deanne Molina MD    0.9%  NaCl infusion (for blood administration), , Intravenous, Q24H PRN, Tyron Doe MD    0.9%  NaCl infusion (for blood administration), , Intravenous, Q24H PRN, Deanne Molina MD    acetaminophen tablet 650 mg, 650 mg, Oral, Q4H PRN, Sanam Jackson FNP, 650 mg at 03/28/25 1322    albumin human 25% bottle 25 g, 25 g, Intravenous, Q20 Min PRN, Deanne Molina MD    ALPRAZolam tablet 0.25 mg, 0.25 mg, Oral, BID PRN, Jailene Powell NP, 0.25 mg at 03/29/25 0807    aluminum-magnesium hydroxide-simethicone 200-200-20 mg/5 mL suspension 30 mL, 30 mL, Oral, QID  PRN, Sanam Jackson, POOJAP    bisacodyL suppository 10 mg, 10 mg, Rectal, Daily PRN, Sanam Jackson, POOJAP    calcitRIOL capsule 0.25 mcg, 0.25 mcg, Oral, Daily, Deanne Molina MD, 0.25 mcg at 04/02/25 0833    clopidogreL tablet 75 mg, 75 mg, Oral, QHS, Tyron Doe MD, 75 mg at 04/02/25 2210    dextrose 50% injection 12.5 g, 12.5 g, Intravenous, PRN, Sanam Jackson, FNP, 12.5 g at 03/28/25 0631    dextrose 50% injection 25 g, 25 g, Intravenous, PRN, Sanam Jackson, POOJAP    DULoxetine DR capsule 60 mg, 60 mg, Oral, QHS, Tyron Doe MD, 60 mg at 04/02/25 2210    ferrous sulfate tablet 1 each, 1 tablet, Oral, Daily, Tyron Doe MD, 1 each at 04/02/25 0833    glucagon (human recombinant) injection 1 mg, 1 mg, Intramuscular, PRN, Sanam Jackson, STARLA    glucose chewable tablet 16 g, 16 g, Oral, PRN, Elenita Jacksonsa, POOJAP    glucose chewable tablet 24 g, 24 g, Oral, PRN, Sanam Jackson, FNP    HYDROcodone-acetaminophen  mg per tablet 1 tablet, 1 tablet, Oral, Q6H PRN, Tyron Doe MD, 1 tablet at 04/01/25 1415    lactated ringers infusion, , Intravenous, Continuous, Deanne Molina MD, Last Rate: 125 mL/hr at 04/02/25 1845, New Bag at 04/02/25 1845    levothyroxine tablet 137 mcg, 137 mcg, Oral, Before breakfast, Tyron Doe MD, 137 mcg at 04/03/25 0524    melatonin tablet 6 mg, 6 mg, Oral, Nightly PRN, Sanam Jackson FNP    midodrine tablet 10 mg, 10 mg, Oral, TID WM, Deanne Molina MD, 10 mg at 04/02/25 1627    morphine injection 2 mg, 2 mg, Intravenous, Q4H PRN, Tyron Doe MD    ondansetron injection 4 mg, 4 mg, Intravenous, Q6H PRN, Tyron Doe MD, 4 mg at 03/31/25 2058    piperacillin-tazobactam (ZOSYN) 4.5 g in D5W 100 mL IVPB (MB+), 4.5 g, Intravenous, Q12H, Sanam Jackson FNP, Last Rate: 25 mL/hr at 04/03/25 0529, 4.5 g at 04/03/25 0529    polyethylene glycol packet 17 g, 17 g, Oral, BID PRN, Sanam Jackson, POOJAP    promethazine tablet 12.5 mg, 12.5 mg, Oral, Q6H PRN, Tyron Doe,  MD    QUEtiapine tablet 25 mg, 25 mg, Oral, QHS, Tyron Doe MD, 25 mg at 04/02/25 7067    sodium chloride 0.9% flush 10 mL, 10 mL, Intravenous, PRN, Sanam Jackson, FNP

## 2025-04-03 NOTE — CONSULTS
INTERVENTIONAL NEPHROLOGY INITIAL CONSULTATION NOTE       Patient Name: Kam Muñozjosias THOMPSON 1950    Patient Seen Date: 2025  Patient Seen Time: 12:02 PM     Consult requested by: Scotty Dixon MD     Reason for consult: Need for tunneled HD catheter insertion.       HPI: 74-year-old female with CKD stage 4 as managed by Dr. Max, colon cancer with ileostomy, CAD, and hypoparathyroidism was transferred to the hospital with signs/symptoms of a complicated UTI. Renal indices since transfer revealed worsening values. Nephrology service was consulted and the pt was initiated on HD treatments on 3/28/25 via a previously placed L BA AVG. Nephrology service desires to continue HD treatments as outpatient, hence interventional nephrology service was consulted for insertion of a tunneled dialysis catheter.    Pt seen and examined at bedside this AM. Pt denies complaints but appears somewhat depressed. Risks and benefits of tunneled dialysis catheter insertion and intravenous conscious sedation was reviewed with the patient. The patient agrees to proceed with the intended procedure. Consents for both intravenous conscious sedation and procedure were signed and placed within the chart.     We re-evaluated the pt in the dialysis unit to confirm that the graft was functioning optimally. At that time we determined that a tunneled dialysis catheter was not necessary and cancelled plans for catheter insertion.    Review of Systems:  General:  No fatigue  Skin: No rashes  HEENT: No vision changes  CVS: No CP  RS: No SOB  GIT: No abdominal pain  Extremities: No swelling  Neurological:  No focal weakness  Psych: No depression    Past Medical History:   Diagnosis Date    Cancer, colon     Chronic pain     GERD (gastroesophageal reflux disease)     Renal disorder     Thyroiditis, unspecified       Past Surgical History:   Procedure Laterality Date    ANGIOGRAM, AV SHUNT, WITH PERCUTANEOUS MECHANICAL  THROMBECTOMY, ANGIOPLASTY, AND STENT INSERTION Left     BACK SURGERY       SECTION      CHOLECYSTECTOMY      COLON SURGERY      COLONOSCOPY N/A 2024    Procedure: COLONOSCOPY;  Surgeon: Ash Haley III, MD;  Location: [facility];  Service: Endoscopy;  Laterality: N/A;    COLONOSCOPY, WITH 1 OR MORE BIOPSIES N/A 2024    Procedure: COLONOSCOPY, WITH 1 OR MORE BIOPSIES;  Surgeon: Ash Haley III, MD;  Location: [facility];  Service: Endoscopy;  Laterality: N/A;  rectal biopsy; cold    EGD, WITH CLOSED BIOPSY N/A 2024    Procedure: EGD, WITH CLOSED BIOPSY;  Surgeon: Robson Tripp MD;  Location: [facility];  Service: Endoscopy;  Laterality: N/A;  A) BX DUODENUM, B) BX ANTRUM FOR H.PYLORI, C) BX GE JUNCTION    ESOPHAGOGASTRODUODENOSCOPY N/A 2024    Procedure: EGD (ESOPHAGOGASTRODUODENOSCOPY);  Surgeon: Robson Tripp MD;  Location: [facility];  Service: Endoscopy;  Laterality: N/A;  A) DIFFUSE GASTRITIS OF ANTRUM & STOMACH    HYSTERECTOMY      KIDNEY SURGERY        Review of patient's allergies indicates:   Allergen Reactions    Oxaprozin Nausea And Vomiting and Swelling     Facial swelling      Sulfamethoxazole-trimethoprim Nausea And Vomiting     Severe nausea and vomiting    Doxycycline     Nsaids (non-steroidal anti-inflammatory drug) Rash    Sulfa (sulfonamide antibiotics) Nausea And Vomiting      Social History[1]   Family History   Problem Relation Name Age of Onset    Hypertension Mother         Current Medications[2]    Vital Signs (24 h):  Temp:  [97.5 °F (36.4 °C)-98.2 °F (36.8 °C)] 97.6 °F (36.4 °C)  Pulse:  [63-74] 72  Resp:  [16-20] 20  SpO2:  [93 %-99 %] 99 %  BP: (109-149)/(58-81) 119/63   I/O last 3 completed shifts:  In: 1130 [P.O.:180; I.V.:950]  Out: 2800 [Urine:1275; Stool:1525]  I/O this shift:  In: 120 [P.O.:120]  Out: 450 [Stool:450]        Physical Exam:  General: NAD  HEENT: NC/AT, EOMI  CVS: RRR     RS: breathing easily  Abdominal: +  ileostomy.  Extremities: No edema b/l LE  Skin: + pallor, no rash, no lesions.  Neurological: No focal deficits.  Psych: + bland affect  Dialysis Access: L BA AVG with good thrill.     Results:    Lab Results   Component Value Date     04/03/2025     06/29/2024     06/03/2005    K 3.7 04/03/2025    K 3.7 06/29/2024    K 4.4 06/03/2005     04/03/2025     (H) 06/29/2024     06/03/2005    CO2 27 04/03/2025    CO2 18 (L) 06/29/2024    CO2 20 (L) 06/03/2005    BUN 30.3 (H) 04/03/2025    BUN 38 (H) 06/29/2024    BUN 11 06/03/2005    CREATININE 6.19 (H) 04/03/2025    CREATININE 3.92 (H) 06/29/2024    CREATININE 1.0 06/03/2005    GLU 91 06/03/2005     Lab Results   Component Value Date    WBC 9.72 04/03/2025    WBC 28.93 03/27/2025    WBC 5.67 06/03/2005    HGB 10.4 (L) 04/03/2025    HGB 13.5 06/03/2005     04/03/2025     06/03/2005    .8 (H) 04/03/2025    MCV 92.2 06/03/2005       Assessment and Plan:      GHADA on CKD on HD via L BA AVG.  Request for tunneled dialysis catheter insertion.  Pt with GHADA on CKD on HD, currently via L BA AVG during this hospitalization, has been evaluated by nephrology service and determined to require continued HD treatment as outpatient. L BA AVG appears to be functioning well without concern per HD unit.   - Will forgo placement of tunneled dialysis catheter given functional L BA AVG.  - If L BA AVG becomes dysfunctional or nonfunctional, consider consultation of original vascular surgery service (Dr. Rossi).  - If tunneled dialysis catheter is required in the future, please do not hesitate to re-consult.    Thank you for your consult. Please feel free to reach me with any questions.    Real Angeles,   Interventional Nephrology  Cell: [phone]       [1]   Social History  Tobacco Use    Smoking status: Never    Smokeless tobacco: Never   Substance Use Topics    Alcohol use: Never    Drug use: Never   [2]   Current  Facility-Administered Medications:     0.9%  NaCl infusion (for blood administration), , Intravenous, Q24H PRN, Deanne Molina MD    0.9%  NaCl infusion (for blood administration), , Intravenous, Q24H PRN, Tyron Doe MD    0.9%  NaCl infusion (for blood administration), , Intravenous, Q24H PRN, Deanne Molina MD    acetaminophen tablet 650 mg, 650 mg, Oral, Q4H PRN, Sanam Jackson, POOJAP, 650 mg at 03/28/25 1322    albumin human 25% bottle 25 g, 25 g, Intravenous, Q20 Min PRN, Deanne Molina MD    ALPRAZolam tablet 0.25 mg, 0.25 mg, Oral, BID PRN, Jailene Powell NP, 0.25 mg at 03/29/25 0826    aluminum-magnesium hydroxide-simethicone 200-200-20 mg/5 mL suspension 30 mL, 30 mL, Oral, QID PRN, Sanam Jackson FNP    bisacodyL suppository 10 mg, 10 mg, Rectal, Daily PRN, Sanam Jackson FNP    calcitRIOL capsule 0.25 mcg, 0.25 mcg, Oral, Daily, Deanne Molina MD, 0.25 mcg at 04/03/25 0856    ceFEPIme injection 1 g, 1 g, Intravenous, Q24H, Scotty Dixon MD    clopidogreL tablet 75 mg, 75 mg, Oral, QHS, Tyron Doe MD, 75 mg at 04/02/25 2210    dextrose 50% injection 12.5 g, 12.5 g, Intravenous, PRN, Ricardo Jacksonyssa, FNP, 12.5 g at 03/28/25 0631    dextrose 50% injection 25 g, 25 g, Intravenous, PRN, Sanam Jackson FNP    DULoxetine DR capsule 60 mg, 60 mg, Oral, QHS, Tyron Doe MD, 60 mg at 04/02/25 2210    ferrous sulfate tablet 1 each, 1 tablet, Oral, Daily, Tyron Doe MD, 1 each at 04/03/25 0856    glucagon (human recombinant) injection 1 mg, 1 mg, Intramuscular, PRN, Sanam Jackson POOJAP    glucose chewable tablet 16 g, 16 g, Oral, PRN, Sanam Jackson, POOJAP    glucose chewable tablet 24 g, 24 g, Oral, PRN, Sanam Jackson, POOJAP    HYDROcodone-acetaminophen  mg per tablet 1 tablet, 1 tablet, Oral, Q6H PRN, Tyron Doe MD, 1 tablet at 04/01/25 1415    lactated ringers infusion, , Intravenous, Continuous, Deanne Molina MD, Last Rate: 50 mL/hr at 04/03/25 0838, Rate Change at 04/03/25  0838    levothyroxine tablet 137 mcg, 137 mcg, Oral, Before breakfast, Tyron Doe MD, 137 mcg at 04/03/25 0524    melatonin tablet 6 mg, 6 mg, Oral, Nightly PRN, Sanam Jackson FNP    midodrine tablet 10 mg, 10 mg, Oral, TID WM, Deanne Molina MD, 10 mg at 04/02/25 1627    morphine injection 2 mg, 2 mg, Intravenous, Q4H PRN, Tyron Doe MD    ondansetron injection 4 mg, 4 mg, Intravenous, Q6H PRN, Tyron Doe MD, 4 mg at 03/31/25 2058    polyethylene glycol packet 17 g, 17 g, Oral, BID PRN, Sanam Jackson FNP    promethazine tablet 12.5 mg, 12.5 mg, Oral, Q6H PRN, Tyron Doe MD    QUEtiapine tablet 25 mg, 25 mg, Oral, QHS, Tyron Doe MD, 25 mg at 04/02/25 2210    sodium chloride 0.9% flush 10 mL, 10 mL, Intravenous, PRN, Sanam Jackson FNP

## 2025-04-03 NOTE — PROGRESS NOTES
[facility] Inpatient Progress Note      HPI:     Patient Name: Kam Reyes  Admission Date: 3/27/2025  Hospital Length of Stay: 7 days  Code Status: Full Code   Attending Physician: Tyron Doe MD   Primary Care Physician: Gregor Heaton MD  Principal Problem:<principal problem not specified>      Today patient seen and examined  Labs, recent events, imaging and medications reviewed for this hospital stay  Feels ok  No nausea  On oxygen    Review of Systems:   Constitutional: Denies fever, fatigue, generalized weakness, night sweats, or acute weight change  Skin: Denies wounds, no rashes, no itching, no new skin lesions  HEENT: Denies acute change in hearing or vision, tinnitus, or dysphagia  Respiratory:  Denies cough, shortness of breath, or wheezing  Cardiovascular: Denies chest pain, palpitations, or swelling  Gastrointestional: Denies abdominal pain, nausea, vomiting, diarrhea, or constipation  Genitourinary: nephrostomy drainage ok  Musculoskeletal: Denies myalgias, back pain, flank pain, new decreased ROM or acute focal weakness  Neurological: Denies headaches, seizures, dizziness, paresthesias or asterixis  Hematological: Denies unusual bruising or bleeding      Medications:   Scheduled Meds:   calcitRIOL  0.25 mcg Oral Daily    clopidogreL  75 mg Oral QHS    DULoxetine  60 mg Oral QHS    ferrous sulfate  1 tablet Oral Daily    levothyroxine  137 mcg Oral Before breakfast    midodrine  10 mg Oral TID WM    piperacillin-tazobactam (Zosyn) IV (PEDS and ADULTS) (extended infusion is not appropriate)  4.5 g Intravenous Q12H    QUEtiapine  25 mg Oral QHS     Continuous Infusions:   lactated ringers   Intravenous Continuous 125 mL/hr at 04/02/25 1845 New Bag at 04/02/25 1845         Vitals:     Vitals:    04/02/25 2000 04/02/25 2333 04/03/25 0500 04/03/25 0738   BP:  (!) 126/58 109/65 116/70   BP Location:       Pulse: 69 74 68 63   Resp:  18 16 20   Temp:  97.9 °F (36.6 °C) 97.5 °F (36.4 °C) 98  °F (36.7 °C)   TempSrc:  Oral Oral Oral   SpO2:  98% 99% 99%   Weight:       Height:             I/O last 3 completed shifts:  In: 1130 [P.O.:180; I.V.:950]  Out: 2800 [Urine:1275; Stool:1525]    Intake/Output Summary (Last 24 hours) at 4/3/2025 0834  Last data filed at 4/3/2025 0529  Gross per 24 hour   Intake 1130 ml   Output 2050 ml   Net -920 ml       Physical Exam:   General: no acute distress, awake, alert, pale  Eyes: PERRLA, EOMI, conjunctiva clear, eyelids without swelling  HENT: atraumatic, oropharynx and nasal mucosa patent  Neck: full ROM, no JVD, no thyromegaly or lymphadenopathy  Respiratory: equal,basilar rhonchi  Cardiovascular: RRR without murmur or rub; BL radial and pedal pulses felt  Edema: none  Gastrointestinal: soft, non-tender, non-distended; positive bowel sounds; no masses to palpation  Genitourinary:R nephrostomy  Musculoskeletal: full ROM without limitation or discomfort  Integumentary: warm, dry; no rashes, wounds, or skin lesions  Neurological: oriented, appropriate, no acute deficits        Labs:     Chemistries:   Recent Labs   Lab 03/30/25  0446 03/31/25  0420 04/01/25  0507 04/02/25  0502    138 139 138   K 3.7 3.1* 3.9 4.0   CL 94* 92* 98 98   CO2 34* 33* 25 24   BUN 10.2 13.6 20.1 25.0*   CREATININE 3.34* 4.56* 5.36* 5.82*   CALCIUM 7.3* 6.7* 7.9* 7.3*   BILITOT 0.7 0.8 1.4 1.1   ALKPHOS 111 101 116 99   ALT 7 6 8 6   AST 16 11 18 20   GLUCOSE 94 77* 75* 80*   PHOS 3.8 4.3  --  4.6        CBC/Anemia Labs: Coags:    Recent Labs   Lab 04/01/25  0507 04/02/25  0502 04/03/25  0807   WBC 12.17* 13.72* 9.72   HGB 10.9* 10.5* 10.4*   HCT 33.1* 33.1* 33.1*    229 200   MCV 98.8* 101.5* 102.8*   RDW 15.5 14.4 13.7    Recent Labs   Lab 03/27/25  1402   INR 1.3          Impression:     CKD4  Obstructive uropathy  GHADA , although UO improving, her clearance is getting worse    Plan:   HD today  Tunneled cath  Can FU as outpt on HD with Dr Max, and he could wean her off HD if  needed         Deanne Molina

## 2025-04-03 NOTE — PROGRESS NOTES
Inpatient Nutrition Evaluation    Admit Date: 3/27/2025   Total duration of encounter: 7 days   Patient Age: 74 y.o.    Nutrition Recommendation/Prescription     Continue Renal Diet as tolerated.  Phos binder with meals as medically feasible.   Monitor wt, labs, and intake.    Nutrition Assessment     Chart Review    Reason Seen: length of stay    Malnutrition Screening Tool Results   Have you recently lost weight without trying?: No  Have you been eating poorly because of a decreased appetite?: No   MST Score: 0   Diagnosis:  Severe sepsis secondary to UTI  Acute on chronic renal failure, baseline stage IV   Hyperkalemia   Obstructive uropathy  Complicated UTI, POA   Metabolic acidosis  Anemia, suspect combined inflammatory and iron-deficiency  Hypotension with history of hypertension  Coronary artery disease  Hypothyroidism    Relevant Medical History:   Cancer, colon   Chronic pain   GERD (gastroesophageal reflux disease)   Renal disorder   Thyroiditis, unspecified       Scheduled Medications:  calcitRIOL, 0.25 mcg, Daily  ceFEPIme, 1 g, Q24H  clopidogreL, 75 mg, QHS  DULoxetine, 60 mg, QHS  ferrous sulfate, 1 tablet, Daily  levothyroxine, 137 mcg, Before breakfast  midodrine, 10 mg, TID WM  QUEtiapine, 25 mg, QHS    Continuous Infusions:  lactated ringers, Last Rate: 50 mL/hr at 04/03/25 0838    PRN Medications:   Current Facility-Administered Medications:     0.9%  NaCl infusion (for blood administration), , Intravenous, Q24H PRN    0.9%  NaCl infusion (for blood administration), , Intravenous, Q24H PRN    0.9%  NaCl infusion (for blood administration), , Intravenous, Q24H PRN    acetaminophen, 650 mg, Oral, Q4H PRN    albumin human 25%, 25 g, Intravenous, Q20 Min PRN    ALPRAZolam, 0.25 mg, Oral, BID PRN    aluminum-magnesium hydroxide-simethicone, 30 mL, Oral, QID PRN    bisacodyL, 10 mg, Rectal, Daily PRN    dextrose 50%, 12.5 g, Intravenous, PRN    dextrose 50%, 25 g, Intravenous, PRN    glucagon (human  "recombinant), 1 mg, Intramuscular, PRN    glucose, 16 g, Oral, PRN    glucose, 24 g, Oral, PRN    HYDROcodone-acetaminophen, 1 tablet, Oral, Q6H PRN    melatonin, 6 mg, Oral, Nightly PRN    morphine, 2 mg, Intravenous, Q4H PRN    ondansetron, 4 mg, Intravenous, Q6H PRN    polyethylene glycol, 17 g, Oral, BID PRN    promethazine, 12.5 mg, Oral, Q6H PRN    sodium chloride 0.9%, 10 mL, Intravenous, PRN    Recent Labs   Lab 03/28/25  0452 03/29/25  0914 03/29/25  1405 03/30/25  0446 03/31/25  0420 04/01/25  0507 04/02/25  0502 04/03/25  0807 04/03/25  0934   *  --  142 140 138 139 138 141  --    K 5.6*  --  3.6 3.7 3.1* 3.9 4.0 3.7  --    CALCIUM 6.8*  --  8.0* 7.3* 6.7* 7.9* 7.3* 8.2*  --    PHOS  --   --   --  3.8 4.3  --  4.6 5.2* 5.2*     --  101 94* 92* 98 98 100  --    CO2 11*  --  30 34* 33* 25 24 27  --    BUN 90.4*  --  7.9* 10.2 13.6 20.1 25.0* 30.3*  --    CREATININE 13.05*  --  2.41* 3.34* 4.56* 5.36* 5.82* 6.19*  --    EGFRNORACEVR 3  --  21 14 10 8 7 7  --    GLUCOSE 48*  --  185* 94 77* 75* 80* 80*  --    BILITOT  --   --   --  0.7 0.8 1.4 1.1 0.8  --    ALKPHOS  --   --   --  111 101 116 99 80  --    ALT  --   --   --  7 6 8 6 6  --    AST  --   --   --  16 11 18 20 11  --    ALBUMIN  --   --   --  2.1* 2.0* 2.1* 2.0* 1.8*  --    WBC 15.08* 12.95* 14.39* 9.02 8.72 12.17* 13.72* 9.72  --    HGB 7.3* 7.5* 8.0* 7.3* 6.8* 10.9* 10.5* 10.4*  --    HCT 23.0* 23.0* 25.8* 23.7* 21.6* 33.1* 33.1* 33.1*  --      Nutrition Orders:  Diet Renal On Dialysis      Appetite/Oral Intake: good/% of meals  Factors Affecting Nutritional Intake: none identified  Food/Yazidism/Cultural Preferences: unable to obtain  Food Allergies: no known food allergies  Last Bowel Movement: 04/03/25  Wound(s):  no recent wounds noted per EMR    Comments    4/3/25: Pt eating well per RN; no weight loss noted per EMR weights; noted elevated phos- pt already on renal diet.     Anthropometrics    Height: 5' 2" (157.5 cm),  "   Last Weight: 76.8 kg (169 lb 5 oz) (04/02/25 0800), Weight Method: Bed Scale  BMI (Calculated): 31  BMI Classification: obese grade I (BMI 30-34.9)     Ideal Body Weight (IBW), Female: 110 lb     % Ideal Body Weight, Female (lb): 145.51 %                             Usual Weight Provided By: EMR weight history    Wt Readings from Last 5 Encounters:   04/02/25 76.8 kg (169 lb 5 oz)   03/26/25 72.6 kg (160 lb)   08/22/24 73.9 kg (163 lb)   04/17/24 74.8 kg (164 lb 14.5 oz)   02/22/24 72.8 kg (160 lb 8 oz)     Weight Change(s) Since Admission:   Wt Readings from Last 1 Encounters:   04/02/25 0800 76.8 kg (169 lb 5 oz)   03/27/25 0830 72.6 kg (160 lb 0.9 oz)   Admit Weight: 72.6 kg (160 lb 0.9 oz) (03/27/25 0830), Weight Method: Standard Scale    Patient Education     Not applicable.    Nutrition Goals & Monitoring     Dietitian will monitor: energy intake and weight    Nutrition Risk/Follow-Up: low (follow-up in 5-7 days)  Patients assigned 'low nutrition risk' status do not qualify for a full nutritional assessment but will be monitored and re-evaluated in a 5-7 day time period. Please consult if re-evaluation needed sooner.

## 2025-04-03 NOTE — PROGRESS NOTES
[facility] Hospital Medicine Progress Note        Chief Complaint: Inpatient Follow-up for     HPI:   74-year-old male transferred from an outside facility secondary to complicated UTI involving a suprapubic catheter.  She originally presented for left lower quadrant abdominal pain.  She also has a past medical history of chronic kidney disease stage IV, colon cancer with ileostomy in place, CAD, and hypothyroidism.  She was transferred for Urology consultation as a CT of the abdomen revealed increasing bilateral hydro ureteral nephrosis despite bilateral ureteral stents.     In the emergency room she is noted to have some mild hypotension down to 91/42.  She has a significant leukocytosis of 15.02.  Anemia with a hemoglobin of 8.3.  Elevated potassium at 5.2 he has significant electrolyte abnormalities.  Her urinalysis showed many bacteria with pyuria and hematuria.She was started empirically on IV Rocephin and admitted to the hospitalist group.  She was evaluated by Nephrology.  They recommended hemodialysis due to worsening renal function and hyperkalemia.  Patient refused.  Palliative Care was consulted and patient wishes to remain full code and seek aggressive treatment.  She has not yet decided about hemodialysis.  Urology evaluated.  Recommending continuing antibiotics.  They were previously unable to placed retrograde stents due to poor visualization.  Recommended IR consultation for right antegrade stenting     After further conversations with patient and family patient was finally agreeable to hemodialysis.  Taken this morning and tolerated well.  She has also taken for antegrade right nephrostomy tube placement on 3/28.  Returned to the floor in stable condition.  Will follow up her labs tomorrow morning.  Blood cultures updated this morning.  Still growing Gram-negative rods with PCR positive for Enterobacterales.  She is trend fused 2 units of packed red blood cells on  3/31 due to continued anemia.  Likely some bone marrow suppression in the setting of renal disease and severe sepsis.     Interval Hx:   Patient was seen and evaluated at bedside, oxygenating well on room air.  Treating patient for urosepsis secondary to obstructive uropathy.  Receiving dialysis, plan for tunneled cath today.  Change Zosyn to Rocephin for now.  Antibiotic end date of 04/11.  Follow up with Urology outpatient.  Case was discussed with patient's nurse and  on the floor.    Objective/physical exam:  General: In no acute distress, afebrile, right IJ TLC  Chest: Clear to auscultation bilaterally  Heart: RRR, +S1, S2, no appreciable murmur  Abdomen: Soft, nontender, BS +, right nephrostomy  MSK: Warm, no lower extremity edema, no clubbing or cyanosis  Neurologic: Alert and oriented x4, Cranial nerve II-XII intact, Strength 5/5 in all 4 extremities    VITAL SIGNS: 24 HRS MIN & MAX LAST   Temp  Min: 97.5 °F (36.4 °C)  Max: 98.2 °F (36.8 °C) 98 °F (36.7 °C)   BP  Min: 109/65  Max: 149/79 116/70   Pulse  Min: 63  Max: 74  63   Resp  Min: 16  Max: 20 20   SpO2  Min: 93 %  Max: 99 % 99 %     I have reviewed the following labs:  Recent Labs   Lab 04/01/25  0507 04/02/25  0502 04/03/25  0807   WBC 12.17* 13.72* 9.72   RBC 3.35* 3.26* 3.22*   HGB 10.9* 10.5* 10.4*   HCT 33.1* 33.1* 33.1*   MCV 98.8* 101.5* 102.8*   MCH 32.5* 32.2* 32.3*   MCHC 32.9* 31.7* 31.4*   RDW 15.5 14.4 13.7    229 200   MPV 10.3 10.8* 10.8*     Recent Labs   Lab 04/01/25  0507 04/02/25  0502 04/03/25  0807    138 141   K 3.9 4.0 3.7   CL 98 98 100   CO2 25 24 27   BUN 20.1 25.0* 30.3*   CREATININE 5.36* 5.82* 6.19*   CALCIUM 7.9* 7.3* 8.2*   ALBUMIN 2.1* 2.0* 1.8*   ALKPHOS 116 99 80   ALT 8 6 6   AST 18 20 11   BILITOT 1.4 1.1 0.8     Microbiology Results (last 7 days)       Procedure Component Value Units Date/Time    Blood Culture [[id number]]  (Normal) Collected: 03/31/25 0707    Order Status: Completed  Specimen: Blood from Hand, Right Updated: 04/03/25 0901     Blood Culture No Growth At 72 Hours    Blood Culture [[id number]]  (Normal) Collected: 03/31/25 0637    Order Status: Completed Specimen: Blood from Arm, Right Updated: 04/03/25 0901     Blood Culture No Growth At 72 Hours             See below for Radiology    Assessment/Plan:  Severe sepsis secondary to UTI  Acute on chronic renal failure, baseline stage IV   Hyperkalemia   Obstructive uropathy  Complicated UTI, POA   Metabolic acidosis  Anemia, suspect combined inflammatory and iron-deficiency  Hypotension with history of hypertension  Coronary artery disease  Hypothyroidism     Antibiotics changed to Rocephin 1 g b.i.d. for now; ABX end date of 4/11  Clinically she does seem to be improving.   H&H stable; No evidence of bleeding.  Likely some bone marrow suppression in the setting of inflammatory disease  Dialysis as been Nephrology  Nephrology plans for tunneled cath today  Urology continues to follow.  Nephrostomy tube is functioning well.  Follow up outpatient    Palliative care's on board as well    VTE prophylaxis:     Patient condition:  Stable/Fair/Guarded/ Serious/ Critical    Anticipated discharge and Disposition:         All diagnosis and differential diagnosis have been reviewed; assessment and plan has been documented; I have personally reviewed the labs and test results that are presently available; I have reviewed the patients medication list; I have reviewed the consulting providers response and recommendations. I have reviewed or attempted to review medical records based upon their availability    All of the patient's questions have been  addressed and answered. Patient's is agreeable to the above stated plan. I will continue to monitor closely and make adjustments to medical management as needed.    Portions of this note dictated using EMR integrated voice recognition software, and may be subject to voice recognition errors not  corrected at proofreading. Please contact writer for clarification if needed.   _____________________________________________________________________    Malnutrition Status:  Nutrition consulted. Most recent weight and BMI monitored-     Measurements:  Wt Readings from Last 1 Encounters:   04/02/25 76.8 kg (169 lb 5 oz)   Body mass index is 30.97 kg/m².    Patient has been screened and assessed by RD.    Malnutrition Type:  Context:    Level:      Malnutrition Characteristic Summary:       Interventions/Recommendations (treatment strategy):        Scheduled Med:   calcitRIOL  0.25 mcg Oral Daily    clopidogreL  75 mg Oral QHS    DULoxetine  60 mg Oral QHS    ferrous sulfate  1 tablet Oral Daily    levothyroxine  137 mcg Oral Before breakfast    midodrine  10 mg Oral TID WM    piperacillin-tazobactam (Zosyn) IV (PEDS and ADULTS) (extended infusion is not appropriate)  4.5 g Intravenous Q12H    QUEtiapine  25 mg Oral QHS      Continuous Infusions:   lactated ringers   Intravenous Continuous 125 mL/hr at 04/02/25 1845 New Bag at 04/02/25 1845      PRN Meds:    Current Facility-Administered Medications:     0.9%  NaCl infusion (for blood administration), , Intravenous, Q24H PRN    0.9%  NaCl infusion (for blood administration), , Intravenous, Q24H PRN    0.9%  NaCl infusion (for blood administration), , Intravenous, Q24H PRN    acetaminophen, 650 mg, Oral, Q4H PRN    albumin human 25%, 25 g, Intravenous, Q20 Min PRN    ALPRAZolam, 0.25 mg, Oral, BID PRN    aluminum-magnesium hydroxide-simethicone, 30 mL, Oral, QID PRN    bisacodyL, 10 mg, Rectal, Daily PRN    dextrose 50%, 12.5 g, Intravenous, PRN    dextrose 50%, 25 g, Intravenous, PRN    glucagon (human recombinant), 1 mg, Intramuscular, PRN    glucose, 16 g, Oral, PRN    glucose, 24 g, Oral, PRN    HYDROcodone-acetaminophen, 1 tablet, Oral, Q6H PRN    melatonin, 6 mg, Oral, Nightly PRN    morphine, 2 mg, Intravenous, Q4H PRN    ondansetron, 4 mg, Intravenous, Q6H  PRN    polyethylene glycol, 17 g, Oral, BID PRN    promethazine, 12.5 mg, Oral, Q6H PRN    sodium chloride 0.9%, 10 mL, Intravenous, PRN     Radiology:  I have personally reviewed the following imaging and agree with the radiologist.     X-Ray Chest 1 View  Narrative: EXAMINATION:  XR CHEST 1 VIEW    CPT 88269    CLINICAL HISTORY:  SOB;    COMPARISON:  March 27, 2025    FINDINGS:  Examination reveals cardiomediastinal silhouette to be unchanged as compared with the previous exam.    Some increase interstitial and pulmonary vascular markings may indicate a mild degree of congestion.    No focal consolidative changes otherwise identified.    Central line is seen from a right approach tip in the region of the right atrium  Impression: Increase in interstitial and pulmonary vascular markings may indicate a mild degree of congestion.    Otherwise no significant change as compared with the previous exam    Electronically signed by: Jerry Gayle  Date:    04/01/2025  Time:    08:09      Scotty Dixon MD  Department of Hospital Medicine   [facility]   04/03/2025

## 2025-04-03 NOTE — PLAN OF CARE
Problem: Adult Inpatient Plan of Care  Goal: Plan of Care Review  Outcome: Progressing  Goal: Patient-Specific Goal (Individualized)  Outcome: Progressing  Goal: Absence of Hospital-Acquired Illness or Injury  Outcome: Progressing  Goal: Optimal Comfort and Wellbeing  Outcome: Progressing  Goal: Readiness for Transition of Care  Outcome: Progressing     Problem: Wound  Goal: Optimal Coping  Outcome: Progressing  Goal: Optimal Functional Ability  Outcome: Progressing  Goal: Absence of Infection Signs and Symptoms  Outcome: Progressing  Goal: Improved Oral Intake  Outcome: Progressing  Goal: Optimal Pain Control and Function  Outcome: Progressing  Goal: Skin Health and Integrity  Outcome: Progressing  Goal: Optimal Wound Healing  Outcome: Progressing     Problem: Skin Injury Risk Increased  Goal: Skin Health and Integrity  Outcome: Progressing     Problem: Hemodialysis  Goal: Safe, Effective Therapy Delivery  Outcome: Progressing  Goal: Effective Tissue Perfusion  Outcome: Progressing  Goal: Absence of Infection Signs and Symptoms  Outcome: Progressing     Problem: Coping Ineffective  Goal: Effective Coping  Outcome: Progressing     Problem: Infection  Goal: Absence of Infection Signs and Symptoms  Outcome: Progressing     Problem: Fall Injury Risk  Goal: Absence of Fall and Fall-Related Injury  Outcome: Progressing

## 2025-04-03 NOTE — PLAN OF CARE
04/03/25 0838   Discharge Reassessment   Assessment Type Discharge Planning Reassessment   Did the patient's condition or plan change since previous assessment? No   Discharge Plan discussed with: Patient   Communicated JOSHUA with patient/caregiver Date not available/Unable to determine   Discharge Plan A Home   Discharge Plan B Home   DME Needed Upon Discharge  other (see comments)  (TBD)   Transition of Care Barriers None   Why the patient remains in the hospital Requires continued medical care   Post-Acute Status   Discharge Delays None known at this time     Notified Rhianna that pt will need a dialysis chair setup. Per nephrology she will follow up with   Dr. Max at [facility] in [place]. Confirmed receipt from Rhianna.

## 2025-04-03 NOTE — PROGRESS NOTES
Pharmacist Renal Dose Adjustment Note    Kam Reyes is a 74 y.o. female being treated with the medication cefepime    Patient Data:    Vital Signs (Most Recent):  Temp: 97.6 °F (36.4 °C) (04/03/25 1120)  Pulse: 72 (04/03/25 1120)  Resp: 20 (04/03/25 1120)  BP: 119/63 (04/03/25 1120)  SpO2: 99 % (04/03/25 1120) Vital Signs (72h Range):  Temp:  [97.5 °F (36.4 °C)-98.6 °F (37 °C)]   Pulse:  [53-80]   Resp:  [16-20]   BP: ()/(49-83)   SpO2:  [93 %-100 %]      Recent Labs   Lab 04/01/25  0507 04/02/25  0502 04/03/25  0807   CREATININE 5.36* 5.82* 6.19*     Serum creatinine: 6.19 mg/dL (H) 04/03/25 0807  Estimated creatinine clearance: 7.7 mL/min (A)    Medication:cefepime dose: 1gm frequency q12h will be changed to medication:cefepime  dose:1gm frequency:q24h    Pharmacist's Name: Candace Gay  Pharmacist's Extension: 0347

## 2025-04-04 LAB
ALBUMIN SERPL-MCNC: 1.9 G/DL (ref 3.4–4.8)
BACTERIA BLD CULT: ABNORMAL
BACTERIA BLD CULT: ABNORMAL
BASOPHILS # BLD AUTO: 0.03 X10(3)/MCL
BASOPHILS NFR BLD AUTO: 0.4 %
BUN SERPL-MCNC: 12.1 MG/DL (ref 9.8–20.1)
CALCIUM SERPL-MCNC: 7.5 MG/DL (ref 8.4–10.2)
CHLORIDE SERPL-SCNC: 105 MMOL/L (ref 98–107)
CO2 SERPL-SCNC: 23 MMOL/L (ref 23–31)
CREAT SERPL-MCNC: 3.35 MG/DL (ref 0.55–1.02)
EOSINOPHIL # BLD AUTO: 0.24 X10(3)/MCL (ref 0–0.9)
EOSINOPHIL NFR BLD AUTO: 2.9 %
ERYTHROCYTE [DISTWIDTH] IN BLOOD BY AUTOMATED COUNT: 13.7 % (ref 11.5–17)
GFR SERPLBLD CREATININE-BSD FMLA CKD-EPI: 14 ML/MIN/1.73/M2
GLUCOSE SERPL-MCNC: 77 MG/DL (ref 82–115)
GRAM STN SPEC: ABNORMAL
HCT VFR BLD AUTO: 30.6 % (ref 37–47)
HGB BLD-MCNC: 10 G/DL (ref 12–16)
IMM GRANULOCYTES # BLD AUTO: 0.14 X10(3)/MCL (ref 0–0.04)
IMM GRANULOCYTES NFR BLD AUTO: 1.7 %
LYMPHOCYTES # BLD AUTO: 1.21 X10(3)/MCL (ref 0.6–4.6)
LYMPHOCYTES NFR BLD AUTO: 14.6 %
MAGNESIUM SERPL-MCNC: 1.6 MG/DL (ref 1.6–2.6)
MCH RBC QN AUTO: 32.8 PG (ref 27–31)
MCHC RBC AUTO-ENTMCNC: 32.7 G/DL (ref 33–36)
MCV RBC AUTO: 100.3 FL (ref 80–94)
MONOCYTES # BLD AUTO: 0.53 X10(3)/MCL (ref 0.1–1.3)
MONOCYTES NFR BLD AUTO: 6.4 %
NEUTROPHILS # BLD AUTO: 6.12 X10(3)/MCL (ref 2.1–9.2)
NEUTROPHILS NFR BLD AUTO: 74 %
NRBC BLD AUTO-RTO: 0 %
PHOSPHATE SERPL-MCNC: 3 MG/DL (ref 2.3–4.7)
PHOSPHATE SERPL-MCNC: 3.4 MG/DL (ref 2.3–4.7)
PLATELET # BLD AUTO: 197 X10(3)/MCL (ref 130–400)
PMV BLD AUTO: 10.9 FL (ref 7.4–10.4)
POCT GLUCOSE: 75 MG/DL (ref 70–110)
POTASSIUM SERPL-SCNC: 3.8 MMOL/L (ref 3.5–5.1)
RBC # BLD AUTO: 3.05 X10(6)/MCL (ref 4.2–5.4)
SODIUM SERPL-SCNC: 138 MMOL/L (ref 136–145)
WBC # BLD AUTO: 8.27 X10(3)/MCL (ref 4.5–11.5)

## 2025-04-04 PROCEDURE — 63600175 PHARM REV CODE 636 W HCPCS: Performed by: STUDENT IN AN ORGANIZED HEALTH CARE EDUCATION/TRAINING PROGRAM

## 2025-04-04 PROCEDURE — 80069 RENAL FUNCTION PANEL: CPT | Performed by: STUDENT IN AN ORGANIZED HEALTH CARE EDUCATION/TRAINING PROGRAM

## 2025-04-04 PROCEDURE — 99232 SBSQ HOSP IP/OBS MODERATE 35: CPT | Mod: ,,, | Performed by: INTERNAL MEDICINE

## 2025-04-04 PROCEDURE — 63600175 PHARM REV CODE 636 W HCPCS: Performed by: INTERNAL MEDICINE

## 2025-04-04 PROCEDURE — 36415 COLL VENOUS BLD VENIPUNCTURE: CPT | Performed by: STUDENT IN AN ORGANIZED HEALTH CARE EDUCATION/TRAINING PROGRAM

## 2025-04-04 PROCEDURE — 83735 ASSAY OF MAGNESIUM: CPT | Performed by: STUDENT IN AN ORGANIZED HEALTH CARE EDUCATION/TRAINING PROGRAM

## 2025-04-04 PROCEDURE — 84100 ASSAY OF PHOSPHORUS: CPT | Performed by: STUDENT IN AN ORGANIZED HEALTH CARE EDUCATION/TRAINING PROGRAM

## 2025-04-04 PROCEDURE — 85025 COMPLETE CBC W/AUTO DIFF WBC: CPT | Performed by: STUDENT IN AN ORGANIZED HEALTH CARE EDUCATION/TRAINING PROGRAM

## 2025-04-04 PROCEDURE — 25000003 PHARM REV CODE 250: Performed by: HOSPITALIST

## 2025-04-04 PROCEDURE — 25000003 PHARM REV CODE 250: Performed by: INTERNAL MEDICINE

## 2025-04-04 PROCEDURE — 11000001 HC ACUTE MED/SURG PRIVATE ROOM

## 2025-04-04 RX ORDER — LEVOFLOXACIN 500 MG/1
250 TABLET, FILM COATED ORAL DAILY
Qty: 3 TABLET | Refills: 0 | Status: SHIPPED | OUTPATIENT
Start: 2025-04-04 | End: 2025-04-05

## 2025-04-04 RX ORDER — SODIUM CHLORIDE 9 MG/ML
INJECTION, SOLUTION INTRAVENOUS ONCE
Status: CANCELLED | OUTPATIENT
Start: 2025-04-04 | End: 2025-04-04

## 2025-04-04 RX ADMIN — MIDODRINE HYDROCHLORIDE 10 MG: 5 TABLET ORAL at 01:04

## 2025-04-04 RX ADMIN — FERROUS SULFATE TAB 325 MG (65 MG ELEMENTAL FE) 1 EACH: 325 (65 FE) TAB at 09:04

## 2025-04-04 RX ADMIN — QUETIAPINE FUMARATE 25 MG: 25 TABLET ORAL at 08:04

## 2025-04-04 RX ADMIN — LEVOTHYROXINE SODIUM 137 MCG: 0.03 TABLET ORAL at 04:04

## 2025-04-04 RX ADMIN — CLOPIDOGREL 75 MG: 75 TABLET ORAL at 08:04

## 2025-04-04 RX ADMIN — SODIUM CHLORIDE, POTASSIUM CHLORIDE, SODIUM LACTATE AND CALCIUM CHLORIDE: 600; 310; 30; 20 INJECTION, SOLUTION INTRAVENOUS at 04:04

## 2025-04-04 RX ADMIN — CALCITRIOL CAPSULES 0.25 MCG 0.25 MCG: 0.25 CAPSULE ORAL at 09:04

## 2025-04-04 RX ADMIN — DULOXETINE HYDROCHLORIDE 60 MG: 30 CAPSULE, DELAYED RELEASE ORAL at 08:04

## 2025-04-04 RX ADMIN — CEFEPIME 1 G: 2 INJECTION, POWDER, FOR SOLUTION INTRAVENOUS at 04:04

## 2025-04-04 RX ADMIN — MIDODRINE HYDROCHLORIDE 10 MG: 5 TABLET ORAL at 04:04

## 2025-04-04 RX ADMIN — MIDODRINE HYDROCHLORIDE 10 MG: 5 TABLET ORAL at 09:04

## 2025-04-04 NOTE — PROGRESS NOTES
UROLOGY  PROGRESS  NOTE    Kam GALLEGOS Amy 1950  [id number]  4/4/2025    No complaints   Patient reports plan for discharge today   She was scheduled for suprapubic tube exchange in the office on 04/02/2025, we will exchange today prior to discharge    VSS, afebrile   Right PCN output not documented  350 mL SP tube overnight   WBC 8.27   H&H 10.0/30.6   BUN/creatinine 12.1/3.35    Exam:    GEN: NAD  CV: RRR  RESP: Even and unlabored  ABD: soft, NTND  : clear yellow urine draining from PCN, mucous yellow urine draining to  bag from SP site - suprapubic tube exchanged without difficulty, light yellow urine draining  NEURO:AAOx4      Recent Results (from the past 24 hours)   Renal Function Panel    Collection Time: 04/04/25  5:16 AM   Result Value Ref Range    Sodium 138 136 - 145 mmol/L    Potassium 3.8 3.5 - 5.1 mmol/L    Chloride 105 98 - 107 mmol/L    CO2 23 23 - 31 mmol/L    Glucose 77 (L) 82 - 115 mg/dL    Blood Urea Nitrogen 12.1 9.8 - 20.1 mg/dL    Creatinine 3.35 (H) 0.55 - 1.02 mg/dL    Calcium 7.5 (L) 8.4 - 10.2 mg/dL    Albumin 1.9 (L) 3.4 - 4.8 g/dL    Phosphorus Level 3.0 2.3 - 4.7 mg/dL    eGFR 14 mL/min/1.73/m2   Magnesium    Collection Time: 04/04/25  5:16 AM   Result Value Ref Range    Magnesium Level 1.60 1.60 - 2.60 mg/dL   CBC with Differential    Collection Time: 04/04/25  5:16 AM   Result Value Ref Range    WBC 8.27 4.50 - 11.50 x10(3)/mcL    RBC 3.05 (L) 4.20 - 5.40 x10(6)/mcL    Hgb 10.0 (L) 12.0 - 16.0 g/dL    Hct 30.6 (L) 37.0 - 47.0 %    .3 (H) 80.0 - 94.0 fL    MCH 32.8 (H) 27.0 - 31.0 pg    MCHC 32.7 (L) 33.0 - 36.0 g/dL    RDW 13.7 11.5 - 17.0 %    Platelet 197 130 - 400 x10(3)/mcL    MPV 10.9 (H) 7.4 - 10.4 fL    Neut % 74.0 %    Lymph % 14.6 %    Mono % 6.4 %    Eos % 2.9 %    Basophil % 0.4 %    Imm Grans % 1.7 %    Neut # 6.12 2.1 - 9.2 x10(3)/mcL    Lymph # 1.21 0.6 - 4.6 x10(3)/mcL    Mono # 0.53 0.1 - 1.3 x10(3)/mcL    Eos # 0.24 0 - 0.9 x10(3)/mcL    Baso # 0.03  <=0.2 x10(3)/mcL    Imm Gran # 0.14 (H) 0.00 - 0.04 x10(3)/mcL    NRBC% 0.0 %     Blood Culture #2 **CANNOT BE ORDERED STAT** [[id number]] (Abnormal)  Collected: 03/26/25 1648   Order Status: Completed Specimen: Blood from Arm, Right Updated: 04/03/25 0710    Blood Culture Providencia rettgeri Abnormal      Klebsiella oxytoca Abnormal     GRAM STAIN Gram Negative Rods Panic      Seen in gram stain of broth only Panic      2 of 2 bottles positive Panic    Susceptibility     Klebsiella oxytoca     ELSA     Ampicillin >=32 Resistant     Cefepime <=0.12 Sensitive     Ceftriaxone <=0.25 Sensitive     Ciprofloxacin <=0.06 Sensitive     ESBL Neg Negative     Gentamicin <=1 Sensitive     Levofloxacin <=0.12 Sensitive     Meropenem <=0.25 Sensitive     Trimeth/Sulfa <=20 Sensitive                      Assessment:  74-year-old female with a history of cervical cancer status post pelvic radiation 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  PMH also includes CKD and colon cancer with ileostomy. [facility] CT abdomen and pelvis reveals increasing bilateral hydroureteronephrosis and pelvicaliectasis with left double-J stent in appropriate position and removal of right ureteral stent since imaging in February; bilateral perinephric stranding, left periureteral stranding; SP tube in the appropriate position.   -acute renal failure with hyperkalemia and acidosis - initiated on hemodialysis this day, last dialyzed 3/29; possible plans for HD tomorrow if no improvement per nephrology  -blood cultures with Providencia, Klebsiella; urine culture with multiple organisms secondary to chronic SP tube - on cefepime  -IR placed right PCN 3/28      Plan:  -continue SP tube to drainage and PCN on discharge   -she will follow up outpatient with Dr. Castro for PCN/stent management   -discussed with  patient    Tala Cherry, NP

## 2025-04-04 NOTE — PROGRESS NOTES
Dialysis Coordinator met with patient after notification that outpatient dialysis services would need to be arranged prior to discharge.       Patient has chosen   [facility]   [address]   [place] [id number]  Start Date :  DAYS:        TTS  TIME:          12 pm      New Start education was provided. Navigator, Making the Right Treatment Choice and Vascular Access educational materials were left for home review.    Dialysis Coordinator will continue to follow through out admission and notify Nephrology and Case Management when a chair time has been received.    Rhianna Cunningham  Dialysis Coordinator  [facility]  [phone]

## 2025-04-04 NOTE — PROGRESS NOTES
[facility] Nephrology Inpatient Progress Note      HPI:     Patient Name: Kam Reyes  Admission Date: 3/27/2025  Hospital Length of Stay: 8 days  Code Status: Full Code   Attending Physician: Scotty Dixon MD   Primary Care Physician: Gregor Heaton MD  Principal Problem:<principal problem not specified>      Today patient seen and examined  Labs, recent events, imaging and medications reviewed for this hospital stay  Dialysis well using AV graft yesterday  No new complaints    Review of Systems:   No dyspnea no abdominal pain no nausea vomiting good drainage from the nephrostomy tube  Otherwise no change      Medications:   Scheduled Meds:   calcitRIOL  0.25 mcg Oral Daily    ceFEPIme  1 g Intravenous Q24H    clopidogreL  75 mg Oral QHS    DULoxetine  60 mg Oral QHS    ferrous sulfate  1 tablet Oral Daily    levothyroxine  137 mcg Oral Before breakfast    midodrine  10 mg Oral TID WM    QUEtiapine  25 mg Oral QHS     Continuous Infusions:      Vitals:     Vitals:    04/03/25 2028 04/03/25 2340 04/04/25 0359 04/04/25 0738   BP:  104/65 106/64 114/74   Pulse:  74 71 75   Resp: 20      Temp:  98 °F (36.7 °C) 97.8 °F (36.6 °C) 97.9 °F (36.6 °C)   TempSrc:  Oral Oral Oral   SpO2:  (!) 92% 96% 95%   Weight:       Height:             I/O last 3 completed shifts:  In: 8815 [P.O.:720; I.V.:8095]  Out: 3975 [Urine:1250; Other:500; Stool:2225]    Intake/Output Summary (Last 24 hours) at 4/4/2025 0843  Last data filed at 4/4/2025 0641  Gross per 24 hour   Intake 8694.95 ml   Output 2700 ml   Net 5994.95 ml       Physical Exam:   General: no acute distress, awake, alert pale  Eyes: PERRLA, EOMI, conjunctiva clear, eyelids without swelling  HENT: atraumatic, oropharynx and nasal mucosa patent  Neck: full ROM, no JVD, no thyromegaly or lymphadenopathy  Respiratory: equal, unlabored, clear to auscultation A/P  Cardiovascular: RRR without rub; BL radial and pedal pulses felt  Edema: none  Gastrointestinal: soft, non-tender,  "non-distended; positive bowel sounds; no masses to palpation  Genitourinary:  Right nephrostomy  Musculoskeletal: full ROM without limitation or discomfort  Integumentary: warm, dry; no rashes, wounds, or skin lesions  Neurological: oriented, appropriate, no acute deficits  Dialysis access:  Left upper arm AV graft      Labs:     Chemistries:   Recent Labs   Lab 04/01/25  0507 04/02/25  0502 04/03/25  0807 04/03/25  0934 04/04/25  0516    138 141  --  138   K 3.9 4.0 3.7  --  3.8   CL 98 98 100  --  105   CO2 25 24 27  --  23   BUN 20.1 25.0* 30.3*  --  12.1   CREATININE 5.36* 5.82* 6.19*  --  3.35*   CALCIUM 7.9* 7.3* 8.2*  --  7.5*   BILITOT 1.4 1.1 0.8  --   --    ALKPHOS 116 99 80  --   --    ALT 8 6 6  --   --    AST 18 20 11  --   --    GLUCOSE 75* 80* 80*  --  77*   MG  --   --   --   --  1.60   PHOS  --  4.6 5.2* 5.2* 3.0        CBC/Anemia Labs: Coags:    Recent Labs   Lab 04/02/25  0502 04/03/25  0807 04/04/25  0516   WBC 13.72* 9.72 8.27   HGB 10.5* 10.4* 10.0*   HCT 33.1* 33.1* 30.6*    200 197   .5* 102.8* 100.3*   RDW 14.4 13.7 13.7    No results for input(s): "PT", "INR", "APTT" in the last 168 hours.       Impression:     CKD 4  GHADA needing dialysis  Obstructive uropathy status post right nephrostomy  UTI      Plan:     HD in a.m.  Renal wise okay to discharge as long as outpatient dialysis arranged care of Dr. Lester Max her nephrologist  Okay to discharge on p.o. Cipro  If not discharged we will run her again tomorrow       Deanne Molina   "

## 2025-04-04 NOTE — PLAN OF CARE
Pt's dialysis chair is setup for TTS at noon at [facility] in [place] per Cooper.   They can start next week. MD notified and request for dc after dialysis tomorrow. Pt and family aware.    Consult for HH noted. FOC signed. Pt has no preference. Referral sent to   [facility] via [facility]. Per Chanell, they are out of network. Referral sent to   [facility] via [facility]. Gabrielle with [facility] notified of referral and possible weekend dc.

## 2025-04-04 NOTE — PROCEDURES
"Kam Ryees is a 74 y.o. female patient.    Temp: 97.8 °F (36.6 °C) (04/04/25 1532)  Pulse: 68 (04/04/25 1532)  Resp: 20 (04/03/25 2028)  BP: 136/67 (04/04/25 1532)  SpO2: 96 % (04/04/25 1532)  Weight: 76.8 kg (169 lb 5 oz) (04/02/25 0800)  Height: 5' 2" (157.5 cm) (03/27/25 0830)  Mallampati Scale: Class II  ASA Classification: Class 3    Bladder Cath    Date/Time: 4/4/2025 3:44 PM  Location procedure was performed: [facility] UROLOGY    Performed by: Tala Cherry AGACNP-BC  Authorized by: Tala Cherry AGACNP-BC  Indications: catheter change  Local anesthesia used: no    Anesthesia:  Local anesthesia used: no    Patient sedated: no  Preparation: Patient was prepped and draped in the usual sterile fashion.  Catheter insertion: suprapubic.  Catheter type: Chun  Catheter size: 16 Fr  Complicated insertion: no  Number of attempts: 1  Urine characteristics: clear and yellow  Patient tolerance: Patient tolerated the procedure well with no immediate complications          4/4/2025    "

## 2025-04-04 NOTE — PROGRESS NOTES
[facility] Hospital Medicine Progress Note        Chief Complaint: Inpatient Follow-up for     HPI:   74-year-old male transferred from an outside facility secondary to complicated UTI involving a suprapubic catheter.  She originally presented for left lower quadrant abdominal pain.  She also has a past medical history of chronic kidney disease stage IV, colon cancer with ileostomy in place, CAD, and hypothyroidism.  She was transferred for Urology consultation as a CT of the abdomen revealed increasing bilateral hydro ureteral nephrosis despite bilateral ureteral stents.     In the emergency room she is noted to have some mild hypotension down to 91/42.  She has a significant leukocytosis of 15.02.  Anemia with a hemoglobin of 8.3.  Elevated potassium at 5.2 he has significant electrolyte abnormalities.  Her urinalysis showed many bacteria with pyuria and hematuria.She was started empirically on IV Rocephin and admitted to the hospitalist group.  She was evaluated by Nephrology.  They recommended hemodialysis due to worsening renal function and hyperkalemia.  Patient refused.  Palliative Care was consulted and patient wishes to remain full code and seek aggressive treatment.  She has not yet decided about hemodialysis.  Urology evaluated.  Recommending continuing antibiotics.  They were previously unable to placed retrograde stents due to poor visualization.  Recommended IR consultation for right antegrade stenting     After further conversations with patient and family patient was finally agreeable to hemodialysis.  Taken this morning and tolerated well.  She has also taken for antegrade right nephrostomy tube placement on 3/28.  Returned to the floor in stable condition.  Will follow up her labs tomorrow morning.  Blood cultures updated this morning.  Still growing Gram-negative rods with PCR positive for Enterobacterales.  She is trend fused 2 units of packed red blood cells on  3/31 due to continued anemia.  Likely some bone marrow suppression in the setting of renal disease and severe sepsis.     Interval Hx:   Patient was seen and evaluated at bedside, oxygenating well on room air.  Continue on cefepime 1 g Q 24 hours; Antibiotic end date of 04/11.  Patient possibly discharge tomorrow after dialysis as we are waiting for approval from dialysis center.  Urology has done a suprapubic catheter exchange today.    Case was discussed with patient's nurse and  on the floor.    Objective/physical exam:  General: In no acute distress, afebrile, right IJ TLC  Chest: Clear to auscultation bilaterally  Heart: RRR, +S1, S2, no appreciable murmur  Abdomen: Soft, nontender, BS +, right nephrostomy  MSK: Warm, no lower extremity edema, no clubbing or cyanosis  Neurologic: Alert and oriented x4, Cranial nerve II-XII intact, Strength 5/5 in all 4 extremities    VITAL SIGNS: 24 HRS MIN & MAX LAST   Temp  Min: 97.8 °F (36.6 °C)  Max: 98.1 °F (36.7 °C) 97.8 °F (36.6 °C)   BP  Min: 104/65  Max: 136/67 136/67   Pulse  Min: 67  Max: 78  68   Resp  Min: 20  Max: 20 20   SpO2  Min: 92 %  Max: 97 % 96 %     I have reviewed the following labs:  Recent Labs   Lab 04/02/25  0502 04/03/25  0807 04/04/25  0516   WBC 13.72* 9.72 8.27   RBC 3.26* 3.22* 3.05*   HGB 10.5* 10.4* 10.0*   HCT 33.1* 33.1* 30.6*   .5* 102.8* 100.3*   MCH 32.2* 32.3* 32.8*   MCHC 31.7* 31.4* 32.7*   RDW 14.4 13.7 13.7    200 197   MPV 10.8* 10.8* 10.9*     Recent Labs   Lab 04/01/25  0507 04/02/25  0502 04/03/25  0807 04/04/25  0516    138 141 138   K 3.9 4.0 3.7 3.8   CL 98 98 100 105   CO2 25 24 27 23   BUN 20.1 25.0* 30.3* 12.1   CREATININE 5.36* 5.82* 6.19* 3.35*   CALCIUM 7.9* 7.3* 8.2* 7.5*   MG  --   --   --  1.60   ALBUMIN 2.1* 2.0* 1.8* 1.9*   ALKPHOS 116 99 80  --    ALT 8 6 6  --    AST 18 20 11  --    BILITOT 1.4 1.1 0.8  --      Microbiology Results (last 7 days)       Procedure Component Value Units  Date/Time    Blood Culture [[id number]]  (Normal) Collected: 03/31/25 0707    Order Status: Completed Specimen: Blood from Hand, Right Updated: 04/04/25 0901     Blood Culture No Growth At 96 Hours    Blood Culture [[id number]]  (Normal) Collected: 03/31/25 0637    Order Status: Completed Specimen: Blood from Arm, Right Updated: 04/04/25 0901     Blood Culture No Growth At 96 Hours             See below for Radiology    Assessment/Plan:  Severe sepsis secondary to UTI  Acute on chronic renal failure, baseline stage IV   Hyperkalemia   Obstructive uropathy  Complicated UTI, POA   Metabolic acidosis  Anemia, suspect combined inflammatory and iron-deficiency  Hypotension with history of hypertension  Coronary artery disease  Hypothyroidism     Continue on cefepime 1 g Q 24 hours; ABX end date of 4/11  Patient continues to improve.   H&H stable; No evidence of bleeding.  Likely some bone marrow suppression in the setting of inflammatory disease  Dialysis as per Nephrology; on a TTS schedule  Fistula is working appropriately no need for tunneled cath at this time.  Right IJ catheter removed  Urology continues to follow.  Nephrostomy tube is functioning well.    Suprapubic cath exchanged 4/4  Follow up outpatient, as per Urology    Palliative care's on board as well    VTE prophylaxis:  SCDs    Patient condition:  Stable    Anticipated discharge and Disposition:   Home, likely tomorrow.      All diagnosis and differential diagnosis have been reviewed; assessment and plan has been documented; I have personally reviewed the labs and test results that are presently available; I have reviewed the patients medication list; I have reviewed the consulting providers response and recommendations. I have reviewed or attempted to review medical records based upon their availability    All of the patient's questions have been  addressed and answered. Patient's is agreeable to the above stated plan. I will continue to monitor closely  and make adjustments to medical management as needed.    Portions of this note dictated using EMR integrated voice recognition software, and may be subject to voice recognition errors not corrected at proofreading. Please contact writer for clarification if needed.   _____________________________________________________________________    Malnutrition Status:  Nutrition consulted. Most recent weight and BMI monitored-     Measurements:  Wt Readings from Last 1 Encounters:   04/02/25 76.8 kg (169 lb 5 oz)   Body mass index is 30.97 kg/m².    Patient has been screened and assessed by RD.    Malnutrition Type:  Context:    Level:      Malnutrition Characteristic Summary:       Interventions/Recommendations (treatment strategy):        Scheduled Med:   calcitRIOL  0.25 mcg Oral Daily    ceFEPIme  1 g Intravenous Q24H    clopidogreL  75 mg Oral QHS    DULoxetine  60 mg Oral QHS    ferrous sulfate  1 tablet Oral Daily    levothyroxine  137 mcg Oral Before breakfast    midodrine  10 mg Oral TID WM    QUEtiapine  25 mg Oral QHS      Continuous Infusions:       PRN Meds:    Current Facility-Administered Medications:     0.9%  NaCl infusion (for blood administration), , Intravenous, Q24H PRN    0.9%  NaCl infusion (for blood administration), , Intravenous, Q24H PRN    0.9%  NaCl infusion (for blood administration), , Intravenous, Q24H PRN    acetaminophen, 650 mg, Oral, Q4H PRN    albumin human 25%, 25 g, Intravenous, Q20 Min PRN    ALPRAZolam, 0.25 mg, Oral, BID PRN    aluminum-magnesium hydroxide-simethicone, 30 mL, Oral, QID PRN    bisacodyL, 10 mg, Rectal, Daily PRN    dextrose 50%, 12.5 g, Intravenous, PRN    dextrose 50%, 25 g, Intravenous, PRN    glucagon (human recombinant), 1 mg, Intramuscular, PRN    glucose, 16 g, Oral, PRN    glucose, 24 g, Oral, PRN    HYDROcodone-acetaminophen, 1 tablet, Oral, Q6H PRN    melatonin, 6 mg, Oral, Nightly PRN    morphine, 2 mg, Intravenous, Q4H PRN    ondansetron, 4 mg, Intravenous,  Q6H PRN    polyethylene glycol, 17 g, Oral, BID PRN    promethazine, 12.5 mg, Oral, Q6H PRN    sodium chloride 0.9%, 10 mL, Intravenous, PRN     Radiology:  I have personally reviewed the following imaging and agree with the radiologist.     X-Ray Chest 1 View  Narrative: EXAMINATION:  XR CHEST 1 VIEW    CPT 62638    CLINICAL HISTORY:  SOB;    COMPARISON:  March 27, 2025    FINDINGS:  Examination reveals cardiomediastinal silhouette to be unchanged as compared with the previous exam.    Some increase interstitial and pulmonary vascular markings may indicate a mild degree of congestion.    No focal consolidative changes otherwise identified.    Central line is seen from a right approach tip in the region of the right atrium  Impression: Increase in interstitial and pulmonary vascular markings may indicate a mild degree of congestion.    Otherwise no significant change as compared with the previous exam    Electronically signed by: Jerry Gayle  Date:    04/01/2025  Time:    08:09      Scotty Dixon MD  Department of Hospital Medicine   [facility]   04/04/2025

## 2025-04-04 NOTE — DISCHARGE SUMMARY
[facility] Medicine Discharge Summary    Admit Date: 3/27/2025  Discharge Date and Time: 4/4/202510:58 AM  Admitting Physician: GIDEON Team  Discharging Physician: Scotty Dixon MD.  Primary Care Physician: Gregor Heaton MD  Consults: Nephrology and Urology and palliative    Discharge Diagnoses:  Severe sepsis secondary to UTI  Acute on chronic renal failure, baseline stage IV   Hyperkalemia   Obstructive uropathy  Complicated UTI, POA   Metabolic acidosis  Anemia, suspect combined inflammatory and iron-deficiency  Hypotension with history of hypertension  Coronary artery disease  Hypothyroidism    Hospital Course:   74-year-old male transferred from an outside facility secondary to complicated UTI involving a suprapubic catheter.  She originally presented for left lower quadrant abdominal pain.  She also has a past medical history of chronic kidney disease stage IV, colon cancer with ileostomy in place, CAD, and hypothyroidism.  She was transferred for Urology consultation as a CT of the abdomen revealed increasing bilateral hydro ureteral nephrosis despite bilateral ureteral stents.     In the emergency room she is noted to have some mild hypotension down to 91/42.  She has a significant leukocytosis of 15.02.  Anemia with a hemoglobin of 8.3.  Elevated potassium at 5.2 he has significant electrolyte abnormalities.  Her urinalysis showed many bacteria with pyuria and hematuria.She was started empirically on IV Rocephin and admitted to the hospitalist group.  She was evaluated by Nephrology.  They recommended hemodialysis due to worsening renal function and hyperkalemia.  Patient refused.  Palliative Care was consulted and patient wishes to remain full code and seek aggressive treatment.  She has not yet decided about hemodialysis.  Urology evaluated.  Recommending continuing antibiotics.  They were previously unable to placed retrograde stents due to poor visualization.   Recommended IR consultation for right antegrade stenting     After further conversations with patient and family patient was finally agreeable to hemodialysis.  Taken this morning and tolerated well.  She has also taken for antegrade right nephrostomy tube placement on 3/28.  Returned to the floor in stable condition.  Will follow up her labs tomorrow morning.  Blood cultures updated this morning.  Still growing Gram-negative rods with PCR positive for Enterobacterales.  She is trend fused 2 units of packed red blood cells on 3/31 due to continued anemia.  Likely some bone marrow suppression in the setting of renal disease and severe sepsis.     4/3: Patient was seen and evaluated at bedside, oxygenating well on room air.  Treating patient for urosepsis secondary to obstructive uropathy.  Receiving dialysis via fistula.  Change Zosyn to cefepime for now.  Antibiotic end date of 04/10.  Follow up with Urology outpatient.  4/4: Patient was seen and evaluated at bedside, o2'ing well on RA. Nephrology has seen the patient and informed her that she can be scheduled for dialysis outpatient and she will likely require dialysis 2x a week. I have informed the patient that abx will switch to levaquin PO with continued treatment until 4/10. Patient will f/u with urology and nephrology outpatient for appropriate needs. Patient has verbalized her understanding of this hospital stay and is safe for d/c at this time.     Case was discussed with patient's nurse and  on the floor.  Pt was seen and examined on the day of discharge  Vitals:  VITAL SIGNS: 24 HRS MIN & MAX LAST   Temp  Min: 97.5 °F (36.4 °C)  Max: 98.1 °F (36.7 °C) 97.9 °F (36.6 °C)   BP  Min: 104/65  Max: 135/77 114/74   Pulse  Min: 64  Max: 75  75   Resp  Min: 20  Max: 20 20   SpO2  Min: 92 %  Max: 99 % 95 %       Physical Exam:  General: In no acute distress, afebrile, right IJ TLC  Chest: Clear to auscultation bilaterally  Heart: RRR, +S1, S2, no  appreciable murmur  Abdomen: Soft, nontender, BS +, right nephrostomy  MSK: Warm, no lower extremity edema, no clubbing or cyanosis  Neurologic: Alert and oriented x4, Cranial nerve II-XII intact, Strength 5/5 in all 4 extremities    Procedures Performed: No admission procedures for hospital encounter.     Significant Diagnostic Studies: See Full reports for all details    Recent Labs   Lab 04/02/25  0502 04/03/25  0807 04/04/25  0516   WBC 13.72* 9.72 8.27   RBC 3.26* 3.22* 3.05*   HGB 10.5* 10.4* 10.0*   HCT 33.1* 33.1* 30.6*   .5* 102.8* 100.3*   MCH 32.2* 32.3* 32.8*   MCHC 31.7* 31.4* 32.7*   RDW 14.4 13.7 13.7    200 197   MPV 10.8* 10.8* 10.9*       Recent Labs   Lab 04/01/25  0507 04/02/25  0502 04/03/25  0807 04/04/25  0516    138 141 138   K 3.9 4.0 3.7 3.8   CL 98 98 100 105   CO2 25 24 27 23   BUN 20.1 25.0* 30.3* 12.1   CREATININE 5.36* 5.82* 6.19* 3.35*   CALCIUM 7.9* 7.3* 8.2* 7.5*   MG  --   --   --  1.60   ALBUMIN 2.1* 2.0* 1.8* 1.9*   ALKPHOS 116 99 80  --    ALT 8 6 6  --    AST 18 20 11  --    BILITOT 1.4 1.1 0.8  --         Microbiology Results (last 7 days)       Procedure Component Value Units Date/Time    Blood Culture [[id number]]  (Normal) Collected: 03/31/25 0707    Order Status: Completed Specimen: Blood from Hand, Right Updated: 04/04/25 0901     Blood Culture No Growth At 96 Hours    Blood Culture [[id number]]  (Normal) Collected: 03/31/25 0637    Order Status: Completed Specimen: Blood from Arm, Right Updated: 04/04/25 0901     Blood Culture No Growth At 96 Hours             X-Ray Chest 1 View  Narrative: EXAMINATION:  XR CHEST 1 VIEW    CPT 98954    CLINICAL HISTORY:  SOB;    COMPARISON:  March 27, 2025    FINDINGS:  Examination reveals cardiomediastinal silhouette to be unchanged as compared with the previous exam.    Some increase interstitial and pulmonary vascular markings may indicate a mild degree of congestion.    No focal consolidative changes otherwise  identified.    Central line is seen from a right approach tip in the region of the right atrium  Impression: Increase in interstitial and pulmonary vascular markings may indicate a mild degree of congestion.    Otherwise no significant change as compared with the previous exam    Electronically signed by: Jerry Gayle  Date:    04/01/2025  Time:    08:09         Medication List        START taking these medications      levoFLOXacin 500 MG tablet  Commonly known as: LEVAQUIN  Take 0.5 tablets (250 mg total) by mouth once daily. for 6 days            CONTINUE taking these medications      alendronate 70 MG tablet  Commonly known as: FOSAMAX     biotin 10,000 mcg Cap     calcitRIOL 0.5 MCG Cap  Commonly known as: ROCALTROL     clopidogreL 75 mg tablet  Commonly known as: PLAVIX     DULoxetine 60 MG capsule  Commonly known as: CYMBALTA     ferrous sulfate 325 (65 FE) MG EC tablet     levothyroxine 100 MCG tablet  Commonly known as: SYNTHROID     omeprazole 40 MG capsule  Commonly known as: PRILOSEC     QUEtiapine 25 MG Tab  Commonly known as: SEROQUEL     sodium bicarbonate 650 MG tablet     VITAMIN C 100 MG tablet  Generic drug: ascorbic acid (vitamin C)     vitamin D 1000 units Tab  Commonly known as: VITAMIN D3     WOMEN'S PROBIOTIC ORAL            STOP taking these medications      ciprofloxacin HCl 500 MG tablet  Commonly known as: CIPRO               Where to Get Your Medications        These medications were sent to [id number] - [place], [place] - [address] AT [facility] & [facility] [address]  [address] [place] [address] [address]      Phone: [phone]   levoFLOXacin 500 MG tablet          Explained in detail to the patient about the discharge plan, medications, and follow-up visits. Pt understands and agrees with the treatment plan  Discharge Disposition: Short Term Hospital   Discharged Condition: stable  Diet-   Dietary Orders (From admission, onward)       Start     Ordered     03/28/25 1657  Diet Renal On Dialysis  Diet effective now         03/28/25 1656                   Medications Per DC med rec  Activities as tolerated   Follow-up Information       Judd Castro MD Follow up in 1 week(s).    Specialty: Urology  Why: stent f/u  Contact information:  [facility] [address] [place] [address]  [phone]               Deanne Molina MD Follow up.    Specialty: Nephrology  Why: ARF, need for dialysis f/u  Contact information:  [facility] [address]  [phone]                           For further questions contact hospitalist office    Discharge time 33 minutes    For worsening symptoms, chest pain, shortness of breath, increased abdominal pain, high grade fever, stroke or stroke like symptoms, immediately go to the nearest Emergency Room or call 911 as soon as possible.      Scotty Marquez M.D, on 4/4/2025. at 10:58 AM.

## 2025-04-05 ENCOUNTER — RESULTS FOLLOW-UP (OUTPATIENT)
Dept: EMERGENCY MEDICINE | Facility: HOSPITAL | Age: 75
End: 2025-04-05

## 2025-04-05 VITALS
RESPIRATION RATE: 20 BRPM | HEIGHT: 62 IN | HEART RATE: 64 BPM | TEMPERATURE: 98 F | OXYGEN SATURATION: 98 % | BODY MASS INDEX: 31.16 KG/M2 | DIASTOLIC BLOOD PRESSURE: 58 MMHG | WEIGHT: 169.31 LBS | SYSTOLIC BLOOD PRESSURE: 118 MMHG

## 2025-04-05 LAB
ALBUMIN SERPL-MCNC: 2 G/DL (ref 3.4–4.8)
BACTERIA BLD CULT: NORMAL
BACTERIA BLD CULT: NORMAL
BASOPHILS # BLD AUTO: 0.04 X10(3)/MCL
BASOPHILS NFR BLD AUTO: 0.4 %
BUN SERPL-MCNC: 19.1 MG/DL (ref 9.8–20.1)
CALCIUM SERPL-MCNC: 7.8 MG/DL (ref 8.4–10.2)
CHLORIDE SERPL-SCNC: 109 MMOL/L (ref 98–107)
CO2 SERPL-SCNC: 20 MMOL/L (ref 23–31)
CREAT SERPL-MCNC: 4.4 MG/DL (ref 0.55–1.02)
EOSINOPHIL # BLD AUTO: 0.21 X10(3)/MCL (ref 0–0.9)
EOSINOPHIL NFR BLD AUTO: 2.3 %
ERYTHROCYTE [DISTWIDTH] IN BLOOD BY AUTOMATED COUNT: 13.6 % (ref 11.5–17)
GFR SERPLBLD CREATININE-BSD FMLA CKD-EPI: 10 ML/MIN/1.73/M2
GLUCOSE SERPL-MCNC: 82 MG/DL (ref 82–115)
HCT VFR BLD AUTO: 31.6 % (ref 37–47)
HGB BLD-MCNC: 10.1 G/DL (ref 12–16)
IMM GRANULOCYTES # BLD AUTO: 0.12 X10(3)/MCL (ref 0–0.04)
IMM GRANULOCYTES NFR BLD AUTO: 1.3 %
LYMPHOCYTES # BLD AUTO: 1.57 X10(3)/MCL (ref 0.6–4.6)
LYMPHOCYTES NFR BLD AUTO: 16.8 %
MAGNESIUM SERPL-MCNC: 1.7 MG/DL (ref 1.6–2.6)
MCH RBC QN AUTO: 32.5 PG (ref 27–31)
MCHC RBC AUTO-ENTMCNC: 32 G/DL (ref 33–36)
MCV RBC AUTO: 101.6 FL (ref 80–94)
MONOCYTES # BLD AUTO: 0.64 X10(3)/MCL (ref 0.1–1.3)
MONOCYTES NFR BLD AUTO: 6.9 %
NEUTROPHILS # BLD AUTO: 6.74 X10(3)/MCL (ref 2.1–9.2)
NEUTROPHILS NFR BLD AUTO: 72.3 %
NRBC BLD AUTO-RTO: 0 %
PHOSPHATE SERPL-MCNC: 3.8 MG/DL (ref 2.3–4.7)
PLATELET # BLD AUTO: 219 X10(3)/MCL (ref 130–400)
PMV BLD AUTO: 10.8 FL (ref 7.4–10.4)
POTASSIUM SERPL-SCNC: 3.8 MMOL/L (ref 3.5–5.1)
RBC # BLD AUTO: 3.11 X10(6)/MCL (ref 4.2–5.4)
SODIUM SERPL-SCNC: 140 MMOL/L (ref 136–145)
WBC # BLD AUTO: 9.32 X10(3)/MCL (ref 4.5–11.5)

## 2025-04-05 PROCEDURE — 99232 SBSQ HOSP IP/OBS MODERATE 35: CPT | Mod: ,,, | Performed by: NURSE PRACTITIONER

## 2025-04-05 PROCEDURE — 85025 COMPLETE CBC W/AUTO DIFF WBC: CPT | Performed by: NURSE PRACTITIONER

## 2025-04-05 PROCEDURE — 25000003 PHARM REV CODE 250: Performed by: INTERNAL MEDICINE

## 2025-04-05 PROCEDURE — 36415 COLL VENOUS BLD VENIPUNCTURE: CPT | Performed by: STUDENT IN AN ORGANIZED HEALTH CARE EDUCATION/TRAINING PROGRAM

## 2025-04-05 PROCEDURE — 80069 RENAL FUNCTION PANEL: CPT | Performed by: STUDENT IN AN ORGANIZED HEALTH CARE EDUCATION/TRAINING PROGRAM

## 2025-04-05 PROCEDURE — 90935 HEMODIALYSIS ONE EVALUATION: CPT

## 2025-04-05 PROCEDURE — 25000003 PHARM REV CODE 250: Performed by: HOSPITALIST

## 2025-04-05 PROCEDURE — 83735 ASSAY OF MAGNESIUM: CPT | Performed by: STUDENT IN AN ORGANIZED HEALTH CARE EDUCATION/TRAINING PROGRAM

## 2025-04-05 RX ORDER — LEVOFLOXACIN 500 MG/1
250 TABLET, FILM COATED ORAL DAILY
Qty: 3 TABLET | Refills: 0 | Status: SHIPPED | OUTPATIENT
Start: 2025-04-05 | End: 2025-04-11

## 2025-04-05 RX ADMIN — MIDODRINE HYDROCHLORIDE 10 MG: 5 TABLET ORAL at 05:04

## 2025-04-05 RX ADMIN — FERROUS SULFATE TAB 325 MG (65 MG ELEMENTAL FE) 1 EACH: 325 (65 FE) TAB at 10:04

## 2025-04-05 RX ADMIN — LEVOTHYROXINE SODIUM 137 MCG: 0.03 TABLET ORAL at 05:04

## 2025-04-05 RX ADMIN — CALCITRIOL CAPSULES 0.25 MCG 0.25 MCG: 0.25 CAPSULE ORAL at 10:04

## 2025-04-05 RX ADMIN — MIDODRINE HYDROCHLORIDE 10 MG: 5 TABLET ORAL at 02:04

## 2025-04-05 NOTE — NURSING
03/28/25 1218   Post-Hemodialysis Assessment   Duration of Treatment 180 minutes   Total UF (mL) 0 mL   Patient Response to Treatment toelratd hd well. vss t hrougout. no complaint noted. avg fx well and able to maintain ordered bfr.       
   04/03/25 1604   Post-Hemodialysis Assessment   Blood Volume Processed (Liters) 53.7 L   Dialyzer Clearance Lightly streaked   Duration of Treatment 180 minutes   Total UF (mL) 500 mL   Patient Response to Treatment tolerated well       
"Artificial Intelligence Notification  [facility]  [address] [address]  Phone: [phone]     This documentation was triggered by an Artificial Intelligence Notification.     Admit Date: 3/27/2025   LOS: 1  Code Status: Full Code  : 1950  Age: 74 y.o.  Weight:   Wt Readings from Last 1 Encounters:   25 72.6 kg (160 lb 0.9 oz)        Sex: female  Bed: 814/814 A  MRN: [id number]  Attending Physician: Tyron Doe MD     Date of Alert: 2025  Time AI Alert Received: 0851             Vitals:    25 0700   BP: (!) 86/42   Pulse: 72   Resp: 18   Temp: 97.4 °F (36.3 °C)       Artificial Intelligence alert discussed with Provider:     Name: KASEY Moise   Date/Time of Provider Notification: 3/28/25 0950      Patient Condition: Patient notably hypotensive this morning. She was transferred here s/t UTI w/ chronic lentz and CKD IV. Per urology, needs additional renal stenting today. Nephrology following and patient agreed to HD after much debate. Palliative following, patient wishes to continue full code status and aggressive treatment. Patient was apprehensive about HD due to personal reasons, noted to have anxiety this morning treated with xanax this morning. RIJ was placed yesterday and bicarb gtt currently at 75 ml/hr. Leukocytosis, Blood Cx (gram neg rods), on rocephin. Hypotension being addressed with midodrine. Marked hypoglycemia treated with D50 this morning. Patient currently in dialysis, states "I feel ok I guess." CBG rechecked while in dialysis 75. Care ongoing by primary team. No need for ICU. Notify with any decompensation [phone].      "
Artificial Intelligence Notification  [facility]  [address]  [place] [address]  Phone: [phone]     This documentation was triggered by an Artificial Intelligence Notification.     Admit Date: 3/27/2025   LOS: 2  Code Status: Full Code  : 1950  Age: 74 y.o.  Weight:   Wt Readings from Last 1 Encounters:   25 72.6 kg (160 lb 0.9 oz)        Sex: female  Bed: [address] [place] [address] A  MRN: [id number]  Attending Physician: Tyron Doe MD     Date of Alert: 2025  Time AI Alert Received: 0450             Vitals:    25 0454   BP: (!) 80/46   Pulse: 83   Resp:    Temp:        Patient Condition: Please see previous AI alert. Hypotensive this morning. Midodrine given. Per nurse Ania, patient did receive pain pill around midnight that could be contributing to low pressure. Will follow up.  
Artificial Intelligence Notification  [facility]  [address] [address]  Phone: [phone]     This documentation was triggered by an Artificial Intelligence Notification.     Admit Date: 3/27/2025   LOS: 1  Code Status: Full Code  : 1950  Age: 74 y.o.  Weight:   Wt Readings from Last 1 Encounters:   25 72.6 kg (160 lb 0.9 oz)        Sex: female  Bed: 814/[facility] A  MRN: [id number]  Attending Physician: Tyron Doe MD     Date of Alert: 2025  Time AI Alert Received:              Vitals:    25   BP: (!) 90/54   Pulse: 71   Resp:    Temp: 97.7 °F (36.5 °C)     Patient Condition: Chart reviewed. Patient improved today after dialysis and antegrade stent placement/nephrostomy placement. Spoke with nurse Ania, states she did recheck her BP with rounds and it is improved with SBP in low 100s. Discussed to please call if any deterioration occurs. Labs are ordered to be checked in the a.m. Thanks!   
Patient approached 3 times throughout the day regarding starting dialysis and refused all three times, Dr. Molina notified  
Patient was given discharge instructions, home medications and follow up appointments. She was discharged to home in stable condition with all questions answered.     
Pt off unit in dialysis  
yes

## 2025-04-05 NOTE — PROGRESS NOTES
04/05/25 1420        Hemodialysis AV Fistula Left upper arm   No placement date or time found.   Present Prior to Hospital Arrival?: Yes  Location: Left upper arm   Site Assessment Clean;Dry;Intact;No redness;No swelling   Patency Present;Thrill;Bruit   Status Deaccessed   Dressing Gauze   Post-Hemodialysis Assessment   Blood Volume Processed (Liters) 63 L   Dialyzer Clearance Lightly streaked   Duration of Treatment 180 minutes   Additional Fluid Intake (mL) 500 mL   Total UF (mL) 1000 mL   Net Fluid Removal 500   Patient Response to Treatment tolerated well   Post-Hemodialysis Comments tx complete, pt reinfused, avg deaccessed, hemostasis acheived, gauze and tape applied

## 2025-04-05 NOTE — DISCHARGE SUMMARY
[facility] Medicine Discharge Summary    Admit Date: 3/27/2025  Discharge Date and Time: 4/5/20253:41 PM  Admitting Physician: GIDEON Team  Discharging Physician: Scotty Dixon MD.  Primary Care Physician: Gregor Heaton MD  Consults:  Nephrology, Urology, palliative Care    Discharge Diagnoses:  Severe sepsis secondary to UTI  Acute on chronic renal failure, baseline stage IV   Hyperkalemia   Obstructive uropathy  Complicated UTI, POA   Metabolic acidosis  Anemia, suspect combined inflammatory and iron-deficiency  Hypotension with history of hypertension  Coronary artery disease  Hypothyroidism     Hospital Course:   74-year-old male transferred from an outside facility secondary to complicated UTI involving a suprapubic catheter.  She originally presented for left lower quadrant abdominal pain.  She also has a past medical history of chronic kidney disease stage IV, colon cancer with ileostomy in place, CAD, and hypothyroidism.  She was transferred for Urology consultation as a CT of the abdomen revealed increasing bilateral hydro ureteral nephrosis despite bilateral ureteral stents.     In the emergency room she is noted to have some mild hypotension down to 91/42.  She has a significant leukocytosis of 15.02.  Anemia with a hemoglobin of 8.3.  Elevated potassium at 5.2 he has significant electrolyte abnormalities.  Her urinalysis showed many bacteria with pyuria and hematuria.She was started empirically on IV Rocephin and admitted to the hospitalist group.  She was evaluated by Nephrology.  They recommended hemodialysis due to worsening renal function and hyperkalemia.  Patient refused.  Palliative Care was consulted and patient wishes to remain full code and seek aggressive treatment.  She has not yet decided about hemodialysis.  Urology evaluated.  Recommending continuing antibiotics.  They were previously unable to placed retrograde stents due to poor visualization.   Recommended IR consultation for right antegrade stenting     After further conversations with patient and family patient was finally agreeable to hemodialysis.  Taken this morning and tolerated well.  She has also taken for antegrade right nephrostomy tube placement on 3/28.  Returned to the floor in stable condition.  Will follow up her labs tomorrow morning.  Blood cultures updated this morning.  Still growing Gram-negative rods with PCR positive for Enterobacterales.  She is trend fused 2 units of packed red blood cells on 3/31 due to continued anemia.  Likely some bone marrow suppression in the setting of renal disease and severe sepsis.      4/3: Patient was seen and evaluated at bedside, oxygenating well on room air.  Treating patient for urosepsis secondary to obstructive uropathy.  Receiving dialysis via fistula.  Change Zosyn to cefepime for now.  Antibiotic end date of 04/10.  Follow up with Urology outpatient.  4/4: Patient was seen and evaluated at bedside, o2'ing well on RA. Nephrology has seen the patient and informed her that she can be scheduled for dialysis outpatient and she will likely require dialysis 2x a week. I have informed the patient that abx will switch to levaquin PO with continued treatment until 4/10. Patient will f/u with urology and nephrology outpatient for appropriate needs. Patient has verbalized her understanding of this hospital stay and is safe for d/c at this time.      Case was discussed with patient's nurse and  on the floor.  Pt was seen and examined on the day of discharge    Vitals:  VITAL SIGNS: 24 HRS MIN & MAX LAST   Temp  Min: 97.5 °F (36.4 °C)  Max: 98.5 °F (36.9 °C) 97.5 °F (36.4 °C)   BP  Min: 105/53  Max: 140/77 (!) 117/58   Pulse  Min: 62  Max: 69  62   Resp  Min: 18  Max: 20 20   SpO2  Min: 97 %  Max: 98 % 98 %       Physical Exam:  General: In no acute distress, afebrile, right IJ TLC  Chest: Clear to auscultation bilaterally  Heart: RRR, +S1, S2, no  appreciable murmur  Abdomen: Soft, nontender, BS +, right nephrostomy  MSK: Warm, no lower extremity edema, no clubbing or cyanosis  Neurologic: Alert and oriented x4, Cranial nerve II-XII intact, Strength 5/5 in all 4 extremities    Procedures Performed: No admission procedures for hospital encounter.     Significant Diagnostic Studies: See Full reports for all details    Recent Labs   Lab 04/03/25  0807 04/04/25  0516 04/05/25  0537   WBC 9.72 8.27 9.32   RBC 3.22* 3.05* 3.11*   HGB 10.4* 10.0* 10.1*   HCT 33.1* 30.6* 31.6*   .8* 100.3* 101.6*   MCH 32.3* 32.8* 32.5*   MCHC 31.4* 32.7* 32.0*   RDW 13.7 13.7 13.6    197 219   MPV 10.8* 10.9* 10.8*       Recent Labs   Lab 04/01/25  0507 04/02/25  0502 04/03/25  0807 04/04/25  0516 04/05/25  0537    138 141 138 140   K 3.9 4.0 3.7 3.8 3.8   CL 98 98 100 105 109*   CO2 25 24 27 23 20*   BUN 20.1 25.0* 30.3* 12.1 19.1   CREATININE 5.36* 5.82* 6.19* 3.35* 4.40*   CALCIUM 7.9* 7.3* 8.2* 7.5* 7.8*   MG  --   --   --  1.60 1.70   ALBUMIN 2.1* 2.0* 1.8* 1.9* 2.0*   ALKPHOS 116 99 80  --   --    ALT 8 6 6  --   --    AST 18 20 11  --   --    BILITOT 1.4 1.1 0.8  --   --         Microbiology Results (last 7 days)       Procedure Component Value Units Date/Time    Blood Culture [[id number]]  (Normal) Collected: 03/31/25 0707    Order Status: Completed Specimen: Blood from Hand, Right Updated: 04/05/25 0901     Blood Culture No Growth at 5 days    Blood Culture [[id number]]  (Normal) Collected: 03/31/25 0637    Order Status: Completed Specimen: Blood from Arm, Right Updated: 04/05/25 0901     Blood Culture No Growth at 5 days             X-Ray Chest 1 View  Narrative: EXAMINATION:  XR CHEST 1 VIEW    CPT 76548    CLINICAL HISTORY:  SOB;    COMPARISON:  March 27, 2025    FINDINGS:  Examination reveals cardiomediastinal silhouette to be unchanged as compared with the previous exam.    Some increase interstitial and pulmonary vascular markings may indicate a  mild degree of congestion.    No focal consolidative changes otherwise identified.    Central line is seen from a right approach tip in the region of the right atrium  Impression: Increase in interstitial and pulmonary vascular markings may indicate a mild degree of congestion.    Otherwise no significant change as compared with the previous exam    Electronically signed by: Jerry Gayle  Date:    04/01/2025  Time:    08:09         Medication List        START taking these medications      levoFLOXacin 500 MG tablet  Commonly known as: LEVAQUIN  Take 0.5 tablets (250 mg total) by mouth once daily. for 6 days            CONTINUE taking these medications      alendronate 70 MG tablet  Commonly known as: FOSAMAX     biotin 10,000 mcg Cap     calcitRIOL 0.5 MCG Cap  Commonly known as: ROCALTROL     clopidogreL 75 mg tablet  Commonly known as: PLAVIX     DULoxetine 60 MG capsule  Commonly known as: CYMBALTA     ferrous sulfate 325 (65 FE) MG EC tablet     levothyroxine 100 MCG tablet  Commonly known as: SYNTHROID     omeprazole 40 MG capsule  Commonly known as: PRILOSEC     QUEtiapine 25 MG Tab  Commonly known as: SEROQUEL     sodium bicarbonate 650 MG tablet     VITAMIN C 100 MG tablet  Generic drug: ascorbic acid (vitamin C)     vitamin D 1000 units Tab  Commonly known as: VITAMIN D3     WOMEN'S PROBIOTIC ORAL            STOP taking these medications      ciprofloxacin HCl 500 MG tablet  Commonly known as: CIPRO               Where to Get Your Medications        These medications were sent to [id number] - [place], [place]  [address] AT [facility] & [facility] [address] [place] [address]      Phone: [phone]   levoFLOXacin 500 MG tablet          Explained in detail to the patient about the discharge plan, medications, and follow-up visits. Pt understands and agrees with the treatment plan  Discharge Disposition: Short Term Hospital   Discharged Condition: stable  Diet-    Dietary Orders (From admission, onward)       Start     Ordered    03/28/25 1657  Diet Renal On Dialysis  Diet effective now         03/28/25 1656                   Medications Per DC med rec  Activities as tolerated   Follow-up Information       Judd Castro MD Follow up in 1 week(s).    Specialty: Urology  Why: stent f/u  Contact information:  [address] [facility] [address]  [place] [address]  [phone]               Lester Max MD Follow up.    Specialty: Nephrology  Why: ARF, need for dialysis f/u  Contact information:  [address] [facility] [address] [phone]               [facility] Follow up.    Why: Dialysis is setup for Tuesday, Thursday, Saturday @ 12pm  Contact information:  [address] [facility] [address]  [phone]             [facility] Follow up.    Specialty: Home Health Services  Why: This is your home health provider. You can contact your provider with any questions or concerns.  Contact information:  [address]  [place] [address]  [phone]                           For further questions contact hospitalist office    Discharge time 33 minutes    For worsening symptoms, chest pain, shortness of breath, increased abdominal pain, high grade fever, stroke or stroke like symptoms, immediately go to the nearest Emergency Room or call 911 as soon as possible.      Scotty Marquez M.D, on 4/5/2025. at 3:41 PM.

## 2025-04-05 NOTE — PROGRESS NOTES
[facility]rology Inpatient Progress Note      HPI:     Patient Name: Kam Reyes  Admission Date: 3/27/2025  Hospital Length of Stay: 9 days  Code Status: Full Code   Attending Physician: Scotty Dixon MD   Primary Care Physician: Gregor Heaton MD  Principal Problem:Pyelonephritis      Today patient seen and examined  Labs, recent events, imaging and medications reviewed for this hospital stay  Suprapubic tube exchanged yesterday.   She is doing well and has no complaints. Noted plans for discharge after HD today.     Review of Systems:   No dyspnea no abdominal pain no nausea vomiting good drainage from the nephrostomy tube  Otherwise no change      Medications:   Scheduled Meds:   calcitRIOL  0.25 mcg Oral Daily    ceFEPIme  1 g Intravenous Q24H    clopidogreL  75 mg Oral QHS    DULoxetine  60 mg Oral QHS    ferrous sulfate  1 tablet Oral Daily    levothyroxine  137 mcg Oral Before breakfast    midodrine  10 mg Oral TID WM    QUEtiapine  25 mg Oral QHS     Continuous Infusions:      Vitals:     Vitals:    04/04/25 2345 04/05/25 0339 04/05/25 0716 04/05/25 0848   BP: (!) 120/58 (!) 105/53  (!) 117/58   Pulse: 66 69 69 62   Resp: 18 20     Temp: 97.9 °F (36.6 °C) 98.5 °F (36.9 °C)  97.5 °F (36.4 °C)   TempSrc: Oral Oral  Oral   SpO2: 97% 97%  98%   Weight:       Height:             I/O last 3 completed shifts:  In: 9055 [P.O.:960; I.V.:8095]  Out: 2016 [Urine:815; Stool:1201]    Intake/Output Summary (Last 24 hours) at 4/5/2025 0930  Last data filed at 4/5/2025 0547  Gross per 24 hour   Intake 720 ml   Output 1066 ml   Net -346 ml       Physical Exam:   General: no acute distress, awake, alert pale  Eyes: PERRLA, EOMI, conjunctiva clear, eyelids without swelling  HENT: atraumatic, oropharynx and nasal mucosa patent  Neck: full ROM, no JVD, no thyromegaly or lymphadenopathy  Respiratory: equal, unlabored, clear to auscultation A/P  Cardiovascular: RRR without rub; BL radial and pedal pulses felt  Edema:  "none  Gastrointestinal: soft, non-tender, non-distended; positive bowel sounds; no masses to palpation  Genitourinary:  Right nephrostomy/suprapubic cath  Musculoskeletal: full ROM without limitation or discomfort  Integumentary: warm, dry; no rashes, wounds, or skin lesions  Neurological: oriented, appropriate, no acute deficits  Dialysis access:  Left upper arm AV graft +bruit/thrill      Labs:     Chemistries:   Recent Labs   Lab 04/01/25  0507 04/02/25  0502 04/03/25  0807 04/03/25  0934 04/04/25  0516 04/04/25  1812 04/05/25  0537    138 141  --  138  --  140   K 3.9 4.0 3.7  --  3.8  --  3.8   CL 98 98 100  --  105  --  109*   CO2 25 24 27  --  23  --  20*   BUN 20.1 25.0* 30.3*  --  12.1  --  19.1   CREATININE 5.36* 5.82* 6.19*  --  3.35*  --  4.40*   CALCIUM 7.9* 7.3* 8.2*  --  7.5*  --  7.8*   BILITOT 1.4 1.1 0.8  --   --   --   --    ALKPHOS 116 99 80  --   --   --   --    ALT 8 6 6  --   --   --   --    AST 18 20 11  --   --   --   --    GLUCOSE 75* 80* 80*  --  77*  --  82   MG  --   --   --   --  1.60  --  1.70   PHOS  --  4.6 5.2*   < > 3.0 3.4 3.8    < > = values in this interval not displayed.        CBC/Anemia Labs: Coags:    Recent Labs   Lab 04/03/25  0807 04/04/25  0516 04/05/25  0537   WBC 9.72 8.27 9.32   HGB 10.4* 10.0* 10.1*   HCT 33.1* 30.6* 31.6*    197 219   .8* 100.3* 101.6*   RDW 13.7 13.7 13.6    No results for input(s): "PT", "INR", "APTT" in the last 168 hours.       Impression:     CKD 4  GHADA needing dialysis  Obstructive uropathy status post right nephrostomy/suprapubic catheter  UTI      Plan:     Plans for discharge post after dialysis today.   Chair time is TTS at 12/[facility] Leena Hope ACNP-BC    "

## 2025-04-05 NOTE — PLAN OF CARE
Problem: Adult Inpatient Plan of Care  Goal: Plan of Care Review  Outcome: Met  Goal: Patient-Specific Goal (Individualized)  Outcome: Met  Goal: Absence of Hospital-Acquired Illness or Injury  Outcome: Met  Goal: Optimal Comfort and Wellbeing  Outcome: Met  Goal: Readiness for Transition of Care  Outcome: Met     Problem: Wound  Goal: Optimal Coping  Outcome: Met  Goal: Optimal Functional Ability  Outcome: Met  Goal: Absence of Infection Signs and Symptoms  Outcome: Met  Goal: Improved Oral Intake  Outcome: Met  Goal: Optimal Pain Control and Function  Outcome: Met  Goal: Skin Health and Integrity  Outcome: Met  Goal: Optimal Wound Healing  Outcome: Met     Problem: Skin Injury Risk Increased  Goal: Skin Health and Integrity  Outcome: Met     Problem: Hemodialysis  Goal: Safe, Effective Therapy Delivery  Outcome: Met  Goal: Effective Tissue Perfusion  Outcome: Met  Goal: Absence of Infection Signs and Symptoms  Outcome: Met     Problem: Coping Ineffective  Goal: Effective Coping  Outcome: Met     Problem: Infection  Goal: Absence of Infection Signs and Symptoms  Outcome: Met     Problem: Fall Injury Risk  Goal: Absence of Fall and Fall-Related Injury  Outcome: Met

## 2025-04-07 ENCOUNTER — PATIENT OUTREACH (OUTPATIENT)
Dept: ADMINISTRATIVE | Facility: CLINIC | Age: 75
End: 2025-04-07
Payer: MEDICARE

## 2025-04-07 NOTE — PROGRESS NOTES
C3 nurse spoke with Kam Reyes for a TCC post hospital discharge follow up call. The patient stated she has a scheduled HOSFU appointment with Gregor Heaton MD on 4/16.

## 2025-04-09 NOTE — PHYSICIAN QUERY
Please clarify the Sepsis diagnosis.    Other infectious disease (please specify): pyelonephritis: listed in chart

## (undated) DEVICE — DRAPE T CYSTOSCOPY STERILE

## (undated) DEVICE — SPONGE DERMACEA 4X4IN 12PLY

## (undated) DEVICE — KIT SURGICAL COLON .25 1.1OZ

## (undated) DEVICE — GLOVE SIGNATURE ESSNTL LTX 7.5

## (undated) DEVICE — DRAPE LEGGINGS CUFF 33X51IN

## (undated) DEVICE — TRAY SKIN SCRUB WET PREMIUM

## (undated) DEVICE — BITE BLOCK ADULT LATEX FREE

## (undated) DEVICE — FORCEP CAPTURA PRO SPK 230CM

## (undated) DEVICE — SET CYSTO IRR DRP CHMBR 84IN

## (undated) DEVICE — TOWEL OR DISP STRL BLUE 4/PK

## (undated) DEVICE — COVER TABLE 44X90 STERILE

## (undated) DEVICE — GOWN POLY REINF BRTH SLV XL